# Patient Record
Sex: FEMALE | Race: WHITE | NOT HISPANIC OR LATINO | Employment: FULL TIME | URBAN - METROPOLITAN AREA
[De-identification: names, ages, dates, MRNs, and addresses within clinical notes are randomized per-mention and may not be internally consistent; named-entity substitution may affect disease eponyms.]

---

## 2017-03-17 ENCOUNTER — GENERIC CONVERSION - ENCOUNTER (OUTPATIENT)
Dept: OTHER | Facility: OTHER | Age: 42
End: 2017-03-17

## 2017-03-17 ENCOUNTER — LAB CONVERSION - ENCOUNTER (OUTPATIENT)
Dept: OTHER | Facility: OTHER | Age: 42
End: 2017-03-17

## 2017-03-17 LAB — HBA1C MFR BLD HPLC: 6.8 % OF TOTAL HGB

## 2017-03-21 ENCOUNTER — ALLSCRIPTS OFFICE VISIT (OUTPATIENT)
Dept: OTHER | Facility: OTHER | Age: 42
End: 2017-03-21

## 2017-04-02 ENCOUNTER — OFFICE VISIT (OUTPATIENT)
Dept: URGENT CARE | Facility: MEDICAL CENTER | Age: 42
End: 2017-04-02
Payer: COMMERCIAL

## 2017-04-02 PROCEDURE — 99213 OFFICE O/P EST LOW 20 MIN: CPT

## 2017-04-03 ENCOUNTER — ALLSCRIPTS OFFICE VISIT (OUTPATIENT)
Dept: OTHER | Facility: OTHER | Age: 42
End: 2017-04-03

## 2017-04-04 ENCOUNTER — LAB CONVERSION - ENCOUNTER (OUTPATIENT)
Dept: OTHER | Facility: OTHER | Age: 42
End: 2017-04-04

## 2017-04-04 ENCOUNTER — GENERIC CONVERSION - ENCOUNTER (OUTPATIENT)
Dept: OTHER | Facility: OTHER | Age: 42
End: 2017-04-04

## 2017-04-04 ENCOUNTER — TRANSCRIBE ORDERS (OUTPATIENT)
Dept: ADMINISTRATIVE | Facility: HOSPITAL | Age: 42
End: 2017-04-04

## 2017-04-04 DIAGNOSIS — K62.5 HEMORRHAGE OF RECTUM AND ANUS: ICD-10-CM

## 2017-04-04 DIAGNOSIS — R10.9 ABDOMINAL PAIN, UNSPECIFIED SITE: Primary | ICD-10-CM

## 2017-04-04 LAB
A/G RATIO (HISTORICAL): 1.1 (CALC) (ref 1–2.5)
ALBUMIN SERPL BCP-MCNC: 3.5 G/DL (ref 3.6–5.1)
ALP SERPL-CCNC: 67 U/L (ref 33–115)
ALT SERPL W P-5'-P-CCNC: 16 U/L (ref 6–29)
AMYLASE (HISTORICAL): 16 U/L (ref 21–101)
AST SERPL W P-5'-P-CCNC: 13 U/L (ref 10–30)
BILIRUB SERPL-MCNC: 0.2 MG/DL (ref 0.2–1.2)
BUN SERPL-MCNC: 10 MG/DL (ref 7–25)
BUN/CREA RATIO (HISTORICAL): ABNORMAL (CALC) (ref 6–22)
CALCIUM SERPL-MCNC: 8.8 MG/DL (ref 8.6–10.2)
CHLORIDE SERPL-SCNC: 103 MMOL/L (ref 98–110)
CO2 SERPL-SCNC: 26 MMOL/L (ref 20–31)
CREAT SERPL-MCNC: 0.82 MG/DL (ref 0.5–1.1)
GAMMA GLOBULIN (HISTORICAL): 3.2 G/DL (CALC) (ref 1.9–3.7)
GLUCOSE (HISTORICAL): 91 MG/DL (ref 65–99)
LIPASE SERPL-CCNC: 16 U/L (ref 7–60)
POTASSIUM SERPL-SCNC: 3.8 MMOL/L (ref 3.5–5.3)
SODIUM SERPL-SCNC: 138 MMOL/L (ref 135–146)
TOTAL PROTEIN (HISTORICAL): 6.7 G/DL (ref 6.1–8.1)

## 2017-04-05 ENCOUNTER — GENERIC CONVERSION - ENCOUNTER (OUTPATIENT)
Dept: OTHER | Facility: OTHER | Age: 42
End: 2017-04-05

## 2017-04-05 LAB — CLOSTRIDIUM DIFFICILE TOXIN (HISTORICAL): NOT DETECTED

## 2017-04-07 ENCOUNTER — ALLSCRIPTS OFFICE VISIT (OUTPATIENT)
Dept: OTHER | Facility: OTHER | Age: 42
End: 2017-04-07

## 2017-04-08 LAB
CULTURE RESULT (HISTORICAL): NORMAL
CULTURE RESULT (HISTORICAL): NORMAL
SHIGA TOXIN TEST (HISTORICAL): NORMAL

## 2017-04-10 ENCOUNTER — GENERIC CONVERSION - ENCOUNTER (OUTPATIENT)
Dept: OTHER | Facility: OTHER | Age: 42
End: 2017-04-10

## 2017-04-13 ENCOUNTER — HOSPITAL ENCOUNTER (OUTPATIENT)
Dept: RADIOLOGY | Age: 42
Discharge: HOME | End: 2017-04-13
Payer: COMMERCIAL

## 2017-04-13 DIAGNOSIS — K62.5 HEMORRHAGE OF RECTUM AND ANUS: ICD-10-CM

## 2017-04-13 DIAGNOSIS — R10.9 ABDOMINAL PAIN, UNSPECIFIED SITE: ICD-10-CM

## 2017-04-13 PROCEDURE — 74177 CT ABD & PELVIS W/CONTRAST: CPT

## 2017-04-13 RX ADMIN — IOHEXOL 100 ML: 350 INJECTION, SOLUTION INTRAVENOUS at 09:03

## 2017-04-17 ENCOUNTER — GENERIC CONVERSION - ENCOUNTER (OUTPATIENT)
Dept: OTHER | Facility: OTHER | Age: 42
End: 2017-04-17

## 2017-04-17 DIAGNOSIS — R10.9 ABDOMINAL PAIN: ICD-10-CM

## 2017-04-17 DIAGNOSIS — N83.292 OTHER OVARIAN CYST, LEFT SIDE: ICD-10-CM

## 2017-05-04 ENCOUNTER — HOSPITAL ENCOUNTER (OUTPATIENT)
Dept: ULTRASOUND IMAGING | Facility: HOSPITAL | Age: 42
Discharge: HOME | End: 2017-05-04
Payer: COMMERCIAL

## 2017-05-04 ENCOUNTER — TRANSCRIBE ORDERS (OUTPATIENT)
Dept: ADMINISTRATIVE | Facility: HOSPITAL | Age: 42
End: 2017-05-04

## 2017-05-04 DIAGNOSIS — R10.9 ABDOMINAL PAIN: ICD-10-CM

## 2017-05-04 DIAGNOSIS — N83.292 OTHER OVARIAN CYST, LEFT SIDE: ICD-10-CM

## 2017-05-04 PROCEDURE — 76856 US EXAM PELVIC COMPLETE: CPT

## 2017-05-04 PROCEDURE — 76830 TRANSVAGINAL US NON-OB: CPT

## 2017-05-05 ENCOUNTER — GENERIC CONVERSION - ENCOUNTER (OUTPATIENT)
Dept: OTHER | Facility: OTHER | Age: 42
End: 2017-05-05

## 2017-05-09 ENCOUNTER — ALLSCRIPTS OFFICE VISIT (OUTPATIENT)
Dept: OTHER | Facility: OTHER | Age: 42
End: 2017-05-09

## 2017-05-19 ENCOUNTER — ALLSCRIPTS OFFICE VISIT (OUTPATIENT)
Dept: OTHER | Facility: OTHER | Age: 42
End: 2017-05-19

## 2017-05-19 ENCOUNTER — TRANSCRIBE ORDERS (OUTPATIENT)
Dept: ADMINISTRATIVE | Facility: HOSPITAL | Age: 42
End: 2017-05-19

## 2017-05-19 DIAGNOSIS — N83.292 COMPLEX CYST OF LEFT OVARY: Primary | ICD-10-CM

## 2017-05-25 ENCOUNTER — ALLSCRIPTS OFFICE VISIT (OUTPATIENT)
Dept: OTHER | Facility: OTHER | Age: 42
End: 2017-05-25

## 2017-06-06 ENCOUNTER — HOSPITAL ENCOUNTER (OUTPATIENT)
Dept: ULTRASOUND IMAGING | Facility: HOSPITAL | Age: 42
Discharge: HOME | End: 2017-06-06
Attending: OBSTETRICS & GYNECOLOGY
Payer: COMMERCIAL

## 2017-06-06 DIAGNOSIS — N83.292 COMPLEX CYST OF LEFT OVARY: ICD-10-CM

## 2017-06-06 PROCEDURE — 76830 TRANSVAGINAL US NON-OB: CPT

## 2017-06-06 PROCEDURE — 76856 US EXAM PELVIC COMPLETE: CPT

## 2017-06-09 ENCOUNTER — GENERIC CONVERSION - ENCOUNTER (OUTPATIENT)
Dept: OTHER | Facility: OTHER | Age: 42
End: 2017-06-09

## 2017-06-12 RX ORDER — LEVOTHYROXINE SODIUM 0.05 MG/1
50 TABLET ORAL DAILY
COMMUNITY
End: 2018-02-13 | Stop reason: SDUPTHER

## 2017-06-12 RX ORDER — DOXYCYCLINE HYCLATE 100 MG/1
100 CAPSULE ORAL 2 TIMES DAILY
COMMUNITY
End: 2017-11-10

## 2017-06-12 RX ORDER — GABAPENTIN 300 MG/1
500 CAPSULE ORAL 3 TIMES DAILY
COMMUNITY
End: 2019-03-07 | Stop reason: DRUGHIGH

## 2017-06-12 RX ORDER — ATORVASTATIN CALCIUM 10 MG/1
10 TABLET, FILM COATED ORAL DAILY
COMMUNITY
End: 2018-03-19 | Stop reason: SDUPTHER

## 2017-06-12 RX ORDER — LISINOPRIL 10 MG/1
10 TABLET ORAL DAILY
COMMUNITY
End: 2018-11-30 | Stop reason: SDUPTHER

## 2017-06-12 RX ORDER — PANTOPRAZOLE SODIUM 40 MG/1
40 TABLET, DELAYED RELEASE ORAL DAILY
COMMUNITY
End: 2018-05-03 | Stop reason: ALTCHOICE

## 2017-06-12 RX ORDER — ASPIRIN 81 MG/1
81 TABLET ORAL DAILY
COMMUNITY
End: 2019-03-26 | Stop reason: HOSPADM

## 2017-06-12 RX ORDER — ALPRAZOLAM 0.5 MG/1
0.5 TABLET ORAL
COMMUNITY
End: 2018-02-02

## 2017-06-12 RX ORDER — BIOTIN 1 MG
2000 TABLET ORAL DAILY
COMMUNITY
End: 2018-12-03 | Stop reason: SDUPTHER

## 2017-06-12 RX ORDER — LIRAGLUTIDE 6 MG/ML
0.6 INJECTION SUBCUTANEOUS
COMMUNITY
End: 2017-11-10

## 2017-06-12 RX ORDER — DULOXETIN HYDROCHLORIDE 60 MG/1
60 CAPSULE, DELAYED RELEASE ORAL DAILY
COMMUNITY
End: 2019-03-26 | Stop reason: HOSPADM

## 2017-06-13 ENCOUNTER — GENERIC CONVERSION - ENCOUNTER (OUTPATIENT)
Dept: OTHER | Facility: OTHER | Age: 42
End: 2017-06-13

## 2017-06-13 ENCOUNTER — HOSPITAL ENCOUNTER (OUTPATIENT)
Facility: HOSPITAL | Age: 42
Setting detail: OUTPATIENT SURGERY
Discharge: HOME | End: 2017-06-13
Attending: INTERNAL MEDICINE | Admitting: INTERNAL MEDICINE
Payer: COMMERCIAL

## 2017-06-13 ENCOUNTER — ANESTHESIA (OUTPATIENT)
Dept: GASTROENTEROLOGY | Facility: HOSPITAL | Age: 42
End: 2017-06-13
Payer: COMMERCIAL

## 2017-06-13 ENCOUNTER — ANESTHESIA EVENT (OUTPATIENT)
Dept: GASTROENTEROLOGY | Facility: HOSPITAL | Age: 42
End: 2017-06-13
Payer: COMMERCIAL

## 2017-06-13 VITALS
BODY MASS INDEX: 48.82 KG/M2 | HEIGHT: 65 IN | TEMPERATURE: 98.3 F | HEART RATE: 80 BPM | DIASTOLIC BLOOD PRESSURE: 66 MMHG | RESPIRATION RATE: 16 BRPM | WEIGHT: 293 LBS | SYSTOLIC BLOOD PRESSURE: 110 MMHG | OXYGEN SATURATION: 97 %

## 2017-06-13 DIAGNOSIS — E66.01 MORBID (SEVERE) OBESITY DUE TO EXCESS CALORIES (HCC): ICD-10-CM

## 2017-06-13 PROCEDURE — 88305 TISSUE EXAM BY PATHOLOGIST: CPT | Performed by: INTERNAL MEDICINE

## 2017-06-13 RX ORDER — SODIUM CHLORIDE 9 MG/ML
125 INJECTION, SOLUTION INTRAVENOUS CONTINUOUS
Status: DISCONTINUED | OUTPATIENT
Start: 2017-06-13 | End: 2017-06-13 | Stop reason: HOSPADM

## 2017-06-13 RX ORDER — PROPOFOL 10 MG/ML
INJECTION, EMULSION INTRAVENOUS AS NEEDED
Status: DISCONTINUED | OUTPATIENT
Start: 2017-06-13 | End: 2017-06-13 | Stop reason: SURG

## 2017-06-13 RX ADMIN — PROPOFOL 30 MG: 10 INJECTION, EMULSION INTRAVENOUS at 12:55

## 2017-06-13 RX ADMIN — PROPOFOL 100 MG: 10 INJECTION, EMULSION INTRAVENOUS at 12:39

## 2017-06-13 RX ADMIN — SODIUM CHLORIDE 125 ML/HR: 0.9 INJECTION, SOLUTION INTRAVENOUS at 11:52

## 2017-06-13 RX ADMIN — PROPOFOL 50 MG: 10 INJECTION, EMULSION INTRAVENOUS at 12:43

## 2017-06-13 RX ADMIN — PROPOFOL 30 MG: 10 INJECTION, EMULSION INTRAVENOUS at 12:47

## 2017-06-13 RX ADMIN — PROPOFOL 20 MG: 10 INJECTION, EMULSION INTRAVENOUS at 12:49

## 2017-06-13 RX ADMIN — PROPOFOL 30 MG: 10 INJECTION, EMULSION INTRAVENOUS at 12:54

## 2017-06-13 RX ADMIN — PROPOFOL 30 MG: 10 INJECTION, EMULSION INTRAVENOUS at 12:51

## 2017-06-13 RX ADMIN — PROPOFOL 50 MG: 10 INJECTION, EMULSION INTRAVENOUS at 12:45

## 2017-06-13 RX ADMIN — PROPOFOL 30 MG: 10 INJECTION, EMULSION INTRAVENOUS at 13:00

## 2017-06-13 RX ADMIN — PROPOFOL 30 MG: 10 INJECTION, EMULSION INTRAVENOUS at 12:57

## 2017-06-13 NOTE — ANESTHESIA POSTPROCEDURE EVALUATION
Post-Op Assessment Note      CV Status:  Stable    Mental Status:  Alert and awake    Hydration Status:  Euvolemic    PONV Controlled:  Controlled    Airway Patency:  Patent    Post Op Vitals Reviewed: Yes          Staff: Anesthesiologist, with CRNAs           BP      Temp      Pulse     Resp      SpO2

## 2017-06-13 NOTE — ANESTHESIA PREPROCEDURE EVALUATION
Review of Systems/Medical History  Patient summary reviewed        Cardiovascular  Hypertension ,    Pulmonary  Negative pulmonary ROS        GI/Hepatic    GI bleeding , GERD, Bowel prep       Negative  ROS        Endo/Other  Diabetes type 2 , History of thyroid disease , hypothyroidism,      GYN       Hematology  Negative hematology ROS      Musculoskeletal  Obesity  morbid obesity,        Neurology      Comment: Fibromyalgia Psychology   Anxiety, Depression ,            Physical Exam    Airway    Mallampati score: II  TM Distance: >3 FB  Neck ROM: full     Dental   No notable dental hx     Cardiovascular  Rhythm: regular, Rate: normal, Cardiovascular exam normal    Pulmonary  Pulmonary exam normal Breath sounds clear to auscultation,     Other Findings        Anesthesia Plan  ASA Score- 2       Anesthesia Type- IV sedation with anesthesia        Induction- intravenous      Informed Consent  Anesthetic plan and risks discussed with patient    I, Dejuan Escalante MD, have personally seen and evaluated the patient prior to anesthetic care.  I have reviewed the pre-anesthetic record, and other medical records if appropriate to the anesthetic care.  If a CRNA is involved in the case, I have reviewed the CRNA assessment, if present, and agree. Risks/benefits and alternatives discussed with patient including possible PONV, sore throat, and possibility of rare anesthetic and surgical emergencies.

## 2017-06-14 DIAGNOSIS — E11.9 TYPE 2 DIABETES MELLITUS WITHOUT COMPLICATIONS (HCC): ICD-10-CM

## 2017-06-20 ENCOUNTER — GENERIC CONVERSION - ENCOUNTER (OUTPATIENT)
Dept: OTHER | Facility: OTHER | Age: 42
End: 2017-06-20

## 2017-06-22 ENCOUNTER — ALLSCRIPTS OFFICE VISIT (OUTPATIENT)
Dept: OTHER | Facility: OTHER | Age: 42
End: 2017-06-22

## 2017-07-12 ENCOUNTER — LAB CONVERSION - ENCOUNTER (OUTPATIENT)
Dept: OTHER | Facility: OTHER | Age: 42
End: 2017-07-12

## 2017-07-12 ENCOUNTER — GENERIC CONVERSION - ENCOUNTER (OUTPATIENT)
Dept: OTHER | Facility: OTHER | Age: 42
End: 2017-07-12

## 2017-07-12 LAB — HBA1C MFR BLD HPLC: 6.1 % OF TOTAL HGB

## 2017-07-14 ENCOUNTER — ALLSCRIPTS OFFICE VISIT (OUTPATIENT)
Dept: OTHER | Facility: OTHER | Age: 42
End: 2017-07-14

## 2017-07-19 DIAGNOSIS — N83.292 OTHER OVARIAN CYST, LEFT SIDE: ICD-10-CM

## 2017-07-25 ENCOUNTER — ALLSCRIPTS OFFICE VISIT (OUTPATIENT)
Dept: OTHER | Facility: OTHER | Age: 42
End: 2017-07-25

## 2017-08-09 ENCOUNTER — GENERIC CONVERSION - ENCOUNTER (OUTPATIENT)
Dept: OTHER | Facility: OTHER | Age: 42
End: 2017-08-09

## 2017-08-25 ENCOUNTER — HOSPITAL ENCOUNTER (OUTPATIENT)
Dept: ULTRASOUND IMAGING | Facility: HOSPITAL | Age: 42
Discharge: HOME | End: 2017-08-25
Attending: OBSTETRICS & GYNECOLOGY
Payer: COMMERCIAL

## 2017-08-25 DIAGNOSIS — N83.292 OTHER OVARIAN CYST, LEFT SIDE: ICD-10-CM

## 2017-08-25 PROCEDURE — 76830 TRANSVAGINAL US NON-OB: CPT

## 2017-08-25 PROCEDURE — 76856 US EXAM PELVIC COMPLETE: CPT

## 2017-08-31 LAB
LEFT EYE DIABETIC RETINOPATHY: NORMAL
RIGHT EYE DIABETIC RETINOPATHY: NORMAL

## 2017-09-01 ENCOUNTER — GENERIC CONVERSION - ENCOUNTER (OUTPATIENT)
Dept: OTHER | Facility: OTHER | Age: 42
End: 2017-09-01

## 2017-09-25 ENCOUNTER — GENERIC CONVERSION - ENCOUNTER (OUTPATIENT)
Dept: OTHER | Facility: OTHER | Age: 42
End: 2017-09-25

## 2017-09-25 DIAGNOSIS — Z12.31 ENCOUNTER FOR SCREENING MAMMOGRAM FOR MALIGNANT NEOPLASM OF BREAST: ICD-10-CM

## 2017-10-05 ENCOUNTER — HOSPITAL ENCOUNTER (OUTPATIENT)
Dept: RADIOLOGY | Age: 42
Discharge: HOME | End: 2017-10-05
Payer: COMMERCIAL

## 2017-10-05 DIAGNOSIS — Z12.31 ENCOUNTER FOR SCREENING MAMMOGRAM FOR MALIGNANT NEOPLASM OF BREAST: ICD-10-CM

## 2017-10-05 PROCEDURE — G0202 SCR MAMMO BI INCL CAD: HCPCS

## 2017-10-06 DIAGNOSIS — I10 ESSENTIAL (PRIMARY) HYPERTENSION: ICD-10-CM

## 2017-10-06 DIAGNOSIS — E11.69 TYPE 2 DIABETES MELLITUS WITH OTHER SPECIFIED COMPLICATION (HCC): ICD-10-CM

## 2017-10-06 DIAGNOSIS — D49.59 NEOPLASM OF UNSPECIFIED BEHAVIOR OF OTHER GENITOURINARY ORGAN: ICD-10-CM

## 2017-10-06 DIAGNOSIS — N93.9 ABNORMAL UTERINE AND VAGINAL BLEEDING, UNSPECIFIED (CODE): ICD-10-CM

## 2017-10-06 DIAGNOSIS — E03.9 HYPOTHYROIDISM: ICD-10-CM

## 2017-10-09 ENCOUNTER — ALLSCRIPTS OFFICE VISIT (OUTPATIENT)
Dept: OTHER | Facility: OTHER | Age: 42
End: 2017-10-09

## 2017-10-09 LAB — HCG, QUALITATIVE (HISTORICAL): NEGATIVE

## 2017-10-09 PROCEDURE — 88305 TISSUE EXAM BY PATHOLOGIST: CPT | Performed by: OBSTETRICS & GYNECOLOGY

## 2017-10-11 ENCOUNTER — LAB REQUISITION (OUTPATIENT)
Dept: LAB | Facility: HOSPITAL | Age: 42
End: 2017-10-11
Payer: COMMERCIAL

## 2017-10-11 DIAGNOSIS — N93.9 ABNORMAL UTERINE AND VAGINAL BLEEDING, UNSPECIFIED (CODE): ICD-10-CM

## 2017-10-24 ENCOUNTER — ALLSCRIPTS OFFICE VISIT (OUTPATIENT)
Dept: OTHER | Facility: OTHER | Age: 42
End: 2017-10-24

## 2017-10-25 NOTE — CONSULTS
Assessment  1  Ovarian neoplasm (239 5) ()    27-year-old  0 with an ovarian neoplasm of uncertain behavior and severe dysmenorrhea likely related to endometriosis  Plan  Ovarian neoplasm    · Schedule Surgery Treatment  Procedure  Status: Hold For - Scheduling  Requested for:  [id number]   Ordered; For: Ovarian neoplasm; Ordered By: Tiffanie Chatman Performed:  Due: 77QNQ4163    The patient desires definitive surgical management  Patient understands that the treatment for endometriosis requires surgical removal of both ovaries which will put her into surgical menopause  The patient has been educated that the side effects of menopause may be debilitating to the quality of her life  The patient understands the risks, benefits and alternative treatments and wishes to proceed  The patient will sign informed consent for a robotic assisted total laparoscopic hysterectomy, bilateral salpingo-oophorectomy, possible staging, and possible exploratory laparotomy  Chief Complaint  Chief Complaint Free Text Note Form: I am here to schedule by surgery  History of Present Illness  Problem [facility]:   1) ovarian neoplasm likely endometriomadysmenorrhea which is severely debilitatingsuper morbid obesity with BMI of 53multiple comorbidities including hypertension, type 2 diabetes and obesity  Previous Therapy:   None   Free Text HPI: The patient is a delightful 27-year-old  0 referred for an ovarian neoplasm of uncertain behavior  She presents for evaluation and treatment  patient has been followed for a persistent left ovarian cyst for quite some time  Patient also endorses a history of dysmenorrhea  The patient underwent an endometrial biopsy on 2017 which was suggestive of a polyp but showed no evidence of hyperplasia or carcinoma  Patient's most recent pelvic ultrasound was performed on of 2017  The uterus measured 7 4 x 3 9 x 5 2 cm and was anteverted   The left ovary measured 7 4 x 5 2 x 4 9 cm  Again noted is a was a hypoechoic blocked a bilobed lesion measuring 9 6 x 5 3 x 5 4 cm with low level internal echoes  The right ovary measures 4 5 x 2 3 x 3 2 cm  Review of Systems  Complete-Female Gyn Onc:   Constitutional: feeling poorly-- and-- feeling tired, but-- no fever,-- no recent weight gain,-- no chills-- and-- no recent weight loss  Eyes: no eye pain,-- no eyesight problems,-- no dryness of the eyes,-- eyes not red,-- no purulent discharge from the eyes-- and-- no itching of the eyes  ENT: no earache,-- no nosebleeds,-- no sore throat,-- no hearing loss,-- no nasal discharge-- and-- no hoarseness   Cardiovascular: the heart rate was not slow,-- no chest pain,-- the heart rate was not fast-- and-- no palpitations  Respiratory: no shortness of breath,-- no cough,-- no orthopnea,-- no wheezing,-- no shortness of breath during exertion-- and-- no PND  Gastrointestinal: no abdominal pain,-- no nausea,-- no vomiting,-- no constipation,-- no diarrhea-- and-- no blood in stools  Genitourinary: pelvic pain,-- dysmenorrhea-- and-- unexplained vaginal bleeding, but-- no dysuria,-- no vaginal discharge,-- no incontinence,-- no pelvic pressure,-- no vulvar itching-- and-- no hematuria  Musculoskeletal: joint stiffness-- and-- limb swelling, but-- no arthralgias,-- no joint swelling,-- no limb pain-- and-- no myalgias  Integumentary: no rashes,-- no itching,-- no breast pain,-- no skin lesions,-- no skin wound-- and-- no breast lump  Neurological: tingling, but-- no headache,-- no numbness,-- no confusion,-- no dizziness,-- no limb weakness,-- no convulsions,-- no fainting-- and-- no difficulty walking  Psychiatric: anxiety-- and-- depression, but-- not suicidal,-- no personality change,-- no sleep disturbances-- and-- no emotional problems  Endocrine: hot flashes, but-- no proptosis,-- no muscle weakness-- and-- no deepening of the voice   no feelings of weakness Hematologic/Lymphatic: no swollen glands,-- no tendency for easy bleeding,-- no tendency for easy bruising-- and-- no swollen glands in the neck  ROS Reviewed:   ROS reviewed  Active Problems  1  Abnormal uterine bleeding (AUB) (626 9) (N93 9)   2  Anxiety and depression (300 00,311) (F41 8)   3  Arthritis (716 90) (M19 90)   4  Benign hypertension (401 1) (I10)   5  Colon cancer screening (V76 51) (Z12 11)   6  Diabetes mellitus type 2 in obese (250 00,278 00) (E11 69,E66 9)   7  Encounter for annual routine gynecological examination (V72 31) (Z01 419)   8  Encounter for smoking cessation counseling (V65 42,305 1) (Z71 6,Z72 0)   9  Gastroesophageal reflux disease without esophagitis (530 81) (K21 9)   10  GERD (gastroesophageal reflux disease) (530 81) (K21 9)   11  Heavy menses (626 2) (N92 0)   12  Hypothyroidism (244 9) (E03 9)   13  Insomnia (780 52) (G47 00)   14  Irregular menstrual cycle (626 4) (N92 6)   15  Low back pain (724 2) (M54 5)   16  Morbid obesity due to excess calories (278 01) (E66 01)   17  No history of previous surgery (V49 89) (Z78 9)   18  Obesity (278 00) (E66 9)   19  Other ovarian cyst, left side (620 2) (N83 292)   20  Proctitis (569 49) (K62 89)   21  Rectal bleeding (569 3) (K62 5)   22  Viral gastroenteritis (008 8) (A08 4)   23  Visit for screening mammogram (V76 12) (Z12 31)    Past Medical History  1  History of Acute maxillary sinusitis, recurrence not specified (461 0) (J01 00)   2  History of H/O colonoscopy (V45 89) (Z98 890)   3  History of diabetes mellitus (V12 29) (Z86 39)   4  History of hypertension (V12 59) (Z86 79)   5  History of mammogram (V1 89) (Z92 89)  GYN Onc Past GYN History [facility]:   Patient GYN History: First period was age 6,--  0-- and-- Para 0, but-- no abnormal pap smears in the past-- and-- no history of STD(s)  Active Problems And Past Medical History Reviewed:    The active problems and past medical history were reviewed and updated today  Surgical History  Surgical History Reviewed: The surgical history was reviewed and updated today  Family History  Mother    1  Family history of cardiac disorder (V17 49) (Z82 49)   2  Family history of diabetes mellitus (V18 0) (Z83 3)   3  Family history of hypertension (V17 49) (Z82 49)   4  Family history of hyperthyroidism (V18 19) (Z83 49)  Father    5  Family history of diabetes mellitus (V18 0) (Z83 3)   6  Family history of hypertension (V17 49) (Z82 49)  Maternal Grandfather    7  Family history of lung cancer (V16 1) (Z80 1)  Aunt    8  Family history of colon cancer (V16 0) (Z80 0)  Maternal Aunt    9  Family history of Colon cancer  Uncle    8  Family history of lung cancer (V16 1) (Z80 1)  Cousin    6  Family history of colon cancer (V16 0) (Z80 0)  Maternal Cousin    15  Family history of Colon cancer  Family History Reviewed: The family history was reviewed and updated today  Social History   · Current every day smoker (305 1) (F17 200)   · Occupation   · Social drinker (V49 89) (Z78 9)  Social History Reviewed: The social history was reviewed and is unchanged  Current Meds   1  ALPRAZolam 0 5 MG Oral Tablet; TAKE 2 TABLET Bedtime PRN; Therapy: 47NSW5952 to [phone]); Last Rx:61Svn2428 Ordered   2  Aspirin Adult Low Strength 81 MG Oral Tablet Delayed Release; Therapy: 50UPN9226 to Recorded   3  Atorvastatin Calcium 10 MG Oral Tablet; TAKE ONE (1) TABLET(S) DAILY; Therapy: 07MRQ9438 to (Evaluate:72Ioq8880)  Requested for: 21Mar2017; Last   Rx:13Mar2017 Ordered   4  B-12 1000 MCG Oral Capsule; Therapy: 92NIX1897 to Recorded   5  Budesonide 3 MG Oral Capsule Delayed Release Particles; take 3 capsule daily; Therapy: 24Xma9156 to [phone])  Requested for: 84Bma8851; Last   Rx:64Vww5949 Ordered   6  Cymbalta 60 MG Oral Capsule Delayed Release Particles; take 1 capsule daily; Therapy: 65HYL1241 to Recorded   7   DULoxetine HCl - 60 MG Oral Capsule Delayed Release Particles; Therapy: 32WWL1327 to Recorded   8  Farxiga 5 MG Oral Tablet; Take 1 tablet daily; Therapy: 79GFP8840 to (Ernst Foster)  Requested for: 82KTF8696; Last   Rx:65Qyd9903 Ordered   9  Gabapentin 300 MG Oral Capsule; Therapy: (MPWAKRBT:68IPH7908) to Recorded   10  Levothyroxine Sodium 50 MCG Oral Tablet; TAKE 1 TABLET DAILY AS DIRECTED; Therapy: 50OXG9693 to (Evaluate:26Jan2018)  Requested for: 84DQP4873; Last    Rx:31Jan2017 Ordered   11  Lisinopril 10 MG Oral Tablet; TAKE 1 TABLET BY MOUTH ONCE DAILY; Therapy: 54VVL9765 to (Evaluate:24Ntx2256)  Requested for: 50ZOY5439; Last    Rx:88Glr1591 Ordered   12  Pantoprazole Sodium 40 MG Oral Tablet Delayed Release; TAKE ONE TABLET BY    MOUTH ONCE DAILY; Therapy: 77DBD7339 to (Evaluate:14Apr2018)  Requested for: 93ZTW2365; Last    Rx:56Njd9855 Ordered   13  Victoza 18 MG/3ML Subcutaneous Solution Pen-injector; INJECT 3 MG Daily; Therapy: 35INH9727 to (Last Rx:20Tvb8935)  Requested for: 14IEK1633 Ordered   14  Vitamin D3 2000 UNIT Oral Tablet; Therapy: 42TIF4481 to Recorded  Medication List Reviewed: The medication list was reviewed and updated today  Allergies  1  No Known Drug Allergies  2  No Known Environmental Allergies   3  No Known Food Allergies    Vitals  Vital Signs    Recorded: 41BWH6631 12:50PM   Temperature 99 1 F, Oral   Heart Rate 82, R Radial   Pulse Quality Normal, R Radial   Respiration Quality Normal   Respiration 18   Systolic 703, RUE, Sitting   Diastolic 84, RUE, Sitting   Height 5 ft 5 in   Weight 321 lb 1 oz   BMI Calculated 53 43   BSA Calculated 2 42     Physical Exam    Constitutional   General appearance: No acute distress, well appearing and well nourished  Neck   Neck: Normal, supple, trachea midline, no masses  Thyroid: Normal, no thyromegaly  Pulmonary   Respiratory effort: No increased work of breathing or signs of respiratory distress      Auscultation of lungs: Clear to auscultation  Cardiovascular   Auscultation of heart: Normal rate and rhythm, normal S1 and S2, no murmurs  Peripheral vascular exam: Normal pulses throughout  No edema noted in lower extremities  Genitourinary   External genitalia: Normal and no lesions appreciated  Vagina: Normal, no lesions or dryness appreciated  Urethra: Normal     Urethral meatus: Normal     Bladder: Normal, soft, non-tender and no prolapse or masses appreciated  Cervix: Normal, no palpable masses  Uterus: Normal, non-tender, not enlarged, and no palpaple masses  Adnexa/parametria: Abnormal  -- Left adnexal fullness  Chest   Breasts: Normal and no dimpling or skin changes noted  Abdomen   Abdomen: Normal, soft, non-tender, and no organomegaly noted  Liver and spleen: No hepatomegaly or splenomegaly  Examination for hernias: No hernias appreciated  Lymphatic   Palpation of lymph nodes in neck, axillae, groin and/or other locations: No lymphadenopathy or masses noted  Skin   Skin and subcutaneous tissue: Normal skin turgor and no rashes  Palpation of skin and subcutaneous tissue: Normal     Psychiatric   Orientation to person, place, and time: Normal     Mood and affect: Normal     GOG performance status is: 0      Results/Data  (1) TISSUE EXAM 16Oct2017 02:38PM Taylor Pascal     Test Name Result Flag Reference   LAB AP CASE REPORT (Report)     Surgical Pathology Report             Case: [id number]                   Authorizing Provider: Francisco Marques MD    Collected:      10/09/2017           Pathologist:      Beatrice Ramos MD      Received:      10/11/2017 1210        Specimen:  Endometrium   LAB AP FINAL DIAGNOSIS (Report)     A   Endometrium, Biopsy:  -Blood  -Small fragments of endometrium with focal features suggestive of a polyp   -Few fragments of late secretory pattern endometrium with stromal   breakdown and hemorrhage   -No evidence of hyperplasia or carcinoma seen   Electronically signed by Saul Champion MD on 10/16/2017 at 2:38 PM   LAB AP NOTE      Intradepartmental consultation is in agreement   LAB AP SURGICAL ADDITIONAL INFORMATION (Report)     All controls performed with the immunohistochemical stains reported above   reacted appropriately  These tests were developed and their performance   characteristics determined by [facility] or   [address] [place] [address]  They may not be cleared or approved by the [place]  Food and Drug Administration  The FDA has determined that such clearance   or approval is not necessary  These tests are used for clinical purposes  They should not be regarded as investigational or for research  This   laboratory has been approved by [place] [address], designated as a high-complexity   laboratory and is qualified to perform these tests  LAB AP GROSS DESCRIPTION (Report)     A  The specimen is received in formalin, labeled with the patient's name   and hospital number, and is designated Endometrial biopsy  The specimen   consists of multiple tan to brown to colorless mucinous, hemorrhagic and   possible soft tissue fragments measuring in aggregate 2 x 1 7 x 0 2 cm  Entirely submitted  One cassette  Note: The estimated total formalin fixation time based upon information   provided by the submitting clinician and the standard processing schedule   is less than 72 hours  MAC     * [facility] [id number] 10:08AM Jose Genao Order Number: [id number]    - Patient Instructions: To schedule this appointment, please contact Central Scheduling at [phone]  Test Name Result Flag Reference   US PELVIS COMPLETE (TRANSABDOMINAL AND TRANSVAGINAL) (Report)     PELVIC ULTRASOUND, COMPLETE     INDICATION: Reassess ovarian mass          COMPARISON: Prior ultrasound 6 6/17 and 5/4/2017   CT abdomen pelvis 4/13/2017     TECHNIQUE:  Transabdominal pelvic ultrasound was performed in sagittal and transverse planes with a curvilinear transducer  Additional transvaginal imaging was performed to better evaluate the endometrium and ovaries  Imaging included volumetric    sweeps as well as traditional still imaging technique  FINDINGS:     UTERUS:   The uterus is anteverted in position, measuring 7 4 x 3 9 x 5 2 cm  Contour and echotexture appear normal      The cervix shows no suspicious abnormality  ENDOMETRIUM:    Normal caliber of 11 mm  Homogenous and normal in appearance  OVARIES/ADNEXA:   Right ovary: 4 5 x 2 3 x 3 2 cm  No suspicious right ovarian abnormality  Doppler flow within normal limits  Left ovary: 7 4 x 5 2 x 4 9 cm    Again noted in the left adnexa is a hypoechoic bilobed lesion measuring 9 6 x 5 3 x 5 4 cm with low-level internal echoes  No definite internal flow  Doppler flow within normal limits  No suspicious adnexal mass or loculated collections  There is no free fluid  IMPRESSION:      Again present is a bilobed hypoechoic lesion in the left adnexa with low-level internal echoes likely representing an endometrioma  The lesion does appear slightly larger than on the prior examination  While this may be secondary to differences in    measurement technique, further characterization with pelvic MRI should be considered for confirmation given this finding  ##sigslh##sigslh            Workstation performed: [id number]     Signed by:   Duong Rasmussen MD   8/25/17     * CT ABDOMEN PELVIS W CONTRAST 13Apr2017 08:11AM Dena Toledo     Test Name Result Flag Reference   CT ABDOMEN PELVIS W CONTRAST (Report)     CT ABDOMEN AND PELVIS WITH IV CONTRAST     INDICATION: 55-year-old female with rectal bleeding and abdominal pain  COMPARISON: None  TECHNIQUE: CT examination of the abdomen and pelvis was performed  Reformatted images were created in axial, sagittal, and coronal planes        Radiation dose length product (DLP) for this visit: 1500 mGy-cm   This examination, like all CT scans performed in the [facility], was performed utilizing techniques to minimize radiation dose exposure, including the use of iterative    reconstruction and automated exposure control  IV Contrast: 100 mL of iohexol (OMNIPAQUE)      Enteric Contrast: Enteric contrast was administered  FINDINGS:     ABDOMEN     LOWER CHEST: No significant abnormalities identified in the lower chest      LIVER/BILIARY TREE: Liver is diffusely decreased in density consistent with fatty change  No CT evidence of suspicious hepatic mass  Normal hepatic contours  No biliary dilatation  GALLBLADDER: No calcified gallstones  No pericholecystic inflammatory change  SPLEEN: Unremarkable  PANCREAS: Unremarkable  ADRENAL GLANDS: Unremarkable  KIDNEYS/URETERS: Unremarkable  No hydronephrosis  STOMACH AND BOWEL: Unremarkable  APPENDIX: A normal appendix was visualized  ABDOMINOPELVIC CAVITY: No ascites or free intraperitoneal air  No lymphadenopathy  VESSELS: Unremarkable for patient's age  PELVIS     REPRODUCTIVE ORGANS: Low-density trilobed septated mass in the left adnexa presumably arising from the ovary measuring 7 3 x 5 6 cm  Ultrasound recommended for characterization  Uterus and right adnexa normal        URINARY BLADDER: Unremarkable  ABDOMINAL WALL/INGUINAL REGIONS: Unremarkable  OSSEOUS STRUCTURES: No acute fracture or destructive osseous lesion  IMPRESSION:       1  7 3 x 5 6 cm septated cystic lesion in the left adnexa likely arising from the ovary for which ultrasound is recommended for further characterization  2  Mild hepatic steatosis         Workstation performed: [id number]     Signed by:   Cynthia Simpson MD   4/17/17     Future Appointments    Date/Time Provider Specialty Site   11/22/2017 04:30 PM Sidney Cardona MD [place] [address]   12/04/2017 03:00 PM Paula Connelly, [facility] [address] [place] [address]     Signatures   Electronically signed by : Gurpreet Becker MD; Oct 24 2017  1:24PM EST                       (Author)

## 2017-11-07 ENCOUNTER — TRANSCRIBE ORDERS (OUTPATIENT)
Dept: LAB | Facility: CLINIC | Age: 42
End: 2017-11-07

## 2017-11-07 ENCOUNTER — LAB REQUISITION (OUTPATIENT)
Dept: LAB | Facility: HOSPITAL | Age: 42
End: 2017-11-07
Payer: COMMERCIAL

## 2017-11-07 ENCOUNTER — GENERIC CONVERSION - ENCOUNTER (OUTPATIENT)
Dept: OTHER | Facility: OTHER | Age: 42
End: 2017-11-07

## 2017-11-07 ENCOUNTER — OFFICE VISIT (OUTPATIENT)
Dept: LAB | Facility: CLINIC | Age: 42
End: 2017-11-07
Payer: COMMERCIAL

## 2017-11-07 ENCOUNTER — APPOINTMENT (OUTPATIENT)
Dept: LAB | Facility: CLINIC | Age: 42
End: 2017-11-07
Payer: COMMERCIAL

## 2017-11-07 DIAGNOSIS — O34.599 NEOPLASM OF BODY OF UTERUS AFFECTING PREGNANCY, ANTEPARTUM: ICD-10-CM

## 2017-11-07 DIAGNOSIS — Z01.812 ENCOUNTER FOR PREPROCEDURAL LABORATORY EXAMINATION: ICD-10-CM

## 2017-11-07 DIAGNOSIS — E11.69 TYPE 2 DIABETES MELLITUS WITH OTHER SPECIFIED COMPLICATION (HCC): ICD-10-CM

## 2017-11-07 DIAGNOSIS — I10 ESSENTIAL (PRIMARY) HYPERTENSION: ICD-10-CM

## 2017-11-07 DIAGNOSIS — E11.69 DIABETES MELLITUS ASSOCIATED WITH HORMONAL ETIOLOGY (HCC): ICD-10-CM

## 2017-11-07 DIAGNOSIS — D49.59 NEOPLASM OF BODY OF UTERUS AFFECTING PREGNANCY, ANTEPARTUM: ICD-10-CM

## 2017-11-07 DIAGNOSIS — D49.59 NEOPLASM OF UNSPECIFIED BEHAVIOR OF OTHER GENITOURINARY ORGAN: ICD-10-CM

## 2017-11-07 DIAGNOSIS — Z01.812 PRE-OPERATIVE LABORATORY EXAMINATION: ICD-10-CM

## 2017-11-07 DIAGNOSIS — Z01.812 PRE-OPERATIVE LABORATORY EXAMINATION: Primary | ICD-10-CM

## 2017-11-07 DIAGNOSIS — E03.9 HYPOTHYROIDISM: ICD-10-CM

## 2017-11-07 LAB
ABO GROUP BLD: NORMAL
ALBUMIN SERPL BCP-MCNC: 2.9 G/DL (ref 3.5–5)
ALP SERPL-CCNC: 76 U/L (ref 46–116)
ALT SERPL W P-5'-P-CCNC: 40 U/L (ref 12–78)
ANION GAP SERPL CALCULATED.3IONS-SCNC: 7 MMOL/L (ref 4–13)
APTT PPP: 41 SECONDS (ref 23–35)
AST SERPL W P-5'-P-CCNC: 15 U/L (ref 5–45)
ATRIAL RATE: 77 BPM
BASOPHILS # BLD AUTO: 0.07 THOUSANDS/ΜL (ref 0–0.1)
BASOPHILS NFR BLD AUTO: 1 % (ref 0–1)
BILIRUB SERPL-MCNC: 0.2 MG/DL (ref 0.2–1)
BLD GP AB SCN SERPL QL: NEGATIVE
BUN SERPL-MCNC: 14 MG/DL (ref 5–25)
CALCIUM SERPL-MCNC: 8.7 MG/DL (ref 8.3–10.1)
CANCER AG125 SERPL-ACNC: 58.8 U/ML (ref 0–30)
CHLORIDE SERPL-SCNC: 104 MMOL/L (ref 100–108)
CHOLEST SERPL-MCNC: 145 MG/DL (ref 50–200)
CO2 SERPL-SCNC: 28 MMOL/L (ref 21–32)
CREAT SERPL-MCNC: 0.9 MG/DL (ref 0.6–1.3)
CREAT UR-MCNC: 132 MG/DL
EOSINOPHIL # BLD AUTO: 0.86 THOUSAND/ΜL (ref 0–0.61)
EOSINOPHIL NFR BLD AUTO: 8 % (ref 0–6)
ERYTHROCYTE [DISTWIDTH] IN BLOOD BY AUTOMATED COUNT: 19 % (ref 11.6–15.1)
EST. AVERAGE GLUCOSE BLD GHB EST-MCNC: 151 MG/DL
GFR SERPL CREATININE-BSD FRML MDRD: 79 ML/MIN/1.73SQ M
GLUCOSE P FAST SERPL-MCNC: 130 MG/DL (ref 65–99)
HBA1C MFR BLD: 6.9 % (ref 4.2–6.3)
HCT VFR BLD AUTO: 37.5 % (ref 34.8–46.1)
HDLC SERPL-MCNC: 42 MG/DL (ref 40–60)
HGB BLD-MCNC: 11.4 G/DL (ref 11.5–15.4)
INR PPP: 0.94 (ref 0.86–1.16)
LDLC SERPL CALC-MCNC: 88 MG/DL (ref 0–100)
LYMPHOCYTES # BLD AUTO: 1.8 THOUSANDS/ΜL (ref 0.6–4.47)
LYMPHOCYTES NFR BLD AUTO: 16 % (ref 14–44)
MCH RBC QN AUTO: 22.8 PG (ref 26.8–34.3)
MCHC RBC AUTO-ENTMCNC: 30.4 G/DL (ref 31.4–37.4)
MCV RBC AUTO: 75 FL (ref 82–98)
MICROALBUMIN UR-MCNC: 6.2 MG/L (ref 0–20)
MICROALBUMIN/CREAT 24H UR: 5 MG/G CREATININE (ref 0–30)
MONOCYTES # BLD AUTO: 0.81 THOUSAND/ΜL (ref 0.17–1.22)
MONOCYTES NFR BLD AUTO: 7 % (ref 4–12)
NEUTROPHILS # BLD AUTO: 7.6 THOUSANDS/ΜL (ref 1.85–7.62)
NEUTS SEG NFR BLD AUTO: 68 % (ref 43–75)
P AXIS: 52 DEGREES
PLATELET # BLD AUTO: 311 THOUSANDS/UL (ref 149–390)
PMV BLD AUTO: 11.3 FL (ref 8.9–12.7)
POTASSIUM SERPL-SCNC: 4.1 MMOL/L (ref 3.5–5.3)
PR INTERVAL: 146 MS
PROT SERPL-MCNC: 7.4 G/DL (ref 6.4–8.2)
PROTHROMBIN TIME: 12.9 SECONDS (ref 12.1–14.4)
QRS AXIS: 72 DEGREES
QRSD INTERVAL: 82 MS
QT INTERVAL: 392 MS
QTC INTERVAL: 443 MS
RBC # BLD AUTO: 5 MILLION/UL (ref 3.81–5.12)
RH BLD: POSITIVE
SODIUM SERPL-SCNC: 139 MMOL/L (ref 136–145)
SPECIMEN EXPIRATION DATE: NORMAL
T WAVE AXIS: 38 DEGREES
T4 FREE SERPL-MCNC: 1.17 NG/DL (ref 0.76–1.46)
TRIGL SERPL-MCNC: 75 MG/DL
TSH SERPL DL<=0.05 MIU/L-ACNC: 3.75 UIU/ML (ref 0.36–3.74)
VENTRICULAR RATE: 77 BPM
WBC # BLD AUTO: 11.14 THOUSAND/UL (ref 4.31–10.16)

## 2017-11-07 PROCEDURE — 85025 COMPLETE CBC W/AUTO DIFF WBC: CPT

## 2017-11-07 PROCEDURE — 86850 RBC ANTIBODY SCREEN: CPT | Performed by: OBSTETRICS & GYNECOLOGY

## 2017-11-07 PROCEDURE — 86901 BLOOD TYPING SEROLOGIC RH(D): CPT | Performed by: OBSTETRICS & GYNECOLOGY

## 2017-11-07 PROCEDURE — 84443 ASSAY THYROID STIM HORMONE: CPT

## 2017-11-07 PROCEDURE — 80061 LIPID PANEL: CPT

## 2017-11-07 PROCEDURE — 84439 ASSAY OF FREE THYROXINE: CPT

## 2017-11-07 PROCEDURE — 85730 THROMBOPLASTIN TIME PARTIAL: CPT

## 2017-11-07 PROCEDURE — 80053 COMPREHEN METABOLIC PANEL: CPT

## 2017-11-07 PROCEDURE — 83036 HEMOGLOBIN GLYCOSYLATED A1C: CPT

## 2017-11-07 PROCEDURE — 86304 IMMUNOASSAY TUMOR CA 125: CPT

## 2017-11-07 PROCEDURE — 85610 PROTHROMBIN TIME: CPT

## 2017-11-07 PROCEDURE — 82570 ASSAY OF URINE CREATININE: CPT

## 2017-11-07 PROCEDURE — 82043 UR ALBUMIN QUANTITATIVE: CPT

## 2017-11-07 PROCEDURE — 86900 BLOOD TYPING SEROLOGIC ABO: CPT | Performed by: OBSTETRICS & GYNECOLOGY

## 2017-11-07 PROCEDURE — 36415 COLL VENOUS BLD VENIPUNCTURE: CPT

## 2017-11-07 PROCEDURE — 93005 ELECTROCARDIOGRAM TRACING: CPT

## 2017-11-10 NOTE — PRE-PROCEDURE INSTRUCTIONS
Pre-Surgery Instructions:   Medication Instructions    ALPRAZolam (XANAX) 0 5 mg tablet Patient was instructed per "E-Preop"    aspirin (ECOTRIN LOW STRENGTH) 81 mg EC tablet Patient was instructed per "E-Preop"    atorvastatin (LIPITOR) 10 mg tablet Patient was instructed per "E-Preop"    Cholecalciferol (VITAMIN D3) 1000 units CAPS Instructed patient per Anesthesia Guidelines   cyanocobalamin 100 MCG tablet Instructed patient per Anesthesia Guidelines   Dapagliflozin Propanediol (FARXIGA) 5 MG TABS Instructed patient per Anesthesia Guidelines      DULoxetine (CYMBALTA) 60 mg delayed release capsule Patient was instructed per "E-Preop"    gabapentin (NEURONTIN) 300 mg capsule Patient was instructed per "E-Preop"    levothyroxine 50 mcg tablet Patient was instructed per "E-Preop"    lisinopril (ZESTRIL) 10 mg tablet Patient was instructed per "E-Preop"    pantoprazole (PROTONIX) 40 mg tablet Patient was instructed per "E-Preop"

## 2017-11-10 NOTE — PRE-PROCEDURE INSTRUCTIONS
Pre-Surgery Instructions:   Medication Instructions    ALPRAZolam (XANAX) 0 5 mg tablet Patient was instructed per "E-Preop"    aspirin (ECOTRIN LOW STRENGTH) 81 mg EC tablet Patient was instructed per "E-Preop"    atorvastatin (LIPITOR) 10 mg tablet Patient was instructed per "E-Preop"    Cholecalciferol (VITAMIN D3) 1000 units CAPS Instructed patient per Anesthesia Guidelines   cyanocobalamin 100 MCG tablet Instructed patient per Anesthesia Guidelines   Dapagliflozin Propanediol (FARXIGA) 5 MG TABS Instructed patient per Anesthesia Guidelines   DULoxetine (CYMBALTA) 60 mg delayed release capsule Patient was instructed per "E-Preop"    gabapentin (NEURONTIN) 300 mg capsule Patient was instructed per "E-Preop"    levothyroxine 50 mcg tablet Patient was instructed per "E-Preop"    lisinopril (ZESTRIL) 10 mg tablet Patient was instructed per "E-Preop"    pantoprazole (PROTONIX) 40 mg tablet Patient was instructed per "E-Preop"      Medication Instruction (ACE/ARB - Blood Pressure Medication)    Please do not take this medication on the morning of your day of surgery  Please restart your medications as soon as clinically feasible  Lisinopril 10 MG Oral Tablet          Medication Instruction (Aspirin - Blood Thinners)    Your surgeon may have you stop aspirin up to a week early if you are having intracranial, spine, middle ear, posterior eye or prostate surgery  If not please continue to take this medication on your normal schedule  If you take this in the morning you may do so with a sip of water  aspirin         Medication Instruction (Benzodiazepine)    Please continue the following medications, if needed, up to and including the day of surgery (with a sip of water)          ALPRAZolam 0 5 MG Oral Tablet         Medication Instruction (Diabetic Medication)    If you are taking long-acting insulin (i e  glargine or Lantus) please only take one-half your usual nighttime dose the night before surgery  If you are taking short-acting insulin or oral diabetes medications, then omit the morning dose the day of surgery  If taking metformin (i e  Glucophage), discontinue this medication for 24 hours prior to surgery  If you have an insulin pump, continue pump the morning of surgery at a basal rate only  Diabetes mellitus type 2 in obese       Medication Instruction (Antacids)    Please continue to take this medication on your normal schedule  If this is an oral medication and you take in the morning, you may do so with a sip of water  pantoprazole         Medication Instruction (Neurological Medication)    Please continue to take this medication on your normal schedule  If this is an oral medication and you take in the morning, you may do so with a sip of water  Gabapentin 300 MG Oral Capsule         NPO Instructions    Please do not eat or drink anything prior to your surgery as follows: For AM Surgery times = stop at midnight the night before  For PM Surgery times = Midnight to 8AM clear liquids only (Jello, broth, 7up, Sprite, apple juice, black coffee, black tea, Gatorade)  If you are supposed to take any of your medications, do so with a sip of water  Failure to follow these instructions can lead to an increased risk of lung complications and may result in a delay or cancellation of your procedure  If you have any questions, contact your institution for further instructions  Medical Procedure Risk       Smoking Cessation    Smoking before and after surgery can lead to complications  Patients who quit smoking at least eight weeks before surgery have complication rates almost as low as non-smokers  Smokers who can stop smoking 24 or 48 hours before surgery may also benefit from decreased amounts of nicotine and carbon monoxide in the body  For help quitting smoking, speak with your physician or contact the [employer] or [employer]   Please visit the following web address for assistance with quitting  [employer] be  [email or web address]  aspx  Current every day smoker       Medication Instruction (SSRI - Antidepressants)    Please continue to take this medication on your normal schedule  If this is an oral medication and you take in the morning, you may do so with a sip of water  Cymbalta 60 MG Oral Capsule Delayed Release Particles, DULoxetine         Medication Instruction (Cholesterol Medication)    Please continue to take this medication on your normal schedule  If this is an oral medication and you take in the morning, you may do so with a sip of water  Atorvastatin Calcium 10 MG Oral Tablet         Medication Instruction (Thyroxine - Synthroid)    Please continue to take this medication on your normal schedule  If this is an oral medication and you take in the morning, you may do so with a sip of water           Levothyroxine Sodium 50 MCG Oral Tablet, levothyroxine      Accept All Complete All   Last signed: Never    Request Signature   Add New Task Show Removed Tasks

## 2017-11-15 ENCOUNTER — ANESTHESIA EVENT (OUTPATIENT)
Dept: PERIOP | Facility: HOSPITAL | Age: 42
End: 2017-11-15
Payer: COMMERCIAL

## 2017-11-19 NOTE — ANESTHESIA PREPROCEDURE EVALUATION
Review of Systems/Medical History  Patient summary reviewed  Chart reviewed  No history of anesthetic complications     Cardiovascular  EKG reviewed, Hyperlipidemia, Hypertension ,   Comment: EKG--SR 77,  Pulmonary  Smoker (3/4 ppd) cigarette smoker , ,        GI/Hepatic    GERD ,        Negative  ROS        Endo/Other  Diabetes type 2 , History of thyroid disease , hypothyroidism,      GYN  Negative gynecology ROS          Hematology  Negative hematology ROS      Musculoskeletal  Obesity (BMI 53)  super morbid obesity,   Comment: Fibromyalgia      Neurology    Headaches (occasional;),    Psychology   Anxiety,            Physical Exam    Airway  Comment: Short very thick neck  Mallampati score: III  TM Distance: <3 FB       Dental   Comment: Upper cracked tooth,     Cardiovascular  Rhythm: regular, Rate: normal,     Pulmonary  Breath sounds clear to auscultation,     Other Findings  Morbidly obese pt      Anesthesia Plan  ASA Score- 3       Anesthesia Type- general with ASA Monitors  Additional Monitors:   Airway Plan: ETT  Comment: H/H 11/37, K 4 1, Cr  90, HgbA1C 6 9, T&S,  Possible difficult airway, [place] videolaryngoscope   Preop   Induction- intravenous  Informed Consent- Anesthetic plan and risks discussed with patient  I personally reviewed this patient with the CRNA  Discussed and agreed on the Anesthesia Plan with the CRNA  Evelina Yusuf

## 2017-11-20 ENCOUNTER — ANESTHESIA (OUTPATIENT)
Dept: PERIOP | Facility: HOSPITAL | Age: 42
End: 2017-11-20
Payer: COMMERCIAL

## 2017-11-20 ENCOUNTER — HOSPITAL ENCOUNTER (OUTPATIENT)
Facility: HOSPITAL | Age: 42
Setting detail: OUTPATIENT SURGERY
Discharge: HOME | End: 2017-11-20
Attending: OBSTETRICS & GYNECOLOGY | Admitting: OBSTETRICS & GYNECOLOGY
Payer: COMMERCIAL

## 2017-11-20 VITALS
WEIGHT: 293 LBS | BODY MASS INDEX: 48.82 KG/M2 | HEART RATE: 75 BPM | RESPIRATION RATE: 16 BRPM | SYSTOLIC BLOOD PRESSURE: 155 MMHG | TEMPERATURE: 99.6 F | HEIGHT: 65 IN | DIASTOLIC BLOOD PRESSURE: 68 MMHG | OXYGEN SATURATION: 97 %

## 2017-11-20 DIAGNOSIS — D49.59 NEOPLASM OF UNSPECIFIED BEHAVIOR OF OTHER GENITOURINARY ORGAN: ICD-10-CM

## 2017-11-20 PROBLEM — F32.A ANXIETY AND DEPRESSION: Status: ACTIVE | Noted: 2017-11-20

## 2017-11-20 PROBLEM — M19.90 ARTHRITIS: Status: ACTIVE | Noted: 2017-11-20

## 2017-11-20 PROBLEM — E66.9 DIABETES MELLITUS TYPE 2 IN OBESE (HCC): Status: ACTIVE | Noted: 2017-11-20

## 2017-11-20 PROBLEM — E03.9 HYPOTHYROIDISM: Status: ACTIVE | Noted: 2017-11-20

## 2017-11-20 PROBLEM — N93.9 ABNORMAL UTERINE BLEEDING (AUB): Status: ACTIVE | Noted: 2017-11-20

## 2017-11-20 PROBLEM — K21.9 GASTROESOPHAGEAL REFLUX DISEASE WITHOUT ESOPHAGITIS: Status: ACTIVE | Noted: 2017-11-20

## 2017-11-20 PROBLEM — E66.01 MORBID OBESITY DUE TO EXCESS CALORIES (HCC): Status: ACTIVE | Noted: 2017-11-20

## 2017-11-20 PROBLEM — F41.9 ANXIETY AND DEPRESSION: Status: ACTIVE | Noted: 2017-11-20

## 2017-11-20 PROBLEM — Z90.710 STATUS POST LAPAROSCOPIC HYSTERECTOMY: Status: ACTIVE | Noted: 2017-11-20

## 2017-11-20 PROBLEM — N83.292 OTHER OVARIAN CYST, LEFT SIDE: Status: ACTIVE | Noted: 2017-11-20

## 2017-11-20 PROBLEM — E11.69 DIABETES MELLITUS TYPE 2 IN OBESE (HCC): Status: ACTIVE | Noted: 2017-11-20

## 2017-11-20 PROBLEM — I10 BENIGN HYPERTENSION: Status: ACTIVE | Noted: 2017-11-20

## 2017-11-20 LAB
EXT PREGNANCY TEST URINE: NEGATIVE
GLUCOSE SERPL-MCNC: 149 MG/DL (ref 65–140)
GLUCOSE SERPL-MCNC: 154 MG/DL (ref 65–140)

## 2017-11-20 PROCEDURE — 81025 URINE PREGNANCY TEST: CPT | Performed by: ANESTHESIOLOGY

## 2017-11-20 PROCEDURE — 88342 IMHCHEM/IMCYTCHM 1ST ANTB: CPT | Performed by: OBSTETRICS & GYNECOLOGY

## 2017-11-20 PROCEDURE — 82948 REAGENT STRIP/BLOOD GLUCOSE: CPT

## 2017-11-20 PROCEDURE — 88331 PATH CONSLTJ SURG 1 BLK 1SPC: CPT | Performed by: PATHOLOGY

## 2017-11-20 PROCEDURE — 88341 IMHCHEM/IMCYTCHM EA ADD ANTB: CPT | Performed by: OBSTETRICS & GYNECOLOGY

## 2017-11-20 PROCEDURE — 88307 TISSUE EXAM BY PATHOLOGIST: CPT | Performed by: OBSTETRICS & GYNECOLOGY

## 2017-11-20 RX ORDER — FENTANYL CITRATE 50 UG/ML
INJECTION, SOLUTION INTRAMUSCULAR; INTRAVENOUS AS NEEDED
Status: DISCONTINUED | OUTPATIENT
Start: 2017-11-20 | End: 2017-11-20 | Stop reason: SURG

## 2017-11-20 RX ORDER — FENTANYL CITRATE/PF 50 MCG/ML
50 SYRINGE (ML) INJECTION
Status: DISCONTINUED | OUTPATIENT
Start: 2017-11-20 | End: 2017-11-20 | Stop reason: HOSPADM

## 2017-11-20 RX ORDER — ONDANSETRON 2 MG/ML
4 INJECTION INTRAMUSCULAR; INTRAVENOUS ONCE AS NEEDED
Status: DISCONTINUED | OUTPATIENT
Start: 2017-11-20 | End: 2017-11-20 | Stop reason: HOSPADM

## 2017-11-20 RX ORDER — ALBUTEROL SULFATE 90 UG/1
AEROSOL, METERED RESPIRATORY (INHALATION) AS NEEDED
Status: DISCONTINUED | OUTPATIENT
Start: 2017-11-20 | End: 2017-11-20 | Stop reason: SURG

## 2017-11-20 RX ORDER — ONDANSETRON 2 MG/ML
INJECTION INTRAMUSCULAR; INTRAVENOUS AS NEEDED
Status: DISCONTINUED | OUTPATIENT
Start: 2017-11-20 | End: 2017-11-20 | Stop reason: SURG

## 2017-11-20 RX ORDER — IBUPROFEN 600 MG/1
600 TABLET ORAL EVERY 6 HOURS PRN
Status: DISCONTINUED | OUTPATIENT
Start: 2017-11-20 | End: 2017-11-20 | Stop reason: HOSPADM

## 2017-11-20 RX ORDER — MIDAZOLAM HYDROCHLORIDE 1 MG/ML
INJECTION INTRAMUSCULAR; INTRAVENOUS AS NEEDED
Status: DISCONTINUED | OUTPATIENT
Start: 2017-11-20 | End: 2017-11-20 | Stop reason: SURG

## 2017-11-20 RX ORDER — METOCLOPRAMIDE HYDROCHLORIDE 5 MG/ML
10 INJECTION INTRAMUSCULAR; INTRAVENOUS ONCE AS NEEDED
Status: DISCONTINUED | OUTPATIENT
Start: 2017-11-20 | End: 2017-11-20 | Stop reason: HOSPADM

## 2017-11-20 RX ORDER — MEPERIDINE HYDROCHLORIDE 25 MG/ML
12.5 INJECTION INTRAMUSCULAR; INTRAVENOUS; SUBCUTANEOUS ONCE AS NEEDED
Status: DISCONTINUED | OUTPATIENT
Start: 2017-11-20 | End: 2017-11-20 | Stop reason: HOSPADM

## 2017-11-20 RX ORDER — GLYCOPYRROLATE 0.2 MG/ML
INJECTION INTRAMUSCULAR; INTRAVENOUS AS NEEDED
Status: DISCONTINUED | OUTPATIENT
Start: 2017-11-20 | End: 2017-11-20 | Stop reason: SURG

## 2017-11-20 RX ORDER — BUPIVACAINE HYDROCHLORIDE 2.5 MG/ML
INJECTION, SOLUTION INFILTRATION; PERINEURAL AS NEEDED
Status: DISCONTINUED | OUTPATIENT
Start: 2017-11-20 | End: 2017-11-20 | Stop reason: HOSPADM

## 2017-11-20 RX ORDER — SODIUM CHLORIDE, SODIUM LACTATE, POTASSIUM CHLORIDE, CALCIUM CHLORIDE 600; 310; 30; 20 MG/100ML; MG/100ML; MG/100ML; MG/100ML
125 INJECTION, SOLUTION INTRAVENOUS CONTINUOUS
Status: DISCONTINUED | OUTPATIENT
Start: 2017-11-20 | End: 2017-11-20 | Stop reason: HOSPADM

## 2017-11-20 RX ORDER — KETOROLAC TROMETHAMINE 30 MG/ML
INJECTION, SOLUTION INTRAMUSCULAR; INTRAVENOUS AS NEEDED
Status: DISCONTINUED | OUTPATIENT
Start: 2017-11-20 | End: 2017-11-20 | Stop reason: SURG

## 2017-11-20 RX ORDER — LIDOCAINE HYDROCHLORIDE 10 MG/ML
INJECTION, SOLUTION INFILTRATION; PERINEURAL AS NEEDED
Status: DISCONTINUED | OUTPATIENT
Start: 2017-11-20 | End: 2017-11-20 | Stop reason: SURG

## 2017-11-20 RX ORDER — OXYCODONE HYDROCHLORIDE AND ACETAMINOPHEN 5; 325 MG/1; MG/1
2 TABLET ORAL EVERY 4 HOURS PRN
Status: DISCONTINUED | OUTPATIENT
Start: 2017-11-20 | End: 2017-11-20 | Stop reason: HOSPADM

## 2017-11-20 RX ORDER — SODIUM CHLORIDE 9 MG/ML
INJECTION, SOLUTION INTRAVENOUS CONTINUOUS PRN
Status: DISCONTINUED | OUTPATIENT
Start: 2017-11-20 | End: 2017-11-20 | Stop reason: SURG

## 2017-11-20 RX ORDER — SODIUM CHLORIDE, SODIUM LACTATE, POTASSIUM CHLORIDE, CALCIUM CHLORIDE 600; 310; 30; 20 MG/100ML; MG/100ML; MG/100ML; MG/100ML
50 INJECTION, SOLUTION INTRAVENOUS CONTINUOUS
Status: DISCONTINUED | OUTPATIENT
Start: 2017-11-20 | End: 2017-11-20 | Stop reason: HOSPADM

## 2017-11-20 RX ORDER — MAGNESIUM HYDROXIDE 1200 MG/15ML
LIQUID ORAL AS NEEDED
Status: DISCONTINUED | OUTPATIENT
Start: 2017-11-20 | End: 2017-11-20 | Stop reason: HOSPADM

## 2017-11-20 RX ORDER — PROPOFOL 10 MG/ML
INJECTION, EMULSION INTRAVENOUS AS NEEDED
Status: DISCONTINUED | OUTPATIENT
Start: 2017-11-20 | End: 2017-11-20 | Stop reason: SURG

## 2017-11-20 RX ORDER — ONDANSETRON 2 MG/ML
4 INJECTION INTRAMUSCULAR; INTRAVENOUS EVERY 6 HOURS PRN
Status: DISCONTINUED | OUTPATIENT
Start: 2017-11-20 | End: 2017-11-20 | Stop reason: HOSPADM

## 2017-11-20 RX ORDER — BUDESONIDE 3 MG/1
9 CAPSULE, COATED PELLETS ORAL DAILY
COMMUNITY
End: 2018-08-13 | Stop reason: SDUPTHER

## 2017-11-20 RX ORDER — OXYCODONE HYDROCHLORIDE AND ACETAMINOPHEN 5; 325 MG/1; MG/1
1 TABLET ORAL EVERY 4 HOURS PRN
Status: DISCONTINUED | OUTPATIENT
Start: 2017-11-20 | End: 2017-11-20 | Stop reason: HOSPADM

## 2017-11-20 RX ORDER — SUCCINYLCHOLINE CHLORIDE 20 MG/ML
INJECTION INTRAMUSCULAR; INTRAVENOUS AS NEEDED
Status: DISCONTINUED | OUTPATIENT
Start: 2017-11-20 | End: 2017-11-20 | Stop reason: SURG

## 2017-11-20 RX ORDER — ROCURONIUM BROMIDE 10 MG/ML
INJECTION, SOLUTION INTRAVENOUS AS NEEDED
Status: DISCONTINUED | OUTPATIENT
Start: 2017-11-20 | End: 2017-11-20 | Stop reason: SURG

## 2017-11-20 RX ADMIN — LIDOCAINE HYDROCHLORIDE 100 MG: 10 INJECTION, SOLUTION INFILTRATION; PERINEURAL at 07:33

## 2017-11-20 RX ADMIN — HYDROMORPHONE HYDROCHLORIDE 0.5 MG: 1 INJECTION, SOLUTION INTRAMUSCULAR; INTRAVENOUS; SUBCUTANEOUS at 09:00

## 2017-11-20 RX ADMIN — ROCURONIUM BROMIDE 20 MG: 10 INJECTION INTRAVENOUS at 08:15

## 2017-11-20 RX ADMIN — ROCURONIUM BROMIDE 20 MG: 10 INJECTION INTRAVENOUS at 07:56

## 2017-11-20 RX ADMIN — ROCURONIUM BROMIDE 20 MG: 10 INJECTION INTRAVENOUS at 08:57

## 2017-11-20 RX ADMIN — DEXAMETHASONE SODIUM PHOSPHATE 10 MG: 10 INJECTION INTRAMUSCULAR; INTRAVENOUS at 08:05

## 2017-11-20 RX ADMIN — SUCCINYLCHOLINE CHLORIDE 140 MG: 20 INJECTION, SOLUTION INTRAMUSCULAR; INTRAVENOUS at 07:35

## 2017-11-20 RX ADMIN — ROCURONIUM BROMIDE 10 MG: 10 INJECTION INTRAVENOUS at 08:43

## 2017-11-20 RX ADMIN — ROCURONIUM BROMIDE 50 MG: 10 INJECTION INTRAVENOUS at 07:39

## 2017-11-20 RX ADMIN — SODIUM CHLORIDE: 0.9 INJECTION, SOLUTION INTRAVENOUS at 07:40

## 2017-11-20 RX ADMIN — SODIUM CHLORIDE, SODIUM LACTATE, POTASSIUM CHLORIDE, AND CALCIUM CHLORIDE 125 ML/HR: .6; .31; .03; .02 INJECTION, SOLUTION INTRAVENOUS at 11:20

## 2017-11-20 RX ADMIN — NEOSTIGMINE METHYLSULFATE 4 MG: 1 INJECTION, SOLUTION INTRAMUSCULAR; INTRAVENOUS; SUBCUTANEOUS at 10:09

## 2017-11-20 RX ADMIN — MIDAZOLAM HYDROCHLORIDE 2 MG: 1 INJECTION, SOLUTION INTRAMUSCULAR; INTRAVENOUS at 07:28

## 2017-11-20 RX ADMIN — FENTANYL CITRATE 50 MCG: 50 INJECTION, SOLUTION INTRAMUSCULAR; INTRAVENOUS at 08:15

## 2017-11-20 RX ADMIN — ONDANSETRON 4 MG: 2 INJECTION INTRAMUSCULAR; INTRAVENOUS at 09:42

## 2017-11-20 RX ADMIN — ROCURONIUM BROMIDE 20 MG: 10 INJECTION INTRAVENOUS at 09:32

## 2017-11-20 RX ADMIN — SODIUM CHLORIDE, SODIUM LACTATE, POTASSIUM CHLORIDE, AND CALCIUM CHLORIDE: .6; .31; .03; .02 INJECTION, SOLUTION INTRAVENOUS at 07:28

## 2017-11-20 RX ADMIN — CEFAZOLIN SODIUM 2000 MG: 2 SOLUTION INTRAVENOUS at 08:00

## 2017-11-20 RX ADMIN — GLYCOPYRROLATE 0.6 MG: 0.2 INJECTION, SOLUTION INTRAMUSCULAR; INTRAVENOUS at 10:09

## 2017-11-20 RX ADMIN — HYDROMORPHONE HYDROCHLORIDE 0.5 MG: 1 INJECTION, SOLUTION INTRAMUSCULAR; INTRAVENOUS; SUBCUTANEOUS at 08:46

## 2017-11-20 RX ADMIN — FENTANYL CITRATE 50 MCG: 50 INJECTION, SOLUTION INTRAMUSCULAR; INTRAVENOUS at 07:33

## 2017-11-20 RX ADMIN — PROPOFOL 300 MG: 10 INJECTION, EMULSION INTRAVENOUS at 07:34

## 2017-11-20 RX ADMIN — KETOROLAC TROMETHAMINE 30 MG: 30 INJECTION, SOLUTION INTRAMUSCULAR at 09:53

## 2017-11-20 RX ADMIN — CEFAZOLIN SODIUM 1000 MG: 1 SOLUTION INTRAVENOUS at 08:00

## 2017-11-20 RX ADMIN — ALBUTEROL SULFATE 2 PUFF: 90 AEROSOL, METERED RESPIRATORY (INHALATION) at 07:30

## 2017-11-20 NOTE — PROGRESS NOTES
Assumed care of patient at this time  Report received from Romayne Orem in [address]  Patient in supine position with HOB elevated to 60 degrees  In NAD  Pt's andry Bacon and pt's parents Alma Vargas and Justen Villagomez  at bedside

## 2017-11-20 NOTE — OP NOTE
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 [D49 59]    Procedure(s) (LRB):  ROBOTIC TOTAL LAPAROSCOPIC HYSTERECTOMY/ BSO POSSIBLE STAGING (N/A)    Specimen(s):  ID Type Source Tests Collected by Time Destination   1 : **uterus & left adnexa for hyperplasia, BMI 52 Tissue Uterus w/Bilateral Ovaries and Fallopian Tubes TISSUE EXAM Alma Schneider MD 2017 [id number]        Estimated Blood Loss:   25 mL    Drains: None       Anesthesia Type:   General    Operative Indications:  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 [D49 59]  Patient is a delightful 42-year-old with a symptomatic adnexal mass  She opted for definitive surgical management  She was extensively counseled that removing both of her fallopian tubes and ovaries would put her into surgical menopause  The patient accepted these known risks and wished to proceed  Operative Findings:  1) grossly normal appearing cervix, uterus, bilateral fallopian tubes, and right ovary  2) 10 cm multi cystic left ovarian neoplasm consistent with endometrioma (FROZEN:  Endometriotic cyst)    Complications:   None    Procedure and Technique:  The patient was taken to the operating room where general endotracheal anesthesia was induced without complications  The patient received antibiotic prophylaxis per hospital protocol  Sequential compression devices were applied to the lower extremities and activated prior to induction of anesthesia  A single dose of preoperative Lovenox was administered   The patient was placed in the dorsolithotomy position in [address] [place] [address] and her lower abdomen, perineum and vagina were prepped and draped in the usual sterile fashion  Under direct visualization the uterus was sounded and the endocervix was dilated  A  GREG uterine manipulator was easily placed  Mays catheter was placed and the intravaginal occluder balloon was inflated  Attention was turned to the abdomen  All port sites were infiltrated with bupivacaine at the beginning of completion of the procedure  A 10 mm skin incision was made approximately 4 cm above the umbilicus near the midline  Then, using a 5 mm Endopath Excel trocar and the 5 mm laparoscope, the peritoneal cavity was entered under direct visualization  Good intraperitoneal location was confirmed and pneumoperitoneum was created to maximal pressure 15 mm of mercury  Three 8 mm robotic trocars were placed (2 on the left and 1 on the right) and an 11 mm long trocar was placed in the midline port site for camera use  A short 11 mm trocar was placed in the right flank for assistant's use  The patient was placed in steep Trendelenburg and the [employer] system was docked  The above-mentioned findings were noted  The round ligaments were cauterized and divided bilaterally and the bladder was mobilized anteriorly  The peritoneum lateral to the ovarian vessels was divided to both sides, the paravesical and pararectal spaces were developed  The ureters were clearly identified  on both sides  Bilateral ovarian vessels were skeletonized, cauterized and divided  The uterine vessels were skeletonized cauterized and divided bilaterally  The cardinal ligaments were serially cauterized and divided on both sides  The bladder was further mobilized anteriorly and a circumferential colpotomy was made  The specimen was easily delivered through the vagina  The robot was undocked    Both 11 mm trocar sites were closed using a deep stitch of zero back with a Marlene Sand needle device  Pneumoperitoneum was completely released with the assistance of Valsalva maneuvers  The skin at all port sites was closed using a subcuticular stitch of 4-0 Monocryl  Histoacryl was applied to all incisions  The Mays was removed  The patient tolerated the procedure well  Sponge, lap, needle and instrument counts were reported as correct x2  The patient was successfully extubated in the operating room and transferred to the post anesthetic care unit in stable condition       I was present for the entire procedure    Patient Disposition:  PACU , hemodynamically stable and extubated and stable    SIGNATURE: Marilou Pastrana MD  DATE: November 20, 2017  TIME: 10:13 AM

## 2017-11-20 NOTE — DISCHARGE INSTRUCTIONS
You will have pain post-operatively while you recover  You have been provided Percocet prescription for moderate to severe pain  Please take them as instructed and as needed for pain  You may also take ibuprofen 600mg every 6 hours as needed for mild and cramping pain  Nothing in the vagina for 6-8 weeks until cleared by your physician  This includes no intercourse or sexual activity, no tampons, no tub baths, and no swimming during this period  Do not carry anything that requires any strain, typically nothing heavier than a gallon of a milk for 1-2 weeks  No driving while requiring pain meds  You may have light spotting, but any heavy bleeding requiring 1 pad/hour is not normal   You have multiple small incisions on your abdomen  Daily soap and water will suffice for cleaning  Please monitor signs of infection like redness, pain, white discharge, bleeding from incision sites  Please call your surgeon for any worsening pain, fevers, nausea/vomiting, or signs of infection  Thank you  Robot Assisted Laparoscopic Hysterectomy   WHAT YOU SHOULD KNOW:   Robot-assisted laparoscopic hysterectomy (RH) is surgery to remove your uterus and cervix using a machine controlled by your surgeon  Your ovaries, fallopian tubes, supporting tissues, some lymph nodes, and the top of your vagina may also be removed  After RH, you will not be able to become pregnant  You will go through menopause if your ovaries are removed  AFTER YOU LEAVE:   Medicines:  · Medicines  may be given to decrease pain or prevent a bacterial infection  You may also need to take hormone medicine such as estrogen  Ask your healthcare provider how to take this medicine safely  · Take your medicine as directed  Call your healthcare provider if you think your medicine is not helping or if you have side effects  Tell him if you are allergic to any medicine  Keep a list of the medicines, vitamins, and herbs you take   Include the amounts, and when and why you take them  Bring the list or the pill bottles to follow-up visits  Carry your medicine list with you in case of an emergency  Activity guidelines:   · You may feel like resting more after surgery  Slowly start to do more each day  Rest when you feel it is needed  · Ask when you can start having sexual intercourse again  What to expect after surgery: It is normal to bleed from your vagina after your uterus and cervix are removed  Change the sanitary pad often to prevent infection  Ask your gynecologist or healthcare provider how much bleeding to expect  Contact your healthcare provider if:   · You have a fever  · Your pain is getting worse, even after you take medicine  · Your incisions look red and swollen, or they have bad-smelling drainage coming from them  · You see new or an increased amount of bright red blood coming from your vagina or your incisions  · You have yellow, green, or bad-smelling discharge coming from your vagina  · You feel pain when you urinate or you need to urinate more often than usual     · You have trouble having a bowel movement  · Your skin is itchy, swollen, or has a rash  · You have questions or concerns about your condition or care  Seek care immediately or call 911 if:   · You feel lightheaded, short of breath, and have chest pain  · You cough up blood  · Your arm or leg feels warm, tender, and painful  It may look swollen and red  · You have more bleeding from your vagina than you were told to expect  © 2014 [employer] is for End User's use only and may not be sold, redistributed or otherwise used for commercial purposes  All illustrations and images included in CareNotes® are the copyrighted property of [employer] , [employer]  or [employer]  The above information is an  only  It is not intended as medical advice for individual conditions or treatments   Talk to your doctor, nurse or pharmacist before following any medical regimen to see if it is safe and effective for you

## 2017-11-20 NOTE — ANESTHESIA POSTPROCEDURE EVALUATION
Post-Op Assessment Note      CV Status:  Stable    Mental Status:  Alert and awake    Hydration Status:  Euvolemic    PONV Controlled:  Controlled    Airway Patency:  Patent    Post Op Vitals Reviewed: Yes          Staff: CRNA, Anesthesiologist           BP   135/72   Temp   97 9   Pulse  75   Resp   14   SpO2   100

## 2017-11-20 NOTE — H&P
The history and physical were scanned into [facility] per hospital protocol  There are no changes

## 2017-12-01 ENCOUNTER — TRANSCRIBE ORDERS (OUTPATIENT)
Dept: ADMINISTRATIVE | Facility: HOSPITAL | Age: 42
End: 2017-12-01

## 2017-12-01 ENCOUNTER — APPOINTMENT (OUTPATIENT)
Dept: LAB | Facility: MEDICAL CENTER | Age: 42
End: 2017-12-01
Payer: COMMERCIAL

## 2017-12-01 DIAGNOSIS — D50.9 NORMOCYTIC HYPOCHROMIC ANEMIA: ICD-10-CM

## 2017-12-01 DIAGNOSIS — Z79.899 ENCOUNTER FOR LONG-TERM (CURRENT) USE OF MEDICATIONS: ICD-10-CM

## 2017-12-01 DIAGNOSIS — M79.7 SCAPULOHUMERAL FIBROSITIS: Primary | ICD-10-CM

## 2017-12-01 DIAGNOSIS — M79.7 SCAPULOHUMERAL FIBROSITIS: ICD-10-CM

## 2017-12-01 LAB
ALBUMIN SERPL BCP-MCNC: 2.9 G/DL (ref 3.5–5)
ALP SERPL-CCNC: 79 U/L (ref 46–116)
ALT SERPL W P-5'-P-CCNC: 27 U/L (ref 12–78)
ANION GAP SERPL CALCULATED.3IONS-SCNC: 6 MMOL/L (ref 4–13)
AST SERPL W P-5'-P-CCNC: 12 U/L (ref 5–45)
BASOPHILS # BLD AUTO: 0.03 THOUSANDS/ΜL (ref 0–0.1)
BASOPHILS NFR BLD AUTO: 0 % (ref 0–1)
BILIRUB SERPL-MCNC: 0.23 MG/DL (ref 0.2–1)
BUN SERPL-MCNC: 15 MG/DL (ref 5–25)
CALCIUM SERPL-MCNC: 9.1 MG/DL (ref 8.3–10.1)
CHLORIDE SERPL-SCNC: 102 MMOL/L (ref 100–108)
CO2 SERPL-SCNC: 29 MMOL/L (ref 21–32)
CREAT SERPL-MCNC: 0.7 MG/DL (ref 0.6–1.3)
EOSINOPHIL # BLD AUTO: 0.55 THOUSAND/ΜL (ref 0–0.61)
EOSINOPHIL NFR BLD AUTO: 5 % (ref 0–6)
ERYTHROCYTE [DISTWIDTH] IN BLOOD BY AUTOMATED COUNT: 18.5 % (ref 11.6–15.1)
ERYTHROCYTE [SEDIMENTATION RATE] IN BLOOD: 53 MM/HOUR (ref 0–20)
FERRITIN SERPL-MCNC: 20 NG/ML (ref 8–388)
GFR SERPL CREATININE-BSD FRML MDRD: 107 ML/MIN/1.73SQ M
GLUCOSE P FAST SERPL-MCNC: 142 MG/DL (ref 65–99)
HCT VFR BLD AUTO: 38 % (ref 34.8–46.1)
HGB BLD-MCNC: 11.6 G/DL (ref 11.5–15.4)
IRON SERPL-MCNC: 34 UG/DL (ref 50–170)
LYMPHOCYTES # BLD AUTO: 1.75 THOUSANDS/ΜL (ref 0.6–4.47)
LYMPHOCYTES NFR BLD AUTO: 16 % (ref 14–44)
MCH RBC QN AUTO: 23 PG (ref 26.8–34.3)
MCHC RBC AUTO-ENTMCNC: 30.5 G/DL (ref 31.4–37.4)
MCV RBC AUTO: 75 FL (ref 82–98)
MONOCYTES # BLD AUTO: 0.9 THOUSAND/ΜL (ref 0.17–1.22)
MONOCYTES NFR BLD AUTO: 8 % (ref 4–12)
NEUTROPHILS # BLD AUTO: 7.51 THOUSANDS/ΜL (ref 1.85–7.62)
NEUTS SEG NFR BLD AUTO: 71 % (ref 43–75)
NRBC BLD AUTO-RTO: 0 /100 WBCS
PLATELET # BLD AUTO: 301 THOUSANDS/UL (ref 149–390)
PMV BLD AUTO: 10.6 FL (ref 8.9–12.7)
POTASSIUM SERPL-SCNC: 5 MMOL/L (ref 3.5–5.3)
PROT SERPL-MCNC: 7.7 G/DL (ref 6.4–8.2)
RBC # BLD AUTO: 5.05 MILLION/UL (ref 3.81–5.12)
SODIUM SERPL-SCNC: 137 MMOL/L (ref 136–145)
WBC # BLD AUTO: 10.77 THOUSAND/UL (ref 4.31–10.16)

## 2017-12-01 PROCEDURE — 83540 ASSAY OF IRON: CPT

## 2017-12-01 PROCEDURE — 82728 ASSAY OF FERRITIN: CPT

## 2017-12-01 PROCEDURE — 36415 COLL VENOUS BLD VENIPUNCTURE: CPT

## 2017-12-01 PROCEDURE — 80053 COMPREHEN METABOLIC PANEL: CPT

## 2017-12-01 PROCEDURE — 85025 COMPLETE CBC W/AUTO DIFF WBC: CPT

## 2017-12-01 PROCEDURE — 85652 RBC SED RATE AUTOMATED: CPT

## 2017-12-04 ENCOUNTER — ALLSCRIPTS OFFICE VISIT (OUTPATIENT)
Dept: OTHER | Facility: OTHER | Age: 42
End: 2017-12-04

## 2017-12-05 ENCOUNTER — ALLSCRIPTS OFFICE VISIT (OUTPATIENT)
Dept: OTHER | Facility: OTHER | Age: 42
End: 2017-12-05

## 2017-12-06 NOTE — PROGRESS NOTES
Assessment    1  GERD (gastroesophageal reflux disease) (530 81) (K21 9)   2  Rectal bleeding (569 3) (K62 5)   3  Proctitis (569 49) (K62 89)    Plan  Proctitis    · Follow-up visit in 3 months Evaluation and Treatment  Follow-up with Dr Dalia Brito for: [id number]   Ordered;Proctitis; Ordered By: Kina London Performed:  Due: [id number]    Discussion/Summary  Discussion Summary:   1  Proctitis/rectal bleeding - she reports significant reduction in her symptoms on budesonide 9 mg daily  She reports that she is taking this for at least 2 months no  She states she has had 2 mild episodes of rectal bleeding in the past month when previously she was having 3-5 days of significant rectal bleeding with passage of large blood clots  Recommend tapering off budesonide and I gave her instructions for this  she will call the office if she has a recurrence her symptoms during the taper  Recommend follow-up in 3 months to see how she is doing  GERD - she reports that her symptoms are well controlled on pantoprazole and she wishes to continue this medication  Colon cancer surveillance - she had a recent colonoscopy this year which showed only proctitis and a single hyperplastic polyp removed her sigmoid colon, follow-up for age-appropriate colon cancer surveillance recommended at age 48  Counseling Documentation With Imm: The patient was counseled regarding diagnostic results,-- instructions for management,-- risk factor reductions,-- prognosis,-- patient and family education,-- impressions,-- risks and benefits of treatment options,-- importance of compliance with treatment  Goals and Barriers: The patient has the current Goals: To reduce bleeding  The patent has the current Barriers: None  Patient's Capacity to Self-Care: Patient is able to Self-Care  Patient Education: Educational resources provided: Colon cancer surveillance     Medication SE Review and Pt Understands Tx: Possible side effects of new medications were reviewed with the patient/guardian today  The treatment plan was reviewed with the patient/guardian  The patient/guardian understands and agrees with the treatment plan   Self Referrals:   Self Referrals: No      Chief Complaint  Chief Complaint Free Text Note Form: Patient 3 month follow up  Two episodes of rectal bleeding  GERD is controlled with PPI  History of Present Illness  HPI: 70-year-old female with past medical history of lupus, hypertension, obesity presents to the office for follow-up after colonoscopy  Colonoscopy showed proctitis and biopsies were negative for chronic inflammation  She continued to have rectal bleeding and she was started on trial of budesonide  She reports that since beginning the budesonide she has had significant improvement in her symptoms of rectal bleeding  She reports that she was previously having episodes of heavy bleeding with blood clots about 3-5 times per week and now she has had only 2 very mild episodes of rectal bleeding in the past 2 months  she denies any other GI complaints today  She notes that she had a total abdominal hysterectomy couple of weeks ago and is recovering from this well  She denies any first-degree relatives with colon cancer, states her maternal aunt had colon cancer T2-C7 in her cousin had colon cancer at age 52  History Reviewed: The history was obtained today from the patient and I agree with the documented history  Review of Systems  Complete-Female GI Adult:  Constitutional: No fever, no chills, feels well, no tiredness, no recent weight gain or weight loss  Eyes: No complaints of eye pain, no red eyes, no eyesight problems, no discharge, no dry eyes, no itching of eyes  ENT: no complaints of earache, no loss of hearing, no nose bleeds, no nasal discharge, no sore throat, no hoarseness    Cardiovascular: No complaints of slow heart rate, no fast heart rate, no chest pain, no palpitations, no leg claudication, no lower extremity edema  Respiratory: No complaints of shortness of breath, no wheezing, no cough, no SOB on exertion, no orthopnea, no PND  Gastrointestinal: GERD, but-- No complaints of abdominal pain, no constipation, no nausea or vomiting, no diarrhea, no bloody stools  Genitourinary: No complaints of dysuria, no incontinence, no pelvic pain, no dysmenorrhea, no vaginal discharge or bleeding  Musculoskeletal: No complaints of arthralgias, no myalgias, no joint swelling or stiffness, no limb pain or swelling  Integumentary: No complaints of skin rash or lesions, no itching, no skin wounds, no breast pain or lump  Neurological: No complaints of headache, no confusion, no convulsions, no numbness, no dizziness or fainting, no tingling, no limb weakness, no difficulty walking  Psychiatric: Not suicidal, no sleep disturbance, no anxiety or depression, no change in personality, no emotional problems  Endocrine: No complaints of proptosis, no hot flashes, no muscle weakness, no deepening of the voice, no feelings of weakness  Hematologic/Lymphatic: No complaints of swollen glands, no swollen glands in the neck, does not bleed easily, does not bruise easily  ROS Reviewed:   ROS reviewed  Active Problems  1  Abnormal uterine bleeding (AUB) (626 9) (N93 9)   2  Anxiety and depression (300 00,311) (F41 8)   3  Arthritis (716 90) (M19 90)   4  Benign hypertension (401 1) (I10)   5  Candidal vaginitis (112 1) (B37 3)   6  Colon cancer screening (V76 51) (Z12 11)   7  Diabetes mellitus type 2 in obese (250 00,278 00) (E11 69,E66 9)   8  Encounter for annual routine gynecological examination (V72 31) (Z01 419)   9  Encounter for smoking cessation counseling (V65 42,305 1) (Z71 6,Z72 0)   10  Gastroesophageal reflux disease without esophagitis (530 81) (K21 9)   11  GERD (gastroesophageal reflux disease) (530 81) (K21 9)   12  Heavy menses (626 2) (N92 0)   13   Hypothyroidism (244  9) (E03 9)   14  Insomnia (780 52) (G47 00)   15  Irregular menstrual cycle (626 4) (N92 6)   16  Low back pain (724 2) (M54 5)   17  Morbid obesity due to excess calories (278 01) (E66 01)   18  No history of previous surgery (V49 89) (Z78 9)   19  Obesity (278 00) (E66 9)   20  Other ovarian cyst, left side (620 2) (N83 292)   21  Ovarian neoplasm (239 5) (D49 59)   22  Proctitis (569 49) (K62 89)   23  Rectal bleeding (569 3) (K62 5)   24  Viral gastroenteritis (008 8) (A08 4)   25  Visit for screening mammogram (V76 12) (Z12 31)    Past Medical History  1  History of Acute maxillary sinusitis, recurrence not specified (461 0) (J01 00)   2  History of H/O colonoscopy (V45 89) (Z98 890)   3  History of diabetes mellitus (V12 29) (Z86 39)   4  History of hypertension (V12 59) (Z86 79)   5  History of mammogram (V15 89) (Z92 89)  Active Problems And Past Medical History Reviewed: The active problems and past medical history were reviewed and updated today  Surgical History  1  History of Hysterectomy Robotic-Assisted   2  History of Salpingo-oophorectomy Bilateral  Surgical History Reviewed: The surgical history was reviewed and updated today  Family History  Mother    1  Family history of cardiac disorder (V17 49) (Z82 49)   2  Family history of diabetes mellitus (V18 0) (Z83 3)   3  Family history of hypertension (V17 49) (Z82 49)   4  Family history of hyperthyroidism (V18 19) (Z83 49)  Father    5  Family history of diabetes mellitus (V18 0) (Z83 3)   6  Family history of hypertension (V17 49) (Z82 49)  Maternal Grandfather    7  Family history of lung cancer (V16 1) (Z80 1)  Aunt    8  Family history of colon cancer (V16 0) (Z80 0)  Maternal Aunt    9  Family history of Colon cancer  Uncle    8  Family history of lung cancer (V16 1) (Z80 1)  Cousin    6  Family history of colon cancer (V16 0) (Z80 0)  Maternal Cousin    15  Family history of Colon cancer  Family History Reviewed:    The family history was reviewed and updated today  Social History     · Current every day smoker (305 1) (F17 200)   · Occupation   · Social drinker (V49 89) (Z78 9)  Social History Reviewed: The social history was reviewed and updated today  Current Meds   1  ALPRAZolam 0 5 MG Oral Tablet; TAKE 2 TABLET Bedtime PRN; Therapy: 06XLM3658 to (96 360331); Last Rx:03Tsy0831 Ordered   2  Aspirin Adult Low Strength 81 MG Oral Tablet Delayed Release; Therapy: 43KZA9062 to Recorded   3  Atorvastatin Calcium 10 MG Oral Tablet; TAKE ONE (1) TABLET(S) DAILY; Therapy: 35VGL2397 to (Evaluate:08Dec2017)  Requested for: 21Mar2017; Last Rx:13Mar2017 Ordered   4  B-12 1000 MCG Oral Capsule; Therapy: 92LVX0304 to Recorded   5  Budesonide 3 MG Oral Capsule Delayed Release Particles; take 3 capsule daily; Therapy: 73Mph4897 to (Evaluate:27Jan2018)  Requested for: 54IJR9465; Last Rx:28Nov2017 Ordered   6  Cymbalta 60 MG Oral Capsule Delayed Release Particles; take 1 capsule daily; Therapy: 33YLS7320 to Recorded   7  DULoxetine HCl - 60 MG Oral Capsule Delayed Release Particles; Therapy: 17HRA6362 to Recorded   8  Farxiga 5 MG Oral Tablet; Take 1 tablet daily; Therapy: 67ZRL1310 to (Ramiro Massey)  Requested for: 92YJZ8337; Last Rx:30Nov2017 Ordered   9  Fluconazole 200 MG Oral Tablet; TAKE 1 TABLET 1 TIME ONLY; Therapy: 65MHN2897 to (Evaluate:28Nov2017); Last Rx:27Nov2017 Ordered   10  Gabapentin 300 MG Oral Capsule; Therapy: (WIGLNL:41OJB9945) to Recorded   11  Levothyroxine Sodium 50 MCG Oral Tablet; TAKE 1 TABLET DAILY AS DIRECTED; Therapy: 68RWB7457 to (Evaluate:26Jan2018)  Requested for: 13SDY5446; Last  Rx:31Jan2017 Ordered   12  Lisinopril 10 MG Oral Tablet; TAKE 1 TABLET BY MOUTH ONCE DAILY; Therapy: 17NTK7153 to (Evaluate:42Jsj7599)  Requested for: 97YZM8540; Last  Rx:15Dec2016 Ordered   13  Pantoprazole Sodium 40 MG Oral Tablet Delayed Release; TAKE ONE TABLET BY  MOUTH ONCE DAILY;   Therapy: 26EGV9201 to (Evaluate:23Uwr6411)  Requested for: 81GWM9274; Last  Rx:48Duk6013 Ordered   14  Vitamin D3 2000 UNIT Oral Tablet; Therapy: 88LOE8525 to Recorded  Medication List Reviewed: The medication list was reviewed and updated today  Allergies  1  No Known Drug Allergies  2  No Known Environmental Allergies   3  No Known Food Allergies    Vitals  Vital Signs    Recorded: 28GJV3189 03:20PM   Heart Rate 86   Systolic 164, LUE, Sitting   Diastolic 82, LUE, Sitting   BP CUFF SIZE Large   Height 5 ft 5 in   Weight 323 lb 6 0 oz   BMI Calculated 53 81   BSA Calculated 2 43   O2 Saturation 98       Physical Exam   Constitutional  General appearance: No acute distress, well appearing and well nourished  obese  Eyes  Pupils and irises: Equal, round and reactive to light  Ears, Nose, Mouth, and Throat  Oropharynx: Normal with no erythema, edema, exudate or lesions  Pulmonary  Respiratory effort: No increased work of breathing or signs of respiratory distress  Auscultation of lungs: Clear to auscultation  Cardiovascular  Auscultation of heart: Normal rate and rhythm, normal S1 and S2, without murmurs  Abdomen  Abdomen: Non-tender, no masses  Liver and spleen: No hepatomegaly or splenomegaly  Lymphatic  Palpation of lymph nodes in neck: No lymphadenopathy  Musculoskeletal  Gait and station: Normal    Skin  Skin and subcutaneous tissue: Normal without rashes or lesions  Neurologic  Cranial nerves: Cranial nerves 2-12 intact  -- (grossly intact)  Psychiatric  Orientation to person, place, and time: Normal    Mood and affect: Normal          Health Management  Colon cancer screening   COLONOSCOPY (GI, SURG); every 8 years; Last 62TWU0724; Next Due: 60His4109;  Active    Future Appointments    Date/Time Provider Specialty Site   12/19/2017 03:30 PM Jazmine Toledo MD [facility]   12/05/2017 04:15 PM Johanny Romero MD Gynecological Oncology [facility] Signatures   Electronically signed by : Anne-Marie Wasserman, [facility]; Dec  4 2017  3:52PM EST                       (Author)    Electronically signed by : Stefano Daniels MD; Dec  5 2017 11:27AM EST                       (Author)

## 2017-12-06 NOTE — PROGRESS NOTES
Assessment    1  Other ovarian cyst, left side (620 2) (T04 090)  43year-old status post definitive surgical management for endometriosis  Plan  Other ovarian cyst, left side    · Follow-up Visit in 4 Weeks Evaluation and Treatment  Follow-up  Status: Hold For -Scheduling  Requested for: 55JCO5683   Ordered; Other ovarian cyst, left side; Ordered By: Karin Pina Performed:  Due: 93JEU8650  The pathology results were explained to the patient in detail  The patient will return in 4 weeks for her 2nd postop visit  Chief Complaint  I am here for my postop visit  Post-Op  Problem [facility]:   1) ovarian neoplasm (endometrioma)dysmenorrhea which is severely debilitatingsuper morbid obesity with BMI of 53multiple comorbidities including hypertension, type 2 diabetes and obesity  Previous Therapy:   11/20/2017: Robotic assisted total laparoscopic hysterectomy with bilateral salpingo-oophorectomy    Free Text HPI: The patient is delightful 70-year-old status post definitive surgical management for atypical endometriosis  On November 20, 2017 the patient underwent robotic assisted total laparoscopic hysterectomy with bilateral salpingo-oophorectomy  Final pathology was completely benign  It did show atypical endometriosis  The patient comes in today for her 1st postoperative visit with minimal complaints  Active Problems  1  Abnormal uterine bleeding (AUB) (626 9) (N93 9)   2  Anxiety and depression (300 00,311) (F41 8)   3  Arthritis (716 90) (M19 90)   4  Benign hypertension (401 1) (I10)   5  Candidal vaginitis (112 1) (B37 3)   6  Colon cancer screening (V76 51) (Z12 11)   7  Diabetes mellitus type 2 in obese (250 00,278 00) (E11 69,E66 9)   8  Encounter for annual routine gynecological examination (V72 31) (Z01 419)   9  Encounter for smoking cessation counseling (V65 42,305 1) (Z71 6,Z72 0)   10  Gastroesophageal reflux disease without esophagitis (530 81) (K21 9)   11   GERD (gastroesophageal reflux disease) (530 81) (K21 9)   12  Heavy menses (626 2) (N92 0)   13  Hypothyroidism (244 9) (E03 9)   14  Insomnia (780 52) (G47 00)   15  Irregular menstrual cycle (626 4) (N92 6)   16  Low back pain (724 2) (M54 5)   17  Morbid obesity due to excess calories (278 01) (E66 01)   18  No history of previous surgery (V49 89) (Z78 9)   19  Obesity (278 00) (E66 9)   20  Other ovarian cyst, left side (620 2) (N83 292)   21  Ovarian neoplasm (239 5) (D49 59)   22  Proctitis (569 49) (K62 89)   23  Rectal bleeding (569 3) (K62 5)   24  Viral gastroenteritis (008 8) (A08 4)   25  Visit for screening mammogram (V76 12) (Z12 31)    Social History     · Current every day smoker (305 1) (F17 200)   · Occupation   · Social drinker (V49 89) (Z78 9)    Current Meds   1  ALPRAZolam 0 5 MG Oral Tablet; TAKE 2 TABLET Bedtime PRN; Therapy: 01OXR2628 to (Evaluate:04Jan2018); Last Rx:99Yxa2385 Ordered   2  Aspirin Adult Low Strength 81 MG Oral Tablet Delayed Release; Therapy: 93URT1174 to Recorded   3  Atorvastatin Calcium 10 MG Oral Tablet; TAKE ONE (1) TABLET(S) DAILY; Therapy: 47BTE6239 to (Evaluate:66Vay8152)  Requested for: 21Mar2017; Last Rx:13Mar2017 Ordered   4  B-12 1000 MCG Oral Capsule; Therapy: 51WWC7617 to Recorded   5  Budesonide 3 MG Oral Capsule Delayed Release Particles; take 3 capsule daily; Therapy: 13Vgi6385 to (Evaluate:27Jan2018)  Requested for: 55YTU0861; Last Rx:28Nov2017 Ordered   6  Cymbalta 60 MG Oral Capsule Delayed Release Particles; take 1 capsule daily; Therapy: 27TMY3342 to Recorded   7  DULoxetine HCl - 60 MG Oral Capsule Delayed Release Particles; Therapy: 05UWJ9816 to Recorded   8  Farxiga 5 MG Oral Tablet; Take 1 tablet daily; Therapy: 88IRO9749 to (Floyce Curlin)  Requested for: 81WLH3932; Last Rx:30Nov2017 Ordered   9  Fluconazole 200 MG Oral Tablet; TAKE 1 TABLET 1 TIME ONLY; Therapy: 79TZM5960 to (Evaluate:28Nov2017); Last Rx:27Nov2017 Ordered   10  Gabapentin 300 MG Oral Capsule;   Therapy: (HDCKFPPZ:44UBD9332) to Recorded   11  Levothyroxine Sodium 50 MCG Oral Tablet; TAKE 1 TABLET DAILY AS DIRECTED; Therapy: 88ZYX0542 to (Evaluate:26Jan2018)  Requested for: 08TVN3338; Last  Rx:31Jan2017 Ordered   12  Lisinopril 10 MG Oral Tablet; TAKE 1 TABLET BY MOUTH ONCE DAILY; Therapy: 97WXH3562 to (Evaluate:41Isl8517)  Requested for: 03QSV3270; Last  Rx:75Cjm4767 Ordered   13  Pantoprazole Sodium 40 MG Oral Tablet Delayed Release; TAKE ONE TABLET BY  MOUTH ONCE DAILY; Therapy: 01PRC1022 to (Evaluate:14Apr2018)  Requested for: 54WJJ1946; Last  Rx:16Oct2017 Ordered   14  Vitamin D3 2000 UNIT Oral Tablet; Therapy: 38ZWU9114 to Recorded    Allergies  1  No Known Drug Allergies  2  No Known Environmental Allergies   3  No Known Food Allergies    Vitals   Recorded: 55ZEU0261 03:51PM   Temperature 98 4 F, Oral   Heart Rate 80, L PT   Pulse Quality Normal, L PT   Respiration Quality Difficult   Respiration 16   Systolic 684   Diastolic 78   Height 5 ft 5 in   Weight 324 lb 3 oz   BMI Calculated 53 95   BSA Calculated 2 43       Physical Exam   Constitutional  General appearance: No acute distress, well appearing and well nourished  Chest  Breasts: Normal and no dimpling or skin changes noted  Abdomen  Abdomen: Normal, non-tender, and no organomegaly noted  -- Well healing laparoscopic port sites  Liver and spleen: No hepatomegaly or splenomegaly  Examination for hernias: No hernias appreciated     Psychiatric  Orientation to person, place, and time: Normal    Mood and affect: Normal        Results/Data  (1) TISSUE EXAM 12UNT9937 09:31AM Gladys Corporal     Test Name Result Flag Reference   LAB AP CASE REPORT (Report)       Surgical Pathology Report             Case: [id number]                  Authorizing Provider: Antoni Stein MD    Collected:      11/20/2017 0931       Ordering Location:   [address] [place] [address]   Received:      11/20/2017 [address] [facility] Pathologist:      Yoon Chacon MD                            Intraop:        Yoon Chacon MD                            Specimen:  Uterus w/Bilateral Ovaries and Fallopian Tubes, **uterus & left adnexa for              hyperplasia, BMI 46   LAB AP FINAL DIAGNOSIS (Report)       A  Uterus, cervix, bilateral ovaries and fallopian tubes; hysterectomy and  salpingo-oophorectomies (158g): - Left ovary: Atypical endometriosis (see note)  - Right ovary: Benign right ovary with hemorrhagic luteinized follicle,  negative for features of malignancy  - Cervix: Benign cervix with features of nabothian gland cysts, negative  for atypia or malignancy  - Endometrium: Benign weakly proliferative to early secretory phase  endometrium with focal benign endometrial polyp, negative for hyperplasia  or carcinoma  - Myometrium: Benign myometrium without significant pathologic  alteration  - Bilateral fallopian tubes: Benign bilateral fallopian tubes and  fimbriae, negative for atypia or malignancy  Electronically signed by Yoon Chacon MD on 12/4/2017 at 4:37 PM Preliminary result electronically signed by Yoon Chacon MD on 11/27/2017 at 10:48 AM   This is an appended report  These results have been appended to a previously preliminary verified report  LAB AP INTRAOPERATIVE CONSULTATION        AF1  Uterus w/Bilateral Ovaries and Fallopian Tubes: Endometriotic cyst of ovary  Small polyp of endometrium, no grossly  invasive lesion  [facility]   LAB AP NOTE (Report)       The left ovary shows a large hemorrhagic cyst with foci of surface atypia  and associated hemorrhagic follicular cyst and corpus luteum, negative for  features of malignancy  Immunohistochemical stains performed with  appropriate controls on selected blocks of the hemorrhagic cyst show  patchy staining for p53 with weak staining for Napsin     Due to the atypia found in the surface epithelium, representative sections  were submitted for outside expert consultation the results of which are  included below  The preliminary findings were communicated to Dr Hetal Cardenas by telephone  conversation on 11/27/17 at [phone]  The final findings were communicated to Dr Hetal Cardenas by telephone  conversation on 12/4/17 at [phone]  Uterus with bilateral ovaries and fallopian tubes, [id number] (8 slides): - Left ovary with atypical endometriosis (see note)  Note: Sections of the left ovary show an endometriotic cyst, the majority  of which is denuded  Focally, the cyst is lined by a single layer of  epithelium with atypical, hyperchromatic nuclei which is most likely  degenerative in nature  There is no evidence of malignancy  The findings  are most consistent with atypical endometriosis  Francisco Villegas MD/MERARY   This is an appended report  These results have been appended to a previously preliminary verified report  LAB AP SURGICAL ADDITIONAL INFORMATION (Report)       All controls performed with the immunohistochemical stains reported above  reacted appropriately  These tests were developed and their performance  characteristics determined by [facility] or  [facility]  They may not be cleared or approved by the [place]  Food and Drug Administration  The FDA has determined that such clearance  or approval is not necessary  These tests are used for clinical purposes  They should not be regarded as investigational or for research  This  laboratory has been approved by IA 88, designated as a high-complexity  laboratory and is qualified to perform these tests  Interpretation performed at [facility], [place] [address] [address]   This is an appended report  These results have been appended to a previously preliminary verified report  LAB AP GROSS DESCRIPTION (Report)       A   The specimen is received fresh for frozen section, labeled with the  patient's name and hospital number, and is designated uterus with  bilateral ovaries and fallopian tubes for hyperplasia  The specimen  consists of a 158 g uterus with attached bilateral adnexa  The serosa is  tan-pink and smooth  The ectocervix is white tan pink and smooth  The  uterus measures 7 9 cm superior to inferior by 3 4 cm cornu to cornu by  4 1 cm anterior to posterior  A patent slit-shaped cervical os is  identified measuring 0 7 cm in greatest dimension  The specimen is  bivalved, revealing an unremarkable cervix  The lower uterine segment  exhibits a smooth lined clear mucus filled cystic structure measuring 0 9  cm in greatest dimension  The endometrium exhibits a hemorrhagic cavity,  which ranges from 0 2 cm to 0 3 cm in thickness, measures 1 5 cm in width  at the fundus, and is without identifiable lesions  The myometrium appears  unremarkable; no nodule/lesions are identified  The left ovary exhibits a  white tan pink partially lobular but smooth outer surface, and appears  cystically dilated, and is received focally ruptured/disrupted, and upon  reconstruction measures 8 1 x 4 7 x 3 9 cm  The outer surface is inked  blue  The specimen is opened, and the ovary exhibits 4 cystic structures  which range from 0 5 cm to 6 8 cm in greatest dimension, and are  hemorrhagic fluid-filled, and smooth lined; no excrescences are  identified  A portion of the cystically dilated left ovary is submitted  for frozen section analysis  The remaining cut surface of the left ovary  is white tan pink and dense, and also identified are a few small orange  tan cystic structures measuring up to 0 5 cm in greatest dimension  the  left fallopian tube with fimbriated end measures 6 2 cm in length and  ranges from 0 4 cm to 0 8 cm in diameter and exhibits 2 adherent smooth  lined cystic structures each measuring 0 2 cm in greatest dimension   The  right ovary exhibits a white tan pink to focally hemorrhagic smooth outer  surface, and measures 4 0 x 2 6 x 2 4 cm, and upon cut section exhibits 2  hemorrhagic cystic structures measuring 0 7 cm and 1 6 cm in greatest  dimension  Also identified is a few orange tan cystic structures measuring  up to 0 7 cm in greatest dimension  The right fallopian tube with  fimbriated end measures 8 2 cm in length and ranges from 0 4 cm to 0 8 cm  in diameter and is unremarkable  Representative sections  Twenty-nine  cassettes, including entire endometrium  1: Frozen section cystically dilated left ovary 2, 3: Anterior cervix/lower uterine segment, complete section 4, 5: Posterior cervix/lower uterine segment, complete section 6-7: Anterior endomyometrium with serosa, complete section in each  cassette 8, 9: Remaining anterior endometrium 10, 11: Posterior endomyometrium with serosa, complete section in each  cassette 12, 13: Remaining posterior endomyometrium 14-20: Left ovary, with a complete section, split, in cassette 14, and  largest cystic cavity in cassettes 18-20 21, 22: Left fallopian tube 23-27: Right ovary 28, 29: Right fallopian tube 30-32: Additional section of left ovary/RRavotti  Note: The estimated total formalin fixation time based upon information  provided by the submitting clinician and the standard processing schedule  is under 72 hours  MSequino   This is an appended report  These results have been appended to a previously preliminary verified report       Future Appointments    Date/Time Provider Specialty Site   12/19/2017 03:30 PM Callum Toledo MD [address] [place], [place] [address]   Electronically signed by : Linda Cherry MD; Dec  5 2017  4:01PM EST                       (Author)

## 2017-12-19 ENCOUNTER — ALLSCRIPTS OFFICE VISIT (OUTPATIENT)
Dept: OTHER | Facility: OTHER | Age: 42
End: 2017-12-19

## 2018-01-09 ENCOUNTER — GENERIC CONVERSION - ENCOUNTER (OUTPATIENT)
Dept: OTHER | Facility: OTHER | Age: 43
End: 2018-01-09

## 2018-01-10 NOTE — RESULT NOTES
Verified Results  (Q) TSH, 3RD GENERATION W/REFLEX TO FT4 11HAM4855 06:50AM Dena Toledo     Test Name Result Flag Reference   TSH W/REFLEX TO FT4 1 99 mIU/L     Reference Range                         > or = 20 Years  0 40-4 50                              Pregnancy Ranges            First trimester    0 26-2 66            Second trimester   0 55-2 73            Third trimester    0 43-2 91     (Q) COMPREHENSIVE METABOLIC PNL W/ADJUSTED CALCIUM 03WTQ3925 06:50AM Dena Toledo     Test Name Result Flag Reference   GLUCOSE 133 mg/dL H 65-99   Fasting reference interval   UREA NITROGEN (BUN) 13 mg/dL  7-25   CREATININE 0 59 mg/dL  0 50-1 10   eGFR NON-AFR  AMERICAN 114 mL/min/1 73m2  > OR = 60   eGFR AFRICAN AMERICAN 132 mL/min/1 73m2  > OR = 60   BUN/CREATININE RATIO   1-40   NOT APPLICABLE (calc)   SODIUM 135 mmol/L  135-146   POTASSIUM 4 4 mmol/L  3 5-5 3   CHLORIDE 103 mmol/L     CARBON DIOXIDE 25 mmol/L  20-31   CALCIUM 9 1 mg/dL  8 6-10 2   CALCIUM (ADJUSTED FOR$ALBUMIN) 9 5 mg/dL (calc)  8 6-10 2   PROTEIN, TOTAL 7 2 g/dL  6 1-8 1   ALBUMIN 3 8 g/dL  3 6-5 1   GLOBULIN 3 4 g/dL (calc)  1 9-3 7   ALBUMIN/GLOBULIN RATIO 1 1 (calc)  1 0-2 5   BILIRUBIN, TOTAL 0 2 mg/dL  0 2-1 2   ALKALINE PHOSPHATASE 75 U/L     AST 15 U/L  10-30   ALT 16 U/L  6-29     (Q) LIPID PANEL WITH DIRECT LDL 93ZPA0547 06:50AM Dena Toledo     Test Name Result Flag Reference   CHOLESTEROL, TOTAL 147 mg/dL  125-200   HDL CHOLESTEROL 32 mg/dL L > OR = 46   TRIGLICERIDES 876 mg/dL  <150   DIRECT LDL 93 mg/dL  <130   Desirable range <100 mg/dL for patients with CHD or  diabetes and <70 mg/dL for diabetic patients with  known heart disease  CHOL/HDLC RATIO 4 6 (calc)  < OR = 5 0   NON HDL CHOLESTEROL 115 mg/dL (calc)     Target for non-HDL cholesterol is 30 mg/dL higher than   LDL cholesterol target       (Q) MICROALBUMIN, RANDOM URINE (W/CREATININE) 83ZYX9965 06:50AM Dena Toledo     Test Name Result Flag Reference CREATININE, RANDOM URINE 137 mg/dL     MICROALBUMIN 0 7 mg/dL     Reference Range  Not established   MICROALBUMIN/CREATININE$RATIO, RANDOM URINE 5 mcg/mg creat  <30   The ADA defines abnormalities in albumin  excretion as follows:     Category         Result (mcg/mg creatinine)     Normal                    <30  Microalbuminuria            Clinical albuminuria   > OR = 300     The ADA recommends that at least two of three  specimens collected within a 3-6 month period be  abnormal before considering a patient to be  within a diagnostic category  (Q) HEMOGLOBIN A1c 95TCF7107 06:50AM Dena Toledo   REPORT COMMENT:  INCLUDES LAB REF [id number]  FASTING:YES     Test Name Result Flag Reference   HEMOGLOBIN A1c 7 0 % of total Hgb H <5 7   According to ADA guidelines, hemoglobin A1c <7 0%  represents optimal control in non-pregnant diabetic  patients  Different metrics may apply to specific  patient populations  Standards of Medical Care in    Diabetes Care  2013;36:s11-s66     For the purpose of screening for the presence of  diabetes  <5 7%       Consistent with the absence of diabetes  5 7-6 4%    Consistent with increased risk for diabetes              (prediabetes)  >or=6 5%    Consistent with diabetes     This assay result is consistent with diabetes  mellitus  Currently, no consensus exists for use of hemoglobin  A1c for diagnosis of diabetes for children  Discussion/Summary   blood sugar level was slightly high    overall looks good!    will discuss in details next visit   - Dr Esperanza Del Cid   Electronically signed by : Abdiel Woodson MD; Nov 15 2016  1:13PM EST                       (Author)

## 2018-01-10 NOTE — RESULT NOTES
Discussion/Summary   stool culture for ecoli, salmonella bacteria was negative     - Dr Obregon Enter      Verified Results  (Q) CULTURE, STOOL, SAL/SHIG/CAMPY AND SHIGA TOXINS EIA W/RFL E COLI O157 CULT [id number] 11:12AM Dena Toledo   REPORT COMMENT:  SPLIT 04/03/2017 FROM [id number]     Test Name Result Flag Reference   SHIGA TOXINS, EIA W/RFL$TO E COLI O157 CULTURE      SHIGA TOXINS, EIA W/RFL TO E COLI O157 CULTURE         MICRO NUMBER:      [id number]    TEST STATUS:       FINAL    SPECIMEN SOURCE:   STOOL    SPECIMEN QUALITY:  ADEQUATE    RESULT:            Not Detected   CULTURE, STOOL, BEA/SHIG/CAMPY AND SHIGA TOXINS EIA W/RFL E COLI O157 CULT      CAMPYLOBACTER, CULTURE         MICRO NUMBER:      [id number]    TEST STATUS:       FINAL    SPECIMEN SOURCE:   STOOL    SPECIMEN QUALITY:  ADEQUATE    RESULT:            No enteric Campylobacter isolated   CULTURE, STOOL, BEA/SHIG/CAMPY AND SHIGA TOXINS EIA W/RFL E COLI O157 CULT      SALMONELLA AND SHIGELLA, CULTURE         MICRO NUMBER:      [id number]    TEST STATUS:       FINAL    SPECIMEN SOURCE:   STOOL    SPECIMEN QUALITY:  ADEQUATE    RESULT:            No Salmonella or Shigella isolated       Signatures   Electronically signed by : Ortega Zaragoza MD; Apr 10 2017  7:59AM EST                       (Author)

## 2018-01-11 NOTE — RESULT NOTES
Verified Results  (Q) COMPREHENSIVE METABOLIC PANEL W/O eGFR [id number] 04:05PM Dena Toledo     Test Name Result Flag Reference   GLUCOSE 91 mg/dL  65-99   Fasting reference interval   UREA NITROGEN (BUN) 10 mg/dL  7-25   CREATININE 0 82 mg/dL  0 50-1 10   BUN/CREATININE RATIO   7-74   NOT APPLICABLE (calc)   SODIUM 138 mmol/L  135-146   POTASSIUM 3 8 mmol/L  3 5-5 3   CHLORIDE 103 mmol/L     CARBON DIOXIDE 26 mmol/L  20-31   CALCIUM 8 8 mg/dL  8 6-10 2   PROTEIN, TOTAL 6 7 g/dL  6 1-8 1   ALBUMIN 3 5 g/dL L 3 6-5 1   GLOBULIN 3 2 g/dL (calc)  1 9-3 7   ALBUMIN/GLOBULIN RATIO 1 1 (calc)  1 0-2 5   BILIRUBIN, TOTAL 0 2 mg/dL  0 2-1 2   ALKALINE PHOSPHATASE 67 U/L     AST 13 U/L  10-30   ALT 16 U/L  6-29     (Q) AMYLASE [id number] 04:05PM TreDena     Test Name Result Flag Reference   AMYLASE 16 U/L L      (1) LIPASE [id number] 04:05PM Tre Dena   REPORT COMMENT:  ON HOLD-2 REQS  FASTING:NO AN UPDATE OR CORRECTION HAS BEEN MADE TO NAME COL     Test Name Result Flag Reference   LIPASE 16 U/L  7-60       Discussion/Summary   normal blood sugar, liver and kidney function   pancreas enzymes were normal as well     - Dr Bartolome Mckinney   Electronically signed by : Eliana Hicks MD; Apr 4 2017  7:50AM EST                       (Author)

## 2018-01-11 NOTE — RESULT NOTES
Discussion/Summary   diabetes test is in a controlled range    cholesterol looks good   thyroid level is overall normal   will discuss in details next visit   - Dr Elias García      Verified Results  (1) TSH WITH FT4 REFLEX [id number] 06:09AM Dena Toledo    Order Number: [id number]     Test Name Result Flag Reference   TSH 3 749 uIU/mL H 0 358-3 740   A FT4 has been ordered      Patients undergoing fluorescein dye angiography may retain small amounts of fluorescein in the body for 48-72 hours post procedure  Samples containing fluorescein can produce falsely depressed TSH values  If the patient had this procedure,a specimen should be resubmitted post fluorescein clearance  The recommended reference ranges for TSH during pregnancy are as follows:  First trimester 0 1 to 2 5 uIU/mL  Second trimester  0 2 to 3 0 uIU/mL  Third trimester 0 3 to 3 0 uIU/m   T4,FREE 1 17 ng/dL  0 76-1 46   Specimen collection should occur prior to Sulfasalazine administration due to the potential for falsely elevated results  (1) COMPREHENSIVE METABOLIC PANEL [id number] 78:90PD Quinton Toledo [facility] Order Number: [id number]     Test Name Result Flag Reference   SODIUM 139 mmol/L  136-145   POTASSIUM 4 1 mmol/L  3 5-5 3   CHLORIDE 104 mmol/L  100-108   CARBON DIOXIDE 28 mmol/L  21-32   ANION GAP (CALC) 7 mmol/L  4-13   BLOOD UREA NITROGEN 14 mg/dL  5-25   CREATININE 0 90 mg/dL  0 60-1 30   Standardized to IDMS reference method   CALCIUM 8 7 mg/dL  8 3-10 1   BILI, TOTAL 0 20 mg/dL  0 20-1 00   ALK PHOSPHATAS 76 U/L     ALT (SGPT) 40 U/L  12-78   Specimen collection should occur prior to Sulfasalazine administration due to the potential for falsely depressed results  AST(SGOT) 15 U/L  5-45   Specimen collection should occur prior to Sulfasalazine administration due to the potential for falsely depressed results     ALBUMIN 2 9 g/dL L 3 5-5 0   TOTAL PROTEIN 7 4 g/dL  6 4-8 2   eGFR 79 ml/min/1 73sq m National Kidney Disease Education Program recommendations are as follows:  GFR calculation is accurate only with a steady state creatinine  Chronic Kidney disease less than 60 ml/min/1 73 sq  meters  Kidney failure less than 15 ml/min/1 73 sq  meters  GLUCOSE FASTING 130 mg/dL H 65-99   Specimen collection should occur prior to Sulfasalazine administration due to the potential for falsely depressed results  Specimen collection should occur prior to Sulfapyridine administration due to the potential for falsely elevated results  (1) HEMOGLOBIN A1C 84NKK2708 06:09AM Tre Verteego ([facility]) Order Number: [id number]     Test Name Result Flag Reference   HEMOGLOBIN A1C 6 9 % H 4 2-6 3   EST  AVG  GLUCOSE 151 mg/dl       (1) MICROALBUMIN CREATININE RATIO, RANDOM URINE [id number] 06:09AM Tre Verteego ([facility]) Order Number: [id number]     Test Name Result Flag Reference   MICROALBUMIN/ CREAT R 5 mg/g creatinine  0-30   MICROALBUMIN,URINE 6 2 mg/L  0 0-20 0   CREATININE URINE 132 0 mg/dL       (1) LIPID PANEL, FASTING 23STF3547 06:09AM [facility] Order Number: [id number]     Test Name Result Flag Reference   CHOLESTEROL 145 mg/dL     HDL,DIRECT 42 mg/dL  40-60   Specimen collection should occur prior to Metamizole administration due to the potential for falsley depressed results  LDL CHOLESTEROL CALCULATED 88 mg/dL  0-100   Triglyceride:        Normal <150 mg/dl   Borderline High 150-199 mg/dl   High 200-499 mg/dl   Very High >499 mg/dl      Cholesterol:       Desirable <200 mg/dl    Borderline High 200-239 mg/dl    High >239 mg/dl      HDL Cholesterol:       High>59 mg/dL    Low <41 mg/dL      This screening LDL is a calculated result  It does not have the accuracy of the Direct Measured LDL in the monitoring of patients with hyperlipidemia and/or statin therapy  Direct Measure LDL (YZU328) must be ordered separately in these patients     TRIGLYCERIDES 75 mg/dL  <=150 Specimen collection should occur prior to N-Acetylcysteine or Metamizole administration due to the potential for falsely depressed results         Signatures   Electronically signed by : Elfego Blanco MD; Nov 7 2017 11:33AM EST                       (Author)

## 2018-01-11 NOTE — RESULT NOTES
Verified Results  (Q) HELICOBACTER PYLORI AG, EIA, STOOL [id number] 11:12AM Dena Toledo   REPORT COMMENT:  SPLIT 04/03/2017 FROM [id number]     Test Name Result Flag Reference   HELICOBACTER PYLORI AG,$EIA, STOOL      HELICOBACTER PYLORI AG, EIA, STOOL         MICRO NUMBER:      [id number]    TEST STATUS:       FINAL    SPECIMEN SOURCE:   STOOL    SPECIMEN QUALITY:  ADEQUATE    RESULT:            Not Detected                                               Antimicrobials, proton pump inhibitors, and                       bismuth preparations inhibit H  pylori and                       ingestion up to two weeks prior to testing may                       cause false negative results  If clinically                       indicated the test should be repeated on a new                       specimen obtained two weeks after discontinuing                       treatment         Discussion/Summary   stool test showed negative for H  pylori infection    - Dr Martin Lizarraga   Electronically signed by : Stephen Price MD; Apr 5 2017 12:06PM EST                       (Author)

## 2018-01-12 VITALS
DIASTOLIC BLOOD PRESSURE: 68 MMHG | RESPIRATION RATE: 18 BRPM | TEMPERATURE: 98 F | OXYGEN SATURATION: 93 % | SYSTOLIC BLOOD PRESSURE: 98 MMHG | WEIGHT: 293 LBS | HEART RATE: 103 BPM

## 2018-01-12 NOTE — RESULT NOTES
Discussion/Summary   pelvic ultrasound showed cyst on left ovary about 9x 5cm  I want her to follow up with her GYN if she has one     - Dr Agnieszka Lea ([address] [place] [address]) [id number] 06:22PM Jose Toledo Order Number: [id number]    - Patient Instructions: To schedule this appointment, please contact Central Scheduling at [phone]  Test Name Result Flag Reference   US PELVIS COMPLETE (TRANSABDOMINAL AND TRANSVAGINAL) (Report)     PELVIC ULTRASOUND, COMPLETE     INDICATION: Follow-up left adnexal cyst  Patient is asymptomatic  LMP was April 6, 2017  COMPARISON: None  TECHNIQUE:  Transabdominal pelvic ultrasound was performed in sagittal and transverse planes with a curvilinear transducer  Additional transvaginal imaging was performed to better evaluate the endometrium and ovaries  Imaging included volumetric    sweeps as well as traditional still imaging technique  FINDINGS:     UTERUS:   The uterus is anteverted in position, measuring 8 8 x 4 4 x 4 7 cm  Volume = 96 mL  Contour and echotexture appear normal      Nabothian cysts in the lower uterine segment  The cervix shows no suspicious abnormality  ENDOMETRIUM:    Normal caliber of 8 mm  Homogenous and normal in appearance  OVARIES/ADNEXA:   Right ovary: 3 0 x 2 6 x 2 6 cm  No suspicious right ovarian abnormality  Doppler flow within normal limits  Left ovary: Multilocular mass in the left adnexa measuring up to 9 1 x 6 1 x 5 4 cm when measured as a single structure  There is a cystic component measuring 3 8 cm  There is an avascular heterogeneously hypoechoic component measuring 5 2 cm  There    is a smaller similarly a vascular heterogeneously hypoechoic component measuring 3 5 cm  No suspicious adnexal mass or loculated collections  There is no free fluid  IMPRESSION:   Uterus is unremarkable  Normal thickness endometrium        Normal right ovary  Multilobular structure in the left adnexa which appears to be comprised of both a simple cystic component as well as two complex cystic lesions, the larger of these measures 5 2 cm and corresponds to the CT abnormality (both appear to have diminished in    size compared to the prior CT)  According to the consensus conference statement from the Society of Radiologists in Ultrasound (Radiology:Volume 256: Number 3-September 2010  pp [phone] ) These lesions have imaging features of either hemorrhagic cysts    or endometriomata  In this premenopausal woman, these should be reevaluated by ultrasound in 6-12 weeks to document continued improvement/resolution     ##fuslh2##fuslh2        Workstation performed: [id number]     Signed by:    Smita Barcenas DO   5/4/17       Signatures   Electronically signed by : Alexander King MD; May  5 2017  7:55AM EST                       (Author)

## 2018-01-13 VITALS
HEART RATE: 94 BPM | WEIGHT: 293 LBS | DIASTOLIC BLOOD PRESSURE: 82 MMHG | BODY MASS INDEX: 47.09 KG/M2 | TEMPERATURE: 98.1 F | HEIGHT: 66 IN | SYSTOLIC BLOOD PRESSURE: 134 MMHG

## 2018-01-13 VITALS
OXYGEN SATURATION: 98 % | WEIGHT: 293 LBS | TEMPERATURE: 97.3 F | SYSTOLIC BLOOD PRESSURE: 118 MMHG | DIASTOLIC BLOOD PRESSURE: 84 MMHG | HEIGHT: 65 IN | HEART RATE: 94 BPM | BODY MASS INDEX: 48.82 KG/M2

## 2018-01-13 VITALS
DIASTOLIC BLOOD PRESSURE: 64 MMHG | HEIGHT: 65 IN | SYSTOLIC BLOOD PRESSURE: 128 MMHG | BODY MASS INDEX: 48.82 KG/M2 | HEART RATE: 80 BPM | RESPIRATION RATE: 20 BRPM | WEIGHT: 293 LBS

## 2018-01-13 VITALS
DIASTOLIC BLOOD PRESSURE: 80 MMHG | HEART RATE: 76 BPM | RESPIRATION RATE: 16 BRPM | WEIGHT: 293 LBS | SYSTOLIC BLOOD PRESSURE: 108 MMHG | BODY MASS INDEX: 52.98 KG/M2

## 2018-01-13 NOTE — RESULT NOTES
Discussion/Summary   Polyp was not a precancerous, colon biopsies were normal, I recommend a repeat colonoscopy at age 48 for screening purposes     Verified Results  (1) TISSUE EXAM [id number] 12:58PM Brittney Saleh     Test Name Result Flag Reference   LAB AP CASE REPORT (Report)     Surgical Pathology Report             Case: [id number]                   Authorizing Provider: Gianfranco Brito DO    Collected:      06/13/2017 1258        Ordering Location:   [address] [place] [address]   Received:      06/13/2017 [facility]                               Pathologist:      Jeanna Baires MD                                Specimens:  A) - Large Intestine, Sigmoid Colon, Sigmoid colon                           B) - Rectum, Distal proctitis   LAB AP FINAL DIAGNOSIS (Report)     A  Large Intestine, Sigmoid Colon, Sigmoid colon :  -Hyperplastic polyp  B  Rectum, Distal proctitis:  Benign colonic mucosa with intramucosal lymphoid aggregate   -No evidence of lymphocytic or collagenous colitis   -No evidence of active colitis   -No evidence of dysplasia or malignancy seen  Electronically signed by Jeanna Baires MD on 6/16/2017 at 5:06 PM   LAB AP SURGICAL ADDITIONAL INFORMATION (Report)     These tests were developed and their performance characteristics   determined by [facility] or [facility]  They may not be cleared or approved by the [place]  Food and   Drug Administration  The FDA has determined that such clearance or   approval is not necessary  These tests are used for clinical purposes  They should not be regarded as investigational or for research  This   laboratory has been approved by CLIA 88, designated as a high-complexity   laboratory and is qualified to perform these tests  LAB AP GROSS DESCRIPTION (Report)     A  The specimen is received in formalin, labeled with the patient's name   and hospital number, and is designated sigmoid colon  The specimen   consists of a single, rubbery, tan-brown tissue fragment measuring up to   0 3 cm in greatest dimension  Entirely submitted  One cassette  B  The specimen is received in formalin, labeled with the patient's name   and hospital number, and is designated distal proctitis, rectum  The   specimen consists of several, rubbery, tan-brown tissue fragments   measuring 0 5 x 0 5 x 0 1 cm in aggregate dimension  Entirely submitted  One cassette  Note: The estimated total formalin fixation time based upon information   provided by the submitting clinician and the standard processing schedule   is 25 0 hours      Erick Gallegos

## 2018-01-13 NOTE — PROGRESS NOTES
Assessment    1  Encounter for preventive health examination (V70 0) (Z00 00)   2  Diabetes mellitus type 2 in obese (250 00,278 00) (E11 9,E66 9)   3  Anxiety and depression (300 00,311) (F41 9,F32 9)   4  Hypothyroidism (244 9) (E03 9)   5  Benign hypertension (401 1) (I10)   6  Low back pain (724 2) (M54 5)    Plan  Anxiety and depression    · ALPRAZolam 0 5 MG Oral Tablet; TAKE 2 TABLET Bedtime PRN  Benign hypertension, Diabetes mellitus type 2 in obese, Hypothyroidism    · (1) HEMOGLOBIN A1C; Status:Active; Requested for:11Nov2016;    · (1) MICROALBUMIN CREATININE RATIO, RANDOM URINE; Status:Active; Requested  for:11Nov2016;    · (Q) LIPID PANEL WITH DIRECT LDL; Status:Active; Requested for:11Nov2016;   Diabetes mellitus type 2 in obese    · Farxiga 5 MG Oral Tablet; Take 1 tablet daily  Diabetes mellitus type 2 in obese, Hypothyroidism    · (Q) COMPREHENSIVE METABOLIC PNL W/ADJUSTED CALCIUM; Status:Active; Requested for:11Nov2016;    · (Q) TSH, 3RD GENERATION W/REFLEX TO FT4; Status:Active; Requested  for:11Nov2016;     Discussion/Summary  health maintenance visit Currently, she eats an adequate diet and eats a poor diet  the risks and benefits of cervical cancer screening were discussed      Chief Complaint  Pt here for new patient physical      History of Present Illness  HM, Adult Female: The patient is being seen for a health maintenance evaluation  The last health maintenance visit was 1 year(s) ago  General Health: The patient's health since the last visit is described as good  She has regular dental visits  She denies vision problems  She denies hearing loss  Lifestyle:  She does not have a healthy diet  She has weight concerns  She does not exercise regularly  She uses tobacco  The patient is a current cigarette smoker  She consumes alcohol  She uses illicit drugs  Reproductive health:  she reports normal menses  she uses no contraception  she is sexually active  she denies prior pregnancies  Screening: cancer screening reviewed and updated  metabolic screening reviewed and updated  risk screening reviewed and updated  HPI: was seeing [facility] doctor       Review of Systems    Constitutional: No fever, no chills, feels well, no tiredness, no recent weight gain or weight loss  Eyes: No complaints of eye pain, no red eyes, no eyesight problems, no discharge, no dry eyes, no itching of eyes  ENT: no complaints of earache, no loss of hearing, no nose bleeds, no nasal discharge, no sore throat, no hoarseness  Cardiovascular: No complaints of slow heart rate, no fast heart rate, no chest pain, no palpitations, no leg claudication, no lower extremity edema  Respiratory: No complaints of shortness of breath, no wheezing, no cough, no SOB on exertion, no orthopnea, no PND  Gastrointestinal: No complaints of abdominal pain, no constipation, no nausea or vomiting, no diarrhea, no bloody stools  Genitourinary: No complaints of dysuria, no incontinence, no pelvic pain, no dysmenorrhea, no vaginal discharge or bleeding  Musculoskeletal: No complaints of arthralgias, no myalgias, no joint swelling or stiffness, no limb pain or swelling  Integumentary: No complaints of skin rash or lesions, no itching, no skin wounds, no breast pain or lump  Past Medical History    · History of diabetes mellitus (V12 29) (Z86 39)   · History of hypertension (V12 59) (Z86 79)    Family History  Mother    · Family history of cardiac disorder (V17 49) (Z82 49)   · Family history of diabetes mellitus (V18 0) (Z83 3)   · Family history of hypertension (V17 49) (Z82 49)   · Family history of hyperthyroidism (V18 19) (Z83 49)  Father    · Family history of diabetes mellitus (V18 0) (Z83 3)   · Family history of hypertension (V17 49) (Z82 49)    Social History    · Current every day smoker (305 1) (F17 200)   · Social drinker (V49 89) (Z78 9)    Current Meds   1   ALPRAZolam 0 5 MG Oral Tablet; TAKE 2 TABLET Bedtime PRN; Therapy: 80NJK0394 to (Evaluate:95Qfj6081) Recorded   2  Atorvastatin Calcium 10 MG Oral Tablet; TAKE 1 TABLET AT BEDTIME; Therapy: 63EFX9935 to Recorded   3  Cymbalta 60 MG Oral Capsule Delayed Release Particles; take 1 capsule daily; Therapy: 84DHA5407 to Recorded   4  Levothyroxine Sodium 50 MCG Oral Tablet; TAKE 1 TABLET DAILY AS DIRECTED; Therapy: 43NAL5384 to (Evaluate:10Ptq6316) Recorded   5  Lisinopril 20 MG Oral Tablet; TAKE 1 TABLET DAILY AS DIRECTED; Therapy: 92FPD6479 to Recorded   6  Vitamin D3 2000 UNIT Oral Tablet; Therapy: 34ZCU4544 to Recorded    Vitals   Recorded: 10SFN4084 41:84OR   Systolic 708   Diastolic 62   Heart Rate 80   Respiration 16   Temperature 97 5 F   Height 5 ft 5 35 in   Weight 326 lb 2 oz   BMI Calculated 53 69   BSA Calculated 2 44     Physical Exam    Constitutional   General appearance: Abnormal   obese  Head and Face   Head and face: Normal     Palpation of the face and sinuses: No sinus tenderness  Eyes   Conjunctiva and lids: No swelling, erythema or discharge  Pupils and irises: Equal, round, reactive to light  Ophthalmoscopic examination: Normal fundi and optic discs  Ears, Nose, Mouth, and Throat   External inspection of ears and nose: Normal     Otoscopic examination: Tympanic membranes translucent with normal light reflex  Canals patent without erythema  Hearing: Normal     Nasal mucosa, septum, and turbinates: Normal without edema or erythema  Lips, teeth, and gums: Normal, good dentition  Oropharynx: Normal with no erythema, edema, exudate or lesions  Neck   Neck: Supple, symmetric, trachea midline, no masses  Thyroid: Normal, no thyromegaly  Pulmonary   Respiratory effort: No increased work of breathing or signs of respiratory distress  Percussion of chest: Normal     Auscultation of lungs: Clear to auscultation  Cardiovascular   Palpation of heart: Normal PMI, no thrills      Auscultation of heart: Normal rate and rhythm, normal S1 and S2, no murmurs  Examination of extremities for edema and/or varicosities: Normal     Chest   Chest: Normal     Abdomen   Abdomen: Non-tender, no masses  Liver and spleen: No hepatomegaly or splenomegaly  Examination for hernias: No hernia appreciated  Lymphatic   Palpation of lymph nodes in neck: No lymphadenopathy  Palpation of lymph nodes in axillae: No lymphadenopathy  Palpation of lymph nodes in groin: No lymphadenopathy  Musculoskeletal   Gait and station: Normal     Digits and nails: Normal without clubbing or cyanosis  Joints, bones, and muscles: Normal     Range of motion: Normal     Stability: Normal     Muscle strength/tone: Normal     Skin   Skin and subcutaneous tissue: Normal without rashes or lesions  Palpation of skin and subcutaneous tissue: Normal turgor  Neurologic   Cranial nerves: Cranial nerves II-XII intact  Cortical function: Normal mental status  Reflexes: 2+ and symmetric  Sensation: No sensory loss  Coordination: Normal finger to nose and heel to shin  Psychiatric   Judgment and insight: Normal     Orientation to person, place, and time: Normal     Recent and remote memory: Intact  Mood and affect: Normal        Results/Data  PHQ-9 Adult Depression Screening [id number] 02:38PM User, [facility]     Test Name Result Flag Reference   PHQ-9 Adult Depression Score 11     Over the last two weeks, how often have you been bothered by any of the following problems?   Little interest or pleasure in doing things: Several days - 1  Feeling down, depressed, or hopeless: Several days - 1  Trouble falling or staying asleep, or sleeping too much: Nearly every day - 3  Feeling tired or having little energy: Nearly every day - 3  Poor appetite or over eating: More than half the days - 2  Feeling bad about yourself - or that you are a failure or have let yourself or your family down: Several days - 1  Trouble concentrating on things, such as reading the newspaper or watching television: Not at all - 0  Moving or speaking so slowly that other people could have noticed   Or the opposite -  being so fidgety or restless that you have been moving around a lot more than usual: Not at all - 0  Thoughts that you would be better off dead, or of hurting yourself in some way: Not at all - 0   PHQ-9 Adult Depression Screening Positive     PHQ-9 Difficulty Level Somewhat difficult     PHQ-9 Severity Moderate Depression         Signatures   Electronically signed by : Boris Samuels MD; Nov 11 2016  3:31PM EST                       (Author)

## 2018-01-14 VITALS
HEIGHT: 65 IN | TEMPERATURE: 99.1 F | RESPIRATION RATE: 18 BRPM | BODY MASS INDEX: 48.82 KG/M2 | HEART RATE: 82 BPM | SYSTOLIC BLOOD PRESSURE: 142 MMHG | WEIGHT: 293 LBS | DIASTOLIC BLOOD PRESSURE: 84 MMHG

## 2018-01-14 VITALS
WEIGHT: 293 LBS | TEMPERATURE: 97.8 F | RESPIRATION RATE: 18 BRPM | OXYGEN SATURATION: 93 % | DIASTOLIC BLOOD PRESSURE: 64 MMHG | HEART RATE: 93 BPM | SYSTOLIC BLOOD PRESSURE: 110 MMHG | BODY MASS INDEX: 51.23 KG/M2

## 2018-01-14 VITALS
RESPIRATION RATE: 20 BRPM | WEIGHT: 293 LBS | BODY MASS INDEX: 53.71 KG/M2 | SYSTOLIC BLOOD PRESSURE: 128 MMHG | DIASTOLIC BLOOD PRESSURE: 74 MMHG | HEART RATE: 90 BPM

## 2018-01-14 VITALS
WEIGHT: 293 LBS | HEART RATE: 84 BPM | DIASTOLIC BLOOD PRESSURE: 68 MMHG | TEMPERATURE: 97.8 F | BODY MASS INDEX: 52.58 KG/M2 | SYSTOLIC BLOOD PRESSURE: 118 MMHG | RESPIRATION RATE: 18 BRPM

## 2018-01-14 VITALS
BODY MASS INDEX: 48.82 KG/M2 | DIASTOLIC BLOOD PRESSURE: 70 MMHG | WEIGHT: 293 LBS | HEART RATE: 89 BPM | HEIGHT: 65 IN | SYSTOLIC BLOOD PRESSURE: 110 MMHG

## 2018-01-15 NOTE — MISCELLANEOUS
Dear Dr Alex Nolan,    The polyp I removed from Ms Bacon was hyperplastic, not precancerous  Based on this, I recommend repeat colonoscopy at age 48 for screening purposes        Sincerely,        Evaristo Diehl DO       Electronically signed by:Cherelle Polanco DO  Jun 20 2017  7:43AM EST

## 2018-01-15 NOTE — RESULT NOTES
Discussion/Summary   diabetes test is in a controlled range   continue current dose as directed    - Dr Cesar Hager      Verified Results  (Q) HEMOGLOBIN A1c [id number] 07:31AM Dena Toledo     Test Name Result Flag Reference   HEMOGLOBIN A1c 6 1 % of total Hgb H <5 7   For someone without known diabetes, a hemoglobin   A1c value between 5 7% and 6 4% is consistent with  prediabetes and should be confirmed with a   follow-up test      For someone with known diabetes, a value <7%  indicates that their diabetes is well controlled  A1c  targets should be individualized based on duration of  diabetes, age, comorbid conditions, and other  considerations  This assay result is consistent with an increased risk  of diabetes  Currently, no consensus exists regarding use of  hemoglobin A1c for diagnosis of diabetes for children         Signatures   Electronically signed by : Samuel Hudson MD; Jul 12 2017  8:06AM EST                       (Author)

## 2018-01-16 ENCOUNTER — GENERIC CONVERSION - ENCOUNTER (OUTPATIENT)
Dept: OTHER | Facility: OTHER | Age: 43
End: 2018-01-16

## 2018-01-16 NOTE — RESULT NOTES
Verified Results  (Q) TSH, 3RD GENERATION W/REFLEX TO FT4 [id number] 07:12AM Dena Toledo     Test Name Result Flag Reference   TSH W/REFLEX TO FT4 2 06 mIU/L     Reference Range                         > or = 20 Years  0 40-4 50                              Pregnancy Ranges            First trimester    0 26-2 66            Second trimester   0 55-2 73            Third trimester    0 43-2 91     (Q) MICROALBUMIN, RANDOM URINE (W/CREATININE) [id number] 07:12AM Dena Toledo     Test Name Result Flag Reference   CREATININE, RANDOM URINE 190 mg/dL     MICROALBUMIN 0 8 mg/dL     Reference Range  Not established   MICROALBUMIN/CREATININE$RATIO, RANDOM URINE 4 mcg/mg creat  <30   The ADA defines abnormalities in albumin  excretion as follows:     Category         Result (mcg/mg creatinine)     Normal                    <30  Microalbuminuria            Clinical albuminuria   > OR = 300     The ADA recommends that at least two of three  specimens collected within a 3-6 month period be  abnormal before considering a patient to be  within a diagnostic category  (Q) HEMOGLOBIN A1c [id number] 07:12AM Dena Toledo   REPORT COMMENT:  INCLUDES LAB [id number]  FASTING:YES     Test Name Result Flag Reference   HEMOGLOBIN A1c 6 8 % of total Hgb H <5 7   According to ADA guidelines, hemoglobin A1c <7 0%  represents optimal control in non-pregnant diabetic  patients  Different metrics may apply to specific  patient populations  Standards of Medical Care in    Diabetes Care  2013;36:s11-s66     For the purpose of screening for the presence of  diabetes  <5 7%       Consistent with the absence of diabetes  5 7-6 4%    Consistent with increased risk for diabetes              (prediabetes)  >or=6 5%    Consistent with diabetes     This assay result is consistent with diabetes  mellitus  Currently, no consensus exists for use of hemoglobin  A1c for diagnosis of diabetes for children  Discussion/Summary   thyroid level is within the range    normal urine test   diabetes test has improved   - Dr Beto Lam

## 2018-01-17 NOTE — RESULT NOTES
this premenopausal woman, this is benign but should receive yearly followup ultrasound because endometriomas have a low risk of malignant degeneration       Another 3 6 x 3 8 x 3 7 simple cyst in the left ovary has slightly decreased in size compared to 5/4/2017 and is likely a functional cyst                  Workstation performed: [id number]     Signed by:   Patricia Eli MD   6/8/17       Signatures   Electronically signed by : WHITLEY Zuniga ; Jun 9 2017 10:04AM EST                       (Author)

## 2018-01-17 NOTE — RESULT NOTES
Discussion/Summary   CT scan of abdomen showed 7cm x 5 6 cm possible cyst on left ovary  mild fatty liver due to her weight  needs a Pelvic Ultrasound to further evaluate her ovaries  see chart for the order  to see GYN as well   - Dr Sadia Christiansen [id number] 08:11AM Dena Toledo     Test Name Result Flag Reference   CT ABDOMEN PELVIS W CONTRAST (Report)     CT ABDOMEN AND PELVIS WITH IV CONTRAST     INDICATION: 51-year-old female with rectal bleeding and abdominal pain  COMPARISON: None  TECHNIQUE: CT examination of the abdomen and pelvis was performed  Reformatted images were created in axial, sagittal, and coronal planes  Radiation dose length product (DLP) for this visit: 1500 mGy-cm   This examination, like all CT scans performed in the [facility], was performed utilizing techniques to minimize radiation dose exposure, including the use of iterative    reconstruction and automated exposure control  IV Contrast: 100 mL of iohexol (OMNIPAQUE)      Enteric Contrast: Enteric contrast was administered  FINDINGS:     ABDOMEN     LOWER CHEST: No significant abnormalities identified in the lower chest      LIVER/BILIARY TREE: Liver is diffusely decreased in density consistent with fatty change  No CT evidence of suspicious hepatic mass  Normal hepatic contours  No biliary dilatation  GALLBLADDER: No calcified gallstones  No pericholecystic inflammatory change  SPLEEN: Unremarkable  PANCREAS: Unremarkable  ADRENAL GLANDS: Unremarkable  KIDNEYS/URETERS: Unremarkable  No hydronephrosis  STOMACH AND BOWEL: Unremarkable  APPENDIX: A normal appendix was visualized  ABDOMINOPELVIC CAVITY: No ascites or free intraperitoneal air  No lymphadenopathy  VESSELS: Unremarkable for patient's age       PELVIS     REPRODUCTIVE ORGANS: Low-density trilobed septated mass in the left adnexa presumably arising from the ovary measuring 7 3 x 5 6 cm  Ultrasound recommended for characterization  Uterus and right adnexa normal        URINARY BLADDER: Unremarkable  ABDOMINAL WALL/INGUINAL REGIONS: Unremarkable  OSSEOUS STRUCTURES: No acute fracture or destructive osseous lesion  IMPRESSION:       1  7 3 x 5 6 cm septated cystic lesion in the left adnexa likely arising from the ovary for which ultrasound is recommended for further characterization  2  Mild hepatic steatosis         Workstation performed: [id number]     Signed by:   Aurora Thompson MD   4/17/17       Plan  Abdominal pain, Other ovarian cyst, left side    · US PELVIS COMPLETE NON OB; Status:Hold For - Scheduling; Requested  for:[id number];

## 2018-01-18 NOTE — MISCELLANEOUS
Message  GI Reminder Recall [employer]:   Date: 08/09/2017   Dear Ayesha Zamudio:     Review of our records shows you are due for the following:       Please call the following office to schedule your appointment:   [address], [address], [place], [address] [facility] [phone]  We look forward to hearing from you!      Sincerely,     [facility]      Signatures   Electronically signed by : Dhara Rosenberg, ; Aug  9 2017 11:37AM EST                       (Author)

## 2018-01-22 VITALS
DIASTOLIC BLOOD PRESSURE: 66 MMHG | WEIGHT: 293 LBS | BODY MASS INDEX: 48.82 KG/M2 | OXYGEN SATURATION: 98 % | HEART RATE: 88 BPM | SYSTOLIC BLOOD PRESSURE: 134 MMHG | HEIGHT: 65 IN

## 2018-01-22 VITALS
HEART RATE: 78 BPM | HEIGHT: 65 IN | SYSTOLIC BLOOD PRESSURE: 132 MMHG | RESPIRATION RATE: 16 BRPM | DIASTOLIC BLOOD PRESSURE: 70 MMHG | BODY MASS INDEX: 48.82 KG/M2 | WEIGHT: 293 LBS

## 2018-01-23 VITALS
BODY MASS INDEX: 48.82 KG/M2 | RESPIRATION RATE: 16 BRPM | HEIGHT: 65 IN | HEART RATE: 80 BPM | SYSTOLIC BLOOD PRESSURE: 134 MMHG | TEMPERATURE: 98.4 F | DIASTOLIC BLOOD PRESSURE: 78 MMHG | WEIGHT: 293 LBS

## 2018-01-23 VITALS
HEART RATE: 86 BPM | WEIGHT: 293 LBS | SYSTOLIC BLOOD PRESSURE: 140 MMHG | DIASTOLIC BLOOD PRESSURE: 82 MMHG | OXYGEN SATURATION: 98 % | BODY MASS INDEX: 48.82 KG/M2 | HEIGHT: 65 IN

## 2018-01-23 NOTE — MISCELLANEOUS
Message  GI Reminder Recall [employer] Can:   Date: 01/16/2018   Dear Francisco Sevilla:     Review of our records shows you are due for the following: Follow Up Visit  Please call the following office to schedule your appointment:   [address] [place] [address], [address] [place] [address] [phone]  We look forward to hearing from you! Sincerely,     [facility]      Signatures   Electronically signed by :  Lucila Owen, ; Jan 16 2018 10:01AM EST                       (Author)

## 2018-01-23 NOTE — PROGRESS NOTES
Assessment   1  Encounter for preventive health examination (V70 0) (Z00 00)  2  Diabetes mellitus type 2 in obese (250 00,278 00) (E11 69,E66 9)  3  Gastroesophageal reflux disease without esophagitis (530 81) (K21 9)  4  GERD (gastroesophageal reflux disease) (530 81) (K21 9)  5  Fibromyalgia (729 1) (M79 7)  6  Encounter for smoking cessation counseling (V65 42,305 1) (Z71 6,Z72 0)  7  Anxiety and depression (300 00,311) (F41 8)  8  Hypothyroidism (244 9) (E03 9)    Plan  Anxiety and depression    · Start: BuPROPion HCl ER (XL) 150 MG Oral Tablet Extended Release 24 Hour; take one  tablet by mouth every day  Diabetes mellitus type 2 in obese    · Start: Victoza 18 MG/3ML Subcutaneous Solution Pen-injector; 0 6 mg once a day for 1  week, then 1 2mg once a day for 1 week then 1 8 mg daily   · *VB - Foot Exam; Status:Complete;   Done: 47CIK8168 03:49PM  Diabetes mellitus type 2 in obese, Hypothyroidism    · (1) COMPREHENSIVE METABOLIC PANEL; Status:Active; Requested for:12Mar2018;    · (1) HEMOGLOBIN A1C; Status:Active; Requested for:12Mar2018;    · (1) LIPID PANEL, FASTING; Status:Active; Requested for:12Mar2018;    · (1) MICROALBUMIN CREATININE RATIO, RANDOM URINE; Status:Active; Requested  for:12Mar2018;    · (1) TSH WITH FT4 REFLEX; Status:Active; Requested for:12Mar2018;   Encounter for smoking cessation counseling    · We recommend you quit smoking  Time spent counseling today was greater than 3  minutes ; Status:Complete;   Done: 24GLT5113  Health Maintenance    · Follow-up visit in 1 year Evaluation and Treatment  Follow-up  Status: Hold For -  Scheduling  Requested for: 69FWN0487   · Always use a seat belt and shoulder strap when riding or driving a motor vehicle ;  Status:Complete;   Done: 24OVQ7371   · Begin a limited exercise program ; Status:Complete;   Done: 13DWR0783   · Begin or continue regular aerobic exercise  Gradually work up to at least 3 sessions of 30  minutes of exercise a week  ; Status:Complete;   Done: 11RMS0715   · Drink plenty of fluids ; Status:Complete;   Done: 76GVN9358   · Eat a low fat and low cholesterol diet ; Status:Complete;   Done: 23DOY3332   · Eat foods that are high in calcium ; Status:Complete;   Done: 83QWR4450   · Use a sun block product with an SPF of 15 or more ; Status:Complete;   Done:  81OUA2384   · We recommend that you bring your body mass index down to 26 ; Status:Complete;    Done: 25MOC4336   · We recommend that you follow these steps to lower your risk of osteoporosis  ;  Status:Complete;   Done: 77OKS1681    Discussion/Summary  health maintenance visit Currently, she eats an adequate diet and has an adequate exercise regimen  the risks and benefits of cervical cancer screening were discussed cervical cancer screening is current Breast cancer screening: the risks and benefits of breast cancer screening were discussed  Chief Complaint  Wellness visit  History of Present Illness  HM, Adult Female: The patient is being seen for a health maintenance evaluation  The last health maintenance visit was 1 year(s) ago  General Health: The patient's health since the last visit is described as good  She has regular dental visits  She denies vision problems  She denies hearing loss  Immunizations status: up to date  Lifestyle:  She does not have a healthy diet  She does not have any weight concerns  She does not exercise regularly  She uses tobacco  She denies alcohol use  She denies drug use  Reproductive health:  she reports normal menses  she uses no contraception  she is sexually active  she denies prior pregnancies  Screening: cancer screening reviewed and updated  metabolic screening reviewed and updated  risk screening reviewed and updated     HPI: sees Rheumatologist for her Fibromyalgia   worsening anxiety and depression due to her recent family feud with sister and her mother       Review of Systems    Constitutional: No fever, no chills, feels well, no tiredness, no recent weight gain or weight loss  Eyes: No complaints of eye pain, no red eyes, no eyesight problems, no discharge, no dry eyes, no itching of eyes  ENT: no complaints of earache, no loss of hearing, no nose bleeds, no nasal discharge, no sore throat, no hoarseness  Cardiovascular: No complaints of slow heart rate, no fast heart rate, no chest pain, no palpitations, no leg claudication, no lower extremity edema  Respiratory: No complaints of shortness of breath, no wheezing, no cough, no SOB on exertion, no orthopnea, no PND  Gastrointestinal: No complaints of abdominal pain, no constipation, no nausea or vomiting, no diarrhea, no bloody stools  Genitourinary: No complaints of dysuria, no incontinence, no pelvic pain, no dysmenorrhea, no vaginal discharge or bleeding  Musculoskeletal: No complaints of arthralgias, no myalgias, no joint swelling or stiffness, no limb pain or swelling  Integumentary: No complaints of skin rash or lesions, no itching, no skin wounds, no breast pain or lump  Neurological: No complaints of headache, no confusion, no convulsions, no numbness, no dizziness or fainting, no tingling, no limb weakness, no difficulty walking  Psychiatric: anxiety and depression  Endocrine: No complaints of proptosis, no hot flashes, no muscle weakness, no deepening of the voice, no feelings of weakness  Hematologic/Lymphatic: No complaints of swollen glands, no swollen glands in the neck, does not bleed easily, does not bruise easily  Active Problems   1  Anxiety and depression (300 00,311) (F41 8)  2  Arthritis (716 90) (M19 90)  3  Benign hypertension (401 1) (I10)  4  Diabetes mellitus type 2 in obese (250 00,278 00) (E11 69,E66 9)  5  Encounter for smoking cessation counseling (V65 42,305 1) (Z71 6,Z72 0)  6  Gastroesophageal reflux disease without esophagitis (530 81) (K21 9)  7  GERD (gastroesophageal reflux disease) (530 81) (K21 9)  8   Hypothyroidism (244  9) (E03 9)  9  Insomnia (780 52) (G47 00)  10  Irregular menstrual cycle (626 4) (N92 6)  11  Morbid obesity due to excess calories (278 01) (E66 01)  12  Visit for screening mammogram (V76 12) (Z12 31)    Past Medical History    · History of Acute maxillary sinusitis, recurrence not specified (461 0) (J01 00)   · History of H/O colonoscopy (V45 89) (X46 170)   · History of diabetes mellitus (V12 29) (Z86 39)   · History of hypertension (V12 59) (Z86 79)   · History of mammogram (V15 89) (Z92 89)   · History of Other ovarian cyst, left side (620 2) (N83 292)   · History of Ovarian neoplasm (239 5) (D49 59)    Surgical History    · History of Hysterectomy Robotic-Assisted   · History of Salpingo-oophorectomy Bilateral    Family History  Mother    · Family history of cardiac disorder (V17 49) (Z82 49)   · Family history of diabetes mellitus (V18 0) (Z83 3)   · Family history of hypertension (V17 49) (Z82 49)   · Family history of hyperthyroidism (V18 19) (Z83 49)  Father    · Family history of diabetes mellitus (V18 0) (Z83 3)   · Family history of hypertension (V17 49) (Z82 49)  Maternal Grandfather    · Family history of lung cancer (V16 1) (Z80 1)  Aunt    · Family history of colon cancer (V16 0) (Z80 0)  Maternal Aunt    · Family history of Colon cancer  Uncle    · Family history of lung cancer (V16 1) (Z80 1)  Cousin    · Family history of colon cancer (V16 0) (Z80 0)  Maternal Cousin    · Family history of Colon cancer    Social History    · Current every day smoker (305 1) (F17 200)   · Occupation   · Social drinker (V49 89) (Z78 9)    Current Meds  1  ALPRAZolam 0 5 MG Oral Tablet; TAKE 2 TABLET Bedtime PRN; Therapy: 95YUQ8139 to (Evaluate:04Jan2018); Last Rx:55Qvr3620 Ordered  2  Aspirin Adult Low Strength 81 MG Oral Tablet Delayed Release; Therapy: 15DOY6929 to Recorded  3  Atorvastatin Calcium 10 MG Oral Tablet; TAKE ONE (1) TABLET(S) DAILY;    Therapy: 08KSL4817 to (Evaluate:06Hlu8244)  Requested for: 50TQW5876; Last   Rx:13Mar2017 Ordered  4  B-12 1000 MCG Oral Capsule; Therapy: 12KWS6093 to Recorded  5  Budesonide 3 MG Oral Capsule Delayed Release Particles; take 3 capsule daily; Therapy: 91Uuf3123 to (Evaluate:27Jan2018)  Requested for: 93UKZ8344; Last   Rx:28Nov2017 Ordered  6  DULoxetine HCl - 60 MG Oral Capsule Delayed Release Particles; Therapy: 26CLV7535 to Recorded  7  Farxiga 5 MG Oral Tablet; Take 1 tablet daily; Therapy: 71MSM9204 to (Yuliya [place])  Requested for: 50YDT1335; Last   Rx:30Nov2017 Ordered  8  Gabapentin 300 MG Oral Capsule; TAKE 1 CAPSULE Every 8 hours; Therapy: (Recorded:71Bwi1480) to Recorded  9  Levothyroxine Sodium 50 MCG Oral Tablet; TAKE 1 TABLET DAILY AS DIRECTED; Therapy: 60BIQ9288 to (Evaluate:26Jan2018)  Requested for: 16HXJ8848; Last   Rx:31Jan2017 Ordered  10  Lisinopril 10 MG Oral Tablet; TAKE 1 TABLET BY MOUTH ONCE DAILY; Therapy: 06CCR4044 to (Evaluate:16Rbi3636)  Requested for: 46Wuz2241; Last    Rx:39Amj3882 Ordered  11  Pantoprazole Sodium 40 MG Oral Tablet Delayed Release; TAKE ONE TABLET BY    MOUTH ONCE DAILY; Therapy: 36AUU7437 to (Evaluate:14Apr2018)  Requested for: 69CWF7872; Last    Rx:06Yxr3638 Ordered  12  Vitamin D3 2000 UNIT Oral Tablet; Therapy: 71ZVJ3982 to Recorded    Allergies   1  No Known Drug Allergies   2  No Known Environmental Allergies  3  No Known Food Allergies    Vitals   Recorded: 75LYN6291 03:17PM   Heart Rate 88   Systolic 904   Diastolic 66   Height 5 ft 5 in   Weight 328 lb    BMI Calculated 54 58   BSA Calculated 2 44   O2 Saturation 98     Physical Exam    Constitutional   General appearance: Abnormal   morbidly obese  Head and Face   Head and face: Normal     Palpation of the face and sinuses: No sinus tenderness  Eyes   Conjunctiva and lids: No swelling, erythema or discharge  Pupils and irises: Equal, round, reactive to light  Ophthalmoscopic examination: Normal fundi and optic discs      Ears, Nose, Mouth, and Throat   External inspection of ears and nose: Normal     Otoscopic examination: Tympanic membranes translucent with normal light reflex  Canals patent without erythema  Hearing: Normal     Nasal mucosa, septum, and turbinates: Normal without edema or erythema  Lips, teeth, and gums: Normal, good dentition  Oropharynx: Normal with no erythema, edema, exudate or lesions  Neck   Neck: Supple, symmetric, trachea midline, no masses  Thyroid: Normal, no thyromegaly  Pulmonary   Respiratory effort: No increased work of breathing or signs of respiratory distress  Percussion of chest: Normal     Cardiovascular   Palpation of heart: Normal PMI, no thrills  Auscultation of heart: Normal rate and rhythm, normal S1 and S2, no murmurs  Examination of extremities for edema and/or varicosities: Normal     Chest   Chest: Normal     Abdomen   Abdomen: Non-tender, no masses  Liver and spleen: No hepatomegaly or splenomegaly  Examination for hernias: No hernia appreciated  Lymphatic   Palpation of lymph nodes in neck: No lymphadenopathy  Palpation of lymph nodes in axillae: No lymphadenopathy  Palpation of lymph nodes in groin: No lymphadenopathy  Musculoskeletal   Gait and station: Normal     Digits and nails: Normal without clubbing or cyanosis  Joints, bones, and muscles: Normal     Range of motion: Normal     Stability: Normal     Muscle strength/tone: Normal     Skin   Skin and subcutaneous tissue: Normal without rashes or lesions  Palpation of skin and subcutaneous tissue: Normal turgor  Neurologic   Cranial nerves: Cranial nerves II-XII intact  Cortical function: Normal mental status  Reflexes: 2+ and symmetric  Sensation: No sensory loss  Coordination: Normal finger to nose and heel to shin  Psychiatric   Judgment and insight: Normal     Orientation to person, place, and time: Normal     Recent and remote memory: Intact      Mood and affect: Normal     Socks and shoes removed, Right Foot Findings: normal foot  The toes were normal  The sensory exam showed normal vibratory sensation at the level of the toes  Socks and Shoes removed, Left Foot Findings: normal foot  The toes were normal, were not swollen and were not erythematous  The sensory exam showed normal vibratory sensation at the level of the toes  Monofilament Testing: diminished tactile sensation with monofilament testing throughout both feet  Vascular: Capillary refills findings on the right were normal in the toes  Pulses: 2+ in the posterior tibialis and 2+ in the dorsalis pedis  Capillary refills findings on the left were normal in the toes  Pulses: 2+ in the posterior tibialis and 2+ in the dorsalis pedis  Assign Risk Category: 0: No loss of protective sensation, no deformity  No present risk  Results/Data  PHQ-9 Adult Depression Screening1 60Van3775 03:53PM1 Arden [place]     Test Name Result Flag Reference   PHQ-9 Adult Depression Score1 131     Over the last two weeks, how often have you been bothered by any of the following problems? Little interest or pleasure in doing things: More than half the days - 2  Feeling down, depressed, or hopeless: More than half the days - 2  Trouble falling or staying asleep, or sleeping too much: Nearly every day - 3  Feeling tired or having little energy: More than half the days - 2  Poor appetite or over eating: More than half the days - 2  Feeling bad about yourself - or that you are a failure or have let yourself or your family down: More than half the days - 2  Trouble concentrating on things, such as reading the newspaper or watching television: Not at all - 0  Moving or speaking so slowly that other people could have noticed   Or the opposite -  being so fidgety or restless that you have been moving around a lot more than usual: Not at all - 0  Thoughts that you would be better off dead, or of hurting yourself in some way: Not at all - 0   PHQ-9 Adult Depression Screening1 Positive1     PHQ-9 Difficulty Level1 Somewhat difficult1     PHQ-9 Severity1 Moderate Depression1       *VB - Foot Exam 81IKR8824 03:49PM Dena [place]     Test Name Result Flag Reference   FOOT EXAM [id number]           1  Amended ByLouisa Arreola; Dec 19 2017 3:54 PM EST   Health Management  History of Colon cancer screening   COLONOSCOPY (GI, SURG); every 8 years; Last 75VTV5719; Next Due: 32Kgz6175;   Active    Future Appointments    Date/Time Provider Specialty Site   01/04/2018 04:00 PM Letty De La Rosa MD Gynecological Oncology [facility]     Signatures   Electronically signed by : Quinton Salinas MD; Dec 19 2017  3:54PM EST                       (Author)

## 2018-01-23 NOTE — MISCELLANEOUS
Message  Return to work or school:   Pawan Hope is under my professional care  She was seen in my office on 12/05/2017   She is able to return to work on  12/14/2017      Weight Bearing Status: Full Weight-Bearing  Should you have any question, please feel free to call our office [phone]  Dr Natalia Torres        Signatures   Electronically signed by : Robby Leon, ; Dec  5 2017  4:12PM EST                       (Author)    Electronically signed by : Robby Leon, ; Dec  5 2017  4:14PM EST                       (Author)

## 2018-01-24 VITALS
DIASTOLIC BLOOD PRESSURE: 68 MMHG | HEIGHT: 65 IN | BODY MASS INDEX: 48.82 KG/M2 | RESPIRATION RATE: 14 BRPM | WEIGHT: 293 LBS | TEMPERATURE: 98.6 F | SYSTOLIC BLOOD PRESSURE: 132 MMHG | HEART RATE: 94 BPM

## 2018-02-02 DIAGNOSIS — F41.9 ANXIETY: Primary | ICD-10-CM

## 2018-02-02 RX ORDER — ALPRAZOLAM 0.5 MG/1
1 TABLET ORAL 2 TIMES DAILY PRN
Qty: 30 TABLET | Refills: 0 | OUTPATIENT
Start: 2018-02-02 | End: 2018-03-30 | Stop reason: SDUPTHER

## 2018-02-02 RX ORDER — ALPRAZOLAM 0.5 MG/1
2 TABLET ORAL
COMMUNITY
Start: 2016-11-11 | End: 2018-02-02 | Stop reason: SDUPTHER

## 2018-02-02 RX ORDER — ALPRAZOLAM 0.5 MG/1
1 TABLET ORAL 2 TIMES DAILY PRN
Qty: 60 TABLET | Refills: 0 | Status: CANCELLED | OUTPATIENT
Start: 2018-02-02 | End: 2018-03-04

## 2018-02-13 DIAGNOSIS — E03.9 HYPOTHYROIDISM, UNSPECIFIED TYPE: ICD-10-CM

## 2018-02-13 DIAGNOSIS — E03.9 HYPOTHYROIDISM, UNSPECIFIED TYPE: Primary | ICD-10-CM

## 2018-02-13 RX ORDER — LEVOTHYROXINE SODIUM 0.05 MG/1
50 TABLET ORAL DAILY
Qty: 90 TABLET | Refills: 0 | OUTPATIENT
Start: 2018-02-13 | End: 2018-02-13 | Stop reason: SDUPTHER

## 2018-02-13 RX ORDER — LEVOTHYROXINE SODIUM 0.05 MG/1
TABLET ORAL
Qty: 90 TABLET | Refills: 11 | Status: SHIPPED | OUTPATIENT
Start: 2018-02-13 | End: 2019-03-09 | Stop reason: SDUPTHER

## 2018-03-12 DIAGNOSIS — E11.69 TYPE 2 DIABETES MELLITUS WITH OTHER SPECIFIED COMPLICATION (HCC): ICD-10-CM

## 2018-03-12 DIAGNOSIS — E03.9 HYPOTHYROIDISM: ICD-10-CM

## 2018-03-19 DIAGNOSIS — E78.5 HYPERLIPIDEMIA, UNSPECIFIED HYPERLIPIDEMIA TYPE: Primary | ICD-10-CM

## 2018-03-20 RX ORDER — ATORVASTATIN CALCIUM 10 MG/1
TABLET, FILM COATED ORAL
Qty: 30 TABLET | Refills: 8 | Status: SHIPPED | OUTPATIENT
Start: 2018-03-20 | End: 2019-01-03 | Stop reason: SDUPTHER

## 2018-03-25 DIAGNOSIS — F32.A DEPRESSION, UNSPECIFIED DEPRESSION TYPE: Primary | ICD-10-CM

## 2018-03-26 RX ORDER — BUPROPION HYDROCHLORIDE 150 MG/1
TABLET ORAL
Qty: 90 TABLET | Refills: 3 | Status: SHIPPED | OUTPATIENT
Start: 2018-03-26 | End: 2018-03-30 | Stop reason: ALTCHOICE

## 2018-03-27 LAB
ALBUMIN SERPL-MCNC: 3.6 G/DL (ref 3.6–5.1)
ALBUMIN/CREAT UR: 3 MCG/MG CREAT
ALBUMIN/GLOB SERPL: 1.1 (CALC) (ref 1–2.5)
ALP SERPL-CCNC: 72 U/L (ref 33–115)
ALT SERPL-CCNC: 30 U/L (ref 6–29)
AST SERPL-CCNC: 22 U/L (ref 10–30)
BILIRUB SERPL-MCNC: 0.2 MG/DL (ref 0.2–1.2)
BUN SERPL-MCNC: 11 MG/DL (ref 7–25)
BUN/CREAT SERPL: ABNORMAL (CALC) (ref 6–22)
CALCIUM SERPL-MCNC: 9.1 MG/DL (ref 8.6–10.2)
CHLORIDE SERPL-SCNC: 103 MMOL/L (ref 98–110)
CHOLEST SERPL-MCNC: 110 MG/DL
CHOLEST/HDLC SERPL: 3.9 (CALC)
CO2 SERPL-SCNC: 26 MMOL/L (ref 20–31)
CREAT SERPL-MCNC: 0.71 MG/DL (ref 0.5–1.1)
CREAT UR-MCNC: 107 MG/DL (ref 20–320)
GLOBULIN SER CALC-MCNC: 3.4 G/DL (CALC) (ref 1.9–3.7)
GLUCOSE SERPL-MCNC: 116 MG/DL (ref 65–99)
HBA1C MFR BLD: 6.3 % OF TOTAL HGB
HDLC SERPL-MCNC: 28 MG/DL
LDLC SERPL CALC-MCNC: 59 MG/DL (CALC)
MICROALBUMIN UR-MCNC: 0.3 MG/DL
NONHDLC SERPL-MCNC: 82 MG/DL (CALC)
POTASSIUM SERPL-SCNC: 4.3 MMOL/L (ref 3.5–5.3)
PROT SERPL-MCNC: 7 G/DL (ref 6.1–8.1)
SL AMB EGFR AFRICAN AMERICAN: 121 ML/MIN/1.73M2
SL AMB EGFR NON AFRICAN AMERICAN: 104 ML/MIN/1.73M2
SODIUM SERPL-SCNC: 137 MMOL/L (ref 135–146)
TRIGL SERPL-MCNC: 152 MG/DL
TSH SERPL-ACNC: 2.19 MIU/L

## 2018-03-30 ENCOUNTER — OFFICE VISIT (OUTPATIENT)
Dept: FAMILY MEDICINE CLINIC | Facility: CLINIC | Age: 43
End: 2018-03-30
Payer: COMMERCIAL

## 2018-03-30 VITALS
HEIGHT: 65 IN | WEIGHT: 293 LBS | DIASTOLIC BLOOD PRESSURE: 82 MMHG | RESPIRATION RATE: 18 BRPM | HEART RATE: 82 BPM | SYSTOLIC BLOOD PRESSURE: 132 MMHG | BODY MASS INDEX: 48.82 KG/M2

## 2018-03-30 DIAGNOSIS — F41.9 ANXIETY AND DEPRESSION: ICD-10-CM

## 2018-03-30 DIAGNOSIS — E11.69 DIABETES MELLITUS TYPE 2 IN OBESE (HCC): Primary | ICD-10-CM

## 2018-03-30 DIAGNOSIS — Z71.6 ENCOUNTER FOR SMOKING CESSATION COUNSELING: ICD-10-CM

## 2018-03-30 DIAGNOSIS — E66.9 DIABETES MELLITUS TYPE 2 IN OBESE (HCC): Primary | ICD-10-CM

## 2018-03-30 DIAGNOSIS — F41.9 ANXIETY: ICD-10-CM

## 2018-03-30 DIAGNOSIS — E66.01 MORBID OBESITY DUE TO EXCESS CALORIES (HCC): ICD-10-CM

## 2018-03-30 DIAGNOSIS — K21.9 GASTROESOPHAGEAL REFLUX DISEASE WITHOUT ESOPHAGITIS: ICD-10-CM

## 2018-03-30 DIAGNOSIS — F32.A ANXIETY AND DEPRESSION: ICD-10-CM

## 2018-03-30 DIAGNOSIS — M79.7 FIBROMYALGIA: ICD-10-CM

## 2018-03-30 DIAGNOSIS — I10 BENIGN HYPERTENSION: ICD-10-CM

## 2018-03-30 PROBLEM — G47.00 INSOMNIA: Status: ACTIVE | Noted: 2017-03-21

## 2018-03-30 PROCEDURE — 99215 OFFICE O/P EST HI 40 MIN: CPT | Performed by: FAMILY MEDICINE

## 2018-03-30 RX ORDER — BUPROPION HYDROCHLORIDE 300 MG/1
300 TABLET ORAL EVERY MORNING
Qty: 90 TABLET | Refills: 0 | Status: SHIPPED | OUTPATIENT
Start: 2018-03-30 | End: 2018-07-26 | Stop reason: SDUPTHER

## 2018-03-30 RX ORDER — FLUCONAZOLE 200 MG/1
1 TABLET ORAL
COMMUNITY
Start: 2017-11-27 | End: 2018-03-30 | Stop reason: ALTCHOICE

## 2018-03-30 RX ORDER — ALPRAZOLAM 0.5 MG/1
0.5 TABLET ORAL 2 TIMES DAILY PRN
Qty: 60 TABLET | Refills: 0 | Status: SHIPPED | OUTPATIENT
Start: 2018-03-30 | End: 2018-06-06 | Stop reason: SDUPTHER

## 2018-03-30 RX ORDER — ACETAMINOPHEN 160 MG
2000 TABLET,DISINTEGRATING ORAL DAILY
COMMUNITY
Start: 2016-11-11

## 2018-03-30 RX ORDER — CHOLECALCIFEROL (VITAMIN D3) 25 MCG
1 TABLET,CHEWABLE ORAL DAILY
COMMUNITY
Start: 2017-05-19

## 2018-03-30 RX ORDER — PHENTERMINE HYDROCHLORIDE 15 MG/1
15 CAPSULE ORAL EVERY MORNING
Qty: 30 CAPSULE | Refills: 0 | Status: SHIPPED | OUTPATIENT
Start: 2018-03-30 | End: 2018-05-03 | Stop reason: SDUPTHER

## 2018-03-30 NOTE — PROGRESS NOTES
Assessment/Plan:     Diagnoses and all orders for this visit:    Diabetes mellitus type 2 in obese ([address] )    Benign hypertension    Gastroesophageal reflux disease without esophagitis    Morbid obesity due to excess calories ([facility])  -     phentermine 15 MG capsule; Take 1 capsule (15 mg total) by mouth every morning    Anxiety and depression  -     buPROPion (WELLBUTRIN XL) 300 mg 24 hr tablet; Take 1 tablet (300 mg total) by mouth every morning    Fibromyalgia    Encounter for smoking cessation counseling    Anxiety  -     ALPRAZolam (XANAX) 0 5 mg tablet; Take 1 tablet (0 5 mg total) by mouth 2 (two) times a day as needed for anxiety    Other orders  -     Cyanocobalamin (B-12) 1000 MCG CAPS; Take by mouth  -     Liraglutide (VICTOZA) 18 MG/3ML SOPN; Inject 3 mg under the skin daily  -     Discontinue: fluconazole (DIFLUCAN) 200 mg tablet; Take 1 tablet by mouth  -     Cholecalciferol (VITAMIN D3) 2000 units capsule; Take by mouth      continue current meds for diabetes and anxiety  To f/u in 1 month for weight check     Subjective:      Patient ID: Shari Matute is a 37 y o  female  Stress eating due to work  Seeing Dr [facility] for her Fibromyalgia and she will be starting lyrica soon  She wants to try to lose weight and will be seeing a  through work for meal planning  She wants to quit smoking as well  April 1 st is the date she set up for herself  Diabetes   She presents for her follow-up diabetic visit  She has type 2 diabetes mellitus  Her disease course has been stable  Hypoglycemia symptoms include nervousness/anxiousness  Pertinent negatives for hypoglycemia include no confusion, dizziness or sweats  There are no diabetic associated symptoms  Pertinent negatives for diabetes include no blurred vision and no chest pain  There are no hypoglycemic complications  Symptoms are stable  There are no diabetic complications  Pertinent negatives for diabetic complications include no impotence   Risk factors for coronary artery disease include diabetes mellitus and obesity  Current diabetic treatment includes oral agent (dual therapy)  She is compliant with treatment all of the time  Her weight is stable  She is following a diabetic diet  When asked about meal planning, she reported none  She has not had a previous visit with a dietitian  She participates in exercise intermittently  There is no change in her home blood glucose trend  Her overall blood glucose range is 130-140 mg/dl  An ACE inhibitor/angiotensin II receptor blocker is being taken  Hypertension   This is a chronic problem  The current episode started more than 1 year ago  The problem is unchanged  The problem is controlled  Pertinent negatives include no anxiety, blurred vision, chest pain, malaise/fatigue, neck pain, palpitations, peripheral edema, PND, shortness of breath or sweats  There are no associated agents to hypertension  Risk factors for coronary artery disease include diabetes mellitus, dyslipidemia and obesity  Past treatments include ACE inhibitors  The current treatment provides moderate improvement  There are no compliance problems  Anxiety   Presents for follow-up visit  Symptoms include excessive worry and nervous/anxious behavior  Patient reports no chest pain, compulsions, confusion, decreased concentration, depressed mood, dizziness, dry mouth, feeling of choking, hyperventilation, impotence, palpitations or shortness of breath  Symptoms occur constantly  The severity of symptoms is moderate  The quality of sleep is good  Compliance with medications is %  The following portions of the patient's history were reviewed and updated as appropriate: allergies, current medications, past family history, past medical history, past social history, past surgical history and problem list     Review of Systems   Constitutional: Negative  Negative for malaise/fatigue  HENT: Negative  Eyes: Negative    Negative for blurred vision  Respiratory: Negative  Negative for shortness of breath  Cardiovascular: Negative  Negative for chest pain, palpitations and PND  Gastrointestinal: Negative  Endocrine: Negative  Genitourinary: Negative  Negative for impotence  Musculoskeletal: Negative  Negative for neck pain  Skin: Negative  Allergic/Immunologic: Negative  Neurological: Negative  Negative for dizziness  Hematological: Negative  Psychiatric/Behavioral: Negative for confusion and decreased concentration  The patient is nervous/anxious  Past Medical History:   Diagnosis Date    Diabetes mellitus ([address] )     Disease of thyroid gland     GERD (gastroesophageal reflux disease)     Hx of colonoscopy 2017    Hypertension     Obesity     Other ovarian cyst, left side     Last assessed 12/05/17    Ovarian neoplasm     Last assessed 10/24/17    Rectal bleeding      Past Surgical History:   Procedure Laterality Date    IN COLONOSCOPY FLX DX W/COLLJ SPEC WHEN PFRMD N/A 6/13/2017    Procedure: COLONOSCOPY;  Surgeon: Shahzad Copeland DO;  Location: [facility];   Service: Gastroenterology    IN LAPAROSCOPY W TOT HYSTERECTUTERUS <=250 [address] TUBE/OVARY N/A 11/20/2017    Procedure: ROBOTIC TOTAL LAPAROSCOPIC HYSTERECTOMY/ BSO;  Surgeon: Adarsh Castrejon MD;  Location: [facility];  Service: Gynecology Oncology     Social History     Social History    Marital status: /Civil Union     Spouse name: N/A    Number of children: N/A    Years of education: N/A     Occupational History    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      Social History Main Topics    Smoking status: Current Every Day Smoker     Packs/day: 0 75    Smokeless tobacco: Never Used      Comment: 15 smokes a day    Alcohol use No      Comment: [employer]: Social    Drug use: No    Sexual activity: Yes     Partners: Male     Other Topics Concern    Not on file     Social History Narrative    No narrative on file     No Known Allergies      Family History   Problem Relation Age of Onset    Heart disease Mother      Cardiac disorder    Diabetes Mother     Hypertension Mother     Hyperthyroidism Mother     Diabetes Father     Hypertension Father     Lung cancer Maternal Grandfather     Colon cancer Cousin     Colon cancer Maternal Aunt     Lung cancer Family            Current Outpatient Prescriptions:     Cholecalciferol (VITAMIN D3) 2000 units capsule, Take by mouth, Disp: , Rfl:     Cyanocobalamin (B-12) 1000 MCG CAPS, Take by mouth, Disp: , Rfl:     Liraglutide (VICTOZA) 18 MG/3ML SOPN, Inject 3 mg under the skin daily, Disp: , Rfl:     ALPRAZolam (XANAX) 0 5 mg tablet, Take 1 tablet (0 5 mg total) by mouth 2 (two) times a day as needed for anxiety, Disp: 60 tablet, Rfl: 0    aspirin (ECOTRIN LOW STRENGTH) 81 mg EC tablet, Take 81 mg by mouth daily, Disp: , Rfl:     atorvastatin (LIPITOR) 10 mg tablet, TAKE ONE TABLET BY MOUTH ONCE DAILY, Disp: 30 tablet, Rfl: 8    budesonide (ENTOCORT EC) 3 MG capsule, Take 9 mg by mouth daily, Disp: , Rfl:     buPROPion (WELLBUTRIN XL) 300 mg 24 hr tablet, Take 1 tablet (300 mg total) by mouth every morning, Disp: 90 tablet, Rfl: 0    Cholecalciferol (VITAMIN D3) 1000 units CAPS, Take 2,000 Units by mouth daily, Disp: , Rfl:     cyanocobalamin 100 MCG tablet, Take 100 mcg by mouth daily, Disp: , Rfl:     Dapagliflozin Propanediol (FARXIGA) 5 MG TABS, Take 5 mg by mouth daily, Disp: , Rfl:     DULoxetine (CYMBALTA) 60 mg delayed release capsule, Take 60 mg by mouth daily, Disp: , Rfl:     gabapentin (NEURONTIN) 300 mg capsule, Take 300 mg by mouth 3 (three) times a day, Disp: , Rfl:     levothyroxine 50 mcg tablet, TAKE ONE TABLET BY MOUTH ONCE DAILY AS DIRECTED, Disp: 90 tablet, Rfl: 11    lisinopril (ZESTRIL) 10 mg tablet, Take 10 mg by mouth daily, Disp: , Rfl:     pantoprazole (PROTONIX) 40 mg tablet, Take 40 mg by mouth daily, Disp: , Rfl:     phentermine 15 MG capsule, Take 1 capsule (15 mg total) by mouth every morning, Disp: 30 capsule, Rfl: 0      Objective:      /82   Pulse 82   Resp 18   Ht 5' 5" (1 651 m)   Wt (!) 151 kg (332 lb 12 8 oz)   LMP 06/13/2017   BMI 55 38 kg/m²        Physical Exam   Constitutional: She is oriented to person, place, and time  Vital signs are normal  She appears well-developed and well-nourished  HENT:   Head: Normocephalic and atraumatic  Nose: Nose normal    Mouth/Throat: Oropharynx is clear and moist    Eyes: Pupils are equal, round, and reactive to light  Neck: Normal range of motion  Neck supple  No thyromegaly present  Cardiovascular: Normal rate and regular rhythm  No murmur heard  Pulmonary/Chest: Effort normal and breath sounds normal    Abdominal: Soft  Bowel sounds are normal    Musculoskeletal: Normal range of motion  She exhibits no edema or deformity  Neurological: She is alert and oriented to person, place, and time  She has normal reflexes  Skin: Skin is warm  No rash noted  No erythema  Psychiatric: She has a normal mood and affect   Her behavior is normal

## 2018-04-30 ENCOUNTER — TELEPHONE (OUTPATIENT)
Dept: FAMILY MEDICINE CLINIC | Facility: CLINIC | Age: 43
End: 2018-04-30

## 2018-05-01 NOTE — TELEPHONE ENCOUNTER
Spoke to patient she said she lost from 3-5 pounds on the 15mg  She would take the medication in the morning and it would help with her eating but by the evening it wore off

## 2018-05-02 NOTE — TELEPHONE ENCOUNTER
I would like her to continue 15 mg dose for now and try to develop healthier eating habits   I will discuss with her during her next appointment

## 2018-05-03 ENCOUNTER — OFFICE VISIT (OUTPATIENT)
Dept: FAMILY MEDICINE CLINIC | Facility: CLINIC | Age: 43
End: 2018-05-03
Payer: COMMERCIAL

## 2018-05-03 VITALS
BODY MASS INDEX: 48.82 KG/M2 | RESPIRATION RATE: 16 BRPM | SYSTOLIC BLOOD PRESSURE: 119 MMHG | HEIGHT: 65 IN | DIASTOLIC BLOOD PRESSURE: 62 MMHG | WEIGHT: 293 LBS | HEART RATE: 72 BPM

## 2018-05-03 DIAGNOSIS — E66.9 DIABETES MELLITUS TYPE 2 IN OBESE (HCC): ICD-10-CM

## 2018-05-03 DIAGNOSIS — E03.9 ACQUIRED HYPOTHYROIDISM: ICD-10-CM

## 2018-05-03 DIAGNOSIS — I10 BENIGN HYPERTENSION: ICD-10-CM

## 2018-05-03 DIAGNOSIS — K21.9 GASTROESOPHAGEAL REFLUX DISEASE WITHOUT ESOPHAGITIS: Primary | ICD-10-CM

## 2018-05-03 DIAGNOSIS — E11.69 DIABETES MELLITUS TYPE 2 IN OBESE (HCC): ICD-10-CM

## 2018-05-03 DIAGNOSIS — E66.01 MORBID OBESITY DUE TO EXCESS CALORIES (HCC): ICD-10-CM

## 2018-05-03 PROCEDURE — 3074F SYST BP LT 130 MM HG: CPT | Performed by: FAMILY MEDICINE

## 2018-05-03 PROCEDURE — 3078F DIAST BP <80 MM HG: CPT | Performed by: FAMILY MEDICINE

## 2018-05-03 PROCEDURE — 99214 OFFICE O/P EST MOD 30 MIN: CPT | Performed by: FAMILY MEDICINE

## 2018-05-03 RX ORDER — PHENTERMINE HYDROCHLORIDE 15 MG/1
15 CAPSULE ORAL EVERY MORNING
Qty: 30 CAPSULE | Refills: 2 | Status: SHIPPED | OUTPATIENT
Start: 2018-05-03 | End: 2018-06-06

## 2018-06-06 ENCOUNTER — OFFICE VISIT (OUTPATIENT)
Dept: FAMILY MEDICINE CLINIC | Facility: CLINIC | Age: 43
End: 2018-06-06
Payer: COMMERCIAL

## 2018-06-06 VITALS
WEIGHT: 293 LBS | BODY MASS INDEX: 48.82 KG/M2 | HEART RATE: 80 BPM | SYSTOLIC BLOOD PRESSURE: 110 MMHG | RESPIRATION RATE: 14 BRPM | HEIGHT: 65 IN | DIASTOLIC BLOOD PRESSURE: 70 MMHG

## 2018-06-06 DIAGNOSIS — E66.01 CLASS 3 SEVERE OBESITY DUE TO EXCESS CALORIES WITH SERIOUS COMORBIDITY AND BODY MASS INDEX (BMI) OF 50.0 TO 59.9 IN ADULT (HCC): Primary | ICD-10-CM

## 2018-06-06 DIAGNOSIS — E11.69 DIABETES MELLITUS TYPE 2 IN OBESE (HCC): ICD-10-CM

## 2018-06-06 DIAGNOSIS — F41.9 ANXIETY AND DEPRESSION: ICD-10-CM

## 2018-06-06 DIAGNOSIS — F41.9 ANXIETY: ICD-10-CM

## 2018-06-06 DIAGNOSIS — E66.9 DIABETES MELLITUS TYPE 2 IN OBESE (HCC): ICD-10-CM

## 2018-06-06 DIAGNOSIS — F32.A ANXIETY AND DEPRESSION: ICD-10-CM

## 2018-06-06 PROCEDURE — 99214 OFFICE O/P EST MOD 30 MIN: CPT | Performed by: FAMILY MEDICINE

## 2018-06-06 RX ORDER — PHENTERMINE HYDROCHLORIDE 37.5 MG/1
37.5 TABLET ORAL DAILY
Qty: 30 TABLET | Refills: 0 | Status: SHIPPED | OUTPATIENT
Start: 2018-06-06 | End: 2018-07-17 | Stop reason: SDUPTHER

## 2018-06-06 RX ORDER — IRON/C/B12/FOLATE/ZINC/SUCCIN 75MG-175MG
TABLET ORAL
Status: ON HOLD | COMMUNITY
Start: 2018-04-13 | End: 2019-03-25 | Stop reason: ALTCHOICE

## 2018-06-06 RX ORDER — ALPRAZOLAM 0.5 MG/1
0.5 TABLET ORAL 2 TIMES DAILY PRN
Qty: 60 TABLET | Refills: 0 | Status: SHIPPED | OUTPATIENT
Start: 2018-06-06 | End: 2018-07-26 | Stop reason: SDUPTHER

## 2018-06-06 NOTE — PROGRESS NOTES
Assessment/Plan:     Diagnoses and all orders for this visit:    Class 3 severe obesity due to excess calories with serious comorbidity and body mass index (BMI) of 50 0 to 59 9 in adult (HCC)  -     phentermine (ADIPEX-P) 37 5 MG tablet; Take 1 tablet (37 5 mg total) by mouth daily    Anxiety and depression    Diabetes mellitus type 2 in obese (HCC)    Anxiety  -     ALPRAZolam (XANAX) 0 5 mg tablet; Take 1 tablet (0 5 mg total) by mouth 2 (two) times a day as needed for anxiety    Other orders  -     [id number]-[id number]-SuccAc-Zn (FERIVA 21/7) 75-1 MG TABS;       continue current meds for diabetes and anxiety      Subjective:      Patient ID: Ana Lilia De Oliveira is a 37 y o  female  Still smoking 1 cigarette a day   Weight loss of 10 pounds in 2 months   Not exercising as much as she should   [employer] work 2 days a week     Diabetes   She presents for her follow-up diabetic visit  She has type 2 diabetes mellitus  Her disease course has been stable  Hypoglycemia symptoms include nervousness/anxiousness  Pertinent negatives for hypoglycemia include no confusion, dizziness or sweats  There are no diabetic associated symptoms  Pertinent negatives for diabetes include no blurred vision and no chest pain  There are no hypoglycemic complications  Symptoms are stable  There are no diabetic complications  Pertinent negatives for diabetic complications include no impotence  Risk factors for coronary artery disease include diabetes mellitus and obesity  Current diabetic treatment includes oral agent (dual therapy)  She is compliant with treatment all of the time  Her weight is stable  She is following a diabetic diet  When asked about meal planning, she reported none  She has not had a previous visit with a dietitian  She participates in exercise intermittently  There is no change in her home blood glucose trend  Her overall blood glucose range is 110-130 mg/dl  An ACE inhibitor/angiotensin II receptor blocker is being taken  Hypertension   This is a chronic problem  The current episode started more than 1 year ago  The problem is unchanged  The problem is controlled  Pertinent negatives include no anxiety, blurred vision, chest pain, malaise/fatigue, neck pain, palpitations, peripheral edema, PND, shortness of breath or sweats  There are no associated agents to hypertension  Risk factors for coronary artery disease include diabetes mellitus, dyslipidemia and obesity  Past treatments include ACE inhibitors  The current treatment provides moderate improvement  There are no compliance problems  Anxiety   Presents for follow-up visit  Symptoms include excessive worry and nervous/anxious behavior  Patient reports no chest pain, compulsions, confusion, decreased concentration, depressed mood, dizziness, dry mouth, feeling of choking, hyperventilation, impotence, palpitations or shortness of breath  Symptoms occur constantly  The severity of symptoms is moderate  The quality of sleep is good  Compliance with medications is %  The following portions of the patient's history were reviewed and updated as appropriate: allergies, current medications, past family history, past medical history, past social history, past surgical history and problem list     Review of Systems   Constitutional: Negative  Negative for malaise/fatigue  HENT: Negative  Eyes: Negative  Negative for blurred vision  Respiratory: Negative  Negative for shortness of breath  Cardiovascular: Negative  Negative for chest pain, palpitations and PND  Gastrointestinal: Negative  Endocrine: Negative  Genitourinary: Negative  Negative for impotence  Musculoskeletal: Negative  Negative for neck pain  Skin: Negative  Allergic/Immunologic: Negative  Neurological: Negative  Negative for dizziness  Hematological: Negative  Psychiatric/Behavioral: Negative for confusion and decreased concentration  The patient is nervous/anxious  Past Medical History:   Diagnosis Date    Diabetes mellitus ([facility] [address] )     Disease of thyroid gland     GERD (gastroesophageal reflux disease)     Hx of colonoscopy 2017    Hypertension     Obesity     Other ovarian cyst, left side     Last assessed 12/05/17    Ovarian neoplasm     Last assessed 10/24/17    Rectal bleeding      Past Surgical History:   Procedure Laterality Date    [place] COLONOSCOPY FLX DX W/COLLJ SPEC WHEN PFRMD N/A 6/13/2017    Procedure: COLONOSCOPY;  Surgeon: Dyllan Ng DO;  [facility];   Service: Gastroenterology    [place] LAPAROSCOPY W TOT HYSTERECTUTERUS <=250 [address] TUBE/OVARY N/A 11/20/2017    Procedure: ROBOTIC TOTAL LAPAROSCOPIC HYSTERECTOMY/ BSO;  Surgeon: Dolly Ahumada MD;  [facility];  Service: Gynecology Oncology     Social History     Social History    Marital status: /Civil Union     Spouse name: N/A    Number of children: N/A    Years of education: N/A     Occupational History    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      Social History Main Topics    Smoking status: Current Every Day Smoker     Packs/day: 0 75    Smokeless tobacco: Never Used      Comment: 15 smokes a day    Alcohol use No      Comment: Allscripts: Social    Drug use: No    Sexual activity: Yes     Partners: Male     Other Topics Concern    Not on file     Social History Narrative    No narrative on file     No Known Allergies      Family History   Problem Relation Age of Onset    Heart disease Mother      Cardiac disorder    Diabetes Mother     Hypertension Mother     Hyperthyroidism Mother     Diabetes Father     Hypertension Father     Lung cancer Maternal Grandfather     Colon cancer Cousin     Colon cancer Maternal Aunt     Lung cancer Family            Current Outpatient Prescriptions:     ALPRAZolam (XANAX) 0 5 mg tablet, Take 1 tablet (0 5 mg total) by mouth 2 (two) times a day as needed for anxiety, Disp: 60 tablet, Rfl: 0    aspirin (ECOTRIN LOW STRENGTH) 81 mg EC tablet, Take 81 mg by mouth daily, Disp: , Rfl:     atorvastatin (LIPITOR) 10 mg tablet, TAKE ONE TABLET BY MOUTH ONCE DAILY, Disp: 30 tablet, Rfl: 8    budesonide (ENTOCORT EC) 3 MG capsule, Take 9 mg by mouth daily, Disp: , Rfl:     buPROPion (WELLBUTRIN XL) 300 mg 24 hr tablet, Take 1 tablet (300 mg total) by mouth every morning, Disp: 90 tablet, Rfl: 0    Cholecalciferol (VITAMIN D3) 1000 units CAPS, Take 2,000 Units by mouth daily, Disp: , Rfl:     Cholecalciferol (VITAMIN D3) 2000 units capsule, Take by mouth, Disp: , Rfl:     Cyanocobalamin (B-12) 1000 MCG CAPS, Take by mouth, Disp: , Rfl:     cyanocobalamin 100 MCG tablet, Take 100 mcg by mouth daily, Disp: , Rfl:     Dapagliflozin Propanediol (FARXIGA) 5 MG TABS, Take 5 mg by mouth daily, Disp: , Rfl:     DULoxetine (CYMBALTA) 60 mg delayed release capsule, Take 60 mg by mouth daily, Disp: , Rfl:     MvDug-G27-PY-C-DSS-SuccAc-Zn (FERIVA 21/7) 75-1 MG TABS, , Disp: , Rfl:     gabapentin (NEURONTIN) 300 mg capsule, Take 300 mg by mouth 3 (three) times a day, Disp: , Rfl:     levothyroxine 50 mcg tablet, TAKE ONE TABLET BY MOUTH ONCE DAILY AS DIRECTED, Disp: 90 tablet, Rfl: 11    Liraglutide (VICTOZA) 18 MG/3ML SOPN, Inject 1 8 mg under the skin daily , Disp: , Rfl:     lisinopril (ZESTRIL) 10 mg tablet, Take 10 mg by mouth daily, Disp: , Rfl:     phentermine (ADIPEX-P) 37 5 MG tablet, Take 1 tablet (37 5 mg total) by mouth daily, Disp: 30 tablet, Rfl: 0      Objective:      /70   Pulse 80   Resp 14   Ht 5' 5" (1 651 m)   Wt (!) 147 kg (323 lb)   LMP 06/13/2017   BMI 53 75 kg/m²        Physical Exam   Constitutional: She is oriented to person, place, and time  Vital signs are normal  She appears well-developed and well-nourished  HENT:   Head: Normocephalic and atraumatic  Nose: Nose normal    Mouth/Throat: Oropharynx is clear and moist    Eyes: Pupils are equal, round, and reactive to light     Neck: Normal range of motion  Neck supple  No thyromegaly present  Cardiovascular: Normal rate and regular rhythm  No murmur heard  Pulmonary/Chest: Effort normal and breath sounds normal    Abdominal: Soft  Bowel sounds are normal    Musculoskeletal: Normal range of motion  She exhibits no edema or deformity  Neurological: She is alert and oriented to person, place, and time  She has normal reflexes  Skin: Skin is warm  No rash noted  No erythema  Psychiatric: She has a normal mood and affect   Her behavior is normal

## 2018-07-17 DIAGNOSIS — E66.01 CLASS 3 SEVERE OBESITY DUE TO EXCESS CALORIES WITH SERIOUS COMORBIDITY AND BODY MASS INDEX (BMI) OF 50.0 TO 59.9 IN ADULT (HCC): Primary | ICD-10-CM

## 2018-07-17 RX ORDER — PHENTERMINE HYDROCHLORIDE 37.5 MG/1
37.5 TABLET ORAL DAILY
Qty: 30 TABLET | Refills: 0 | Status: SHIPPED | OUTPATIENT
Start: 2018-07-17 | End: 2018-07-26 | Stop reason: ALTCHOICE

## 2018-07-23 ENCOUNTER — APPOINTMENT (OUTPATIENT)
Dept: LAB | Facility: MEDICAL CENTER | Age: 43
End: 2018-07-23
Payer: COMMERCIAL

## 2018-07-23 DIAGNOSIS — E11.69 DIABETES MELLITUS TYPE 2 IN OBESE (HCC): ICD-10-CM

## 2018-07-23 DIAGNOSIS — E66.9 DIABETES MELLITUS TYPE 2 IN OBESE (HCC): ICD-10-CM

## 2018-07-26 ENCOUNTER — OFFICE VISIT (OUTPATIENT)
Dept: FAMILY MEDICINE CLINIC | Facility: CLINIC | Age: 43
End: 2018-07-26
Payer: COMMERCIAL

## 2018-07-26 VITALS
RESPIRATION RATE: 12 BRPM | WEIGHT: 293 LBS | HEART RATE: 89 BPM | SYSTOLIC BLOOD PRESSURE: 110 MMHG | BODY MASS INDEX: 48.82 KG/M2 | DIASTOLIC BLOOD PRESSURE: 68 MMHG | HEIGHT: 65 IN

## 2018-07-26 DIAGNOSIS — E11.69 DIABETES MELLITUS TYPE 2 IN OBESE (HCC): ICD-10-CM

## 2018-07-26 DIAGNOSIS — F32.A ANXIETY AND DEPRESSION: ICD-10-CM

## 2018-07-26 DIAGNOSIS — F41.9 ANXIETY AND DEPRESSION: ICD-10-CM

## 2018-07-26 DIAGNOSIS — E66.01 MORBID OBESITY DUE TO EXCESS CALORIES (HCC): ICD-10-CM

## 2018-07-26 DIAGNOSIS — E03.9 ACQUIRED HYPOTHYROIDISM: ICD-10-CM

## 2018-07-26 DIAGNOSIS — I10 BENIGN HYPERTENSION: Primary | ICD-10-CM

## 2018-07-26 DIAGNOSIS — E66.9 DIABETES MELLITUS TYPE 2 IN OBESE (HCC): ICD-10-CM

## 2018-07-26 DIAGNOSIS — K21.9 GASTROESOPHAGEAL REFLUX DISEASE WITHOUT ESOPHAGITIS: ICD-10-CM

## 2018-07-26 DIAGNOSIS — F51.01 PRIMARY INSOMNIA: ICD-10-CM

## 2018-07-26 PROCEDURE — 99215 OFFICE O/P EST HI 40 MIN: CPT | Performed by: FAMILY MEDICINE

## 2018-07-26 RX ORDER — BUPROPION HYDROCHLORIDE 300 MG/1
300 TABLET ORAL EVERY MORNING
Qty: 90 TABLET | Refills: 0 | Status: SHIPPED | OUTPATIENT
Start: 2018-07-26 | End: 2018-10-31 | Stop reason: SDUPTHER

## 2018-07-26 RX ORDER — ALPRAZOLAM 0.5 MG/1
0.5 TABLET ORAL 2 TIMES DAILY PRN
Qty: 60 TABLET | Refills: 0 | Status: SHIPPED | OUTPATIENT
Start: 2018-07-26 | End: 2018-10-25 | Stop reason: SDUPTHER

## 2018-07-26 NOTE — PROGRESS NOTES
Assessment/Plan:         Diagnoses and all orders for this visit:    Benign hypertension    Morbid obesity due to excess calories ([facility] [address] )  -     Ambulatory referral to Bariatric Surgery    Primary insomnia  -     ALPRAZolam (XANAX) 0 5 mg tablet; Take 1 tablet (0 5 mg total) by mouth 2 (two) times a day as needed for anxiety    Gastroesophageal reflux disease without esophagitis    Diabetes mellitus type 2 in obese (HCC)  -     Hemoglobin A1C; Future    Acquired hypothyroidism    Anxiety and depression  -     ALPRAZolam (XANAX) 0 5 mg tablet; Take 1 tablet (0 5 mg total) by mouth 2 (two) times a day as needed for anxiety  -     buPROPion (WELLBUTRIN XL) 300 mg 24 hr tablet; Take 1 tablet (300 mg total) by mouth every morning      1) diabetes: stable  Recent HbA1c 7 4%  Continue current medications for now- weight loss and cut back on sweets   2) Hypertension: stable  Continue lisinopril  3) Hypothyroidism: stable  Continue Levothyroxine 50 mcg daily  4) GERD: Pantoprazole PRN - not taking at all right now   5) Fibromyalgia: stable on gabapentin-sees Dr Matt Lou   6) Insomnia: negative for sleep apnea in the past  Xanax PRN   7) morbid obesity: not doing well   Will stop the Phentermine at this point- referral to bypass surgery   8) anxiety: due to eating disorder- to se Josep Jacques for counseling     Subjective:      Patient ID: Norval Soulier is a 37 y o  female  Not eating healthy  Gaining weight   Worsening anxiety   Phentermine is not working   Obsessed with her weight     Subjective    Norval Soulier is a 37 y o  female who complains of insomnia  Onset was several months ago  Patient describes symptoms as frequent night time awakening  Patient has found minimal relief with avoiding heavy meal before bedtime and avoiding long naps during the day  Associated symptoms include: none  Patient denies anxiety, daytime somnolence and fatigue  Symptoms have stabilized            Diabetes   She presents for her follow-up diabetic visit  She has type 2 diabetes mellitus  Her disease course has been stable  Hypoglycemia symptoms include nervousness/anxiousness  Pertinent negatives for hypoglycemia include no dizziness, headaches or sweats  There are no diabetic associated symptoms  Pertinent negatives for diabetes include no blurred vision and no chest pain  There are no hypoglycemic complications  Symptoms are stable  There are no diabetic complications  Pertinent negatives for diabetic complications include no CVA, PVD or retinopathy  Risk factors for coronary artery disease include diabetes mellitus and obesity  Current diabetic treatment includes oral agent (dual therapy)  She is compliant with treatment all of the time  Her weight is stable  She is following a diabetic diet  She has not had a previous visit with a dietitian  Her overall blood glucose range is  mg/dl  An ACE inhibitor/angiotensin II receptor blocker is being taken  She does not see a podiatrist Eye exam is not current  Hypertension   This is a chronic problem  The current episode started more than 1 year ago  The problem has been gradually improving since onset  The problem is controlled  Pertinent negatives include no anxiety, blurred vision, chest pain, headaches, malaise/fatigue, neck pain, orthopnea, palpitations, peripheral edema, PND, shortness of breath or sweats  There are no associated agents to hypertension  Risk factors for coronary artery disease include diabetes mellitus and obesity  Past treatments include ACE inhibitors  The current treatment provides mild improvement  There are no compliance problems  There is no history of angina, CAD/MI, CVA, heart failure, left ventricular hypertrophy, PVD or retinopathy  There is no history of chronic renal disease, coarctation of the aorta, hyperaldosteronism, hypercortisolism, hyperparathyroidism, a hypertension causing med, pheochromocytoma, renovascular disease, sleep apnea or a thyroid problem     Anxiety Presents for follow-up visit  Symptoms include excessive worry, insomnia and nervous/anxious behavior  Patient reports no chest pain, compulsions, decreased concentration, depressed mood, dizziness, dry mouth, feeling of choking, irritability, malaise, muscle tension, nausea, obsessions, palpitations or shortness of breath  Symptoms occur most days  The severity of symptoms is mild  The quality of sleep is good  Compliance with medications is %  The following portions of the patient's history were reviewed and updated as appropriate: allergies, current medications, past family history, past medical history, past social history, past surgical history and problem list     Review of Systems   Constitutional: Negative  Negative for irritability and malaise/fatigue  HENT: Negative  Eyes: Negative  Negative for blurred vision  Respiratory: Negative  Negative for shortness of breath  Cardiovascular: Negative  Negative for chest pain, palpitations, orthopnea and PND  Gastrointestinal: Negative  Negative for nausea  Endocrine: Negative  Genitourinary: Negative  Musculoskeletal: Negative  Negative for neck pain  Skin: Negative  Allergic/Immunologic: Negative  Neurological: Negative  Negative for dizziness and headaches  Hematological: Negative  Psychiatric/Behavioral: Negative for decreased concentration  The patient is nervous/anxious and has insomnia                Past Medical History:   Diagnosis Date    Diabetes mellitus ([address] )     Disease of thyroid gland     GERD (gastroesophageal reflux disease)     Hx of colonoscopy 2017    Hypertension     Obesity     Other ovarian cyst, left side     Last assessed 12/05/17    Ovarian neoplasm     Last assessed 10/24/17    Rectal bleeding      Past Surgical History:   Procedure Laterality Date    [place] COLONOSCOPY FLX DX W/COLLJ SPEC WHEN PFRMD N/A 6/13/2017    Procedure: COLONOSCOPY;  Surgeon: Joellen Guardado DO; [facility];   Service: Gastroenterology    OR LAPAROSCOPY W TOT HYSTERECTUTERUS <=250 [address] TUBE/OVARY N/A 11/20/2017    Procedure: ROBOTIC TOTAL LAPAROSCOPIC HYSTERECTOMY/ BSO;  Surgeon: Dolly Ahumada MD;  [facility];  Service: Gynecology Oncology     Social History     Social History    Marital status: /Civil Union     Spouse name: N/A    Number of children: N/A    Years of education: N/A     Occupational History    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      Social History Main Topics    Smoking status: Current Every Day Smoker     Packs/day: 0 75    Smokeless tobacco: Never Used      Comment: 15 smokes a day    Alcohol use No      Comment: Allscripts: Social    Drug use: No    Sexual activity: Yes     Partners: Male     Other Topics Concern    Not on file     Social History Narrative    No narrative on file     Allergies   Allergen Reactions    Metformin Diarrhea         Family History   Problem Relation Age of Onset    Heart disease Mother         Cardiac disorder    Diabetes Mother     Hypertension Mother     Hyperthyroidism Mother     Diabetes Father     Hypertension Father     Lung cancer Maternal Grandfather     Colon cancer Cousin     Colon cancer Maternal Aunt     Lung cancer Family            Current Outpatient Prescriptions:     ALPRAZolam (XANAX) 0 5 mg tablet, Take 1 tablet (0 5 mg total) by mouth 2 (two) times a day as needed for anxiety, Disp: 60 tablet, Rfl: 0    aspirin (ECOTRIN LOW STRENGTH) 81 mg EC tablet, Take 81 mg by mouth daily, Disp: , Rfl:     atorvastatin (LIPITOR) 10 mg tablet, TAKE ONE TABLET BY MOUTH ONCE DAILY, Disp: 30 tablet, Rfl: 8    budesonide (ENTOCORT EC) 3 MG capsule, Take 9 mg by mouth daily, Disp: , Rfl:     buPROPion (WELLBUTRIN XL) 300 mg 24 hr tablet, Take 1 tablet (300 mg total) by mouth every morning, Disp: 90 tablet, Rfl: 0    Cholecalciferol (VITAMIN D3) 1000 units CAPS, Take 2,000 Units by mouth daily, Disp: , Rfl:    Cholecalciferol (VITAMIN D3) 2000 units capsule, Take by mouth, Disp: , Rfl:     Cyanocobalamin (B-12) 1000 MCG CAPS, Take by mouth, Disp: , Rfl:     cyanocobalamin 100 MCG tablet, Take 100 mcg by mouth daily, Disp: , Rfl:     Dapagliflozin Propanediol (FARXIGA) 5 MG TABS, Take 5 mg by mouth daily, Disp: , Rfl:     DULoxetine (CYMBALTA) 60 mg delayed release capsule, Take 60 mg by mouth daily, Disp: , Rfl:     VrVhu-X48-CO-C-DSS-SuccAc-Zn (FERIVA 21/7) 75-1 MG TABS, , Disp: , Rfl:     gabapentin (NEURONTIN) 300 mg capsule, Take 300 mg by mouth 3 (three) times a day, Disp: , Rfl:     levothyroxine 50 mcg tablet, TAKE ONE TABLET BY MOUTH ONCE DAILY AS DIRECTED, Disp: 90 tablet, Rfl: 11    Liraglutide (VICTOZA) 18 MG/3ML SOPN, Inject 1 8 mg under the skin daily , Disp: , Rfl:     lisinopril (ZESTRIL) 10 mg tablet, Take 10 mg by mouth daily, Disp: , Rfl:     Objective:      /68   Pulse 89   Resp 12   Ht 5' 5" (1 651 m)   Wt (!) 149 kg (328 lb 6 4 oz)   LMP 06/13/2017   BMI 54 65 kg/m²          Physical Exam   Constitutional: She is oriented to person, place, and time  Vital signs are normal  She appears well-developed and well-nourished  HENT:   Head: Normocephalic and atraumatic  Nose: Nose normal    Mouth/Throat: Oropharynx is clear and moist    Eyes: Pupils are equal, round, and reactive to light  Neck: Normal range of motion  Neck supple  No thyromegaly present  Cardiovascular: Normal rate and regular rhythm  No murmur heard  Pulmonary/Chest: Effort normal and breath sounds normal    Abdominal: Soft  Bowel sounds are normal    Musculoskeletal: Normal range of motion  She exhibits no edema or deformity  Neurological: She is alert and oriented to person, place, and time  She has normal reflexes  Skin: Skin is warm  No rash noted  No erythema  Psychiatric: She has a normal mood and affect   Her behavior is normal

## 2018-07-31 DIAGNOSIS — E11.9 TYPE 2 DIABETES MELLITUS WITHOUT COMPLICATION, WITHOUT LONG-TERM CURRENT USE OF INSULIN (HCC): Primary | ICD-10-CM

## 2018-08-06 ENCOUNTER — TELEPHONE (OUTPATIENT)
Dept: GASTROENTEROLOGY | Facility: CLINIC | Age: 43
End: 2018-08-06

## 2018-08-06 NOTE — TELEPHONE ENCOUNTER
Dr Edda Loving patient with hx of gerd, proctitis w/occasional BRB  She is reporting since this morning she is having   BRB +quarter size clots  She had normal BM this am with some blood but since this morning she is now having diarrhea with active BRB and clots  She also reports intermittent middle abd pains that give her the urge for bm  When she goes she says its blood like a "nose bleed"  Patient denies fever, n/v/c, SOB, dizzyness or lightheadedness  She also denies any knowledge or prior dx of hemmorhoids  In the past she was on Budesonide, which she says helped her symptoms  She has ov on Monday 8/13/18  I told patient that if her bleeding or pain gets worse to go to the ED otherwise we would call her back asap  Please advise

## 2018-08-06 NOTE — TELEPHONE ENCOUNTER
I called pt back  She reports rectal bleeding which is a small amount  Advised if continued bleeding or gets lightheaded or dizzy she should proceed to ER  All questions answered

## 2018-08-13 ENCOUNTER — OFFICE VISIT (OUTPATIENT)
Dept: GASTROENTEROLOGY | Facility: CLINIC | Age: 43
End: 2018-08-13
Payer: COMMERCIAL

## 2018-08-13 VITALS
WEIGHT: 293 LBS | HEART RATE: 86 BPM | TEMPERATURE: 97.3 F | HEIGHT: 65 IN | BODY MASS INDEX: 48.82 KG/M2 | SYSTOLIC BLOOD PRESSURE: 129 MMHG | DIASTOLIC BLOOD PRESSURE: 79 MMHG

## 2018-08-13 DIAGNOSIS — K21.9 GASTROESOPHAGEAL REFLUX DISEASE WITHOUT ESOPHAGITIS: Primary | ICD-10-CM

## 2018-08-13 DIAGNOSIS — K62.5 BRIGHT RED RECTAL BLEEDING: ICD-10-CM

## 2018-08-13 DIAGNOSIS — K62.89 PROCTITIS: ICD-10-CM

## 2018-08-13 PROCEDURE — 99213 OFFICE O/P EST LOW 20 MIN: CPT | Performed by: PHYSICIAN ASSISTANT

## 2018-08-13 RX ORDER — BUDESONIDE 3 MG/1
6 CAPSULE, COATED PELLETS ORAL DAILY
Qty: 60 CAPSULE | Refills: 1 | Status: SHIPPED | OUTPATIENT
Start: 2018-08-13 | End: 2018-09-12

## 2018-08-14 ENCOUNTER — OFFICE VISIT (OUTPATIENT)
Dept: BEHAVIORAL/MENTAL HEALTH CLINIC | Facility: CLINIC | Age: 43
End: 2018-08-14
Payer: COMMERCIAL

## 2018-08-14 DIAGNOSIS — F41.9 ANXIETY AND DEPRESSION: Primary | ICD-10-CM

## 2018-08-14 DIAGNOSIS — F32.A ANXIETY AND DEPRESSION: Primary | ICD-10-CM

## 2018-08-14 PROCEDURE — 90834 PSYTX W PT 45 MINUTES: CPT | Performed by: SOCIAL WORKER

## 2018-08-14 NOTE — PSYCH
Assessment/Plan: Manage anxiety and depression     There are no diagnoses linked to this encounter  Subjective: Feels anxious and overwhelmed at times  Multiple stressors  Long-standing self esteem issues due to her weight and eating issues  Patient ID: Raghu Waite is a 37 y o  female  Alexi Anjelica has history of some emotional trauma due to family dysfunction  Has had struggle with weight and self-esteem issues since childhood  Appears to be primary stressor at present  Review of Systems   Psychiatric/Behavioral: Positive for sleep disturbance  The patient is nervous/anxious  Objective: Presents as anxious and overwhelmed  Tearful at times due to her long-standing self esteem issues around her weight as well as her struggles with eating  She is verbal, cooperative and well oriented during session  Physical Exam   Psychiatric: Her speech is normal and behavior is normal  Judgment and thought content normal  Her mood appears anxious  Cognition and memory are normal    No SI  Very invested in her family

## 2018-08-15 ENCOUNTER — TELEPHONE (OUTPATIENT)
Dept: PSYCHIATRY | Facility: CLINIC | Age: 43
End: 2018-08-15

## 2018-08-15 NOTE — TELEPHONE ENCOUNTER
Behavorial Health Outpatient Intake Questions    Referred by: Tommy Drummond with provider before scheduling    Are there any developmental disabilities? No    Does the patient have hearing impairment? No    Does the patient have ICM or CTT? No    Taking injectable psychiatric medications? NoIf yes, patient can not be seen here  Has the patient ever seen or currently see a psychiatrist? No If yes who/when? Has the patient ever seen or currently see a therapist? No If yes who/when? How many visits did the pt have for previous psychiatric treatment?  History    Has the patient served in the [place] [address]? No    If yes, have you had combat services? No    Was the patient activated into federal active duty as a member of the national guard or reserve? No    Minor Child    Who has custody of the child? Is there a custody agreement? If there is a custody agreement remind parent that they must bring a copy to the first appt or they will not be seen  Behavorial Health Outpatient Intake History     Presenting Problem (in patient's words) EATING DX ISSUES    Substance Abuse:No concerns of substance abuse are reported  Has the patient been seen here previously, either inpatient or outpatient? No outpatient    If seen as outpatient, what provider(s) did the patient see? A member of the patient's family has been in therapy here with NONE  ACCEPTED as a patient Yes Appointment Date:     Referred Elsewhere? No    Primary Care Physician: Nikki Melara MD    PCP telephone number: [phone] [facility]  ----------------------------------------------------------------------------------------------------------------------    Insurance subscriber:     BEBETO ABDALLA;     Address:                                                        Phone: SSN:    Employer:   [employer]                                                   Address:  ----------------------------------------------------------------------------------------------------------------------    Primary Insurance:    CBC                                                               Phone:    ID number:    [id number]                                               Group number: [id number]----------------------------------------------------------------------------------------------------------------------    Secondary Insurance:                                                               Phone:    ID number:                                                   Group number:  ----------------------------------------------------------------------------------------------------------------------    Other insurance information:             _______________________________________

## 2018-08-15 NOTE — TELEPHONE ENCOUNTER
----- Message from eKlby Jaramillo sent at 8/15/2018  9:13 AM EDT -----   Would like to refer her to [facility] for therapy for her eating disorder issues  Berkley Garcia  ----- Message -----  From: Arlene Claros  Sent: 8/15/2018   9:03 AM  To: Kelby Jaramillo    Of course  ----- Message -----  From: Kelby Jaramillo  Sent: 8/14/2018   6:50 PM  To: Arlene Claros    Could you possible see her for her eating/self-esteem issues?  Berkley Garcia

## 2018-10-18 ENCOUNTER — OFFICE VISIT (OUTPATIENT)
Dept: BEHAVIORAL/MENTAL HEALTH CLINIC | Facility: CLINIC | Age: 43
End: 2018-10-18
Payer: COMMERCIAL

## 2018-10-18 ENCOUNTER — APPOINTMENT (OUTPATIENT)
Dept: LAB | Facility: MEDICAL CENTER | Age: 43
End: 2018-10-18
Payer: COMMERCIAL

## 2018-10-18 DIAGNOSIS — E66.9 DIABETES MELLITUS TYPE 2 IN OBESE (HCC): ICD-10-CM

## 2018-10-18 DIAGNOSIS — F32.A ANXIETY AND DEPRESSION: Primary | ICD-10-CM

## 2018-10-18 DIAGNOSIS — F41.9 ANXIETY AND DEPRESSION: Primary | ICD-10-CM

## 2018-10-18 DIAGNOSIS — E11.69 DIABETES MELLITUS TYPE 2 IN OBESE (HCC): ICD-10-CM

## 2018-10-18 LAB
EST. AVERAGE GLUCOSE BLD GHB EST-MCNC: 143 MG/DL
HBA1C MFR BLD: 6.6 % (ref 4.2–6.3)

## 2018-10-18 PROCEDURE — 90791 PSYCH DIAGNOSTIC EVALUATION: CPT | Performed by: SOCIAL WORKER

## 2018-10-18 PROCEDURE — 36415 COLL VENOUS BLD VENIPUNCTURE: CPT

## 2018-10-18 PROCEDURE — 83036 HEMOGLOBIN GLYCOSYLATED A1C: CPT

## 2018-10-18 NOTE — PSYCH
Robi Mobley  1975       Date of Initial Treatment Plan: 10/18/18   Date of Current Treatment Plan: 10/18/18    Treatment Plan Number 1     Strengths/Personal Resources for Self Care: Good mother and wife, organized, responsible, caring    Diagnosis:   No diagnosis found  Area of Needs:   Stress  Eating      Long Term Goal 1: Learn how to let go    Target Date: 1/18/19  Completion Date: TBD         Short Term Objectives for Goal 1:       Learn what is in my control and what is not      Stop taking everyone else's issues on       Decide what I should help with and what I shouldn't      Take time for me and my  by ourselves    Long Term Goal 2: try not to link it to stress    Target Date: 1/18/19  Completion Date: TBD    Short Term Objectives for Goal 2:    water or take a walk instead  Be able to verbalize rather than cure it with food  Follow through with doctor appointment  Recognize my triggers    GOAL 1: Modality: Individual 1-2x per month   Completion Date TBD and The person(s) responsible for carrying out the plan is  [facility]    GOAL 2: Modality: Individual 1-2x per month   Completion Date TBD and The person(s) responsible for carrying out the plan is  [facility],[address] [facility]: Diagnosis and Treatment Plan explained to Colonel Fraser relates understanding diagnosis and is agreeable to Treatment Plan         Client Comments : Please share your thoughts, feelings, need and/or experiences regarding your treatment plan:       __________________________________________________________________    __________________________________________________________________    __________________________________________________________________    __________________________________________________________________    _______________________________________                Patient signature, Date Time: __________________________________________             Physician cosigner signature, Date, Time: ________________________________

## 2018-10-24 PROBLEM — E66.01 MORBID OBESITY WITH BMI OF 50.0-59.9, ADULT (HCC): Status: ACTIVE | Noted: 2018-10-24

## 2018-10-25 ENCOUNTER — OFFICE VISIT (OUTPATIENT)
Dept: FAMILY MEDICINE CLINIC | Facility: CLINIC | Age: 43
End: 2018-10-25
Payer: COMMERCIAL

## 2018-10-25 VITALS
HEIGHT: 65 IN | BODY MASS INDEX: 48.82 KG/M2 | SYSTOLIC BLOOD PRESSURE: 116 MMHG | WEIGHT: 293 LBS | RESPIRATION RATE: 16 BRPM | HEART RATE: 86 BPM | OXYGEN SATURATION: 99 % | DIASTOLIC BLOOD PRESSURE: 68 MMHG

## 2018-10-25 DIAGNOSIS — E03.9 ACQUIRED HYPOTHYROIDISM: ICD-10-CM

## 2018-10-25 DIAGNOSIS — F32.A ANXIETY AND DEPRESSION: ICD-10-CM

## 2018-10-25 DIAGNOSIS — E66.9 DIABETES MELLITUS TYPE 2 IN OBESE (HCC): Primary | ICD-10-CM

## 2018-10-25 DIAGNOSIS — E66.01 MORBID OBESITY WITH BMI OF 50.0-59.9, ADULT (HCC): ICD-10-CM

## 2018-10-25 DIAGNOSIS — Z12.31 VISIT FOR SCREENING MAMMOGRAM: ICD-10-CM

## 2018-10-25 DIAGNOSIS — F51.01 PRIMARY INSOMNIA: ICD-10-CM

## 2018-10-25 DIAGNOSIS — F41.9 ANXIETY AND DEPRESSION: ICD-10-CM

## 2018-10-25 DIAGNOSIS — K21.9 GASTROESOPHAGEAL REFLUX DISEASE WITHOUT ESOPHAGITIS: ICD-10-CM

## 2018-10-25 DIAGNOSIS — E11.69 DIABETES MELLITUS TYPE 2 IN OBESE (HCC): Primary | ICD-10-CM

## 2018-10-25 DIAGNOSIS — I10 BENIGN HYPERTENSION: ICD-10-CM

## 2018-10-25 PROCEDURE — 3078F DIAST BP <80 MM HG: CPT | Performed by: FAMILY MEDICINE

## 2018-10-25 PROCEDURE — 3074F SYST BP LT 130 MM HG: CPT | Performed by: FAMILY MEDICINE

## 2018-10-25 PROCEDURE — 99215 OFFICE O/P EST HI 40 MIN: CPT | Performed by: FAMILY MEDICINE

## 2018-10-25 RX ORDER — ALPRAZOLAM 0.5 MG/1
0.5 TABLET ORAL 2 TIMES DAILY PRN
Qty: 60 TABLET | Refills: 0 | Status: SHIPPED | OUTPATIENT
Start: 2018-10-25 | End: 2019-01-31 | Stop reason: SDUPTHER

## 2018-10-25 NOTE — PROGRESS NOTES
Assessment/Plan:         Diagnoses and all orders for this visit:    Diabetes mellitus type 2 in obese ([address] )  -     Hemoglobin A1C; Future    Benign hypertension  -     Comprehensive metabolic panel; Future    Acquired hypothyroidism    Gastroesophageal reflux disease without esophagitis    Anxiety and depression  -     ALPRAZolam (XANAX) 0 5 mg tablet; Take 1 tablet (0 5 mg total) by mouth 2 (two) times a day as needed for anxiety    Morbid obesity with BMI of 50 0-59 9, adult (HCC)    Primary insomnia  -     ALPRAZolam (XANAX) 0 5 mg tablet; Take 1 tablet (0 5 mg total) by mouth 2 (two) times a day as needed for anxiety    Visit for screening mammogram  -     Mammo screening bilateral w cad; Future            1) diabetes: stable  Recent HbA1c 6 6%  Continue current medications   2) Hypertension: stable  Continue lisinopril  3) Hypothyroidism: stable  Continue Levothyroxine 50 mcg daily  4) GERD: Pantoprazole PRN - not taking at all right now   5) Fibromyalgia: was increased on dose recently on gabapentin-sees Dr Riley Gray   6) Insomnia: negative for sleep apnea in the past  Xanax PRN   Belsomra didn't help   7) morbid obesity: not doing well   Seeing gastric bypass surgeon next month   8) anxiety: due to eating disorder- seeing counselor     Subjective:      Patient ID: Trinity Noriega is a 37 y o  female  Not eating healthy  Gaining weight 20 pounds in 3 months   Stress eating   Worsening anxiety   Not sleeping well- Belsomra didn't work   Roof leaking at home       [address] is a 37 y o  female who complains of insomnia  Onset was several months ago  Patient describes symptoms as frequent night time awakening  Patient has found minimal relief with avoiding heavy meal before bedtime and avoiding long naps during the day  Associated symptoms include: none  Patient denies anxiety, daytime somnolence and fatigue  Symptoms have stabilized            Diabetes   She presents for her follow-up diabetic visit  She has type 2 diabetes mellitus  Her disease course has been stable  Hypoglycemia symptoms include nervousness/anxiousness  Pertinent negatives for hypoglycemia include no dizziness, headaches or sweats  There are no diabetic associated symptoms  Pertinent negatives for diabetes include no blurred vision and no chest pain  There are no hypoglycemic complications  Symptoms are stable  There are no diabetic complications  Pertinent negatives for diabetic complications include no CVA, PVD or retinopathy  Risk factors for coronary artery disease include diabetes mellitus and obesity  Current diabetic treatment includes oral agent (dual therapy)  She is compliant with treatment all of the time  Her weight is stable  She is following a diabetic diet  She has not had a previous visit with a dietitian  Her overall blood glucose range is  mg/dl  An ACE inhibitor/angiotensin II receptor blocker is being taken  She does not see a podiatrist Eye exam is not current  Hypertension   This is a chronic problem  The current episode started more than 1 year ago  The problem has been gradually improving since onset  The problem is controlled  Pertinent negatives include no anxiety, blurred vision, chest pain, headaches, malaise/fatigue, neck pain, orthopnea, palpitations, peripheral edema, PND, shortness of breath or sweats  There are no associated agents to hypertension  Risk factors for coronary artery disease include diabetes mellitus and obesity  Past treatments include ACE inhibitors  The current treatment provides mild improvement  There are no compliance problems  There is no history of angina, CAD/MI, CVA, heart failure, left ventricular hypertrophy, PVD or retinopathy  There is no history of chronic renal disease, coarctation of the aorta, hyperaldosteronism, hypercortisolism, hyperparathyroidism, a hypertension causing med, pheochromocytoma, renovascular disease, sleep apnea or a thyroid problem     Anxiety Presents for follow-up visit  Symptoms include excessive worry, insomnia and nervous/anxious behavior  Patient reports no chest pain, compulsions, decreased concentration, depressed mood, dizziness, dry mouth, feeling of choking, irritability, malaise, muscle tension, nausea, obsessions, palpitations or shortness of breath  Symptoms occur most days  The severity of symptoms is mild  The quality of sleep is good  Compliance with medications is %  The following portions of the patient's history were reviewed and updated as appropriate: allergies, current medications, past family history, past medical history, past social history, past surgical history and problem list     Review of Systems   Constitutional: Negative  Negative for irritability and malaise/fatigue  HENT: Negative  Eyes: Negative  Negative for blurred vision  Respiratory: Negative  Negative for shortness of breath  Cardiovascular: Negative  Negative for chest pain, palpitations, orthopnea and PND  Gastrointestinal: Negative  Negative for nausea  Endocrine: Negative  Genitourinary: Negative  Musculoskeletal: Negative  Negative for neck pain  Skin: Negative  Allergic/Immunologic: Negative  Neurological: Negative  Negative for dizziness and headaches  Hematological: Negative  Psychiatric/Behavioral: Negative for decreased concentration  The patient is nervous/anxious and has insomnia                Past Medical History:   Diagnosis Date    Diabetes mellitus ([address] )     Disease of thyroid gland     GERD (gastroesophageal reflux disease)     Hx of colonoscopy 2017    Hypertension     Obesity     Other ovarian cyst, left side     Last assessed 12/05/17    Ovarian neoplasm     Last assessed 10/24/17    Rectal bleeding      Past Surgical History:   Procedure Laterality Date    SC COLONOSCOPY FLX DX W/COLLJ SPEC WHEN PFRMD N/A 6/13/2017    Procedure: COLONOSCOPY;  Surgeon: Michaela Monzon DO; [facility];   Service: Gastroenterology    IN LAPAROSCOPY W TOT HYSTERECTUTERUS <=250 [address] Emer[address] TUBE/OVARY N/A 11/20/2017    Procedure: ROBOTIC TOTAL LAPAROSCOPIC HYSTERECTOMY/ BSO;  Surgeon: Lew Rivera MD;  [facility];  Service: Gynecology Oncology     Social History     Social History    Marital status: /Civil Union     Spouse name: N/A    Number of children: N/A    Years of education: N/A     Occupational History    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      Social History Main Topics    Smoking status: Current Every Day Smoker     Packs/day: 0 75    Smokeless tobacco: Never Used      Comment: 15 smokes a day    Alcohol use No      Comment: [employer]: Social    Drug use: No    Sexual activity: Yes     Partners: Male     Other Topics Concern    Not on file     Social History Narrative    No narrative on file     Allergies   Allergen Reactions    Metformin Diarrhea         Family History   Problem Relation Age of Onset    Heart disease Mother         Cardiac disorder    Diabetes Mother     Hypertension Mother     Hyperthyroidism Mother     Diabetes Father     Hypertension Father     Lung cancer Maternal Grandfather     Colon cancer Cousin     Colon cancer Maternal Aunt     Lung cancer Family            Current Outpatient Prescriptions:     ALPRAZolam (XANAX) 0 5 mg tablet, Take 1 tablet (0 5 mg total) by mouth 2 (two) times a day as needed for anxiety, Disp: 60 tablet, Rfl: 0    aspirin (ECOTRIN LOW STRENGTH) 81 mg EC tablet, Take 81 mg by mouth daily, Disp: , Rfl:     atorvastatin (LIPITOR) 10 mg tablet, TAKE ONE TABLET BY MOUTH ONCE DAILY, Disp: 30 tablet, Rfl: 8    buPROPion (WELLBUTRIN XL) 300 mg 24 hr tablet, Take 1 tablet (300 mg total) by mouth every morning, Disp: 90 tablet, Rfl: 0    Cholecalciferol (VITAMIN D3) 1000 units CAPS, Take 2,000 Units by mouth daily, Disp: , Rfl:     Cholecalciferol (VITAMIN D3) 2000 units capsule, Take by mouth, Disp: , Rfl:    Cyanocobalamin (B-12) 1000 MCG CAPS, Take by mouth, Disp: , Rfl:     cyanocobalamin 100 MCG tablet, Take 100 mcg by mouth daily, Disp: , Rfl:     Dapagliflozin Propanediol (FARXIGA) 5 MG TABS, Take 5 mg by mouth daily, Disp: , Rfl:     DULoxetine (CYMBALTA) 60 mg delayed release capsule, Take 60 mg by mouth daily, Disp: , Rfl:     [id number]-DSS-SuccAc-Zn (FERIVA 21/7) 75-1 MG TABS, , Disp: , Rfl:     gabapentin (NEURONTIN) 300 mg capsule, Take 500 mg by mouth 3 (three) times a day , Disp: , Rfl:     levothyroxine 50 mcg tablet, TAKE ONE TABLET BY MOUTH ONCE DAILY AS DIRECTED, Disp: 90 tablet, Rfl: 11    liraglutide (VICTOZA) injection, Inject 0 3 mL (1 8 mg total) under the skin daily, Disp: 5 pen, Rfl: 3    lisinopril (ZESTRIL) 10 mg tablet, Take 10 mg by mouth daily, Disp: , Rfl:     Objective:      /68 (BP Location: Left arm, Patient Position: Sitting, Cuff Size: Standard)   Pulse 86   Resp 16   Ht 5' 5" (1 651 m)   Wt (!) 156 kg (343 lb)   LMP 06/13/2017   SpO2 99%   BMI 57 08 kg/m²          Physical Exam   Constitutional: She is oriented to person, place, and time  Vital signs are normal  She appears well-developed and well-nourished  HENT:   Head: Normocephalic and atraumatic  Nose: Nose normal    Mouth/Throat: Oropharynx is clear and moist    Eyes: Pupils are equal, round, and reactive to light  Neck: Normal range of motion  Neck supple  No thyromegaly present  Cardiovascular: Normal rate and regular rhythm  No murmur heard  Pulmonary/Chest: Effort normal and breath sounds normal    Abdominal: Soft  Bowel sounds are normal    Musculoskeletal: Normal range of motion  She exhibits no edema or deformity  Neurological: She is alert and oriented to person, place, and time  She has normal reflexes  Skin: Skin is warm  No rash noted  No erythema  Psychiatric: She has a normal mood and affect   Her behavior is normal

## 2018-10-31 DIAGNOSIS — F32.A ANXIETY AND DEPRESSION: Primary | ICD-10-CM

## 2018-10-31 DIAGNOSIS — F41.9 ANXIETY AND DEPRESSION: Primary | ICD-10-CM

## 2018-10-31 RX ORDER — BUPROPION HYDROCHLORIDE 300 MG/1
300 TABLET ORAL EVERY MORNING
Qty: 90 TABLET | Refills: 3 | Status: SHIPPED | OUTPATIENT
Start: 2018-10-31 | End: 2019-03-11 | Stop reason: DRUGHIGH

## 2018-11-13 ENCOUNTER — CLINICAL SUPPORT (OUTPATIENT)
Dept: BARIATRICS | Facility: CLINIC | Age: 43
End: 2018-11-13

## 2018-11-13 VITALS
HEIGHT: 65 IN | TEMPERATURE: 97.5 F | DIASTOLIC BLOOD PRESSURE: 82 MMHG | WEIGHT: 293 LBS | HEART RATE: 82 BPM | SYSTOLIC BLOOD PRESSURE: 124 MMHG | BODY MASS INDEX: 48.82 KG/M2

## 2018-11-13 DIAGNOSIS — E66.01 MORBID OBESITY (HCC): Primary | ICD-10-CM

## 2018-11-13 DIAGNOSIS — E66.01 MORBID OBESITY WITH BMI OF 50.0-59.9, ADULT (HCC): Primary | ICD-10-CM

## 2018-11-13 PROCEDURE — RECHECK: Performed by: DIETITIAN, REGISTERED

## 2018-11-13 NOTE — PROGRESS NOTES
Bariatric Behavioral Health Evaluation    Presenting Problem: Patient reports she wants to alleviate pain in knees and feet  She wants to improve diabetes  Patient wants to improve quality of life  Is the patient seeking Bariatric Surgery Eval? Yes  If yes how long have you researched this surgery option  Patient has considered bariatric surgery for 4 years  Realizes Post- Op Requirements? Yes     Psychiatric/Psychological Treatment Diagnosis: In 2008 patient diagnosed with depression and anxiety  Patient is monitored by physicians and they prescribe medication  Outpatient Counselor Yes, Jennifer Donald  Counselor Phone [phone]  Psychiatrist No     Have you had Inpatient Treatment? No    Family Constellation (include relationship with each and Psych/Med HX) None reported     Mother is 71years old, father is 79years old, 2 sisters, 1 brother   15 years; spouse supports surgery  1 step child who lives with patient( partially)  Domestic Violence No    Abuse History:  None reported     Additional comments/stressors related to family/relationships/peer support: patient has struggled with weight her entire life  Physical/Psychological Assessment:     Appearance: appropriate  Sociability: average  Affect: appropriate  Mood: calm  Thought Process: coherent  Speech: normal  Content: no impairment  Orientation: person  Yes   Insight: emotional  good    Risk Assessment:     none    Recommendations: Recommended for surgery  yes    Risk of Harm to Self or Others: None reported     Access to weapons yes     Weapons secured by; weapons belong to spouse; locked in a gun safe  Based on the previous information, the client presents the following risk of harm to self or others: low     Note   [facility] Marc[facility] Behavioral Health Assessment  Provided patient education as needed  Patient admits  to  Axis 1 diagnosis and is currently taking medication prescribed by PCP   Patient also meets with a therapist    Patient  meets criteria for [facility] bariatric  surgery program and is therefore referred to surgeon   BARIATRIC SURGERY EDUCATION CHECKLIST    I have received education related to my bariatric surgery process and understand:    Patients may be required to complete a psychiatric evaluation and receive clearance for surgery from their psychiatrist     Patients who undergo weight loss surgery are at higher risk of increased mental health concerns and suicide attempts  Patients may be required to complete a full substance abuse evaluation and then complete all treatment recommendations prior to surgery  If diagnosis of abuse/dependence results, patient may be required to remain sober for one (1) year before having bariatric surgery  Patients on psychiatric medications should check with their provider to discuss psychiatric medications and the changes in absorption  Patient should discuss all time release medications with provider and take all medications as prescribed  The recommendation is that there is no use of  any tobacco products, Hookah or  vapes for the bariatric post-operation patient  Bariatric surgery patients should not consume alcohol as a post-operative patient as it may increase risk of numerous health conditions including but not limited to alcohol abuse and ulcers  There is a possibility of weight regain if patient does not follow all program guidelines and recommendations  Bariatric surgery patients should exercise thirty (30) to sixty (60) minutes per day to maintain post-surgical weight loss  Research indicates that bariatric patients are more successful when they see a therapist for up to two (2) years post-op  Patients will follow all medical and dietary recommendations provided  Patient will keep all scheduled appointments and follow up with their physician for a minimum of five (5) years  Patient will take all vitamins as recommended  Post-operative vitamins are life-long  Patient reviewed Bariatric Surgery Education Checklist and agrees they have received education on these issues

## 2018-11-13 NOTE — PROGRESS NOTES
Bariatric Nutrition Assessment Note    Type of surgery    Preop  Surgery Date: TBD  Surgeon: Dr Suzette Duarte  37 y o   female     Wt with BMI of 25: 150lbs  Pre-Op Excess Wt: 195 5lbs  /82 (BP Location: Left arm, Patient Position: Sitting, Cuff Size: Adult)   Pulse 82   Temp 97 5 °F (36 4 °C) (Tympanic)   Ht 5' 5" (1 651 m)   Wt (!) 157 kg (345 lb 8 oz)   LMP 06/13/2017   BMI 57 49 kg/m²     [place] Equation:     You need 2,668 Calories/day to maintain your weight  2,668   You need 2,168 Calories/day to lose 1 lb per week  2,168   You need 1,668 Calories/day to lose 2 lb per week  1,668     Weight History   Onset of Obesity: Childhood  Family history of obesity: No  Wt Loss Attempts: Commercial Programs (Simple Admit/Smartesting, Marney Grow, etc )  Counseling with  MD  Exercise  FAD Diets (Cabbage soup, Grapefruit, Cleanse, etc )  OTC meds/supplements  Self Created Diets (Portion Control, Healthy Food Choices, etc )  Maximum Wt Lost: 60lbs    Review of History and Medications   Past Medical History:   Diagnosis Date    Ankle swelling     Anxiety     Arthritis     Depression     Diabetes mellitus ([address] )     Disease of thyroid gland     Fibromyalgia     GERD (gastroesophageal reflux disease)     Hx of colonoscopy 2017    Hypertension     Morbid obesity ([address] )     Night sweats     Obesity     Other ovarian cyst, left side     Last assessed 12/05/17    Ovarian neoplasm     Last assessed 10/24/17    Rectal bleeding      Past Surgical History:   Procedure Laterality Date    MD COLONOSCOPY FLX DX W/COLLJ SPEC WHEN PFRMD N/A 6/13/2017    Procedure: COLONOSCOPY;  Surgeon: Lieutenant Talita DO;  [facility];   Service: Gastroenterology    MD LAPAROSCOPY W TOT HYSTERECTUTERUS <=250 [address]man TUBE/OVARY N/A 11/20/2017    Procedure: ROBOTIC TOTAL LAPAROSCOPIC HYSTERECTOMY/ BSO;  Surgeon: Ximena Montemayor MD;  [facility];  Service: Gynecology Oncology     Social History     Social History    Marital status: /Civil Union     Spouse name: N/A    Number of children: N/A    Years of education: N/A     Occupational History    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      Social History Main Topics    Smoking status: Current Every Day Smoker     Packs/day: 0 75    Smokeless tobacco: Never Used    Alcohol use Yes      Comment: social    Drug use: No    Sexual activity: Yes     Partners: Male     Other Topics Concern    None     Social History Narrative    None       Current Outpatient Prescriptions:     ALPRAZolam (XANAX) 0 5 mg tablet, Take 1 tablet (0 5 mg total) by mouth 2 (two) times a day as needed for anxiety, Disp: 60 tablet, Rfl: 0    aspirin (ECOTRIN LOW STRENGTH) 81 mg EC tablet, Take 81 mg by mouth daily, Disp: , Rfl:     atorvastatin (LIPITOR) 10 mg tablet, TAKE ONE TABLET BY MOUTH ONCE DAILY, Disp: 30 tablet, Rfl: 8    buPROPion (WELLBUTRIN XL) 300 mg 24 hr tablet, Take 1 tablet (300 mg total) by mouth every morning, Disp: 90 tablet, Rfl: 3    Cholecalciferol (VITAMIN D3) 1000 units CAPS, Take 2,000 Units by mouth daily, Disp: , Rfl:     Cholecalciferol (VITAMIN D3) 2000 units capsule, Take by mouth, Disp: , Rfl:     Cyanocobalamin (B-12) 1000 MCG CAPS, Take by mouth, Disp: , Rfl:     cyanocobalamin 100 MCG tablet, Take 100 mcg by mouth daily, Disp: , Rfl:     Dapagliflozin Propanediol (FARXIGA) 5 MG TABS, Take 5 mg by mouth daily, Disp: , Rfl:     DULoxetine (CYMBALTA) 60 mg delayed release capsule, Take 60 mg by mouth daily, Disp: , Rfl:     [id number]-DSS-SuccAc-Zn (FERIVA 21/7) 75-1 MG TABS, , Disp: , Rfl:     gabapentin (NEURONTIN) 300 mg capsule, Take 500 mg by mouth 3 (three) times a day , Disp: , Rfl:     levothyroxine 50 mcg tablet, TAKE ONE TABLET BY MOUTH ONCE DAILY AS DIRECTED, Disp: 90 tablet, Rfl: 11    liraglutide (VICTOZA) injection, Inject 0 3 mL (1 8 mg total) under the skin daily, Disp: 5 pen, Rfl: 3   lisinopril (ZESTRIL) 10 mg tablet, Take 10 mg by mouth daily, Disp: , Rfl:   Food Intake and Lifestyle Assessment   Food Intake Assessment completed via 24 hour recall  Breakfast: three cups coffee with creamer  Tamera Hughs egg and cheese wake up wrap from [employer]: none   Lunch: 16 oz whole milk, 2 cups Honey Bunches of Oats Cereal  Snack: 16 oz iced tea with Sweet N Low, 2 bottles diet Ginger Ale  Dinner: shake n bake boneless porkchop, 1/2 cup corn, 1 tsp water, 2 tbsp au gratin potatoes  Snack: 2 glasses wine  Beverage intake: water, whole milk, sugar free beverages, juice, diet soda and coffee/tea  Protein supplement: none  Estimated protein intake per day: 30-60g  Estimated fluid intake per day: 3 cup coffee, 32 oz iced tea, 40 oz diet soda, 8 oz OJ, 8-16 oz water  Meals eaten away from home: once weekly: Restaurant or Diner  Typical meal pattern: 3 meals per day and 0-1 snacks per day  Eating Behaviors: Emotional eating and stress eating  Food allergies or intolerances: Allergies   Allergen Reactions    Metformin Diarrhea     Cultural or Mormonism considerations: none  Dislikes fish  Will eat shellfish  Physical Assessment  Physical Activity  Types of exercise: None  Current physical limitations: none  Some SOB  Psychosocial Assessment   Support systems: significant other friend(s)  Socioeconomic factors: none noted    Nutrition Diagnosis  Diagnosis: Overweight / Obesity (NC-3 3) and Excessive energy intake (NI-1 5)  Related to: Food and nutrition-related knowledge deficit, Physical inactivity and Excessive energy intake  As Evidenced by: BMI >25, Excessive energy intake and Unintentional weight gain     Nutrition Prescription: Recommend the following diet  Low fat, Low sugar, High protein and Regular    Interventions and Teaching   Discussed pre-op and post-op nutrition guidelines  Patient educated and handouts provided    Surgical changes to stomach / GI  Capacity of post-surgery stomach  Diet progression  Adequate hydration  Sugar and fat restriction to decrease "dumping syndrome"  Fat restriction to decrease steatorrhea  Expected weight loss  Weight loss plateaus/ possibility of weight regain  Exercise  Suggestions for pre-op diet  Nutrition considerations after surgery  Protein supplements  Meal planning and preparation  Appropriate carbohydrate, protein, and fat intake, and food/fluid choices to maximize safe weight loss, nutrient intake, and tolerance   Dietary and lifestyle changes  Possible problems with poor eating habits  Intuitive eating  Techniques for self monitoring and keeping daily food journal  Potential for food intolerance after surgery, and ways to deal with them including: lactose intolerance, nausea, reflux, vomiting, diarrhea, food intolerance, appetite changes, gas  Vitamin / Mineral supplementation of Multivitamin with minerals and Vitamin D    Education provided to: patient and friend    Barriers to learning: No barriers identified  Readiness to change: preparation    Prior research on procedure: discussed with provider and pre-op class    Comprehension: needs reinforcement and verbalizes understanding     Expected Compliance: good  Recommendations  Pt is an appropriate candidate for surgery  Yes    Evaluation / Monitoring  Dietitian to Monitor: Eating pattern as discussed Tolerance of nutrition prescription Body weight Lab values Physical activity    Goals  Food journal, Exercise 30 minutes 5 times per week, Complete lession plans 1-6, Eat 3 meals per day and measure her meal portions  Pt needs 10% we loss (34 5#) for a DAY OF SURGERY goal weight of 311#  custodial TO SCHEDULE SURGERY=17 25#, goal weight of 328 25#    Will meet with pt monthly    Time Spent:   1 Hour

## 2018-11-16 ENCOUNTER — TELEPHONE (OUTPATIENT)
Dept: BARIATRICS | Facility: CLINIC | Age: 43
End: 2018-11-16

## 2018-11-16 NOTE — TELEPHONE ENCOUNTER
I called left a message for patient to call and be scheduled with Dr Dee Duncan in Jan for consult she is seeing SW in Dec

## 2018-11-29 ENCOUNTER — OFFICE VISIT (OUTPATIENT)
Dept: FAMILY MEDICINE CLINIC | Facility: CLINIC | Age: 43
End: 2018-11-29
Payer: COMMERCIAL

## 2018-11-29 ENCOUNTER — HOSPITAL ENCOUNTER (OUTPATIENT)
Dept: RADIOLOGY | Facility: HOSPITAL | Age: 43
Discharge: HOME | End: 2018-11-29
Payer: COMMERCIAL

## 2018-11-29 VITALS
HEART RATE: 91 BPM | OXYGEN SATURATION: 97 % | DIASTOLIC BLOOD PRESSURE: 76 MMHG | SYSTOLIC BLOOD PRESSURE: 116 MMHG | WEIGHT: 293 LBS | BODY MASS INDEX: 48.82 KG/M2 | HEIGHT: 65 IN | TEMPERATURE: 98.6 F | RESPIRATION RATE: 16 BRPM

## 2018-11-29 DIAGNOSIS — R04.2 COUGH WITH HEMOPTYSIS: ICD-10-CM

## 2018-11-29 DIAGNOSIS — J20.9 ACUTE BRONCHITIS, UNSPECIFIED ORGANISM: Primary | ICD-10-CM

## 2018-11-29 PROCEDURE — 71046 X-RAY EXAM CHEST 2 VIEWS: CPT

## 2018-11-29 PROCEDURE — 3008F BODY MASS INDEX DOCD: CPT | Performed by: FAMILY MEDICINE

## 2018-11-29 PROCEDURE — 99213 OFFICE O/P EST LOW 20 MIN: CPT | Performed by: FAMILY MEDICINE

## 2018-11-29 RX ORDER — PROMETHAZINE HYDROCHLORIDE AND CODEINE PHOSPHATE 6.25; 1 MG/5ML; MG/5ML
5 SYRUP ORAL EVERY 4 HOURS PRN
Qty: 120 ML | Refills: 0 | Status: SHIPPED | OUTPATIENT
Start: 2018-11-29 | End: 2019-01-31 | Stop reason: ALTCHOICE

## 2018-11-29 RX ORDER — LEVOFLOXACIN 500 MG/1
500 TABLET, FILM COATED ORAL EVERY 24 HOURS
Qty: 7 TABLET | Refills: 0 | Status: SHIPPED | OUTPATIENT
Start: 2018-11-29 | End: 2018-12-06

## 2018-11-29 NOTE — PROGRESS NOTES
Assessment/Plan:         Diagnoses and all orders for this visit:    Acute bronchitis, unspecified organism  -     levofloxacin (LEVAQUIN) 500 mg tablet; Take 1 tablet (500 mg total) by mouth every 24 hours for 7 days  -     promethazine-codeine (PHENERGAN WITH CODEINE) 6 25-10 mg/5 mL syrup; Take 5 mL by mouth every 4 (four) hours as needed for cough    Cough with hemoptysis  -     XR chest pa & lateral; Future  -     promethazine-codeine (PHENERGAN WITH CODEINE) 6 25-10 mg/5 mL syrup; Take 5 mL by mouth every 4 (four) hours as needed for cough          Subjective:      Patient ID: Kvng Mtz is a 37 y o  female  URI    This is a new problem  The current episode started in the past 7 days  There has been no fever  Associated symptoms include chest pain, congestion, coughing, headaches, rhinorrhea, sinus pain and swollen glands  Pertinent negatives include no abdominal pain, diarrhea, dysuria, ear pain, joint pain, joint swelling, nausea, neck pain, plugged ear sensation, rash, sneezing, sore throat, vomiting or wheezing  The treatment provided mild relief  The following portions of the patient's history were reviewed and updated as appropriate: allergies, current medications, past family history, past medical history, past social history, past surgical history and problem list     Review of Systems   HENT: Positive for congestion, rhinorrhea and sinus pain  Negative for ear pain, sneezing and sore throat  Respiratory: Positive for cough  Negative for wheezing  Cardiovascular: Positive for chest pain  Gastrointestinal: Negative for abdominal pain, diarrhea, nausea and vomiting  Genitourinary: Negative for dysuria  Musculoskeletal: Negative for joint pain and neck pain  Skin: Negative for rash  Neurological: Positive for headaches           Objective:      /76 (BP Location: Left arm, Patient Position: Sitting, Cuff Size: Standard)   Pulse 91   Temp 98 6 °F (37 °C)   Resp 16   Ht 5' 5" (1 651 m)   Wt (!) 156 kg (343 lb)   LMP 06/13/2017   SpO2 97%   BMI 57 08 kg/m²          Physical Exam   Constitutional: She is oriented to person, place, and time  She appears well-developed and well-nourished  HENT:   Head: Normocephalic  Mouth/Throat: No oropharyngeal exudate  Eyes: Right eye exhibits no discharge  Left eye exhibits no discharge  Cardiovascular: Normal rate and regular rhythm  Pulmonary/Chest: Effort normal and breath sounds normal    Musculoskeletal: She exhibits no edema or tenderness  Neurological: She is alert and oriented to person, place, and time  Skin: No rash noted  No erythema  Psychiatric: She has a normal mood and affect

## 2018-11-30 DIAGNOSIS — I10 ESSENTIAL HYPERTENSION: Primary | ICD-10-CM

## 2018-11-30 DIAGNOSIS — E11.65 TYPE 2 DIABETES MELLITUS WITH HYPERGLYCEMIA, WITHOUT LONG-TERM CURRENT USE OF INSULIN (HCC): ICD-10-CM

## 2018-11-30 PROCEDURE — 4010F ACE/ARB THERAPY RXD/TAKEN: CPT | Performed by: FAMILY MEDICINE

## 2018-11-30 RX ORDER — LISINOPRIL 10 MG/1
10 TABLET ORAL DAILY
Qty: 30 TABLET | Refills: 5 | Status: SHIPPED | OUTPATIENT
Start: 2018-11-30 | End: 2019-04-09

## 2018-12-03 ENCOUNTER — OFFICE VISIT (OUTPATIENT)
Dept: CARDIOLOGY CLINIC | Facility: MEDICAL CENTER | Age: 43
End: 2018-12-03
Payer: COMMERCIAL

## 2018-12-03 ENCOUNTER — TRANSCRIBE ORDERS (OUTPATIENT)
Dept: ADMINISTRATIVE | Facility: HOSPITAL | Age: 43
End: 2018-12-03

## 2018-12-03 ENCOUNTER — APPOINTMENT (OUTPATIENT)
Dept: LAB | Facility: MEDICAL CENTER | Age: 43
End: 2018-12-03
Payer: COMMERCIAL

## 2018-12-03 VITALS
SYSTOLIC BLOOD PRESSURE: 124 MMHG | BODY MASS INDEX: 48.82 KG/M2 | WEIGHT: 293 LBS | HEART RATE: 81 BPM | OXYGEN SATURATION: 93 % | HEIGHT: 65 IN | DIASTOLIC BLOOD PRESSURE: 70 MMHG

## 2018-12-03 DIAGNOSIS — M79.7 SCAPULOHUMERAL FIBROSITIS: Primary | ICD-10-CM

## 2018-12-03 DIAGNOSIS — Z79.899 ENCOUNTER FOR LONG-TERM (CURRENT) USE OF MEDICATIONS: ICD-10-CM

## 2018-12-03 DIAGNOSIS — Z01.818 PRE-OPERATIVE CLEARANCE: Primary | ICD-10-CM

## 2018-12-03 DIAGNOSIS — E78.5 DYSLIPIDEMIA: ICD-10-CM

## 2018-12-03 DIAGNOSIS — E66.01 MORBID OBESITY DUE TO EXCESS CALORIES (HCC): ICD-10-CM

## 2018-12-03 DIAGNOSIS — E66.01 MORBID OBESITY (HCC): ICD-10-CM

## 2018-12-03 DIAGNOSIS — I10 BENIGN HYPERTENSION: ICD-10-CM

## 2018-12-03 LAB
ALBUMIN SERPL BCP-MCNC: 3.3 G/DL (ref 3.5–5)
ALP SERPL-CCNC: 89 U/L (ref 46–116)
ALT SERPL W P-5'-P-CCNC: 46 U/L (ref 12–78)
ANION GAP SERPL CALCULATED.3IONS-SCNC: 6 MMOL/L (ref 4–13)
AST SERPL W P-5'-P-CCNC: 23 U/L (ref 5–45)
BASOPHILS # BLD AUTO: 0.05 THOUSANDS/ΜL (ref 0–0.1)
BASOPHILS NFR BLD AUTO: 1 % (ref 0–1)
BILIRUB SERPL-MCNC: 0.18 MG/DL (ref 0.2–1)
BUN SERPL-MCNC: 10 MG/DL (ref 5–25)
CALCIUM SERPL-MCNC: 9.1 MG/DL (ref 8.3–10.1)
CHLORIDE SERPL-SCNC: 107 MMOL/L (ref 100–108)
CO2 SERPL-SCNC: 26 MMOL/L (ref 21–32)
CREAT SERPL-MCNC: 0.76 MG/DL (ref 0.6–1.3)
EOSINOPHIL # BLD AUTO: 0.65 THOUSAND/ΜL (ref 0–0.61)
EOSINOPHIL NFR BLD AUTO: 8 % (ref 0–6)
ERYTHROCYTE [DISTWIDTH] IN BLOOD BY AUTOMATED COUNT: 18 % (ref 11.6–15.1)
ERYTHROCYTE [SEDIMENTATION RATE] IN BLOOD: 51 MM/HOUR (ref 0–20)
GFR SERPL CREATININE-BSD FRML MDRD: 96 ML/MIN/1.73SQ M
GLUCOSE SERPL-MCNC: 110 MG/DL (ref 65–140)
HCT VFR BLD AUTO: 41.6 % (ref 34.8–46.1)
HGB BLD-MCNC: 12.7 G/DL (ref 11.5–15.4)
IMM GRANULOCYTES # BLD AUTO: 0.05 THOUSAND/UL (ref 0–0.2)
IMM GRANULOCYTES NFR BLD AUTO: 1 % (ref 0–2)
LYMPHOCYTES # BLD AUTO: 2.21 THOUSANDS/ΜL (ref 0.6–4.47)
LYMPHOCYTES NFR BLD AUTO: 26 % (ref 14–44)
MCH RBC QN AUTO: 25.4 PG (ref 26.8–34.3)
MCHC RBC AUTO-ENTMCNC: 30.5 G/DL (ref 31.4–37.4)
MCV RBC AUTO: 83 FL (ref 82–98)
MONOCYTES # BLD AUTO: 0.77 THOUSAND/ΜL (ref 0.17–1.22)
MONOCYTES NFR BLD AUTO: 9 % (ref 4–12)
NEUTROPHILS # BLD AUTO: 4.94 THOUSANDS/ΜL (ref 1.85–7.62)
NEUTS SEG NFR BLD AUTO: 55 % (ref 43–75)
NRBC BLD AUTO-RTO: 0 /100 WBCS
PLATELET # BLD AUTO: 242 THOUSANDS/UL (ref 149–390)
PMV BLD AUTO: 11.8 FL (ref 8.9–12.7)
POTASSIUM SERPL-SCNC: 4.4 MMOL/L (ref 3.5–5.3)
PROT SERPL-MCNC: 8 G/DL (ref 6.4–8.2)
RBC # BLD AUTO: 5 MILLION/UL (ref 3.81–5.12)
SODIUM SERPL-SCNC: 139 MMOL/L (ref 136–145)
WBC # BLD AUTO: 8.67 THOUSAND/UL (ref 4.31–10.16)

## 2018-12-03 PROCEDURE — 93000 ELECTROCARDIOGRAM COMPLETE: CPT | Performed by: INTERNAL MEDICINE

## 2018-12-03 PROCEDURE — 85652 RBC SED RATE AUTOMATED: CPT | Performed by: PHYSICIAN ASSISTANT

## 2018-12-03 PROCEDURE — 80053 COMPREHEN METABOLIC PANEL: CPT | Performed by: PHYSICIAN ASSISTANT

## 2018-12-03 PROCEDURE — 99243 OFF/OP CNSLTJ NEW/EST LOW 30: CPT | Performed by: INTERNAL MEDICINE

## 2018-12-03 PROCEDURE — 36415 COLL VENOUS BLD VENIPUNCTURE: CPT | Performed by: PHYSICIAN ASSISTANT

## 2018-12-03 PROCEDURE — 85025 COMPLETE CBC W/AUTO DIFF WBC: CPT | Performed by: PHYSICIAN ASSISTANT

## 2018-12-03 NOTE — PROGRESS NOTES
Cardiology Consultation     Mohan Bustillo  [id number]  1975  [facility] [place]  [address] [place] [address]    I had the pleasure of seeing Mohan Bustillo for a consultation regarding preoperative clearance for Gastric bypass surgery requested by Sawyer Fortune    History of the Presenting Illness, Discussion/Summary and My Plan are as follows: She is a very pleasant 24-year-old lady without a prior cardiac history but with a history of hypertension-2 years, dyslipidemia and diabetes-5 years and chronic tobacco use-quit 3 days ago    She is asymptomatic from a cardiac standpoint although minimally physically active-walking a mi twice a week at a slow pace  Cardiac physical exam is unremarkable  Limited by body habitus  ECG demonstrates sinus rhythm  Plan:    Preoperative evaluation prior to gastric bypass surgery: Will check a regular treadmill stress test   She feels that she will be able to exercise  If this is negative, can proceed with surgery  Hypertension:  Controlled    Dyslipidemia:  Continue statin    Diabetes:  Last A1c around 6    With weight loss that would ensue after bariatric surgery, her hypertension, diabetes and dyslipidemia might resolve entirely if not, control would improve  Can follow up as needed, presuming normalcy of the upcoming stress test      Results for Kym Barahona (MRN [id number]) as of 12/3/2018 10:07   Ref  Range 3/26/2018 07:15   Cholesterol Latest Ref Range: <200 mg/dL 110   Triglycerides Latest Ref Range: <150 mg/dL 152 (H)   HDL Latest Ref Range: >50 mg/dL 28 (L)   Non-HDL Cholesterol Latest Ref Range: <130 mg/dL (calc) 82   LDL Direct Latest Units: mg/dL (calc) 59   Results for Kym Barahona (MRN [id number]) as of 12/3/2018 10:07   Ref   Range 10/18/2018 07:41   Hemoglobin A1C Latest Ref Range: 4 2 - 6 3 % 6 6 (H)       1  Pre-operative clearance  POCT ECG 2  Morbid obesity ([facility] )  Ambulatory referral to Cardiology   3  Benign hypertension     4   Morbid obesity due to excess calories [facility])       Patient Active Problem List   Diagnosis    Abnormal uterine bleeding (AUB)    Morbid obesity due to excess calories (HCC)    Other ovarian cyst, left side    Hypothyroidism    Gastroesophageal reflux disease without esophagitis    Diabetes mellitus type 2 in obese (HCC)    Benign hypertension    Arthritis    Anxiety and depression    Ovarian neoplasm    Status post laparoscopic hysterectomy    Fibromyalgia    Insomnia    Morbid obesity with BMI of 50 0-59 9, adult ([facility] )     Past Medical History:   Diagnosis Date    Ankle swelling     Anxiety     Arthritis     Depression     Diabetes mellitus ([facility] )     Disease of thyroid gland     Fibromyalgia     GERD (gastroesophageal reflux disease)     Hx of colonoscopy 2017    Hypertension     Morbid obesity ([facility] )     Night sweats     Obesity     Other ovarian cyst, left side     Last assessed 12/05/17    Ovarian neoplasm     Last assessed 10/24/17    Rectal bleeding      Social History     Social History    Marital status: /Civil Union     Spouse name: N/A    Number of children: N/A    Years of education: N/A     Occupational History    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      Social History Main Topics    Smoking status: Former Smoker     Packs/day: 0 75     Quit date: 11/30/2018    Smokeless tobacco: Never Used      Comment: just quit     Alcohol use Yes      Comment: social    Drug use: No    Sexual activity: Yes     Partners: Male     Other Topics Concern    Not on file     Social History Narrative    No narrative on file      Family History   Problem Relation Age of Onset    Heart disease Mother         Cardiac disorder    Diabetes Mother     Hypertension Mother     Hyperthyroidism Mother     Diabetes Father     Hypertension Father     Lung cancer Maternal Grandfather     Colon cancer Cousin  Colon cancer Maternal Aunt     Lung cancer Family     Diabetes Sister     No Known Problems Brother      Past Surgical History:   Procedure Laterality Date    AL COLONOSCOPY FLX DX W/COLLJ [facility] WHEN PFRMD N/A 6/13/2017    Procedure: COLONOSCOPY;  Surgeon: Michaela Monzon DO;  [facility];   Service: Gastroenterology    AL LAPAROSCOPY W TOT HYSTERECTUTERUS <=250 Tracie Roulette TUBE/OVARY N/A 11/20/2017    Procedure: ROBOTIC TOTAL LAPAROSCOPIC HYSTERECTOMY/ BSO;  Surgeon: Samm Mandel MD;  [facility];  Service: Gynecology Oncology       Current Outpatient Prescriptions:     ALPRAZolam (XANAX) 0 5 mg tablet, Take 1 tablet (0 5 mg total) by mouth 2 (two) times a day as needed for anxiety, Disp: 60 tablet, Rfl: 0    aspirin (ECOTRIN LOW STRENGTH) 81 mg EC tablet, Take 81 mg by mouth daily, Disp: , Rfl:     atorvastatin (LIPITOR) 10 mg tablet, TAKE ONE TABLET BY MOUTH ONCE DAILY, Disp: 30 tablet, Rfl: 8    buPROPion (WELLBUTRIN XL) 300 mg 24 hr tablet, Take 1 tablet (300 mg total) by mouth every morning, Disp: 90 tablet, Rfl: 3    Cholecalciferol (VITAMIN D3) 2000 units capsule, Take by mouth, Disp: , Rfl:     Cyanocobalamin (B-12) 1000 MCG CAPS, Take by mouth, Disp: , Rfl:     Dapagliflozin Propanediol (FARXIGA) 5 MG TABS, Take 1 tablet (5 mg total) by mouth daily, Disp: 30 tablet, Rfl: 5    DULoxetine (CYMBALTA) 60 mg delayed release capsule, Take 60 mg by mouth daily, Disp: , Rfl:     [id number]-SuccAc-Zn (FERIVA 21/7) 75-1 MG TABS, , Disp: , Rfl:     gabapentin (NEURONTIN) 300 mg capsule, Take 500 mg by mouth 3 (three) times a day , Disp: , Rfl:     levofloxacin (LEVAQUIN) 500 mg tablet, Take 1 tablet (500 mg total) by mouth every 24 hours for 7 days, Disp: 7 tablet, Rfl: 0    levothyroxine 50 mcg tablet, TAKE ONE TABLET BY MOUTH ONCE DAILY AS DIRECTED, Disp: 90 tablet, Rfl: 11    liraglutide (VICTOZA) injection, Inject 0 3 mL (1 8 mg total) under the skin daily, Disp: 5 pen, Rfl: 3    lisinopril (ZESTRIL) 10 mg tablet, Take 1 tablet (10 mg total) by mouth daily, Disp: 30 tablet, Rfl: 5    promethazine-codeine (PHENERGAN WITH CODEINE) 6 25-10 mg/5 mL syrup, Take 5 mL by mouth every 4 (four) hours as needed for cough, Disp: 120 mL, Rfl: 0  Allergies   Allergen Reactions    Metformin Diarrhea     Vitals:    12/03/18 0954   BP: 124/70   BP Location: Left arm   Patient Position: Sitting   Cuff Size: Large   Pulse: 81   SpO2: 93%   Weight: (!) 158 kg (348 lb 9 6 oz)   Height: 5' 5" (1 651 m)       Labs:not applicable    Imaging: Xr Chest Pa & Lateral    Result Date: 12/2/2018  Narrative: CHEST INDICATION:   R04 2: Hemoptysis  Smoker  COMPARISON:  None EXAM PERFORMED/VIEWS:  XR CHEST PA & LATERAL  The frontal view was performed utilizing dual energy radiographic technique  FINDINGS: Cardiomediastinal silhouette appears unremarkable  The lungs are clear  No pneumothorax or pleural effusion  Osseous structures appear within normal limits for patient age  Impression: No acute cardiopulmonary disease  Workstation performed: [id number]       Review of Systems:  Review of Systems   Constitutional: Negative  HENT: Negative  Eyes: Negative  Respiratory: Negative  Cardiovascular: Negative  Gastrointestinal: Negative  Endocrine: Negative  Genitourinary: Negative  Musculoskeletal: Negative  Skin: Negative  Allergic/Immunologic: Negative  Neurological: Negative  Hematological: Negative  Psychiatric/Behavioral: Negative  Physical Exam:  Physical Exam   Constitutional: She appears well-developed and well-nourished  No distress  HENT:   Head: Normocephalic and atraumatic  Eyes: Pupils are equal, round, and reactive to light  Right eye exhibits no discharge  Left eye exhibits no discharge  Neck: Normal range of motion  No JVD present  No thyromegaly present  Cardiovascular: Normal rate, normal heart sounds and intact distal pulses    Exam reveals no friction rub  No murmur heard  Pulmonary/Chest: Effort normal  No stridor  No respiratory distress  She has no wheezes  She has no rales  Abdominal: Soft  She exhibits no distension  There is no tenderness  There is no rebound  Skin: Skin is warm  No rash noted  She is not diaphoretic  No erythema  No pallor

## 2018-12-03 NOTE — LETTER
Cardiology Pre Operative Clearance      PRE OPERATIVE CARDIAC RISK ASSESSMENT    12/03/18    Eva Self  1975  [id number]    Date of Surgery: TBA    Type of Surgery:Gastric Bypass    Surgeon: Dr Puma Winston     No Cardiac Contraindication for Planned Surgical Procedures    Anticoagulation: Yes, Aspirin 81 mg     Physician Comment: Can proceed at low cardiac risk without further cardiac testing       Electronically Signed: Lord Gilberto MD

## 2018-12-11 ENCOUNTER — TELEPHONE (OUTPATIENT)
Dept: FAMILY MEDICINE CLINIC | Facility: CLINIC | Age: 43
End: 2018-12-11

## 2018-12-11 NOTE — TELEPHONE ENCOUNTER
PATIENT WAS INTO SEE YOU 11/29/2018  Acute bronchitis  PATIENT HAS COMPLETED THE ROUND OF levofloxacin (LEVAQUIN) 500 mg tablet AS WELL AS THE promethazine-codeine (PHENERGAN WITH CODEINE) 6 25-10 mg/5 mL syrup   SYMPTOMS : SORE THROAT, CHEST CONGESTION, YELLOW AND GREEN MUCUS FOR 4 DAYS NOW          [address] [facility]

## 2018-12-11 NOTE — TELEPHONE ENCOUNTER
I want her to take mucinex DM and see how she does  I want to see her on Friday if she is not better   She needs to stop smoking as well

## 2018-12-14 ENCOUNTER — TRANSCRIBE ORDERS (OUTPATIENT)
Dept: ADMINISTRATIVE | Facility: HOSPITAL | Age: 43
End: 2018-12-14

## 2018-12-14 DIAGNOSIS — G60.8 HEREDITARY SENSORY NEUROPATHY: Primary | ICD-10-CM

## 2018-12-21 ENCOUNTER — APPOINTMENT (OUTPATIENT)
Dept: LAB | Facility: CLINIC | Age: 43
End: 2018-12-21
Payer: COMMERCIAL

## 2018-12-21 ENCOUNTER — HOSPITAL ENCOUNTER (OUTPATIENT)
Dept: NON INVASIVE DIAGNOSTICS | Facility: CLINIC | Age: 43
Discharge: HOME | End: 2018-12-21
Payer: COMMERCIAL

## 2018-12-21 ENCOUNTER — OFFICE VISIT (OUTPATIENT)
Dept: BARIATRICS | Facility: CLINIC | Age: 43
End: 2018-12-21

## 2018-12-21 ENCOUNTER — TRANSCRIBE ORDERS (OUTPATIENT)
Dept: LAB | Facility: CLINIC | Age: 43
End: 2018-12-21

## 2018-12-21 VITALS — HEIGHT: 65 IN | WEIGHT: 293 LBS | BODY MASS INDEX: 48.82 KG/M2

## 2018-12-21 DIAGNOSIS — G60.8 HEREDITARY SENSORY NEUROPATHY: ICD-10-CM

## 2018-12-21 DIAGNOSIS — E66.01 MORBID (SEVERE) OBESITY DUE TO EXCESS CALORIES (HCC): Primary | ICD-10-CM

## 2018-12-21 DIAGNOSIS — I10 BENIGN HYPERTENSION: ICD-10-CM

## 2018-12-21 DIAGNOSIS — M25.50 PAIN IN JOINT, MULTIPLE SITES: ICD-10-CM

## 2018-12-21 DIAGNOSIS — Z79.899 NEED FOR PROPHYLACTIC CHEMOTHERAPY: ICD-10-CM

## 2018-12-21 DIAGNOSIS — Z01.818 PRE-OPERATIVE CLEARANCE: ICD-10-CM

## 2018-12-21 DIAGNOSIS — R79.89 HYPOURICEMIA: ICD-10-CM

## 2018-12-21 DIAGNOSIS — E78.5 DYSLIPIDEMIA: ICD-10-CM

## 2018-12-21 DIAGNOSIS — G60.8 HEREDITARY SENSORY NEUROPATHY: Primary | ICD-10-CM

## 2018-12-21 LAB
ALBUMIN SERPL BCP-MCNC: 3.1 G/DL (ref 3.5–5)
ALP SERPL-CCNC: 90 U/L (ref 46–116)
ALT SERPL W P-5'-P-CCNC: 27 U/L (ref 12–78)
ANION GAP SERPL CALCULATED.3IONS-SCNC: 13 MMOL/L (ref 4–13)
AST SERPL W P-5'-P-CCNC: 19 U/L (ref 5–45)
BASOPHILS # BLD AUTO: 0.06 THOUSANDS/ΜL (ref 0–0.1)
BASOPHILS NFR BLD AUTO: 1 % (ref 0–1)
BILIRUB SERPL-MCNC: 0.2 MG/DL (ref 0.2–1)
BUN SERPL-MCNC: 16 MG/DL (ref 5–25)
C3 SERPL-MCNC: 148 MG/DL (ref 90–180)
CALCIUM SERPL-MCNC: 9.2 MG/DL (ref 8.3–10.1)
CHEST PAIN STATEMENT: NORMAL
CHLORIDE SERPL-SCNC: 101 MMOL/L (ref 100–108)
CO2 SERPL-SCNC: 24 MMOL/L (ref 21–32)
CREAT SERPL-MCNC: 0.84 MG/DL (ref 0.6–1.3)
CRP SERPL QL: 28.5 MG/L
EOSINOPHIL # BLD AUTO: 0.67 THOUSAND/ΜL (ref 0–0.61)
EOSINOPHIL NFR BLD AUTO: 6 % (ref 0–6)
ERYTHROCYTE [DISTWIDTH] IN BLOOD BY AUTOMATED COUNT: 18 % (ref 11.6–15.1)
ERYTHROCYTE [SEDIMENTATION RATE] IN BLOOD: 48 MM/HOUR (ref 0–20)
EST. AVERAGE GLUCOSE BLD GHB EST-MCNC: 157 MG/DL
FOLATE SERPL-MCNC: 18.6 NG/ML (ref 3.1–17.5)
GFR SERPL CREATININE-BSD FRML MDRD: 85 ML/MIN/1.73SQ M
GLUCOSE SERPL-MCNC: 101 MG/DL (ref 65–140)
HBA1C MFR BLD: 7.1 % (ref 4.2–6.3)
HCT VFR BLD AUTO: 43.7 % (ref 34.8–46.1)
HGB BLD-MCNC: 13.4 G/DL (ref 11.5–15.4)
IMM GRANULOCYTES # BLD AUTO: 0.05 THOUSAND/UL (ref 0–0.2)
IMM GRANULOCYTES NFR BLD AUTO: 1 % (ref 0–2)
LYMPHOCYTES # BLD AUTO: 1.88 THOUSANDS/ΜL (ref 0.6–4.47)
LYMPHOCYTES NFR BLD AUTO: 17 % (ref 14–44)
MAX DIASTOLIC BP: 90 MMHG
MAX HEART RATE: 151 BPM
MAX PREDICTED HEART RATE: 177 BPM
MAX. SYSTOLIC BP: 168 MMHG
MCH RBC QN AUTO: 25.4 PG (ref 26.8–34.3)
MCHC RBC AUTO-ENTMCNC: 30.7 G/DL (ref 31.4–37.4)
MCV RBC AUTO: 83 FL (ref 82–98)
MISCELLANEOUS LAB TEST RESULT: NORMAL
MONOCYTES # BLD AUTO: 0.91 THOUSAND/ΜL (ref 0.17–1.22)
MONOCYTES NFR BLD AUTO: 8 % (ref 4–12)
NEUTROPHILS # BLD AUTO: 7.44 THOUSANDS/ΜL (ref 1.85–7.62)
NEUTS SEG NFR BLD AUTO: 67 % (ref 43–75)
NRBC BLD AUTO-RTO: 0 /100 WBCS
PLATELET # BLD AUTO: 260 THOUSANDS/UL (ref 149–390)
PMV BLD AUTO: 11.2 FL (ref 8.9–12.7)
POTASSIUM SERPL-SCNC: 4.3 MMOL/L (ref 3.5–5.3)
PROT SERPL-MCNC: 8 G/DL (ref 6.4–8.2)
PROTOCOL NAME: NORMAL
RBC # BLD AUTO: 5.27 MILLION/UL (ref 3.81–5.12)
REASON FOR TERMINATION: NORMAL
SODIUM SERPL-SCNC: 138 MMOL/L (ref 136–145)
T4 FREE SERPL-MCNC: 0.99 NG/DL (ref 0.76–1.46)
TARGET HR FORMULA: NORMAL
TEST INDICATION: NORMAL
TIME IN EXERCISE PHASE: NORMAL
TSH SERPL DL<=0.05 MIU/L-ACNC: 2.29 UIU/ML (ref 0.36–3.74)
VIT B12 SERPL-MCNC: 874 PG/ML (ref 100–900)
WBC # BLD AUTO: 11.01 THOUSAND/UL (ref 4.31–10.16)

## 2018-12-21 PROCEDURE — 82607 VITAMIN B-12: CPT

## 2018-12-21 PROCEDURE — 84165 PROTEIN E-PHORESIS SERUM: CPT

## 2018-12-21 PROCEDURE — RECHECK

## 2018-12-21 PROCEDURE — 93017 CV STRESS TEST TRACING ONLY: CPT

## 2018-12-21 PROCEDURE — 86800 THYROGLOBULIN ANTIBODY: CPT

## 2018-12-21 PROCEDURE — 86430 RHEUMATOID FACTOR TEST QUAL: CPT

## 2018-12-21 PROCEDURE — 83516 IMMUNOASSAY NONANTIBODY: CPT

## 2018-12-21 PROCEDURE — 84165 PROTEIN E-PHORESIS SERUM: CPT | Performed by: PATHOLOGY

## 2018-12-21 PROCEDURE — 36415 COLL VENOUS BLD VENIPUNCTURE: CPT

## 2018-12-21 PROCEDURE — 86618 LYME DISEASE ANTIBODY: CPT

## 2018-12-21 PROCEDURE — 93018 CV STRESS TEST I&R ONLY: CPT | Performed by: INTERNAL MEDICINE

## 2018-12-21 PROCEDURE — 86225 DNA ANTIBODY NATIVE: CPT

## 2018-12-21 PROCEDURE — 80053 COMPREHEN METABOLIC PANEL: CPT

## 2018-12-21 PROCEDURE — 85652 RBC SED RATE AUTOMATED: CPT

## 2018-12-21 PROCEDURE — 82746 ASSAY OF FOLIC ACID SERUM: CPT

## 2018-12-21 PROCEDURE — 84439 ASSAY OF FREE THYROXINE: CPT

## 2018-12-21 PROCEDURE — 86140 C-REACTIVE PROTEIN: CPT

## 2018-12-21 PROCEDURE — 86160 COMPLEMENT ANTIGEN: CPT

## 2018-12-21 PROCEDURE — 83036 HEMOGLOBIN GLYCOSYLATED A1C: CPT

## 2018-12-21 PROCEDURE — 84432 ASSAY OF THYROGLOBULIN: CPT

## 2018-12-21 PROCEDURE — 86038 ANTINUCLEAR ANTIBODIES: CPT

## 2018-12-21 PROCEDURE — 84443 ASSAY THYROID STIM HORMONE: CPT

## 2018-12-21 PROCEDURE — 85025 COMPLETE CBC W/AUTO DIFF WBC: CPT

## 2018-12-21 PROCEDURE — 86200 CCP ANTIBODY: CPT

## 2018-12-21 PROCEDURE — 86376 MICROSOMAL ANTIBODY EACH: CPT

## 2018-12-21 PROCEDURE — 93016 CV STRESS TEST SUPVJ ONLY: CPT | Performed by: INTERNAL MEDICINE

## 2018-12-21 NOTE — PROGRESS NOTES
Met with patient and discussed the following; workflow and pre-op weight loss  Patient scheduled to complete stress test ( 12/21/18), scheduled for sleep consult and with surgeon  Patient quit smoking on 12/4/18  Patient food journals using phone wilver  Patient wants to address weight loss   Next appointment with AMARILIS+LAMAR scheduled for 2/15/18 @8:00am  [place]

## 2018-12-21 NOTE — PROGRESS NOTES
Bariatric Follow Up Nutrition Note    Preop  Preop Program    Type of surgery  Preop  Surgery Date: TBD- pt aiming for March 2019  Surgeon: Dr Idalia Mcclain  37 y o   female  Ht 5' 5" (1 651 m)   Wt (!) 157 kg (346 lb)   LMP 06/13/2017   BMI 57 58 kg/m²     No calculations performed for this visit    Weight on Day of Weight Loss Surgery: Pt needs 10% we loss (34 5#) for a DAY OF SURGERY goal weight of 311#  [place] TO SCHEDULE SURGERY=17 25#, goal weight of 328 25#  Weight in (lb) to have BMI = 25: 150lbs  Pre-Op Excess Wt: 195 5lbs    Review of History and Medications   Past Medical History:   Diagnosis Date    Ankle swelling     Anxiety     Arthritis     Depression     Diabetes mellitus ([facility] )     Disease of thyroid gland     Fibromyalgia     GERD (gastroesophageal reflux disease)     Hx of colonoscopy 2017    Hypertension     Morbid obesity ([address] )     Night sweats     Obesity     Other ovarian cyst, left side     Last assessed 12/05/17    Ovarian neoplasm     Last assessed 10/24/17    Rectal bleeding      Past Surgical History:   Procedure Laterality Date    AK COLONOSCOPY FLX DX W/COLLJ SPEC WHEN PFRMD N/A 6/13/2017    Procedure: COLONOSCOPY;  Surgeon: Tiffani Drummond DO;  [facility];   Service: Gastroenterology    AK LAPAROSCOPY W TOT HYSTERECTUTERUS <=250 Fredi Heater TUBE/OVARY N/A 11/20/2017    Procedure: ROBOTIC TOTAL LAPAROSCOPIC HYSTERECTOMY/ BSO;  Surgeon: Jody Ronquillo MD;  [facility];  Service: Gynecology Oncology     Social History     Social History    Marital status: /Civil Union     Spouse name: N/A    Number of children: N/A    Years of education: N/A     Occupational History    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      Social History Main Topics    Smoking status: Former Smoker     Packs/day: 0 75     Quit date: 11/30/2018    Smokeless tobacco: Never Used      Comment: just quit     Alcohol use Yes      Comment: social  Drug use: No    Sexual activity: Yes     Partners: Male     Other Topics Concern    Not on file     Social History Narrative    No narrative on file       Current Outpatient Prescriptions:     ALPRAZolam (XANAX) 0 5 mg tablet, Take 1 tablet (0 5 mg total) by mouth 2 (two) times a day as needed for anxiety, Disp: 60 tablet, Rfl: 0    aspirin (ECOTRIN LOW STRENGTH) 81 mg EC tablet, Take 81 mg by mouth daily, Disp: , Rfl:     atorvastatin (LIPITOR) 10 mg tablet, TAKE ONE TABLET BY MOUTH ONCE DAILY, Disp: 30 tablet, Rfl: 8    buPROPion (WELLBUTRIN XL) 300 mg 24 hr tablet, Take 1 tablet (300 mg total) by mouth every morning, Disp: 90 tablet, Rfl: 3    Cholecalciferol (VITAMIN D3) 2000 units capsule, Take by mouth, Disp: , Rfl:     Cyanocobalamin (B-12) 1000 MCG CAPS, Take by mouth, Disp: , Rfl:     Dapagliflozin Propanediol (FARXIGA) 5 MG TABS, Take 1 tablet (5 mg total) by mouth daily, Disp: 30 tablet, Rfl: 5    DULoxetine (CYMBALTA) 60 mg delayed release capsule, Take 60 mg by mouth daily, Disp: , Rfl:     [id number]-DSS-SuccAc-Zn (FERIVA 21/7) 75-1 MG TABS, , Disp: , Rfl:     gabapentin (NEURONTIN) 300 mg capsule, Take 500 mg by mouth 3 (three) times a day , Disp: , Rfl:     levothyroxine 50 mcg tablet, TAKE ONE TABLET BY MOUTH ONCE DAILY AS DIRECTED, Disp: 90 tablet, Rfl: 11    liraglutide (VICTOZA) injection, Inject 0 3 mL (1 8 mg total) under the skin daily, Disp: 5 pen, Rfl: 3    lisinopril (ZESTRIL) 10 mg tablet, Take 1 tablet (10 mg total) by mouth daily, Disp: 30 tablet, Rfl: 5    promethazine-codeine (PHENERGAN WITH CODEINE) 6 25-10 mg/5 mL syrup, Take 5 mL by mouth every 4 (four) hours as needed for cough, Disp: 120 mL, Rfl: 0    Food Intake and Lifestyle Assessment   Food Intake Assessment completed via 24 hour recall  Beverage intake: water and whole milk  Diet texture/stage: regular  Protein supplement: none  Estimated protein intake per day: 30-60g  Estimated fluid intake per day: 64oz  Meals eaten away from home: 0-1  Typical meal pattern: 3 meals per day and 0 snacks per day  Eating Behaviors: Does not drink with meals and waits 30-minutes after meal before resuming drinking    Food allergies or intolerances: none noted  Cultural or Temple considerations: pt dislikes seaafood    Physical Assessment  Nutrition Related Findings  obesity    Physical Activity  Types of exercise: Walking  Current physical limitations: none    Psychosocial Assessment   Support systems: spouse friend(s)  Socioeconomic factors: none noted    Nutrition Diagnosis  Diagnosis: Overweight / Obesity (NC-3 3)  Related to: Physical inactivity and Excessive energy intake  As Evidenced by: BMI >25 and Excessive energy intake     Interventions and Teaching   Patient educated on post-op nutrition guidelines  Patient educated and handouts provided  Surgical changes to stomach / GI  Capacity of post-surgery stomach  Diet progression  Adequate hydration  Expected weight loss  Exercise  Suggestions for pre-op diet  Nutrition considerations after surgery  Meal planning and preparation  Appropriate carbohydrate, protein, and fat intake, and food/fluid choices to maximize safe weight loss, nutrient intake, and tolerance   Dietary and lifestyle changes  Techniques for self monitoring and keeping daily food journal    Education provided to: patient    Barriers to learning: No barriers identified    Readiness to change: action    Comprehension: needs reinforcement and verbalizes understanding     Expected Compliance: good    Evaluation/Monitoring   Eating pattern as discussed Tolerance of nutrition prescription Body weight Lab values Physical activity    Goals  Food journal, Exercise 30 minutes 5 times per week, Complete lession plans 1-6 and Eat 3 meals per day     Provided pt with [id number] calorie, 90 g CHO, 90 g PRO 7-day sample menu which pt will strictly floow    Pt is also food journaling on [employer] wilver     Workflow:  1) bloodwork  2) nicotine test- pt quit smoking 12/4/18  3) EGD- will be scheduled after surgeon consult  4) Cardiac clearance- appt was 12/3/18, pt has stress test today  5) Sleep studies- appt on 1/7/19   6) 10% wt loss    Time Spent:   30 Minutes

## 2018-12-22 LAB
DSDNA AB SER-ACNC: 1 IU/ML (ref 0–9)
THYROGLOB AB SERPL-ACNC: <1 IU/ML (ref 0–0.9)
THYROGLOB SERPL-MCNC: 7.4 NG/ML (ref 1.5–38.5)
THYROPEROXIDASE AB SERPL-ACNC: 27 IU/ML (ref 0–34)
TTG IGA SER-ACNC: <2 U/ML (ref 0–3)
TTG IGG SER-ACNC: <2 U/ML (ref 0–5)

## 2018-12-23 LAB
B BURGDOR IGG SER IA-ACNC: 0.33
B BURGDOR IGM SER IA-ACNC: 0.26
CCP IGA+IGG SERPL IA-ACNC: 9 UNITS (ref 0–19)
RHEUMATOID FACT SER QL LA: NEGATIVE

## 2018-12-24 LAB — RYE IGE QN: NEGATIVE

## 2018-12-25 LAB
ALBUMIN SERPL ELPH-MCNC: 3.53 G/DL (ref 3.5–5)
ALBUMIN SERPL ELPH-MCNC: 48.3 % (ref 52–65)
ALPHA1 GLOB SERPL ELPH-MCNC: 0.38 G/DL (ref 0.1–0.4)
ALPHA1 GLOB SERPL ELPH-MCNC: 5.2 % (ref 2.5–5)
ALPHA2 GLOB SERPL ELPH-MCNC: 0.99 G/DL (ref 0.4–1.2)
ALPHA2 GLOB SERPL ELPH-MCNC: 13.5 % (ref 7–13)
BETA GLOB ABNORMAL SERPL ELPH-MCNC: 0.53 G/DL (ref 0.4–0.8)
BETA1 GLOB SERPL ELPH-MCNC: 7.2 % (ref 5–13)
BETA2 GLOB SERPL ELPH-MCNC: 6.4 % (ref 2–8)
BETA2+GAMMA GLOB SERPL ELPH-MCNC: 0.47 G/DL (ref 0.2–0.5)
GAMMA GLOB ABNORMAL SERPL ELPH-MCNC: 1.42 G/DL (ref 0.5–1.6)
GAMMA GLOB SERPL ELPH-MCNC: 19.4 % (ref 12–22)
IGG/ALB SER: 0.93 {RATIO} (ref 1.1–1.8)
PROT PATTERN SERPL ELPH-IMP: ABNORMAL
PROT SERPL-MCNC: 7.3 G/DL (ref 6.4–8.2)

## 2018-12-31 ENCOUNTER — TRANSCRIBE ORDERS (OUTPATIENT)
Dept: ADMINISTRATIVE | Facility: HOSPITAL | Age: 43
End: 2018-12-31

## 2018-12-31 ENCOUNTER — APPOINTMENT (OUTPATIENT)
Dept: LAB | Facility: MEDICAL CENTER | Age: 43
End: 2018-12-31
Payer: COMMERCIAL

## 2018-12-31 ENCOUNTER — HOSPITAL ENCOUNTER (OUTPATIENT)
Dept: RADIOLOGY | Facility: MEDICAL CENTER | Age: 43
Discharge: HOME | End: 2018-12-31
Payer: COMMERCIAL

## 2018-12-31 ENCOUNTER — APPOINTMENT (OUTPATIENT)
Dept: RADIOLOGY | Facility: MEDICAL CENTER | Age: 43
End: 2018-12-31
Payer: COMMERCIAL

## 2018-12-31 VITALS — WEIGHT: 293 LBS | BODY MASS INDEX: 48.82 KG/M2 | HEIGHT: 65 IN

## 2018-12-31 DIAGNOSIS — M25.50 PAIN IN JOINT, MULTIPLE SITES: ICD-10-CM

## 2018-12-31 DIAGNOSIS — L40.0 PSORIASIS VULGARIS: ICD-10-CM

## 2018-12-31 DIAGNOSIS — M15.0 PRIMARY GENERALIZED HYPERTROPHIC OSTEOARTHROSIS: ICD-10-CM

## 2018-12-31 DIAGNOSIS — Z79.899 ENCOUNTER FOR LONG-TERM (CURRENT) USE OF MEDICATIONS: ICD-10-CM

## 2018-12-31 DIAGNOSIS — R79.89 HYPOURICEMIA: ICD-10-CM

## 2018-12-31 DIAGNOSIS — G60.8 HEREDITARY SENSORY NEUROPATHY: Primary | ICD-10-CM

## 2018-12-31 DIAGNOSIS — G60.8 HEREDITARY SENSORY NEUROPATHY: ICD-10-CM

## 2018-12-31 DIAGNOSIS — Z12.31 VISIT FOR SCREENING MAMMOGRAM: ICD-10-CM

## 2018-12-31 PROCEDURE — 84166 PROTEIN E-PHORESIS/URINE/CSF: CPT | Performed by: PATHOLOGY

## 2018-12-31 PROCEDURE — 84166 PROTEIN E-PHORESIS/URINE/CSF: CPT | Performed by: PHYSICIAN ASSISTANT

## 2018-12-31 PROCEDURE — 77067 SCR MAMMO BI INCL CAD: CPT

## 2018-12-31 PROCEDURE — 73130 X-RAY EXAM OF HAND: CPT

## 2018-12-31 PROCEDURE — 73110 X-RAY EXAM OF WRIST: CPT

## 2018-12-31 PROCEDURE — 73630 X-RAY EXAM OF FOOT: CPT

## 2019-01-03 DIAGNOSIS — E78.5 HYPERLIPIDEMIA, UNSPECIFIED HYPERLIPIDEMIA TYPE: Primary | ICD-10-CM

## 2019-01-03 DIAGNOSIS — D72.829 LEUKOCYTOSIS, UNSPECIFIED TYPE: Primary | ICD-10-CM

## 2019-01-03 LAB
ALBUMIN UR ELPH-MCNC: 100 %
ALPHA1 GLOB MFR UR ELPH: 0 %
ALPHA2 GLOB MFR UR ELPH: 0 %
B-GLOBULIN MFR UR ELPH: 0 %
GAMMA GLOB MFR UR ELPH: 0 %
PROT PATTERN UR ELPH-IMP: NORMAL
PROT UR-MCNC: 8 MG/DL

## 2019-01-03 RX ORDER — ATORVASTATIN CALCIUM 10 MG/1
10 TABLET, FILM COATED ORAL DAILY
Qty: 90 TABLET | Refills: 3 | Status: SHIPPED | OUTPATIENT
Start: 2019-01-03 | End: 2019-07-29 | Stop reason: SDUPTHER

## 2019-01-07 ENCOUNTER — OFFICE VISIT (OUTPATIENT)
Dept: SLEEP CENTER | Facility: CLINIC | Age: 44
End: 2019-01-07
Payer: COMMERCIAL

## 2019-01-07 VITALS
BODY MASS INDEX: 48.82 KG/M2 | WEIGHT: 293 LBS | SYSTOLIC BLOOD PRESSURE: 114 MMHG | DIASTOLIC BLOOD PRESSURE: 68 MMHG | OXYGEN SATURATION: 94 % | HEIGHT: 65 IN | HEART RATE: 79 BPM

## 2019-01-07 DIAGNOSIS — F51.01 PRIMARY INSOMNIA: ICD-10-CM

## 2019-01-07 DIAGNOSIS — R29.818 SUSPECTED SLEEP APNEA: Primary | ICD-10-CM

## 2019-01-07 DIAGNOSIS — E66.01 MORBID OBESITY (HCC): ICD-10-CM

## 2019-01-07 PROCEDURE — 99244 OFF/OP CNSLTJ NEW/EST MOD 40: CPT | Performed by: NURSE PRACTITIONER

## 2019-01-07 NOTE — PATIENT INSTRUCTIONS
1  Schedule home sleep study with CPAP titration study to follow, if needed  2  Schedule follow up visit to review study results  3  Schedule DME appointment for CPAP equipment, if needed  4   Schedule follow up visit for CPAP compliance and recheck

## 2019-01-07 NOTE — PROGRESS NOTES
Consultation - [address] [place], 1975, MRN: [id number]    1/7/2019        Reason for Consult / Principal Problem:    Evaluation of possible sleep apnea     Subjective:     HPI: Soham Campbell is a 37y o  year old female  She presents for evaluation of possible sleep apnea  She is currently working with the bariatric specialists and was found to be at risk for sleep apnea with a stop bang >4  She rates herself a 3/24 on the Engelhard sleepiness scale and denies significant daytime sleepiness  She occasionally awakens feeling unrefreshed, however, these are typically days when she has not gotten her typical 8 hours of sleep  She admits to becoming sleepy in the late afternoon while at work, between 3-3:30pm   She snores moderately and reports that snoring is intermittent  She has not had witnessed apnea, choking or gasping  Comorbid conditions include morbid obesity, HTN, type 2 diabetes, hyperlipidemia, depression with anxiety and fibromyalgia  She uses alprazolam to help with insomnia on some nights "when she can't shut her mind off", but doesn't use it nightly  Review of Systems      Genitourinary hot flashes at night   Cardiology none   Gastrointestinal none   Neurology awaken with headache   Constitutional fatigue   Integumentary none   Psychiatry anxiety   Musculoskeletal back pain   Pulmonary snoring   ENT none   Endocrine none   Hematological none     Employment:  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 between the hours of 8:00 a m  And 4:30 p m  Sleep Schedule:       Bedtime:  She maintains a fairly consistent bedtime, going to bed at 10:00 p m  On work days and 11:00 p m  On weekends      Latency:  Approximately 1 hr      Wakeup time:  6:00 a m  On work days and between 7-8:00 a m  On weekends    Awakenings:       Frequency:  1-2 times per night      Causes:   For urination      Duration:  Returns to sleep easily after returning to bed    Daytime Sleepiness / Inappropriate Sleep:       Most severe:  3:00 p m  To 3:30 p m  Naps :  She is not intentionally take naps       Inappropriate drowsiness / sleep:  She denies any inappropriate drowsiness with sedentary activities, however, she will fall asleep on long car rides as a passenger    Snoring:  Her family reports that she snores moderately and intermittently    Apnea: No witnessed apnea    Change in Weight:  Weight fluctuates by approximately 20 lb up and down    Restless Leg Syndrome:  She denies any clinical symptoms consistent with this diagnosis     Other Complaints:  She denies any sleep walking, sleep talking, sleep paralysis or hallucinations surrounding sleep  She denies bruxism  She occasionally has headaches however these are not associated with sleep or a m  Awakenings  Headaches are typically related to sinus congestion when she has that  Social History:      Caffeine:  She has recently stopped the use of all caffeine products       Tobacco:   reports that she quit smoking about 5 weeks ago  She smoked 0 75 packs per day  She has never used smokeless tobacco        Alcohol:   reports that she drinks alcohol  Occasional, such as with special occasions     Drugs:   reports that she does not use drugs  The review of systems and following portions of the patient's history were reviewed and updated as appropriate: allergies, current medications, past family history, past medical history, past social history, past surgical history and problem list         Objective:       Vitals:    01/07/19 0800   BP: 114/68   Pulse: 79   SpO2: 94%   Weight: (!) 156 kg (344 lb 6 4 oz)   Height: 5' 5" (1 651 m)     Body mass index is 57 31 kg/m²  Neck Circumference: 44 (cm)  Langtry Sleepiness Scale:  Total score: 3      Current Outpatient Prescriptions:     ALPRAZolam (XANAX) 0 5 mg tablet, Take 1 tablet (0 5 mg total) by mouth 2 (two) times a day as needed for anxiety, Disp: 60 tablet, Rfl: 0    aspirin (ECOTRIN LOW STRENGTH) 81 mg EC tablet, Take 81 mg by mouth daily, Disp: , Rfl:     atorvastatin (LIPITOR) 10 mg tablet, Take 1 tablet (10 mg total) by mouth daily for 90 days, Disp: 90 tablet, Rfl: 3    buPROPion (WELLBUTRIN XL) 300 mg 24 hr tablet, Take 1 tablet (300 mg total) by mouth every morning, Disp: 90 tablet, Rfl: 3    Cholecalciferol (VITAMIN D3) 2000 units capsule, Take by mouth, Disp: , Rfl:     Cyanocobalamin (B-12) 1000 MCG CAPS, Take by mouth, Disp: , Rfl:     Dapagliflozin Propanediol (FARXIGA) 5 MG TABS, Take 1 tablet (5 mg total) by mouth daily, Disp: 30 tablet, Rfl: 5    DULoxetine (CYMBALTA) 60 mg delayed release capsule, Take 60 mg by mouth daily, Disp: , Rfl:     gabapentin (NEURONTIN) 300 mg capsule, Take 500 mg by mouth 3 (three) times a day , Disp: , Rfl:     levothyroxine 50 mcg tablet, TAKE ONE TABLET BY MOUTH ONCE DAILY AS DIRECTED, Disp: 90 tablet, Rfl: 11    liraglutide (VICTOZA) injection, Inject 0 3 mL (1 8 mg total) under the skin daily, Disp: 5 pen, Rfl: 3    lisinopril (ZESTRIL) 10 mg tablet, Take 1 tablet (10 mg total) by mouth daily, Disp: 30 tablet, Rfl: 5    UcVne-U97-XB-C-DSS-SuccAc-Zn (FERIVA 21/7) 75-1 MG TABS, , Disp: , Rfl:     promethazine-codeine (PHENERGAN WITH CODEINE) 6 25-10 mg/5 mL syrup, Take 5 mL by mouth every 4 (four) hours as needed for cough (Patient not taking: Reported on 1/7/2019 ), Disp: 120 mL, Rfl: 0    Physical Exam  General Appearance:   Alert, cooperative, no distress, appears stated age, morbidly obese     Head:   Normocephalic, without obvious abnormality, atraumatic     Eyes:   PERRL, conjunctiva/corneas clear, EOM's intact          Nose:  Nares normal, septum midline, mucosa normal, no drainage or sinus tenderness           Throat:  Lips, teeth and gums normal; tongue normal size and  shape and midline in position; mucosa moist and redundant bilaterally, uvula thick at the base and dipping below the base of the tongue, tonsils +3/4 bilaterally, Mallampati class 3-4       Neck:  Supple, symmetrical, trachea midline, no adenopathy; Thyroid: No enlargement, tenderness or nodules; no carotid bruit or JVD     Lungs:      Clear to auscultation bilaterally, respirations unlabored     Heart:   Regular rate and rhythm, S1 and S2 normal, no murmur, rub or gallop       Extremities:  Extremities normal, atraumatic, no cyanosis or edema     Pulses:  2+ and symmetric all extremities     Skin:  Skin color, texture, turgor normal, no rashes or lesions       Neurologic:  CNII-XII intact  Normal strength, sensation throughout     Sleep Study Results:  She completed a sleep study in 2002 that was reportedly negative for sleep apnea  Study results are unavailable  ASSESSMENT / PLAN     1  Suspected sleep apnea  Home Study    CPAP Study   2  Primary insomnia     3  Morbid obesity ([facility] [address] )  Ambulatory referral to Sleep Medicine         Counseling / Coordination of Care  Total clinic time spent today 60 minutes  Greater than 50% of total time was spent with the patient and / or family counseling and / or coordination of care  A description of the counseling / coordination of care:     instructions for management, risk factor reductions, prognosis, patient and family education, impressions, risks and benefits of treatment options and importance of compliance with treatment    Today we discussed the anatomy and physiology of the upper airway  I pointed out how changes in this region can result in both snoring and abnormal breathing events including apneas and hypopneas  I explained the most common co-morbidities of untreated sleep apnea  After this we talked about some forms of treatment including application of positive airway pressure, mandibular advancement devices and surgery       In order to evaluate the possibility of Obstructive Sleep Apnea as a cause of the patient's symptoms, a test will be completed to identify the presence or absence of abnormal nocturnal breathing  This will consist of a home sleep test   If significant abnormal nocturnal breathing is detected, a CPAP titration study will be completed and nasal CPAP will be titrated to find the optimum pressure needed to maintain upper airway patency during sleep  Following testing, the patient will return to the [address] for a review of the test results and to discuss possible treatment options  She was encouraged to continue with lifestyle changes regarding diet and exercise, in conjunction with the bariatric specialists  The following instructions have been given to the patient today:    Patient Instructions   1  Schedule home sleep study with CPAP titration study to follow, if needed  2  Schedule follow up visit to review study results  3  Schedule DME appointment for CPAP equipment, if needed  4   Schedule follow up visit for CPAP compliance and recheck            Jose David Cardoso, [address] [facility]

## 2019-01-17 ENCOUNTER — OFFICE VISIT (OUTPATIENT)
Dept: BARIATRICS | Facility: CLINIC | Age: 44
End: 2019-01-17
Payer: COMMERCIAL

## 2019-01-17 ENCOUNTER — OFFICE VISIT (OUTPATIENT)
Dept: BEHAVIORAL/MENTAL HEALTH CLINIC | Facility: CLINIC | Age: 44
End: 2019-01-17
Payer: COMMERCIAL

## 2019-01-17 ENCOUNTER — OFFICE VISIT (OUTPATIENT)
Dept: BARIATRICS | Facility: CLINIC | Age: 44
End: 2019-01-17

## 2019-01-17 VITALS
BODY MASS INDEX: 48.82 KG/M2 | DIASTOLIC BLOOD PRESSURE: 70 MMHG | TEMPERATURE: 97.7 F | HEART RATE: 82 BPM | SYSTOLIC BLOOD PRESSURE: 120 MMHG | HEIGHT: 65 IN | WEIGHT: 293 LBS

## 2019-01-17 DIAGNOSIS — E66.01 MORBID OBESITY (HCC): ICD-10-CM

## 2019-01-17 DIAGNOSIS — E78.5 DYSLIPIDEMIA: ICD-10-CM

## 2019-01-17 DIAGNOSIS — E66.9 DIABETES MELLITUS TYPE 2 IN OBESE (HCC): Primary | ICD-10-CM

## 2019-01-17 DIAGNOSIS — F41.9 ANXIETY AND DEPRESSION: ICD-10-CM

## 2019-01-17 DIAGNOSIS — M19.90 ARTHRITIS: ICD-10-CM

## 2019-01-17 DIAGNOSIS — F32.A ANXIETY AND DEPRESSION: ICD-10-CM

## 2019-01-17 DIAGNOSIS — R29.818 SUSPECTED SLEEP APNEA: ICD-10-CM

## 2019-01-17 DIAGNOSIS — E66.01 MORBID OBESITY WITH BMI OF 50.0-59.9, ADULT (HCC): ICD-10-CM

## 2019-01-17 DIAGNOSIS — F17.211 CIGARETTE NICOTINE DEPENDENCE IN REMISSION: Primary | ICD-10-CM

## 2019-01-17 DIAGNOSIS — F32.A ANXIETY AND DEPRESSION: Primary | ICD-10-CM

## 2019-01-17 DIAGNOSIS — F41.9 ANXIETY AND DEPRESSION: Primary | ICD-10-CM

## 2019-01-17 DIAGNOSIS — E11.69 DIABETES MELLITUS TYPE 2 IN OBESE (HCC): Primary | ICD-10-CM

## 2019-01-17 DIAGNOSIS — K21.9 GASTROESOPHAGEAL REFLUX DISEASE WITHOUT ESOPHAGITIS: ICD-10-CM

## 2019-01-17 DIAGNOSIS — I10 BENIGN HYPERTENSION: ICD-10-CM

## 2019-01-17 PROCEDURE — 99204 OFFICE O/P NEW MOD 45 MIN: CPT | Performed by: SURGERY

## 2019-01-17 PROCEDURE — 90834 PSYTX W PT 45 MINUTES: CPT | Performed by: SOCIAL WORKER

## 2019-01-17 PROCEDURE — RECHECK: Performed by: DIETITIAN, REGISTERED

## 2019-01-17 NOTE — PSYCH
Treatment Plan Tracking    # 2Treatment Plan not completed within required time limits due to: Treatment plan created verbally  PT will sign at her next session

## 2019-01-17 NOTE — PSYCH
Rosa Isela Octtom  1975       Date of Initial Treatment Plan: 10/18/18   Date of Current Treatment Plan: 01/17/19    Treatment Plan Number 2     Strengths/Personal Resources for Self Care: Good mother and wife, organized, responsible, caring    Diagnosis:   No diagnosis found  Area of Needs:   Stress  Eating    Long Term Goal 1: Learn how to let go    Target Date: 4/17/19  Completion Date: TBD         Short Term Objectives for Goal 1:    Learn what is in my control and what is not      Stop taking everyone else's issues on       Decide what I should help with and what I shouldn't      Take time for me and my  by ourselves    Long Term Goal 2:  try not to link it to stress       Target Date: 4/17/19  Completion Date: TBD    Short Term Objectives for Goal 2:    water or take a walk instead  Be able to verbalize rather than cure it with food  Follow through with doctor appointment  Recognize my triggers           GOAL 1: Modality: Individual 1-2x per month   Completion Date TBD and The person(s) responsible for carrying out the plan is  Marti    GOAL 2: Modality: Individual 1-2x per month   Completion Date TBD and The person(s) responsible for carrying out the plan is  SUNITHAE: Diagnosis and Treatment Plan explained to Luz Penaloza relates understanding diagnosis and is agreeable to Treatment Plan         Client Comments : Please share your thoughts, feelings, need and/or experiences regarding your treatment plan:       __________________________________________________________________    __________________________________________________________________    __________________________________________________________________    __________________________________________________________________    _______________________________________                Patient signature, Date Time: __________________________________________             Physician Brenda Lang signature, Date, Time: ________________________________

## 2019-01-17 NOTE — LETTER
January 17, 2019     Sy Reyes MD  [address] [facility] [place]  [address] [address]    Patient: Arlene Mascorro   YOB: 1975   Date of Visit: 1/17/2019       Dear Dr Natali Knapp:    Thank you for referring Arlene Mascorro to me for evaluation  Below are my notes for this consultation  If you have questions, please do not hesitate to call me  I look forward to following your patient along with you  Sincerely,        Jovita Bazan MD        CC: No Recipients  Jovita Bazan MD  1/17/2019 10:33 AM  Sign at close encounter      BARIATRIC INITIAL CONSULT - [address] [facility] 37 y o  female MRN: [id number]  Unit/Bed#:  Encounter: [id number]      HPI:  Arlene Mascorro is a 37 y o  female who presents with a longstanding history of morbid obesity and inability to sustain a meaningful weight loss  Here today to discuss bariatric options  Visit type: initial visit    Symptoms: inability to loss weight    Associated Symptoms: depressed mood and anxiety    Associated Conditions: hyperlipidemia and abdominal obesity  Disease Complications: hypertension and osteoarthritis Diabetes  Weight Loss Interest: high  Previous Diet Trials: low carb    Exercise Frequency:infrequency  Types of Exercise: walking        Review of Systems   All other systems reviewed and are negative        Historical Information   Past Medical History:   Diagnosis Date    Ankle swelling     Anxiety     Arthritis     Depression     Diabetes mellitus ([address] )     Disease of thyroid gland     Fibromyalgia     GERD (gastroesophageal reflux disease)     Hx of colonoscopy 2017    Hypertension     Morbid obesity ([address] )     Night sweats     Obesity     Other ovarian cyst, left side     Last assessed 12/05/17    Ovarian neoplasm     Last assessed 10/24/17    Rectal bleeding      Past Surgical History:   Procedure Laterality Date    HYSTERECTOMY Bilateral 11/17/2018    OOPHORECTOMY Bilateral 11/17/2018    MS COLONOSCOPY FLX DX W/COLLJ SPEC WHEN PFRMD N/A 6/13/2017    Procedure: COLONOSCOPY;  Surgeon: Demetrius Peralta DO;  [facility]: [facility]; Service: Gastroenterology    ME LAPAROSCOPY W TOT HYSTERECTUTERUS <=250 Charlsie Abelson TUBE/OVARY N/A 11/20/2017    Procedure: ROBOTIC TOTAL LAPAROSCOPIC HYSTERECTOMY/ BSO;  Surgeon: Denise Segovia MD;  [facility];  Service: Gynecology Oncology     Social History   History   Alcohol Use    Yes     Comment: social     History   Drug Use No     History   Smoking Status    Former Smoker    Packs/day: 0 75    Quit date: 12/4/2018   Smokeless Tobacco    Never Used     Family History: non-contributory    Meds/Allergies   all medications and allergies reviewed  Allergies   Allergen Reactions    Metformin Diarrhea       Objective       Current Vitals:   Blood Pressure: 120/70 (01/17/19 1010)  Pulse: 82 (01/17/19 1010)  Temperature: 97 7 °F (36 5 °C) (01/17/19 1010)  Temp Source: Tympanic (01/17/19 1010)  Height: 5' 5" (165 1 cm) (01/17/19 1010)  Weight - Scale: (!) 152 kg (336 lb) (01/17/19 1010)        Invasive Devices          No matching active lines, drains, or airways          Physical Exam   Constitutional: She is oriented to person, place, and time  She appears well-developed and well-nourished  No distress  Neurological: She is alert and oriented to person, place, and time  Psychiatric: She has a normal mood and affect  Her behavior is normal  Judgment and thought content normal    Vitals reviewed  Lab Results: I have personally reviewed pertinent lab results  Assessment/PLAN:    37 y o  yo female with a long standing h/o of obesity and inability to sustain any meaningful weight loss on her own despite several attempts  She is interested in the Laparoscopic Gastric Bypass  As a part of her pre op evaluation, she will be referred to a cardiologist and for a sleep evaluation and consult      She needs an EGD to evaluate the anatomy of her GI tract prior to the operation  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 by Dr Patti Mccracken from   I have encouraged her to make an appointment with her in follow-up also  I have spent over 45 minutes with her face to face in the office today discussing her options and details of the surgery  We have seen an animation of the surgery on the computer that illustrates how the operation is done and how the anatomy will be altered with the procedure  Over 50% of this was coordinating care  She was given the opportunity to ask questions and I have answered all of them  I have discussed and educated the patient with regards to the components of our multidisciplinary program and the importance of compliance and follow up in the post operative period  The patient was also instructed with regards to the importance of behavior modification, nutritional counseling, support meeting attendance and lifestyle changes that are important to ensure success  Although there is a great statistical chance of improvement or even resolution of most of her associated comorbidities, the results vary from patient to patient and they largely depend on her commitment and compliance  She needs to lose 34 lbs prior to the operation  She quit smoking on December 4th         Wil Schultz MD  1/17/2019  10:18 AM

## 2019-01-17 NOTE — PROGRESS NOTES
BARIATRIC INITIAL CONSULT - BARIATRIC SURGERY    Shari Matute 37 y o  female MRN: [id number]  Unit/Bed#:  Encounter: [id number]      HPI:  Shari Matute is a 37 y o  female who presents with a longstanding history of morbid obesity and inability to sustain a meaningful weight loss  Here today to discuss bariatric options  Visit type: initial visit    Symptoms: inability to loss weight    Associated Symptoms: depressed mood and anxiety    Associated Conditions: hyperlipidemia and abdominal obesity  Disease Complications: hypertension and osteoarthritis Diabetes  Weight Loss Interest: high  Previous Diet Trials: low carb    Exercise Frequency:infrequency  Types of Exercise: walking        Review of Systems   All other systems reviewed and are negative  Historical Information   Past Medical History:   Diagnosis Date    Ankle swelling     Anxiety     Arthritis     Depression     Diabetes mellitus ([facility] [address] )     Disease of thyroid gland     Fibromyalgia     GERD (gastroesophageal reflux disease)     Hx of colonoscopy 2017    Hypertension     Morbid obesity ([facility] [address] )     Night sweats     Obesity     Other ovarian cyst, left side     Last assessed 12/05/17    Ovarian neoplasm     Last assessed 10/24/17    Rectal bleeding      Past Surgical History:   Procedure Laterality Date    HYSTERECTOMY Bilateral 11/17/2018    OOPHORECTOMY Bilateral 11/17/2018    ME COLONOSCOPY FLX DX W/COLLJ SPEC WHEN PFRMD N/A 6/13/2017    Procedure: COLONOSCOPY;  Surgeon: Candelario Lawrence DO;  [facility];   Service: Gastroenterology    ME LAPAROSCOPY W TOT HYSTERECTUTERUS <=250 Dex Flake TUBE/OVARY N/A 11/20/2017    Procedure: ROBOTIC TOTAL LAPAROSCOPIC HYSTERECTOMY/ BSO;  Surgeon: Alyson Presley MD;  [facility];  Service: Gynecology Oncology     Social History   History   Alcohol Use    Yes     Comment: social     History   Drug Use No     History   Smoking Status    Former Smoker    Packs/day: 0 75  Quit date: 12/4/2018   Smokeless Tobacco    Never Used     Family History: non-contributory    Meds/Allergies   all medications and allergies reviewed  Allergies   Allergen Reactions    Metformin Diarrhea       Objective       Current Vitals:   Blood Pressure: 120/70 (01/17/19 1010)  Pulse: 82 (01/17/19 1010)  Temperature: 97 7 °F (36 5 °C) (01/17/19 1010)  Temp Source: Tympanic (01/17/19 1010)  Height: 5' 5" (165 1 cm) (01/17/19 1010)  Weight - Scale: (!) 152 kg (336 lb) (01/17/19 1010)        Invasive Devices          No matching active lines, drains, or airways          Physical Exam   Constitutional: She is oriented to person, place, and time  She appears well-developed and well-nourished  No distress  Neurological: She is alert and oriented to person, place, and time  Psychiatric: She has a normal mood and affect  Her behavior is normal  Judgment and thought content normal    Vitals reviewed  Lab Results: I have personally reviewed pertinent lab results  Assessment/PLAN:    37 y o  yo female with a long standing h/o of obesity and inability to sustain any meaningful weight loss on her own despite several attempts  She is interested in the Laparoscopic Gastric Bypass  As a part of her pre op evaluation, she will be referred to a cardiologist and for a sleep evaluation and consult  She needs an EGD to evaluate the anatomy of her GI tract prior to the operation  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 by Dr Thania Kohli from GI  I have encouraged her to make an appointment with her in follow-up also  I have spent over 45 minutes with her face to face in the office today discussing her options and details of the surgery  We have seen an animation of the surgery on the computer that illustrates how the operation is done and how the anatomy will be altered with the procedure  Over 50% of this was coordinating care      She was given the opportunity to ask questions and I have answered all of them  I have discussed and educated the patient with regards to the components of our multidisciplinary program and the importance of compliance and follow up in the post operative period  The patient was also instructed with regards to the importance of behavior modification, nutritional counseling, support meeting attendance and lifestyle changes that are important to ensure success  Although there is a great statistical chance of improvement or even resolution of most of her associated comorbidities, the results vary from patient to patient and they largely depend on her commitment and compliance  She needs to lose 34 lbs prior to the operation  She quit smoking on December 4th         Shaneka Lawson MD  1/17/2019  10:18 AM

## 2019-01-17 NOTE — PSYCH
Psychotherapy Provided: Individual Psychotherapy 45 minutes     Length of time in session: 45 minutes, follow up in 2 month    Goals addressed in session: Goal 1 and Goal 2     Pain:      moderate to severe    5    Current suicide risk : Low     D- Marti presented for treatment as upbeat and happy  She stated that she feels that things have been much better  She stated that she is now able to not allow her job to ruin her personal time  She also stated that she is moving forward with the bariatric surgery and has been meeting her goals  She also shared that she has been able to set and maintain healthy boundaries in her relationships  Processing her emotions and continuing to work on use of positive thought and healthy coping mechanisms for stress  Reviewing and renewing her treatment plan  Giving supportive therapy  A- Progress - Continuing to process her emotions  P-Continue treatment    [address]: Diagnosis and Treatment Plan explained to Ashutosh Bonilla relates understanding diagnosis and is agreeable to Treatment Plan   Yes

## 2019-01-17 NOTE — PROGRESS NOTES
Bariatric Follow Up Nutrition Note    Preop  Preop Program: 10% pre-op wt loss support    Type of surgery  Preop  Surgery Date: Tentative for March 2019  Surgeon: Dr Vipul Burr  37 y o   female  LMP 11/18/2018     No calculations performed for this visit    Weight on Day of Weight Loss Surgery: day of surgery goal weight of 311lbs  Weight in (lb) to have BMI = 25: 150lbs  Pre-Op Excess Wt: 185 4lbs    Review of History and Medications   Past Medical History:   Diagnosis Date    Ankle swelling     Anxiety     Arthritis     Depression     Diabetes mellitus ([address] )     Disease of thyroid gland     Fibromyalgia     GERD (gastroesophageal reflux disease)     Hx of colonoscopy 2017    Hypertension     Morbid obesity ([address] )     Night sweats     Obesity     Other ovarian cyst, left side     Last assessed 12/05/17    Ovarian neoplasm     Last assessed 10/24/17    Rectal bleeding      Past Surgical History:   Procedure Laterality Date    HYSTERECTOMY Bilateral 11/17/2018    OOPHORECTOMY Bilateral 11/17/2018    NM COLONOSCOPY FLX DX W/COLLJ SPEC WHEN PFRMD N/A 6/13/2017    Procedure: COLONOSCOPY;  Surgeon: Fara Jennings DO;  [facility]: [facility];   Service: Gastroenterology    NM LAPAROSCOPY W TOT HYSTERECTUTERUS <=[address] [place] TUBE/OVARY N/A 11/20/2017    Procedure: ROBOTIC TOTAL LAPAROSCOPIC HYSTERECTOMY/ BSO;  Surgeon: Saulo Monge MD;  [facility];  Service: Gynecology Oncology     Social History     Social History    Marital status: /Civil Union     Spouse name: N/A    Number of children: N/A    Years of education: N/A     Occupational History    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      Social History Main Topics    Smoking status: Former Smoker     Packs/day: 0 75     Quit date: 12/4/2018    Smokeless tobacco: Never Used    Alcohol use Yes      Comment: social    Drug use: No    Sexual activity: Yes     Partners: Male     Other Topics Concern  Not on file     Social History Narrative    No narrative on file       Current Outpatient Prescriptions:     ALPRAZolam (XANAX) 0 5 mg tablet, Take 1 tablet (0 5 mg total) by mouth 2 (two) times a day as needed for anxiety, Disp: 60 tablet, Rfl: 0    aspirin (ECOTRIN LOW STRENGTH) 81 mg EC tablet, Take 81 mg by mouth daily, Disp: , Rfl:     atorvastatin (LIPITOR) 10 mg tablet, Take 1 tablet (10 mg total) by mouth daily for 90 days, Disp: 90 tablet, Rfl: 3    buPROPion (WELLBUTRIN XL) 300 mg 24 hr tablet, Take 1 tablet (300 mg total) by mouth every morning, Disp: 90 tablet, Rfl: 3    Cholecalciferol (VITAMIN D3) 2000 units capsule, Take by mouth, Disp: , Rfl:     Cyanocobalamin (B-12) 1000 MCG CAPS, Take by mouth, Disp: , Rfl:     Dapagliflozin Propanediol (FARXIGA) 5 MG TABS, Take 1 tablet (5 mg total) by mouth daily, Disp: 30 tablet, Rfl: 5    DULoxetine (CYMBALTA) 60 mg delayed release capsule, Take 60 mg by mouth daily, Disp: , Rfl:     XvTyr-V98-SR-C-DSS-SuccAc-Zn (FERIVA 21/7) 75-1 MG TABS, , Disp: , Rfl:     gabapentin (NEURONTIN) 300 mg capsule, Take 500 mg by mouth 3 (three) times a day , Disp: , Rfl:     levothyroxine 50 mcg tablet, TAKE ONE TABLET BY MOUTH ONCE DAILY AS DIRECTED, Disp: 90 tablet, Rfl: 11    liraglutide (VICTOZA) injection, Inject 0 3 mL (1 8 mg total) under the skin daily, Disp: 5 pen, Rfl: 3    lisinopril (ZESTRIL) 10 mg tablet, Take 1 tablet (10 mg total) by mouth daily, Disp: 30 tablet, Rfl: 5    promethazine-codeine (PHENERGAN WITH CODEINE) 6 25-10 mg/5 mL syrup, Take 5 mL by mouth every 4 (four) hours as needed for cough, Disp: 120 mL, Rfl: 0    Food Intake and Lifestyle Assessment   Food Intake Assessment completed via food log brought by patient  Breakfast: dannon light and fit greek yogurt, 24 oz decaf coffee with 3 tbsp sugar free creamer  Snack: none   Lunch:  salad, half a slice of pizza, 1 can [place]  Snack: none  Dinner: 1 slice whole grain bread, 1 cup egg salad, 1 can fresca  Snack: none  Beverage intake: water  Diet texture/stage: regular  Protein supplement: none  Estimated protein intake per day: 70-90g  Estimated fluid intake per day: 70oz  Meals eaten away from home: 0  Typical meal pattern: 3 meals per day and 0-3 snacks per day  Eating Behaviors: Appropriate diet advancement, Appropriate portion sizes and Does not drink with meals and waits 30-minutes after meal before resuming drinking    Food allergies or intolerances: none noted  Cultural or Moravian considerations: dislikes seafood    Physical Assessment  Nutrition Related Findings  obesity    Physical Activity  Types of exercise: None  Current physical limitations: pt reports her back starts to hurt is she walks for longer periods of time, but this is improving with her weight loss  Psychosocial Assessment   Support systems: spouse friend(s) coworkers  Socioeconomic factors: none noted    Nutrition Diagnosis  Diagnosis: Overweight / Obesity (NC-3 3)  Related to: Physical inactivity and Excessive energy intake  As Evidenced by: BMI >25 and Excessive energy intake     Interventions and Teaching   Patient educated on post-op nutrition guidelines  Patient educated and handouts provided    Adequate hydration  Expected weight loss  Exercise  Suggestions for pre-op diet  Appropriate carbohydrate, protein, and fat intake, and food/fluid choices to maximize safe weight loss, nutrient intake, and tolerance   Techniques for self monitoring and keeping daily food journal    Education provided to: patient    Barriers to learning: No barriers identified    Readiness to change: action and monitoring    Comprehension: demonstrated understanding and verbalizes understanding     Expected Compliance: excellent    Evaluation/Monitoring   Eating pattern as discussed Tolerance of nutrition prescription Body weight Lab values Physical activity    Goals  Food journal, Exercise 30 minutes 5 times per week, Complete lession plans 1-6 and Eat 3 meals per day     Met with pt for pre-op weight loss support  Pt has stuck to the meal plans previously provided and has [facility]  Average [id number] calories per day, 60-90 gram carb, 90 gram protein  Reviewed food journals with pt and reinforced calorie and macronutrient goals, discussed some higher fat foods to substitute  Pt has been walking for about 35 minutes twice weekly  Pt is practicing 30/60 rule and drinking mainly water, more than 64 oz daily  Discussed pt's bloodwork results from late December  Pt reports her WBC have been high for several years and she has seen a Hematologist in the past for this, whom said she was ok  Pt will contact this hematologists' office to request clearance letter for surgery  Pt will also have CBC re-drawn in early February  Workflow:  1) 10% weight loss:  7 15# wt loss needed yet to schedule surgery  24 4# wt loss needed yet prior to surgery  2) Labs: Will re-check CBC in early February  Pt will also get letter from hematologist   3) Nicotine test:  Ordered today  Pt will have drawn after next appointment  Pt has not smoked since 12/4/18  4) EGD: scheduled for 1/30/19  5) Sleep studies: Home sleep study scheduled for 2/5/19  6)  Pt attended support group last night      Next follow-up scheduled with LAMAR+AMARILIS for Friday, 2/15/19 at 8:00am   Time Spent:   30 Minutes

## 2019-01-18 ENCOUNTER — TELEPHONE (OUTPATIENT)
Dept: GASTROENTEROLOGY | Facility: CLINIC | Age: 44
End: 2019-01-18

## 2019-01-18 NOTE — TELEPHONE ENCOUNTER
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

## 2019-01-18 NOTE — TELEPHONE ENCOUNTER
The patient is scheduled at [facility] on 1/29/19 with Brianne Villela   Pt aware of date and time

## 2019-01-18 NOTE — TELEPHONE ENCOUNTER
Can someone please call the patient to schedule a follow up appointment?  Please explain that we must see her in order to write the letter

## 2019-01-26 ENCOUNTER — APPOINTMENT (OUTPATIENT)
Dept: LAB | Facility: MEDICAL CENTER | Age: 44
End: 2019-01-26
Payer: COMMERCIAL

## 2019-01-26 DIAGNOSIS — I10 BENIGN HYPERTENSION: ICD-10-CM

## 2019-01-26 DIAGNOSIS — E66.9 DIABETES MELLITUS TYPE 2 IN OBESE (HCC): ICD-10-CM

## 2019-01-26 DIAGNOSIS — D72.829 LEUKOCYTOSIS, UNSPECIFIED TYPE: ICD-10-CM

## 2019-01-26 DIAGNOSIS — E11.69 DIABETES MELLITUS TYPE 2 IN OBESE (HCC): ICD-10-CM

## 2019-01-26 LAB
ALBUMIN SERPL BCP-MCNC: 3.4 G/DL (ref 3.5–5)
ALP SERPL-CCNC: 77 U/L (ref 46–116)
ALT SERPL W P-5'-P-CCNC: 43 U/L (ref 12–78)
ANION GAP SERPL CALCULATED.3IONS-SCNC: 6 MMOL/L (ref 4–13)
AST SERPL W P-5'-P-CCNC: 23 U/L (ref 5–45)
BILIRUB SERPL-MCNC: 0.24 MG/DL (ref 0.2–1)
BUN SERPL-MCNC: 16 MG/DL (ref 5–25)
CALCIUM SERPL-MCNC: 9.1 MG/DL (ref 8.3–10.1)
CHLORIDE SERPL-SCNC: 102 MMOL/L (ref 100–108)
CO2 SERPL-SCNC: 28 MMOL/L (ref 21–32)
CREAT SERPL-MCNC: 0.78 MG/DL (ref 0.6–1.3)
ERYTHROCYTE [DISTWIDTH] IN BLOOD BY AUTOMATED COUNT: 16.8 % (ref 11.6–15.1)
EST. AVERAGE GLUCOSE BLD GHB EST-MCNC: 137 MG/DL
GFR SERPL CREATININE-BSD FRML MDRD: 93 ML/MIN/1.73SQ M
GLUCOSE P FAST SERPL-MCNC: 121 MG/DL (ref 65–99)
HBA1C MFR BLD: 6.4 % (ref 4.2–6.3)
HCT VFR BLD AUTO: 44.1 % (ref 34.8–46.1)
HGB BLD-MCNC: 13.2 G/DL (ref 11.5–15.4)
MCH RBC QN AUTO: 25.7 PG (ref 26.8–34.3)
MCHC RBC AUTO-ENTMCNC: 29.9 G/DL (ref 31.4–37.4)
MCV RBC AUTO: 86 FL (ref 82–98)
PLATELET # BLD AUTO: 260 THOUSANDS/UL (ref 149–390)
PMV BLD AUTO: 11.5 FL (ref 8.9–12.7)
POTASSIUM SERPL-SCNC: 4.9 MMOL/L (ref 3.5–5.3)
PROT SERPL-MCNC: 8 G/DL (ref 6.4–8.2)
RBC # BLD AUTO: 5.14 MILLION/UL (ref 3.81–5.12)
SODIUM SERPL-SCNC: 136 MMOL/L (ref 136–145)
WBC # BLD AUTO: 10.8 THOUSAND/UL (ref 4.31–10.16)

## 2019-01-26 PROCEDURE — 85027 COMPLETE CBC AUTOMATED: CPT

## 2019-01-26 PROCEDURE — 80053 COMPREHEN METABOLIC PANEL: CPT

## 2019-01-26 PROCEDURE — 83036 HEMOGLOBIN GLYCOSYLATED A1C: CPT

## 2019-01-26 PROCEDURE — 36415 COLL VENOUS BLD VENIPUNCTURE: CPT

## 2019-01-29 ENCOUNTER — OFFICE VISIT (OUTPATIENT)
Dept: GASTROENTEROLOGY | Facility: CLINIC | Age: 44
End: 2019-01-29
Payer: COMMERCIAL

## 2019-01-29 ENCOUNTER — ANESTHESIA EVENT (OUTPATIENT)
Dept: GASTROENTEROLOGY | Facility: HOSPITAL | Age: 44
End: 2019-01-29
Payer: COMMERCIAL

## 2019-01-29 VITALS
DIASTOLIC BLOOD PRESSURE: 62 MMHG | BODY MASS INDEX: 48.82 KG/M2 | SYSTOLIC BLOOD PRESSURE: 105 MMHG | HEIGHT: 65 IN | WEIGHT: 293 LBS | HEART RATE: 74 BPM | TEMPERATURE: 97.6 F

## 2019-01-29 DIAGNOSIS — Z12.11 SCREENING FOR COLON CANCER: ICD-10-CM

## 2019-01-29 DIAGNOSIS — K62.89 PROCTITIS: ICD-10-CM

## 2019-01-29 DIAGNOSIS — K21.9 GASTROESOPHAGEAL REFLUX DISEASE WITHOUT ESOPHAGITIS: ICD-10-CM

## 2019-01-29 DIAGNOSIS — K62.5 RECTAL BLEEDING: Primary | ICD-10-CM

## 2019-01-29 PROCEDURE — 99214 OFFICE O/P EST MOD 30 MIN: CPT | Performed by: PHYSICIAN ASSISTANT

## 2019-01-29 NOTE — PROGRESS NOTES
[facility] - Outpatient Follow-up Note  Shari Matute 40 y o  female MRN: [id number]  Encounter: [id number]          ASSESSMENT AND PLAN:      1  Rectal Bleeding  2  Proctitis   -she reports that she has not had any recurrence of her rectal bleeding since she began a high-fiber diet and her constipation improved  She is not using budesonide, when she did have a rectal bleeding her symptoms responded very well to this  -mild proctitis in the distal rectum was noted on her colonoscopy in 2017, biopsies were unremarkable    -she can follow up as needed for her proctitis/rectal bleeding  3  GERD    -she reports that she has a history of reflux but notes this is under much better control since she has changed her diet prior to gastric bypass surgery  She is not consuming caffeine any more    -she was previously taking a PPI but has not required this, currently her reflux is well controlled on diet alone    -commended her weight loss efforts, this will likely improve her GERD as well  4  Screening for colon cancer   -recommend follow-up in the office to discuss screening colonoscopy prior to her next birthday, the most recent colon cancer screening guidelines recommend starting screening colonoscopies at age 39     -she notes that her cousin had colon cancer at age 52, she denies any colon cancer in a first-degree relative   ____________________________________________________________    SUBJECTIVE:    42-year-old female past medical history of GERD, type 2 diabetes, hypothyroidism, hypertension, arthritis, fibromyalgia and morbid obesity presents to the office for follow-up  She notes that she has been feeling very well lately and denies any further episodes of rectal bleeding since her last office visit  She did completed budesonide taper after her visit    She notes that she has changed her diet and increased her fiber intake and as a result her constipation has drastically improved, she states she is now having regular bowel movements the past much more easily  She believes this has resolved the rectal bleeding  She notes that her reflux is under good control with her dietary changes, she has not required acid blocking medications  She has discontinued all caffeine intake  She notes that her cousin had colon cancer at age 52, she denies any colon cancer in a first-degree relative  She had a hyperplastic polyp on colonoscopy in 2017  REVIEW OF SYSTEMS IS OTHERWISE NEGATIVE  Historical Information   Past Medical History:   Diagnosis Date    Ankle swelling     Anxiety     Arthritis     Depression     Diabetes mellitus ([address]     Disease of thyroid gland     Fibromyalgia     GERD (gastroesophageal reflux disease)     Hx of colonoscopy 2017    Hypertension     Morbid obesity ([address] )     Night sweats     Obesity     Other ovarian cyst, left side     Last assessed 12/05/17    Ovarian neoplasm     Last assessed 10/24/17    Rectal bleeding      Past Surgical History:   Procedure Laterality Date    COLONOSCOPY      HYSTERECTOMY Bilateral 11/17/2018    OOPHORECTOMY Bilateral 11/17/2018    NV COLONOSCOPY FLX DX W/COLLJ SPEC WHEN PFRMD N/A 6/13/2017    Procedure: COLONOSCOPY;  Surgeon: Bakari Fernanod DO;  [facility];   Service: Gastroenterology    NV LAPAROSCOPY W TOT HYSTERECTUTERUS <=[address] [place] TUBE/OVARY N/A 11/20/2017    Procedure: ROBOTIC TOTAL LAPAROSCOPIC HYSTERECTOMY/ BSO;  Surgeon: Ana Bravo MD;  [facility];  Service: Gynecology Oncology     Social History   History   Alcohol Use    Yes     Comment: social     History   Drug Use No     History   Smoking Status    Former Smoker    Packs/day: 0 75    Quit date: 12/4/2018   Smokeless Tobacco    Never Used     Family History   Problem Relation Age of Onset    Heart disease Mother         Cardiac disorder    Diabetes Mother     Hypertension Mother     Hyperthyroidism Mother     Diabetes Father     Hypertension Father     Lung cancer Maternal Grandfather     Colon cancer Cousin 79    Colon cancer Maternal Aunt 52    Lung cancer Family     Diabetes Sister     No Known Problems Brother        Meds/Allergies       Current Outpatient Prescriptions:     ALPRAZolam (XANAX) 0 5 mg tablet    aspirin (ECOTRIN LOW STRENGTH) 81 mg EC tablet    atorvastatin (LIPITOR) 10 mg tablet    buPROPion (WELLBUTRIN XL) 300 mg 24 hr tablet    Cholecalciferol (VITAMIN D3) 2000 units capsule    Cyanocobalamin (B-12) 1000 MCG CAPS    Dapagliflozin Propanediol (FARXIGA) 5 MG TABS    DULoxetine (CYMBALTA) 60 mg delayed release capsule    IwDrc-N60-UI-C-DSS-SuccAc-Zn (FERIVA 21/7) 75-1 MG TABS    gabapentin (NEURONTIN) 300 mg capsule    levothyroxine 50 mcg tablet    liraglutide (VICTOZA) injection    lisinopril (ZESTRIL) 10 mg tablet    promethazine-codeine (PHENERGAN WITH CODEINE) 6 25-10 mg/5 mL syrup    Allergies   Allergen Reactions    Metformin Diarrhea           Objective     Last menstrual period 11/18/2018  PHYSICAL EXAM:      Physical Exam   Constitutional: She is oriented to person, place, and time  No distress  Morbidly obese   HENT:   Head: Normocephalic and atraumatic  Eyes: Right eye exhibits no discharge  Left eye exhibits no discharge  No scleral icterus  Neck: Neck supple  No tracheal deviation present  Cardiovascular: Normal rate, regular rhythm and normal heart sounds  Exam reveals no gallop and no friction rub  No murmur heard  Pulmonary/Chest: Effort normal  No respiratory distress  She has no wheezes  She has no rales  Abdominal: Soft  Bowel sounds are normal  She exhibits no distension  There is no tenderness  There is no rebound and no guarding  Neurological: She is alert and oriented to person, place, and time  Skin: Skin is warm and dry  Psychiatric: She has a normal mood and affect               Lab Results:   No visits with results within 1 Day(s) from this visit  Latest known visit with results is:   Appointment on 01/26/2019   Component Date Value    Hemoglobin A1C 01/26/2019 6 4*    EAG 01/26/2019 137     Sodium 01/26/2019 136     Potassium 01/26/2019 4 9     Chloride 01/26/2019 102     CO2 01/26/2019 28     ANION GAP 01/26/2019 6     BUN 01/26/2019 16     Creatinine 01/26/2019 0 78     Glucose, Fasting 01/26/2019 121*    Calcium 01/26/2019 9 1     AST 01/26/2019 23     ALT 01/26/2019 43     Alkaline Phosphatase 01/26/2019 77     Total Protein 01/26/2019 8 0     Albumin 01/26/2019 3 4*    Total Bilirubin 01/26/2019 0 24     eGFR 01/26/2019 93     WBC 01/26/2019 10 80*    RBC 01/26/2019 5 14*    Hemoglobin 01/26/2019 13 2     Hematocrit 01/26/2019 44 1     MCV 01/26/2019 86     MCH 01/26/2019 25 7*    MCHC 01/26/2019 29 9*    RDW 01/26/2019 16 8*    Platelets 25/61/7393 260     MPV 01/26/2019 11 5          Radiology Results:   Xr Wrist 3+ Vw Left    Result Date: 1/4/2019  Narrative: LEFT WRIST INDICATION:   Fibromyalgia, evaluate for psoriatic arthritis  Chronic diffuse joint pain  COMPARISON:  None VIEWS:  XR WRIST 3+ VW LEFT FINDINGS: There is no acute fracture or dislocation  No bony erosions or juxta-articular abnormality to suggest inflammatory arthritis  There is mild 1st carpometacarpal and scaphotrapezial joint osteoarthritis  No lytic or blastic lesions are seen  Soft tissues are unremarkable  Impression: No acute osseous abnormality  No bony erosions or juxta-articular abnormality to suggest inflammatory arthritis  There is mild 1st carpometacarpal and scaphotrapezial joint osteoarthritis  Workstation performed: [id number]     Xr Wrist 3+ Vw Right    Result Date: 1/4/2019  Narrative: RIGHT WRIST INDICATION:   Fibromyalgia, evaluate for psoriatic arthritis  Chronic diffuse joint pain  COMPARISON:  None VIEWS:  XR WRIST 3+ VW RIGHT FINDINGS: There is no acute fracture or dislocation   No bony erosions or juxta-articular abnormality to suggest inflammatory arthritis  Mild 1st carpometacarpal and scaphotrapezial joint osteoarthritis  No lytic or blastic lesions are seen  Soft tissues are unremarkable  Impression: No acute osseous abnormality  No evidence of inflammatory arthritis  Mild 1st carpometacarpal and scaphotrapezial joint osteoarthritis  Workstation performed: [id number]     Xr Hand 3+ Vw Left    Result Date: 1/4/2019  Narrative: LEFT HAND INDICATION:   Fibromyalgia, evaluate for psoriatic arthritis  Chronic diffuse joint pain  COMPARISON:  None VIEWS:  XR HAND 3+ VW LEFT For the purposes of institution wide universal language the following terms will apply: (thumb=1st digit/finger, index finger=2nd digit/finger, long finger=3rd digit/finger, ring=4th digit/finger and small finger=5th digit/finger) FINDINGS: There is no acute fracture or dislocation  No bony erosions or juxta-articular abnormality to suggest inflammatory arthritis  No lytic or blastic lesions are seen  Soft tissues are unremarkable  Impression: No acute osseous abnormality  No bony erosions or juxta-articular abnormality to suggest inflammatory arthritis  Workstation performed: [id number]     Xr Hand 3+ Vw Right    Result Date: 1/4/2019  Narrative: RIGHT HAND INDICATION:   Fibromyalgia, evaluate for psoriatic arthritis  Chronic diffuse joint pain  COMPARISON:  None VIEWS:  XR HAND 3+ VW RIGHT For the purposes of institution wide universal language the following terms will apply: (thumb=1st digit/finger, index finger=2nd digit/finger, long finger=3rd digit/finger, ring=4th digit/finger and small finger=5th digit/finger) FINDINGS: There is no acute fracture or dislocation  No bony erosions or juxta-articular abnormality to suggest inflammatory arthritis  There is mild osteoarthritis in the 2nd through 5th DIP joints  No lytic or blastic lesions are seen  Soft tissues are unremarkable       Impression: No acute osseous abnormality  There is no evidence of inflammatory arthritis  There is mild osteoarthritis in the 2nd through 5th DIP joints  Workstation performed: [id number]     Xr Foot 3+ Vw Left    Result Date: 1/4/2019  Narrative: LEFT FOOT INDICATION:   Fibromyalgia, evaluate for psoriatic arthritis  Chronic diffuse joint pain  COMPARISON:  None VIEWS:  XR FOOT 3+ VW LEFT FINDINGS: There is no acute fracture or dislocation  No bony erosions or juxta-articular abnormality to suggest inflammatory arthritis  There are small plantar and retrocalcaneal spurs  No lytic or blastic lesions seen  Soft tissues are unremarkable  Impression: No acute osseous abnormality  There is no evidence of inflammatory arthritis  Workstation performed: [id number]     Xr Foot 3+ Vw Right    Result Date: 1/4/2019  Narrative: RIGHT FOOT INDICATION:   Fibromyalgia, evaluate for psoriatic arthritis  Chronic diffuse joint pain  COMPARISON:  None VIEWS:  XR FOOT 3+ VW RIGHT FINDINGS: There is no acute fracture or dislocation  No bony erosions or juxta-articular abnormality to suggest inflammatory arthritis  Mild 1st metatarsophalangeal and 1st interphalangeal joint osteoarthritis  Moderate plantar and retrocalcaneal spurs  No lytic or blastic lesions seen  Soft tissues are unremarkable  Impression: No acute osseous abnormality  There is no evidence of inflammatory arthritis  Mild 1st metatarsophalangeal and 1st interphalangeal joint osteoarthritis  Workstation performed: [id number]     Mammo Screening Bilateral W Cad    Result Date: 12/31/2018  Narrative: DIAGNOSIS: Visit for screening mammogram RELEVANT HISTORY: Family Breast Cancer History: No known family history of breast cancer  Family Medical history: Family medical history includes colon cancer in cousin and colon cancer in maternal aunt  Personal History: No relevant hormone history has been documented for this patient  Surgical history includes hysterectomy and oophorectomy   No relevant medical history has been documented for this patient  COMPARISONS: Compared to: 10/05/2017 Mammo screening bilateral w cad INDICATION: Sujata Claros is a 37 y o  female presenting for screening  TECHNOLOGIST: Juana Resendez VIEWS: 2D views: CC, MLO views of left, right breast(s) were taken  2D synth views: No views of the breast(s) were taken  3D views: No views of the breast(s) were taken  TECHNIQUE: The current study was evaluated with Computer Aided Detection  TISSUE DENSITY: The breasts are almost entirely fatty  The patient is scheduled in a reminder system for screening mammography  8-10% of cancers will be missed on mammography  Management of a palpable abnormality must be based on clinical grounds  Patients will be notified of their results via letter from our facility  Accredited by [employer] of Radiology and FDA  RISK ASSESSMENT: 5 Year Tyrer-Cuzick: 0 55 % 10 Year Tyrer-Cuzick: 1 32 % Lifetime Tyrer-Cuzick: 8 38 % FINDINGS: Bilateral There are no suspicious masses, grouped microcalcifications or areas of architectural distortion  The skin and nipple areolar complex are unremarkable  Impression: No mammographic evidence of malignancy  ASSESSMENT/BI-RADS CATEGORY: Left: 2 - Benign Right: 2 - Benign Overall: 2 - Benign RECOMMENDATION:      - Routine Screening Mammogram in 1 year for both breasts   Workstation ID: [id number]

## 2019-01-30 ENCOUNTER — ANESTHESIA (OUTPATIENT)
Dept: GASTROENTEROLOGY | Facility: HOSPITAL | Age: 44
End: 2019-01-30
Payer: COMMERCIAL

## 2019-01-30 ENCOUNTER — HOSPITAL ENCOUNTER (OUTPATIENT)
Facility: HOSPITAL | Age: 44
Setting detail: OUTPATIENT SURGERY
Discharge: HOME | End: 2019-01-30
Attending: SURGERY | Admitting: SURGERY
Payer: COMMERCIAL

## 2019-01-30 VITALS
TEMPERATURE: 98.5 F | HEIGHT: 65 IN | SYSTOLIC BLOOD PRESSURE: 116 MMHG | WEIGHT: 293 LBS | RESPIRATION RATE: 16 BRPM | OXYGEN SATURATION: 96 % | HEART RATE: 85 BPM | DIASTOLIC BLOOD PRESSURE: 75 MMHG | BODY MASS INDEX: 48.82 KG/M2

## 2019-01-30 DIAGNOSIS — E66.01 MORBID OBESITY (HCC): ICD-10-CM

## 2019-01-30 LAB — GLUCOSE SERPL-MCNC: 99 MG/DL (ref 65–140)

## 2019-01-30 PROCEDURE — 88305 TISSUE EXAM BY PATHOLOGIST: CPT | Performed by: PATHOLOGY

## 2019-01-30 PROCEDURE — 43239 EGD BIOPSY SINGLE/MULTIPLE: CPT | Performed by: SURGERY

## 2019-01-30 PROCEDURE — 82948 REAGENT STRIP/BLOOD GLUCOSE: CPT

## 2019-01-30 RX ORDER — SODIUM CHLORIDE 9 MG/ML
125 INJECTION, SOLUTION INTRAVENOUS CONTINUOUS
Status: DISCONTINUED | OUTPATIENT
Start: 2019-01-30 | End: 2019-01-30 | Stop reason: HOSPADM

## 2019-01-30 RX ORDER — PROPOFOL 10 MG/ML
INJECTION, EMULSION INTRAVENOUS AS NEEDED
Status: DISCONTINUED | OUTPATIENT
Start: 2019-01-30 | End: 2019-01-30 | Stop reason: SURG

## 2019-01-30 RX ADMIN — PROPOFOL 100 MG: 10 INJECTION, EMULSION INTRAVENOUS at 10:52

## 2019-01-30 RX ADMIN — LIDOCAINE HYDROCHLORIDE 100 MG: 20 INJECTION, SOLUTION INTRAVENOUS at 10:50

## 2019-01-30 RX ADMIN — PROPOFOL 200 MG: 10 INJECTION, EMULSION INTRAVENOUS at 10:50

## 2019-01-30 RX ADMIN — SODIUM CHLORIDE 125 ML/HR: 0.9 INJECTION, SOLUTION INTRAVENOUS at 10:07

## 2019-01-30 RX ADMIN — PROPOFOL 100 MG: 10 INJECTION, EMULSION INTRAVENOUS at 10:51

## 2019-01-30 NOTE — DISCHARGE INSTRUCTIONS
Gastritis   WHAT YOU NEED TO KNOW:   Gastritis is inflammation or irritation of the lining of your stomach  DISCHARGE INSTRUCTIONS:   Call 911 for any of the following:   · You develop chest pain or shortness of breath  Seek care immediately if:   · You vomit blood  · You have black or bloody bowel movements  · You have severe stomach or back pain  Contact your healthcare provider if:   · You have a fever  · You have new or worsening symptoms, even after treatment  · You have questions or concerns about your condition or care  Medicines:   · Medicines  may be given to help treat a bacterial infection or decrease stomach acid  · Take your medicine as directed  Contact your healthcare provider if you think your medicine is not helping or if you have side effects  Tell him or her if you are allergic to any medicine  Keep a list of the medicines, vitamins, and herbs you take  Include the amounts, and when and why you take them  Bring the list or the pill bottles to follow-up visits  Carry your medicine list with you in case of an emergency  Manage or prevent gastritis:   · Do not smoke  Nicotine and other chemicals in cigarettes and cigars can make your symptoms worse and cause lung damage  Ask your healthcare provider for information if you currently smoke and need help to quit  E-cigarettes or smokeless tobacco still contain nicotine  Talk to your healthcare provider before you use these products  · Do not drink alcohol  Alcohol can prevent healing and make your gastritis worse  Talk to your healthcare provider if you need help to stop drinking  · Do not take NSAIDs or aspirin unless directed  These and similar medicines can cause irritation  If your healthcare provider says it is okay to take NSAIDs, take them with food  · Do not eat foods that cause irritation  Foods such as oranges and salsa can cause burning or pain  Eat a variety of healthy foods   Examples include fruits (not citrus), vegetables, low-fat dairy products, beans, whole-grain breads, and lean meats and fish  Try to eat small meals, and drink water with your meals  Do not eat for at least 3 hours before you go to bed  · Find ways to relax and decrease stress  Stress can increase stomach acid and make gastritis worse  Activities such as yoga, meditation, or listening to music can help you relax  Spend time with friends, or do things you enjoy  Follow up with your healthcare provider as directed: You may need ongoing tests or treatment, or referral to a gastroenterologist  Write down your questions so you remember to ask them during your visits  © 2017 [employer] Information is for End User's use only and may not be sold, redistributed or otherwise used for commercial purposes  All illustrations and images included in CareNotes® are the copyrighted property of [employer]  or [employer]  The above information is an  only  It is not intended as medical advice for individual conditions or treatments  Talk to your doctor, nurse or pharmacist before following any medical regimen to see if it is safe and effective for you

## 2019-01-30 NOTE — H&P (VIEW-ONLY)
BARIATRIC INITIAL CONSULT - BARIATRIC SURGERY    Myranda Mike 37 y o  female MRN: [id number]  Unit/Bed#:  Encounter: [id number]      HPI:  Myranda Mike is a 37 y o  female who presents with a longstanding history of morbid obesity and inability to sustain a meaningful weight loss  Here today to discuss bariatric options  Visit type: initial visit    Symptoms: inability to loss weight    Associated Symptoms: depressed mood and anxiety    Associated Conditions: hyperlipidemia and abdominal obesity  Disease Complications: hypertension and osteoarthritis Diabetes  Weight Loss Interest: high  Previous Diet Trials: low carb    Exercise Frequency:infrequency  Types of Exercise: walking        Review of Systems   All other systems reviewed and are negative  Historical Information   Past Medical History:   Diagnosis Date    Ankle swelling     Anxiety     Arthritis     Depression     [facility] [address] [facility]     Disease of thyroid gland     Fibromyalgia     GERD (gastroesophageal reflux disease)     Hx of colonoscopy 2017    Hypertension     Morbid obesity ([facility] [address] [facility]     Night sweats     Obesity     Other ovarian cyst, left side     Last assessed 12/05/17    Ovarian neoplasm     Last assessed 10/24/17    Rectal bleeding      Past Surgical History:   Procedure Laterality Date    HYSTERECTOMY Bilateral 11/17/2018    OOPHORECTOMY Bilateral 11/17/2018    MT COLONOSCOPY FLX DX W/COLLJ SPEC WHEN PFRMD N/A 6/13/2017    Procedure: COLONOSCOPY;  Surgeon: Dk Gordon DO;  [facility];   Service: Gastroenterology    MT LAPAROSCOPY W TOT HYSTERECTUTERUS <=[address] TUBE/OVARY N/A 11/20/2017    Procedure: ROBOTIC TOTAL LAPAROSCOPIC HYSTERECTOMY/ BSO;  Surgeon: Caitlin Terrell MD;  [facility];  Service: Gynecology Oncology     Social History   History   Alcohol Use    Yes     Comment: social     History   Drug Use No     History   Smoking Status    Former Smoker    Packs/day: 0 75  Quit date: 12/4/2018   Smokeless Tobacco    Never Used     Family History: non-contributory    Meds/Allergies   all medications and allergies reviewed  Allergies   Allergen Reactions    Metformin Diarrhea       Objective       Current Vitals:   Blood Pressure: 120/70 (01/17/19 1010)  Pulse: 82 (01/17/19 1010)  Temperature: 97 7 °F (36 5 °C) (01/17/19 1010)  Temp Source: Tympanic (01/17/19 1010)  Height: 5' 5" (165 1 cm) (01/17/19 1010)  Weight - Scale: (!) 152 kg (336 lb) (01/17/19 1010)        Invasive Devices          No matching active lines, drains, or airways          Physical Exam   Constitutional: She is oriented to person, place, and time  She appears well-developed and well-nourished  No distress  Neurological: She is alert and oriented to person, place, and time  Psychiatric: She has a normal mood and affect  Her behavior is normal  Judgment and thought content normal    Vitals reviewed  Lab Results: I have personally reviewed pertinent lab results  Assessment/PLAN:    37 y o  yo female with a long standing h/o of obesity and inability to sustain any meaningful weight loss on her own despite several attempts  She is interested in the Laparoscopic Gastric Bypass  As a part of her pre op evaluation, she will be referred to a cardiologist and for a sleep evaluation and consult  She needs an EGD to evaluate the anatomy of her GI tract prior to the operation  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 by Dr Lucrecia Hussein from GI  I have encouraged her to make an appointment with her in follow-up also  I have spent over 45 minutes with her face to face in the office today discussing her options and details of the surgery  We have seen an animation of the surgery on the computer that illustrates how the operation is done and how the anatomy will be altered with the procedure  Over 50% of this was coordinating care      She was given the opportunity to ask questions and I have answered all of them  I have discussed and educated the patient with regards to the components of our multidisciplinary program and the importance of compliance and follow up in the post operative period  The patient was also instructed with regards to the importance of behavior modification, nutritional counseling, support meeting attendance and lifestyle changes that are important to ensure success  Although there is a great statistical chance of improvement or even resolution of most of her associated comorbidities, the results vary from patient to patient and they largely depend on her commitment and compliance  She needs to lose 34 lbs prior to the operation  She quit smoking on December 4th         Gabi Moseley MD  1/17/2019  10:18 AM

## 2019-01-30 NOTE — OP NOTE
[facility]   [address] [facility], [address]  [phone] (Fax)      Operative Report  ESOPHAGOGASTRODUODENOSCOPY (EGD) with bx     Patient Name: Patricia Sutton    :  1975  MRN: [id number]  [facility]: [facility]  Surgery Date : 2019  Surgeons:  Surgeon(s) and Role:     * Griffin Lane MD - Primary    Diagnosis:    Pre-Op Diagnosis Codes: Morbid obesity ([address] ) [E66 01]  Body mass index is 55 75 kg/m²  Post-Op Diagnosis Codes:     * Morbid obesity ([address] ) [E66 01]     * Body mass index is 55 75 kg/m²  * Gastritis and bile reflux    Procedure  1  Endoscopy with Biopsy    Specimen(s):  ID Type Source Tests Collected by Time Destination   1 : gastric bx r/o h  pilori Tissue Stomach TISSUE EXAM Griffin Lane MD 2019 1051        Estimated Blood Loss:    Minimal      Anesthesia Type:     IV Sedation with Anesthesia    Operative Indications: Morbid obesity ([address] ) [E66 01]  Body mass index is 55 75 kg/m²  Operative Findings:    Gastritis  Bile reflux    Complications:     None    Procedure and Technique:       Indication for the procedure     The patient is a 40 y o  female with a long standing history of morbid obesity who presented to the office for a bariatric operation  The risks, benefits and alternatives to the procedure were discussed with the patient and informed consent was obtained for an upper endoscopy and biopsy to asses the anatomy of the GI tract prior to the surgical procedure      Operative Technique     The patient was brought to the GI suite and was placed in a supine position  EKG trace, pulse, blood pressure and oxygen saturation were monitored throughout the procedure  A time out was called and the patient was identified by name and arm band  The patient was placed in a left lateral decubitus position and received IV sedation with propofol by the anesthesia staff    The endoscope was introduced through the mouth and advanced under under direct visualization  The esophagus was normal in appearance  The stomach showed evidence of  inflammation  There were no mucosal lesions, polyps nor ulcerations   There was a moderate amount of bile refluxing into the antrum from the pylorus  The first and second portions of the duodenum were inspected and were normal in appearance  A biopsy was taken times two from the antrum, fundus and incisura with a forceps grasper to rule out the presence of H  Pylori  A retroflex view of the GE junction was obtained and was normal in appearance     The GE junction was again inspected upon withdrawing the scope and was normal in appearance      Impression     Gastritis  Bile reflux        Patient Disposition:    PACU     Signature: Gabi Moseley MD  Date: January 30, 2019  Time: 10:56 AM

## 2019-01-31 ENCOUNTER — OFFICE VISIT (OUTPATIENT)
Dept: FAMILY MEDICINE CLINIC | Facility: CLINIC | Age: 44
End: 2019-01-31
Payer: COMMERCIAL

## 2019-01-31 VITALS
WEIGHT: 293 LBS | RESPIRATION RATE: 16 BRPM | SYSTOLIC BLOOD PRESSURE: 130 MMHG | TEMPERATURE: 98 F | DIASTOLIC BLOOD PRESSURE: 86 MMHG | HEART RATE: 88 BPM | OXYGEN SATURATION: 97 % | BODY MASS INDEX: 48.82 KG/M2 | HEIGHT: 65 IN

## 2019-01-31 DIAGNOSIS — F32.A ANXIETY AND DEPRESSION: Primary | ICD-10-CM

## 2019-01-31 DIAGNOSIS — F51.01 PRIMARY INSOMNIA: ICD-10-CM

## 2019-01-31 DIAGNOSIS — E11.69 DIABETES MELLITUS TYPE 2 IN OBESE (HCC): ICD-10-CM

## 2019-01-31 DIAGNOSIS — F41.9 ANXIETY AND DEPRESSION: Primary | ICD-10-CM

## 2019-01-31 DIAGNOSIS — E66.9 DIABETES MELLITUS TYPE 2 IN OBESE (HCC): ICD-10-CM

## 2019-01-31 DIAGNOSIS — Z00.00 ANNUAL PHYSICAL EXAM: Primary | ICD-10-CM

## 2019-01-31 PROCEDURE — 99396 PREV VISIT EST AGE 40-64: CPT | Performed by: FAMILY MEDICINE

## 2019-01-31 RX ORDER — ALPRAZOLAM 0.5 MG/1
0.5 TABLET ORAL 2 TIMES DAILY PRN
Qty: 60 TABLET | Refills: 0 | Status: SHIPPED | OUTPATIENT
Start: 2019-01-31 | End: 2019-04-09 | Stop reason: SDUPTHER

## 2019-01-31 NOTE — PATIENT INSTRUCTIONS

## 2019-02-05 ENCOUNTER — HOSPITAL ENCOUNTER (OUTPATIENT)
Dept: SLEEP CENTER | Facility: CLINIC | Age: 44
Discharge: HOME | End: 2019-02-05
Payer: COMMERCIAL

## 2019-02-05 DIAGNOSIS — R29.818 SUSPECTED SLEEP APNEA: ICD-10-CM

## 2019-02-05 PROCEDURE — G0399 HOME SLEEP TEST/TYPE 3 PORTA: HCPCS

## 2019-02-07 ENCOUNTER — TELEPHONE (OUTPATIENT)
Dept: SLEEP CENTER | Facility: CLINIC | Age: 44
End: 2019-02-07

## 2019-02-07 NOTE — TELEPHONE ENCOUNTER
I spoke with pt, advised sleep study reveals severe KEAGAN, recommend CPAP study already scheduled for 2/28/19 in [facility]  Instructions reviewed  Pt verbalizes understanding

## 2019-02-15 ENCOUNTER — OFFICE VISIT (OUTPATIENT)
Dept: BARIATRICS | Facility: CLINIC | Age: 44
End: 2019-02-15

## 2019-02-15 VITALS — BODY MASS INDEX: 48.82 KG/M2 | WEIGHT: 293 LBS | HEIGHT: 65 IN

## 2019-02-15 DIAGNOSIS — E66.01 MORBID (SEVERE) OBESITY DUE TO EXCESS CALORIES (HCC): Primary | ICD-10-CM

## 2019-02-15 DIAGNOSIS — E66.01 MORBID OBESITY (HCC): ICD-10-CM

## 2019-02-15 PROCEDURE — RECHECK

## 2019-02-15 NOTE — PROGRESS NOTES
Met with patient and discussed the following; workflow and pre-op weight loss  Patient  scheduled to complete sleep recommendations  on 3/18/19  Patient is making positive dietary and behavioral changes  Patient continues to meet with therapist Coral Aguila and is implementing skills and strategies she is learning  She reduced mindless eating, reads food labels, tracks meals on phone wilver and cooked healthier meals which her husbands approves of  She also reduced reliance on Xanax medication because she is practicing tools learned in therapy  We discussed how to increase physical activity during the winter months    Next appointment with AMARILIS+LAMAR scheduled for [email or web address]  [place]

## 2019-02-15 NOTE — PROGRESS NOTES
Bariatric Follow Up Nutrition Note    Preop  Preop Program: 10% pre-op wt loss support    Type of surgery  Preop  Surgery Date: Tentative for March 2019  Surgeon: Dr Davion Awan  40 y o   female  Ht 5' 5" (1 651 m)   Wt (!) 150 kg (331 lb 1 6 oz)   LMP 11/18/2018   BMI 55 10 kg/m²     No calculations performed for this visit    Weight on Day of Weight Loss Surgery: day of surgery goal weight of 311lbs  Weight in (lb) to have BMI = 25: 150lbs  Pre-Op Excess Wt: 185 4lbs    Review of History and Medications   Past Medical History:   Diagnosis Date    Ankle swelling     Anxiety     Arthritis     Depression     Diabetes mellitus ([facility] [address] [facility]     Disease of thyroid gland     Fibromyalgia     GERD (gastroesophageal reflux disease)     Hx of colonoscopy 2017    Hypertension     Morbid obesity ([facility] [address] [facility]     Night sweats     Obesity     Other ovarian cyst, left side     Last assessed 12/05/17    Ovarian neoplasm     Last assessed 10/24/17    Rectal bleeding      Past Surgical History:   Procedure Laterality Date    COLONOSCOPY      HYSTERECTOMY Bilateral 11/17/2018    OOPHORECTOMY Bilateral 11/17/2018    [place] COLONOSCOPY FLX DX W/COLLJ SPEC WHEN PFRMD N/A 6/13/2017    Procedure: COLONOSCOPY;  Surgeon: Michaela Monzon DO;  [facility]; Service: Gastroenterology    IL EGD TRANSORAL BIOPSY SINGLE/MULTIPLE N/A 1/30/2019    Procedure: ESOPHAGOGASTRODUODENOSCOPY (EGD) with bx;  Surgeon: Sarah Campos MD;  [facility];   Service: Bariatrics    IL LAPAROSCOPY W TOT HYSTERECTUTERUS <=250 GRAM  W TUBE/OVARY N/A 11/20/2017    Procedure: ROBOTIC TOTAL LAPAROSCOPIC HYSTERECTOMY/ BSO;  Surgeon: Samm Mandel MD;  [facility];  Service: Gynecology Oncology     Social History     Socioeconomic History    Marital status: /Civil Union     Spouse name: Not on file    Number of children: Not on file    Years of education: Not on file    Highest education level: Not on file   Occupational History    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   Social Needs    Financial resource strain: Not on file    Food insecurity:     Worry: Not on file     Inability: Not on file    Transportation needs:     Medical: Not on file     Non-medical: Not on file   Tobacco Use    Smoking status: Former Smoker     Packs/day: 0 75     Years: 15 00     Pack years: 11 25     Last attempt to quit: 2018     Years since quittin 2    Smokeless tobacco: Never Used   Substance and Sexual Activity    Alcohol use: Yes     Comment: social    Drug use: No    Sexual activity: Yes     Partners: Male   Lifestyle    Physical activity:     Days per week: Not on file     Minutes per session: Not on file    Stress: Not on file   Relationships    Social connections:     Talks on phone: Not on file     Gets together: Not on file     Attends Nondenominational service: Not on file     Active member of club or organization: Not on file     Attends meetings of clubs or organizations: Not on file     Relationship status: Not on file    Intimate partner violence:     Fear of current or ex partner: Not on file     Emotionally abused: Not on file     Physically abused: Not on file     Forced sexual activity: Not on file   Other Topics Concern    Not on file   Social History Narrative    Not on file       Current Outpatient Medications:     ALPRAZolam (XANAX) 0 5 mg tablet, Take 1 tablet (0 5 mg total) by mouth 2 (two) times a day as needed for anxiety for up to 30 days, Disp: 60 tablet, Rfl: 0    aspirin (ECOTRIN LOW STRENGTH) 81 mg EC tablet, Take 81 mg by mouth daily, Disp: , Rfl:     atorvastatin (LIPITOR) 10 mg tablet, Take 1 tablet (10 mg total) by mouth daily for 90 days, Disp: 90 tablet, Rfl: 3    buPROPion (WELLBUTRIN XL) 300 mg 24 hr tablet, Take 1 tablet (300 mg total) by mouth every morning, Disp: 90 tablet, Rfl: 3    Cholecalciferol (VITAMIN D3) 2000 units capsule, Take by mouth, Disp: , Rfl:     Cyanocobalamin (B-12) 1000 MCG CAPS, Take by mouth, Disp: , Rfl:     Dapagliflozin Propanediol (FARXIGA) 5 MG TABS, Take 1 tablet (5 mg total) by mouth daily, Disp: 30 tablet, Rfl: 5    DULoxetine (CYMBALTA) 60 mg delayed release capsule, Take 60 mg by mouth daily, Disp: , Rfl:     [id number]-SuccAc-Zn (FERIVA 21/7) 75-1 MG TABS, , Disp: , Rfl:     gabapentin (NEURONTIN) 300 mg capsule, Take 500 mg by mouth 3 (three) times a day , Disp: , Rfl:     levothyroxine 50 mcg tablet, TAKE ONE TABLET BY MOUTH ONCE DAILY AS DIRECTED, Disp: 90 tablet, Rfl: 11    liraglutide (VICTOZA) injection, Inject 0 3 mL (1 8 mg total) under the skin daily, Disp: 5 pen, Rfl: 3    lisinopril (ZESTRIL) 10 mg tablet, Take 1 tablet (10 mg total) by mouth daily, Disp: 30 tablet, Rfl: 5    Food Intake and Lifestyle Assessment   Food Intake Assessment completed via food log brought by patient:  Pt is using Jade [employer] wilver- reviewed with pt  Breakfast: dannon light and fit greek yogurt, 24 oz decaf coffee with 3 tbsp sugar free creamer  Snack: none   Lunch:  salad, half a slice of pizza, 1 can [place]  Snack: none  Dinner: Pt is experimenting with bariatric recipes online    Snack: none  Beverage intake: water  Diet texture/stage: regular  Protein supplement: none  Estimated protein intake per day: 70-90g  Estimated fluid intake per day: 70oz  Meals eaten away from home: 0  Typical meal pattern: 3 meals per day and 0-1 snacks per day  Eating Behaviors: Appropriate diet advancement, Appropriate portion sizes and Does not drink with meals and waits 30-minutes after meal before resuming drinking    Food allergies or intolerances: none noted  Cultural or Islam considerations: dislikes seafood    Physical Assessment  Nutrition Related Findings  obesity    Physical Activity  Types of exercise: None  Current physical limitations: pt reports her back starts to hurt is she walks for longer periods of time, but this is improving with her weight loss  Psychosocial Assessment   Support systems: spouse friend(s) coworkers  Socioeconomic factors: none noted    Nutrition Diagnosis- improving  Diagnosis: Overweight / Obesity (NC-3 3)  Related to: Physical inactivity and Excessive energy intake  As Evidenced by: BMI >25 and Excessive energy intake     Interventions and Teaching   Patient educated on post-op nutrition guidelines  Patient educated and handouts provided  Adequate hydration  Expected weight loss  Exercise  Suggestions for pre-op diet  Appropriate carbohydrate, protein, and fat intake, and food/fluid choices to maximize safe weight loss, nutrient intake, and tolerance   Techniques for self monitoring and keeping daily food journal    Education provided to: patient    Barriers to learning: No barriers identified    Readiness to change: action and monitoring    Comprehension: demonstrated understanding and verbalizes understanding     Expected Compliance: excellent    Evaluation/Monitoring   Eating pattern as discussed Tolerance of nutrition prescription Body weight Lab values Physical activity    Goals  Food journal, Exercise 30 minutes 5 times per week, Complete lession plans 1-6 and Eat 3 meals per day     Met with pt for pre-op weight loss support  Pt continues to follow the meal plans previously provided and has been food journaling on [employer] daily  Average 6299-8059 calories per day, 60-90 gram carb, 90 gram protein  Pt has been walking for about 35 minutes twice weekly  Pt is practicing 30/60 rule and drinking mainly water, more than 64 oz daily  Workflow:  1) 10% weight loss:  2 85# wt loss needed yet to schedule surgery  20 1# wt loss needed yet prior to surgery  2) Labs: Will re-check CBC in early February  Pt will also get letter from hematologist   3) Nicotine test:  Pt will have drawn prior to next appointment  Pt has not smoked since 12/4/18    Pt's  has been smoking in their basement  4) Sleep studies: pt has overnight test scheduled 2/28/19  Next follow-up scheduled with RD+SW for 3/29/19      Time Spent:   30 Minutes

## 2019-02-18 ENCOUNTER — HOSPITAL ENCOUNTER (OUTPATIENT)
Dept: SLEEP CENTER | Facility: CLINIC | Age: 44
Discharge: HOME | End: 2019-02-18
Payer: COMMERCIAL

## 2019-02-18 DIAGNOSIS — R29.818 SUSPECTED SLEEP APNEA: ICD-10-CM

## 2019-02-18 PROCEDURE — 95811 POLYSOM 6/>YRS CPAP 4/> PARM: CPT

## 2019-02-19 ENCOUNTER — APPOINTMENT (OUTPATIENT)
Dept: LAB | Facility: MEDICAL CENTER | Age: 44
End: 2019-02-19
Payer: COMMERCIAL

## 2019-02-19 DIAGNOSIS — F17.211 CIGARETTE NICOTINE DEPENDENCE IN REMISSION: ICD-10-CM

## 2019-02-19 PROCEDURE — 80323 ALKALOIDS NOS: CPT

## 2019-02-19 NOTE — PROGRESS NOTES
Sleep Study Documentation    Pre-Sleep Study       Sleep testing procedure explained to patient:YES    Patient napped prior to study:NO    [address] [place] worker after 12PM   Caffeine use:NO    Alcohol:Dayshift workers after 5PM: Alcohol use:NO    Typical day for patient:YES       Study Documentation    Sleep Study Indications: The patient experienced hypopneas, UAR, soft to moderate snoring without arousals, and PLM's with arousals  All stages of sleep were achieved  EKG was unremarkable  CPAP was tolerated well with a small Ros[employer] & [employer] Eson nasal mask at 11cm, with heated humidification  Snoring was eliminated at 10cm on CPAP  Treatment   Optimal PAP pressure: 11cm  Leak:None  Snore:Eliminated  REM Obtained:yes  Supplemental O2: no    Minimum SaO2 83  Baseline SaO2 92  PAP mask choice Small Rosas [employer] Paykel Eson  PAP mask type:nasal  PAP pressure at which snoring was eliminated 10cm  Mode of Therapy:CPAP  ETCO2:No  CPAP changed to BiPAP:No      EKG abnormalities: no     EEG abnormalities: no    Sleep Study Recorded < 2 hours: N/A    Sleep Study Recorded > 2 hours but incomplete study: N/A    Sleep Study Recorded 6 hours but no sleep obtained: NO    Patient classification: employed       Post-Sleep Study    Medication used at bedtime or during sleep study:YES other prescription medications    Patient reports time it took to fall asleep:30 to 60 minutes    Patient reports waking up during study:1 to 2 times  Patient reports returning to sleep without difficulty  Patient reports sleeping 4 to 6 hours without dreaming  Patient reports sleep during study:better than usual    Patient rated sleepiness: Somewhat sleepy or tired    PAP treatment:yes: Post PAP treatment patient reports feeling better and  would wear PAP mask at home

## 2019-02-20 ENCOUNTER — TELEPHONE (OUTPATIENT)
Dept: SLEEP CENTER | Facility: CLINIC | Age: 44
End: 2019-02-20

## 2019-02-20 DIAGNOSIS — G47.33 OSA (OBSTRUCTIVE SLEEP APNEA): Primary | ICD-10-CM

## 2019-02-20 NOTE — TELEPHONE ENCOUNTER
Discussed study results- scheduled for follow-up 2/28 @ 11 am followed by DME set-up- paperwork to [facility]

## 2019-02-22 LAB
COTININE SERPL-MCNC: NORMAL NG/ML
NICOTINE SERPL-MCNC: NORMAL NG/ML

## 2019-02-28 ENCOUNTER — OFFICE VISIT (OUTPATIENT)
Dept: BARIATRICS | Facility: CLINIC | Age: 44
End: 2019-02-28

## 2019-02-28 ENCOUNTER — OFFICE VISIT (OUTPATIENT)
Dept: SLEEP CENTER | Facility: CLINIC | Age: 44
End: 2019-02-28
Payer: COMMERCIAL

## 2019-02-28 VITALS
HEIGHT: 65 IN | BODY MASS INDEX: 48.82 KG/M2 | WEIGHT: 293 LBS | SYSTOLIC BLOOD PRESSURE: 132 MMHG | HEART RATE: 80 BPM | DIASTOLIC BLOOD PRESSURE: 74 MMHG

## 2019-02-28 DIAGNOSIS — E66.01 MORBID OBESITY (HCC): Primary | ICD-10-CM

## 2019-02-28 DIAGNOSIS — E66.01 MORBID OBESITY (HCC): ICD-10-CM

## 2019-02-28 DIAGNOSIS — G47.33 OSA (OBSTRUCTIVE SLEEP APNEA): Primary | ICD-10-CM

## 2019-02-28 PROBLEM — E11.9 DIABETES MELLITUS WITH NO COMPLICATION (HCC): Status: ACTIVE | Noted: 2019-02-28

## 2019-02-28 PROBLEM — Z99.89 OSA ON CPAP: Status: ACTIVE | Noted: 2019-02-28

## 2019-02-28 PROCEDURE — 99214 OFFICE O/P EST MOD 30 MIN: CPT | Performed by: NURSE PRACTITIONER

## 2019-02-28 PROCEDURE — RECHECK: Performed by: DIETITIAN, REGISTERED

## 2019-02-28 NOTE — PATIENT INSTRUCTIONS
1    your CPAP equipment today  2  Begin using your CPAP equipment every night  3  Begin cleaning your CPAP daily after use and once weekly with 1 part white vinegar and 10 parts water  4  Contact your medical equipment distributor regarding any questions concerning your CPAP equipment  5  Contact the [address] with any other questions, prior to next visit, if needed  6    Schedule compliance follow-up visit in 2-3 months

## 2019-02-28 NOTE — PROGRESS NOTES
Progress Note - Anderson RODRIGUEZ[address]inlovely [address] 40 y o  female   :1975, MRN: [id number]  2019          Follow Up Evaluation / Problem:     Obstructive Sleep Apnea  Morbid obesity  Mild insomnia related to anxiety    HPI: Juan Catalan is a 40y o  year old female  She presents for follow up of obstructive sleep apnea  She was referred by the bariatric specialist for evaluation of sleep apnea  She admitted to loud intermittent snoring, however, denied witnessed apnea, choking or gasping  She complained of some late daytime sleepiness that was worse in the afternoon  She also suffers from mild insomnia on an intermittent basis due to anxiety  This is well controlled with alprazolam that she uses on an infrequent basis  She completed a home sleep study in early February, which indicated severe sleep apnea  She subsequently completed a CPAP titration study and is here to review the results of her testing and to discuss treatment options      Review of Systems      Genitourinary hot flashes at night   Cardiology none   Gastrointestinal none   Neurology awaken with headache   Constitutional fatigue   Integumentary none   Psychiatry anxiety and depression   Musculoskeletal joint pain   Pulmonary snoring   ENT none   Endocrine none   Hematological none       Current Outpatient Medications:     ALPRAZolam (XANAX) 0 5 mg tablet, Take 1 tablet (0 5 mg total) by mouth 2 (two) times a day as needed for anxiety for up to 30 days, Disp: 60 tablet, Rfl: 0    aspirin (ECOTRIN LOW STRENGTH) 81 mg EC tablet, Take 81 mg by mouth daily, Disp: , Rfl:     atorvastatin (LIPITOR) 10 mg tablet, Take 1 tablet (10 mg total) by mouth daily for 90 days, Disp: 90 tablet, Rfl: 3    buPROPion (WELLBUTRIN XL) 300 mg 24 hr tablet, Take 1 tablet (300 mg total) by mouth every morning, Disp: 90 tablet, Rfl: 3    Cholecalciferol (VITAMIN D3) 2000 units capsule, Take by mouth, Disp: , Rfl:    Cyanocobalamin (B-12) 1000 MCG CAPS, Take by mouth, Disp: , Rfl:     Dapagliflozin Propanediol (FARXIGA) 5 MG TABS, Take 1 tablet (5 mg total) by mouth daily, Disp: 30 tablet, Rfl: 5    DULoxetine (CYMBALTA) 60 mg delayed release capsule, Take 60 mg by mouth daily, Disp: , Rfl:     gabapentin (NEURONTIN) 300 mg capsule, Take 500 mg by mouth 3 (three) times a day , Disp: , Rfl:     levothyroxine 50 mcg tablet, TAKE ONE TABLET BY MOUTH ONCE DAILY AS DIRECTED, Disp: 90 tablet, Rfl: 11    liraglutide (VICTOZA) injection, Inject 0 3 mL (1 8 mg total) under the skin daily, Disp: 5 pen, Rfl: 3    lisinopril (ZESTRIL) 10 mg tablet, Take 1 tablet (10 mg total) by mouth daily, Disp: 30 tablet, Rfl: 5    [id number]-[id number]-SuccAc-Zn (FERIVA 21/7) 75-1 MG TABS, , Disp: , Rfl:     Lincoln Sleepiness Scale  Sitting and reading: Slight chance of dozing  Watching TV: Moderate chance of dozing  Sitting, inactive in a public place (e g  a theatre or a meeting): Moderate chance of dozing  As a passenger in a car for an hour without a break: Moderate chance of dozing  Lying down to rest in the afternoon when circumstances permit: Slight chance of dozing  Sitting and talking to someone: Would never doze  Sitting quietly after a lunch without alcohol: Slight chance of dozing  In a car, while stopped for a few minutes in traffic: Would never doze  Total score: 9              Vitals:    02/28/19 1100   BP: 132/74   Pulse: 80   Weight: (!) 148 kg (327 lb)   Height: 5' 5" (1 651 m)       Body mass index is 54 42 kg/m²  EPWORTH SLEEPINESS SCORE  Total score: 9      Past History Since Last Sleep Center Visit:   She denies any changes to her health since her last visit  She continues to work with the bariatric specialist and plans to have bariatric surgery in the near future      The results of her home sleep study completed on February 6, 2019 are as follows:  Severe obstructive sleep apnea   Based on the results of this study, a follow-up study to titrate positive airway pressure is strongly recommended  The results of her CPAP titration study completed on February 19, 2019 are as follows:  1  Previously confirmed sleep disordered breathing that appears to be sufficiently remediated with positive airway pressure at 11 cm H2O    2  Moderate to severe periodic limb movements of sleep   Based on the results of this study, continued use of positive airway pressure [at the above described setting] is recommended  She may also warrant pharmacotherapy for the periodic limb movements of sleep  The review of systems and following portions of the patient's history were reviewed and updated as appropriate: allergies, current medications, past family history, past medical history, past social history, past surgical history, and problem list         OBJECTIVE    PAP Pressure: Nasal Auto PAP set between 8cm of water pressure and 14cm of water pressure  DME Provider: [employer]    Physical Exam:     General Appearance:   Alert, cooperative, no distress, appears stated age, morbidly obese     Head:   Normocephalic, without obvious abnormality, atraumatic     Eyes:   PERRL, conjunctiva/corneas clear, EOM's intact          Nose:  Nares normal, septum midline, no drainage or sinus tenderness           Throat:  Lips, teeth and gums normal; tongue normal size and  shape and midline mucosa moist and redundant bilaterally, uvula thick at the base and only partially visualized, tonsils enlarged, +3/4 bilaterally, Mallampati class 3-4       Neck:  Supple, symmetrical, trachea midline, no adenopathy;  Thyroid: No enlargement, tenderness or nodules; no carotid bruit or JVD     Lungs:      Clear to auscultation bilaterally, respirations unlabored     Heart:   Regular rate and rhythm, S1 and S2 normal, no murmur, rub or gallop       Extremities:  Extremities normal, atraumatic, no cyanosis or edema     Pulses:  2+ and symmetric all extremities     Skin:  Skin color, texture, turgor normal, no rashes or lesions       Neurologic:  No focal deficits noted  Normal strength, sensation throughout       ASSESSMENT / PLAN    1  KEAGAN (obstructive sleep apnea)  PAP DME Pressure Change   2  Morbid obesity ([address] )  PAP DME Pressure Change           Counseling / Coordination of Care  Total clinic time spent today 30 minutes  Greater than 50% of total time was spent with the patient and / or family counseling and / or coordination of care  A description of the counseling / coordination of care:     Impressions, Diagnostic results, Prognosis, Instructions for management, Risks and benefits of treatment, Patient and family education, Risk factor reductions and Importance of compliance with treatment    I reviewed the recent test results with the patient  We talked about the abnormal findings, including those consistent with Obstructive Sleep Apnea  We then discussed how the these are categorized as mild, moderate or severe  We also covered the medical comorbidities associated with untreated Obstructive Sleep Apnea including, hypertension, cardiac arrythmia, myocardial infarction and stroke  I explained how the risk of these conditions increases with the severity of the test results  I also spoke in some detail about the most common treatment options including weight loss, nasal PAP, mandibular advancement devices and uvulopalatopharyngoplasty (UPPP)  I answered all questions posed to me and gave my opinion regarding the best options for treatment based on the patient and their level of disease  In this case she chose to begin treatment using APAP  Due to her upcoming bariatric surgery plans, her equipment will be set into auto Pap from 8 cm to 14 cm of water pressure  This will allow her a equipment to fluctuate as she begins to lose weight rapidly after surgery      The patient will return in 8 to 12 weeks for a review of compliance data collected since beginning treatment  We will discuss this data and talk about any problems that may prevent successful treatment of Obstructive Sleep Apnea  Changes will be made in treatment parameters or interface devices in order to improve her ability to continue using PAP to maintain upper airway patency during sleep  The following instructions have been given to the patient today:    Patient Instructions   1   your CPAP equipment today  2  Begin using your CPAP equipment every night  3  Begin cleaning your CPAP daily after use and once weekly with 1 part white vinegar and 10 parts water  4  Contact your medical equipment distributor regarding any questions concerning your CPAP equipment  5  Contact the Sleep [address] [place] [address] with any other questions, prior to next visit, if needed  6    Schedule compliance follow-up visit in 2-3 months      Ponce Haywood, [address] [facility]

## 2019-02-28 NOTE — PROGRESS NOTES
Bariatric Follow Up Nutrition Note    Preop  Preop Program: 10% pre-op wt loss support    Type of surgery  Preop  Surgery Date: Tentative for March 2019  Surgeon: Dr Neftali Jennings  40 y o   female  LMP 11/18/2018     No calculations performed for this visit    Weight on Day of Weight Loss Surgery: day of surgery goal weight of 311lbs  Weight in (lb) to have BMI = 25: 150lbs  Pre-Op Excess Wt: 185 4lbs  4 3lb wt loss from previous visit  Total 18 7lb wt loss  15 8lb wt loss needed yet prior to surgery  Review of History and Medications   Past Medical History:   Diagnosis Date    Ankle swelling     Anxiety     Arthritis     Depression     Diabetes mellitus ([address] )     Disease of thyroid gland     Fibromyalgia     GERD (gastroesophageal reflux disease)     Hx of colonoscopy 2017    Hypertension     Morbid obesity [facility] )     Night sweats     Obesity     Other ovarian cyst, left side     Last assessed 12/05/17    Ovarian neoplasm     Last assessed 10/24/17    Rectal bleeding      Past Surgical History:   Procedure Laterality Date    COLONOSCOPY      HYSTERECTOMY Bilateral 11/17/2018    OOPHORECTOMY Bilateral 11/17/2018    AK COLONOSCOPY FLX DX W/COLLJ SPEC WHEN PFRMD N/A 6/13/2017    Procedure: COLONOSCOPY;  Surgeon: Carlos Evans DO;  [facility]; Service: Gastroenterology    AK EGD TRANSORAL BIOPSY SINGLE/MULTIPLE N/A 1/30/2019    Procedure: ESOPHAGOGASTRODUODENOSCOPY (EGD) with bx;  Surgeon: Gabi Moseley MD;  [facility];   Service: Bariatrics    AK LAPAROSCOPY W TOT HYSTERECTUTERUS <=250 GRAM  W TUBE/OVARY N/A 11/20/2017    Procedure: ROBOTIC TOTAL LAPAROSCOPIC HYSTERECTOMY/ BSO;  Surgeon: Wayne Sotelo MD;  [facility];  Service: Gynecology Oncology     Social History     Socioeconomic History    Marital status: /Civil Union     Spouse name: Not on file    Number of children: Not on file    Years of education: Not on file    Highest education level: Not on file   Occupational History    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   Social Needs    Financial resource strain: Not on file    Food insecurity:     Worry: Not on file     Inability: Not on file    Transportation needs:     Medical: Not on file     Non-medical: Not on file   Tobacco Use    Smoking status: Former Smoker     Packs/day: 0 75     Years: 15 00     Pack years: 11 25     Last attempt to quit: 2018     Years since quittin 2    Smokeless tobacco: Never Used   Substance and Sexual Activity    Alcohol use: Yes     Comment: social    Drug use: No    Sexual activity: Yes     Partners: Male   Lifestyle    Physical activity:     Days per week: Not on file     Minutes per session: Not on file    Stress: Not on file   Relationships    Social connections:     Talks on phone: Not on file     Gets together: Not on file     Attends Holiness service: Not on file     Active member of club or organization: Not on file     Attends meetings of clubs or organizations: Not on file     Relationship status: Not on file    Intimate partner violence:     Fear of current or ex partner: Not on file     Emotionally abused: Not on file     Physically abused: Not on file     Forced sexual activity: Not on file   Other Topics Concern    Not on file   Social History Narrative    Not on file       Current Outpatient Medications:     ALPRAZolam (XANAX) 0 5 mg tablet, Take 1 tablet (0 5 mg total) by mouth 2 (two) times a day as needed for anxiety for up to 30 days, Disp: 60 tablet, Rfl: 0    aspirin (ECOTRIN LOW STRENGTH) 81 mg EC tablet, Take 81 mg by mouth daily, Disp: , Rfl:     atorvastatin (LIPITOR) 10 mg tablet, Take 1 tablet (10 mg total) by mouth daily for 90 days, Disp: 90 tablet, Rfl: 3    buPROPion (WELLBUTRIN XL) 300 mg 24 hr tablet, Take 1 tablet (300 mg total) by mouth every morning, Disp: 90 tablet, Rfl: 3    Cholecalciferol (VITAMIN D3) 2000 units capsule, Take by mouth, Disp: , Rfl:     Cyanocobalamin (B-12) 1000 MCG CAPS, Take by mouth, Disp: , Rfl:     Dapagliflozin Propanediol (FARXIGA) 5 MG TABS, Take 1 tablet (5 mg total) by mouth daily, Disp: 30 tablet, Rfl: 5    DULoxetine (CYMBALTA) 60 mg delayed release capsule, Take 60 mg by mouth daily, Disp: , Rfl:     [id number]-C-DSS-SuccAc-Zn (FERIVA 21/7) 75-1 MG TABS, , Disp: , Rfl:     gabapentin (NEURONTIN) 300 mg capsule, Take 500 mg by mouth 3 (three) times a day , Disp: , Rfl:     levothyroxine 50 mcg tablet, TAKE ONE TABLET BY MOUTH ONCE DAILY AS DIRECTED, Disp: 90 tablet, Rfl: 11    liraglutide (VICTOZA) injection, Inject 0 3 mL (1 8 mg total) under the skin daily, Disp: 5 pen, Rfl: 3    lisinopril (ZESTRIL) 10 mg tablet, Take 1 tablet (10 mg total) by mouth daily, Disp: 30 tablet, Rfl: 5    Food Intake and Lifestyle Assessment   Food Intake Assessment completed via food log brought by patient:  Pt is using [employer]- reviewed with pt  Breakfast: dannon light and fit greek yogurt, 24 oz decaf coffee with 3 tbsp sugar free creamer  Snack: none   Lunch:  salad, half a slice of pizza, 1 can [place]  Snack: none  Dinner: Pt is experimenting with bariatric recipes online    Snack: none  Beverage intake: water  Diet texture/stage: regular  Protein supplement: none  Estimated protein intake per day: 70-90g  Estimated fluid intake per day: 70oz  Meals eaten away from home: 0  Typical meal pattern: 3 meals per day and 0-1 snacks per day  Eating Behaviors: Appropriate diet advancement, Appropriate portion sizes and Does not drink with meals and waits 30-minutes after meal before resuming drinking    Food allergies or intolerances: none noted  Cultural or Anabaptism considerations: dislikes seafood    Physical Assessment  Nutrition Related Findings  obesity    Physical Activity  Types of exercise: None  Current physical limitations: pt reports her back starts to hurt is she walks for longer periods of time, but this is improving with her weight loss  Psychosocial Assessment   Support systems: spouse friend(s) coworkers  Socioeconomic factors: none noted    Nutrition Diagnosis- improving  Diagnosis: Overweight / Obesity (NC-3 3)  Related to: Physical inactivity and Excessive energy intake  As Evidenced by: BMI >25 and Excessive energy intake     Interventions and Teaching   Patient educated on post-op nutrition guidelines  Patient educated and handouts provided  Adequate hydration  Expected weight loss  Exercise  Suggestions for pre-op diet  Appropriate carbohydrate, protein, and fat intake, and food/fluid choices to maximize safe weight loss, nutrient intake, and tolerance   Techniques for self monitoring and keeping daily food journal    Education provided to: patient    Barriers to learning: No barriers identified    Readiness to change: action and monitoring    Comprehension: demonstrated understanding and verbalizes understanding     Expected Compliance: excellent    Evaluation/Monitoring   Eating pattern as discussed Tolerance of nutrition prescription Body weight Lab values Physical activity    Goals  Food journal, Exercise 30 minutes 5 times per week, Complete lession plans 1-6 and Eat 3 meals per day     Met with pt for pre-op weight loss support  Pt continues to follow the meal plans previously provided and has been food journaling on [facility] daily  Average 2619-8822 calories per day, 60-90 gram carb, 90 gram protein  Pt has been walking for about 35 minutes more than twice weekly  Pt is practicing 30/60 rule and drinking mainly water, more than 64 oz daily  Workflow completed  Pt referred to  for scheduling  Pt has contact info for future questions      Time Spent:   30 Minutes

## 2019-03-04 ENCOUNTER — TELEPHONE (OUTPATIENT)
Dept: SLEEP CENTER | Facility: CLINIC | Age: 44
End: 2019-03-04

## 2019-03-06 ENCOUNTER — ANESTHESIA EVENT (OUTPATIENT)
Dept: PERIOP | Facility: HOSPITAL | Age: 44
DRG: 620 | End: 2019-03-06
Payer: COMMERCIAL

## 2019-03-06 DIAGNOSIS — Z98.84 BARIATRIC SURGERY STATUS: Primary | ICD-10-CM

## 2019-03-07 ENCOUNTER — OFFICE VISIT (OUTPATIENT)
Dept: BARIATRICS | Facility: CLINIC | Age: 44
End: 2019-03-07
Payer: COMMERCIAL

## 2019-03-07 VITALS
TEMPERATURE: 97.8 F | BODY MASS INDEX: 48.82 KG/M2 | DIASTOLIC BLOOD PRESSURE: 86 MMHG | HEIGHT: 65 IN | HEART RATE: 82 BPM | WEIGHT: 293 LBS | SYSTOLIC BLOOD PRESSURE: 128 MMHG | RESPIRATION RATE: 18 BRPM

## 2019-03-07 DIAGNOSIS — I10 BENIGN HYPERTENSION: ICD-10-CM

## 2019-03-07 DIAGNOSIS — K21.9 GASTROESOPHAGEAL REFLUX DISEASE WITHOUT ESOPHAGITIS: ICD-10-CM

## 2019-03-07 DIAGNOSIS — M19.90 ARTHRITIS: ICD-10-CM

## 2019-03-07 DIAGNOSIS — E66.01 MORBID OBESITY WITH BMI OF 50.0-59.9, ADULT (HCC): Primary | ICD-10-CM

## 2019-03-07 DIAGNOSIS — E11.69 DIABETES MELLITUS TYPE 2 IN OBESE (HCC): ICD-10-CM

## 2019-03-07 DIAGNOSIS — E66.9 DIABETES MELLITUS TYPE 2 IN OBESE (HCC): ICD-10-CM

## 2019-03-07 DIAGNOSIS — G47.33 OSA ON CPAP: ICD-10-CM

## 2019-03-07 DIAGNOSIS — Z99.89 OSA ON CPAP: ICD-10-CM

## 2019-03-07 PROCEDURE — 99213 OFFICE O/P EST LOW 20 MIN: CPT | Performed by: SURGERY

## 2019-03-07 RX ORDER — OMEPRAZOLE 20 MG/1
20 CAPSULE, DELAYED RELEASE ORAL DAILY
Qty: 90 CAPSULE | Refills: 1 | Status: SHIPPED | OUTPATIENT
Start: 2019-03-07 | End: 2019-09-03 | Stop reason: SDUPTHER

## 2019-03-07 RX ORDER — HEPARIN SODIUM 5000 [USP'U]/ML
5000 INJECTION, SOLUTION INTRAVENOUS; SUBCUTANEOUS
Status: CANCELLED | OUTPATIENT
Start: 2019-03-25 | End: 2019-03-26

## 2019-03-07 RX ORDER — ACETAMINOPHEN 500 MG
500 TABLET ORAL EVERY 6 HOURS PRN
COMMUNITY
End: 2022-02-04

## 2019-03-07 RX ORDER — OXYCODONE HYDROCHLORIDE AND ACETAMINOPHEN 5; 325 MG/1; MG/1
1 TABLET ORAL EVERY 4 HOURS PRN
Qty: 15 TABLET | Refills: 0 | Status: SHIPPED | OUTPATIENT
Start: 2019-03-07 | End: 2019-04-04 | Stop reason: ALTCHOICE

## 2019-03-07 NOTE — H&P (VIEW-ONLY)
BARIATRIC H&P - BARIATRIC SURGERY  Africa Mckeon 40 y o  female MRN: [id number]  Unit/Bed#:  Encounter: [id number]      HPI:  Africa Mckeon is a 40 y o  female who presents with a long-standing history of morbid obesity  She was found to be a good candidate to undergo a bariatric operation [facility]  She is here today to discuss details of her surgery  Review of Systems   Gastrointestinal:        Heartburn   All other systems reviewed and are negative  Historical Information   Past Medical History:   Diagnosis Date    Anxiety     Arthritis     CPAP (continuous positive airway pressure) dependence     Depression     Diabetes mellitus ([facility] [address] )     Disease of thyroid gland     Eczema     Fibromyalgia, primary     Hyperlipidemia     Hypertension     Lupus anticoagulant disorder (HCC)     Migraine     Night sweats     Obesity     Psoriasis     Sleep apnea     Wears glasses      Past Surgical History:   Procedure Laterality Date    COLONOSCOPY      HYSTERECTOMY Bilateral 11/17/2018    OOPHORECTOMY Bilateral 11/17/2018    MN COLONOSCOPY FLX DX W/COLLJ SPEC WHEN PFRMD N/A 6/13/2017    Procedure: COLONOSCOPY;  Surgeon: Demetrius Peralta DO;  [facility]; Service: Gastroenterology    MN EGD TRANSORAL BIOPSY SINGLE/MULTIPLE N/A 1/30/2019    Procedure: ESOPHAGOGASTRODUODENOSCOPY (EGD) with bx;  Surgeon: Lou Serrano MD;  [facility];   Service: Bariatrics    MN LAPAROSCOPY W TOT HYSTERECTUTERUS <=250 GRAM  W TUBE/OVARY N/A 11/20/2017    Procedure: ROBOTIC TOTAL LAPAROSCOPIC HYSTERECTOMY/ BSO;  Surgeon: Denise Segovia MD;  [facility];  Service: Gynecology Oncology    WISDOM TOOTH EXTRACTION       Social History   Social History     Substance and Sexual Activity   Alcohol Use Yes    Frequency: Monthly or less    Comment: social     Social History     Substance and Sexual Activity   Drug Use No     Social History Tobacco Use   Smoking Status Former Smoker    Packs/day: 0 75    Years: 15 00    Pack years: 11 25    Last attempt to quit: 2018    Years since quittin 2   Smokeless Tobacco Never Used     Family History: non-contributory    Meds/Allergies   all medications and allergies reviewed  Allergies   Allergen Reactions    Metformin Diarrhea       Objective     Current Vitals:   Blood Pressure: 128/86 (19)  Pulse: 82 (19)  Temperature: 97 8 °F (36 6 °C) (19)  Temp Source: Tympanic (19)  Respirations: 18 (19)  Height: 5' 5" (165 1 cm) (19)  Weight - Scale: (!) 148 kg (326 lb) (19)      Invasive Devices          None          Physical Exam   Constitutional: She is oriented to person, place, and time  Vital signs are normal  She appears well-developed and well-nourished  No distress  HENT:   Head: Normocephalic and atraumatic  Nose: Nose normal    Mouth/Throat: Oropharynx is clear and moist    Eyes: Conjunctivae are normal  Right eye exhibits no discharge  Left eye exhibits no discharge  No scleral icterus  Neck: Normal range of motion  Neck supple  Cardiovascular: Normal rate, regular rhythm, normal heart sounds and intact distal pulses  Pulmonary/Chest: Effort normal and breath sounds normal  No stridor  No respiratory distress  She has no wheezes  She has no rales  She exhibits no tenderness  Abdominal: Soft  Normal appearance and bowel sounds are normal  There is no tenderness  There is no rebound, no guarding and no CVA tenderness  Abdomen is obese  Benign  Well-healed laparoscopic incisions from previous surgery   Musculoskeletal: Normal range of motion  She exhibits no deformity  Lymphadenopathy:     She has no cervical adenopathy  Neurological: She is alert and oriented to person, place, and time  Skin: Skin is warm and dry  No rash noted  She is not diaphoretic  No erythema     Psychiatric: She has a normal mood and affect  Her behavior is normal  Judgment and thought content normal    Nursing note and vitals reviewed  Lab Results: I have personally reviewed pertinent lab results  Imaging: I have personally reviewed pertinent reports  EKG, Pathology, and Other Studies: I have personally reviewed pertinent reports  Assessment/PLAN:    40 y o  female morbidly obese found to be a good candidate to undergo a weight loss operation upon being enrolled here at the [facility]     Patient has a long history of morbid obesity and is presenting to discuss the surgical weight loss options  Despite the patient best efforts patient was unable to lose any meaningful or sustainable weight using nonsurgical means  We had a long discussion regarding all the surgical weight-loss options at our disposal at this point and reviewed the risks and benefits of each procedure in details as it relates to her age, BMI and medical conditions  She has been pre certified to undergo a Laparoscopic Jorge-en-Y gastric bypass possible sleeve gastrectomy  Here today to review her pre op test results  Has been medically cleared for the procedure   ++++++++++++++++++++++++++  She has KEAGAN and wears a CPAP machine  ++++++++++++++++++++++++++    I have discussed with her at length the risks and benefits of the operation and reiterated the components of our multidisciplinary program and the importance of compliance and follow up in the post operative period  Although there is a great statistical chance of improvement or even resolution of most of her associated comorbidities, the results vary from patient to patient and they largely depend on her commitment  The patient was also instructed with regards to the importance of behavior modification, nutritional counseling, support meeting attendance and lifestyle changes that are important to ensure success      She was given the opportunity to ask questions and I have answered all of them  I have addressed with the patient the level of CODE STATUS for this hospital stay and after explaining the different options currently she wishes to be a Level I  She understands and wishes to proceed  Still needs to lose 16 lbs prior to surgery  Will return next week for a weight check and if the weight loss is at least half the way there, the surgery date will not be re scheduled  Patient understands and agrees            Wil Schultz MD  3/7/2019  3:36 PM

## 2019-03-07 NOTE — PRE-PROCEDURE INSTRUCTIONS
Pre-Surgery Instructions:   Medication Instructions    acetaminophen (TYLENOL) 500 mg tablet Patient was instructed by Physician and understands   ALPRAZolam (XANAX) 0 5 mg tablet Patient was instructed by Physician and understands   aspirin (ECOTRIN LOW STRENGTH) 81 mg EC tablet Patient was instructed to contact Physician for medication instruction   atorvastatin (LIPITOR) 10 mg tablet Patient was instructed by Physician and understands   buPROPion (WELLBUTRIN XL) 300 mg 24 hr tablet Patient was instructed by Physician and understands   Cholecalciferol (VITAMIN D3) 2000 units capsule Patient was instructed by Physician and understands   Cyanocobalamin (B-12) 1000 MCG CAPS Patient was instructed by Physician and understands   Dapagliflozin Propanediol (FARXIGA) 5 MG TABS Patient was instructed by Physician and understands   DULoxetine (CYMBALTA) 60 mg delayed release capsule Patient was instructed by Physician and understands   GABAPENTIN PO Patient was instructed by Physician and understands   levothyroxine 50 mcg tablet Patient was instructed by Physician and understands   liraglutide (VICTOZA) injection Patient was instructed by Physician and understands   lisinopril (ZESTRIL) 10 mg tablet Patient was instructed by Physician and understands  Pt instructed to take cymbalta and levothyroxine with a small sip of water the morning of surgery (xanax if needed)  Pt given [facility] preop instructions and reviewed with pt  Pt given Chlorhexidine

## 2019-03-07 NOTE — ANESTHESIA PREPROCEDURE EVALUATION
Review of Systems/Medical History  Patient summary reviewed  Chart reviewed  No history of anesthetic complications     Cardiovascular  Hyperlipidemia, Hypertension controlled,    Pulmonary  Smoker ex-smoker  Cumulative Pack Years: 15, Sleep apnea CPAP,        GI/Hepatic    GERD well controlled,        Negative  ROS        Endo/Other  Diabetes well controlled type 2 Oral agent, History of thyroid disease , hypothyroidism,   Obesity  super morbid obesity   GYN  Negative gynecology ROS          Hematology  Negative hematology ROS      Musculoskeletal    Arthritis     Neurology    Headaches,    Psychology   Anxiety, Depression , being treated for depression,              Physical Exam    Airway    Mallampati score: III  TM Distance: >3 FB  Neck ROM: full     Dental   No notable dental hx     Cardiovascular  Rhythm: regular, Rate: normal, Cardiovascular exam normal    Pulmonary  Pulmonary exam normal Breath sounds clear to auscultation,     Other Findings        Anesthesia Plan  ASA Score- 3     Anesthesia Type- general with ASA Monitors  Additional Monitors:   Airway Plan: ETT  Comment: SCOP patch ordered  Plan Factors-    Induction- intravenous  Postoperative Plan-     Informed Consent- Anesthetic plan and risks discussed with patient

## 2019-03-07 NOTE — H&P
BARIATRIC H&P - BARIATRIC SURGERY  Alex Tellez 40 y o  female MRN: [id number]  Unit/Bed#:  Encounter: [id number]      HPI:  Alex Tellez is a 40 y o  female who presents with a long-standing history of morbid obesity  She was found to be a good candidate to undergo a bariatric operation upon [facility]  She is here today to discuss details of her surgery  Review of Systems   Gastrointestinal:        Heartburn   All other systems reviewed and are negative  Historical Information   Past Medical History:   Diagnosis Date    Anxiety     Arthritis     CPAP (continuous positive airway pressure) dependence     Depression     Diabetes mellitus ([address] )     Disease of thyroid gland     Eczema     Fibromyalgia, primary     Hyperlipidemia     Hypertension     Lupus anticoagulant disorder (HCC)     Migraine     Night sweats     Obesity     Psoriasis     Sleep apnea     Wears glasses      Past Surgical History:   Procedure Laterality Date    COLONOSCOPY      HYSTERECTOMY Bilateral 11/17/2018    OOPHORECTOMY Bilateral 11/17/2018    IA COLONOSCOPY FLX DX W/COLLJ SPEC WHEN PFRMD N/A 6/13/2017    Procedure: COLONOSCOPY;  Surgeon: Lalo Davis DO;  [facility]; Service: Gastroenterology    IA EGD TRANSORAL BIOPSY SINGLE/MULTIPLE N/A 1/30/2019    Procedure: ESOPHAGOGASTRODUODENOSCOPY (EGD) with bx;  Surgeon: Lorrie Azul MD;  [facility];   Service: Bariatrics    IA LAPAROSCOPY W TOT HYSTERECTUTERUS <=250 GRAM  W TUBE/OVARY N/A 11/20/2017    Procedure: ROBOTIC TOTAL LAPAROSCOPIC HYSTERECTOMY/ BSO;  Surgeon: Jeanella Peabody, MD;  [facility];  Service: Gynecology Oncology    WISDOM TOOTH EXTRACTION       Social History   Social History     Substance and Sexual Activity   Alcohol Use Yes    Frequency: Monthly or less    Comment: social     Social History     Substance and Sexual Activity   Drug Use No     Social History Tobacco Use   Smoking Status Former Smoker    Packs/day: 0 75    Years: 15 00    Pack years: 11 25    Last attempt to quit: 2018    Years since quittin 2   Smokeless Tobacco Never Used     Family History: non-contributory    Meds/Allergies   all medications and allergies reviewed  Allergies   Allergen Reactions    Metformin Diarrhea       Objective     Current Vitals:   Blood Pressure: 128/86 (19)  Pulse: 82 (19)  Temperature: 97 8 °F (36 6 °C) (19)  Temp Source: Tympanic (19)  Respirations: 18 (19)  Height: 5' 5" (165 1 cm) (19)  Weight - Scale: (!) 148 kg (326 lb) (19)      Invasive Devices          None          Physical Exam   Constitutional: She is oriented to person, place, and time  Vital signs are normal  She appears well-developed and well-nourished  No distress  HENT:   Head: Normocephalic and atraumatic  Nose: Nose normal    Mouth/Throat: Oropharynx is clear and moist    Eyes: Conjunctivae are normal  Right eye exhibits no discharge  Left eye exhibits no discharge  No scleral icterus  Neck: Normal range of motion  Neck supple  Cardiovascular: Normal rate, regular rhythm, normal heart sounds and intact distal pulses  Pulmonary/Chest: Effort normal and breath sounds normal  No stridor  No respiratory distress  She has no wheezes  She has no rales  She exhibits no tenderness  Abdominal: Soft  Normal appearance and bowel sounds are normal  There is no tenderness  There is no rebound, no guarding and no CVA tenderness  Abdomen is obese  Benign  Well-healed laparoscopic incisions from previous surgery   Musculoskeletal: Normal range of motion  She exhibits no deformity  Lymphadenopathy:     She has no cervical adenopathy  Neurological: She is alert and oriented to person, place, and time  Skin: Skin is warm and dry  No rash noted  She is not diaphoretic  No erythema     Psychiatric: She has a normal mood and affect  Her behavior is normal  Judgment and thought content normal    Nursing note and vitals reviewed  Lab Results: I have personally reviewed pertinent lab results  Imaging: I have personally reviewed pertinent reports  EKG, Pathology, and Other Studies: I have personally reviewed pertinent reports  Assessment/PLAN:    40 y o  female morbidly obese found to be a good candidate to undergo a weight loss operation upon being enrolled here at the [facility]     Patient has a long history of morbid obesity and is presenting to discuss the surgical weight loss options  Despite the patient best efforts patient was unable to lose any meaningful or sustainable weight using nonsurgical means  We had a long discussion regarding all the surgical weight-loss options at our disposal at this point and reviewed the risks and benefits of each procedure in details as it relates to her age, BMI and medical conditions  She has been pre certified to undergo a Laparoscopic Jorge-en-Y gastric bypass possible sleeve gastrectomy  Here today to review her pre op test results  Has been medically cleared for the procedure   ++++++++++++++++++++++++++  She has KEAGAN and wears a CPAP machine  ++++++++++++++++++++++++++    I have discussed with her at length the risks and benefits of the operation and reiterated the components of our multidisciplinary program and the importance of compliance and follow up in the post operative period  Although there is a great statistical chance of improvement or even resolution of most of her associated comorbidities, the results vary from patient to patient and they largely depend on her commitment  The patient was also instructed with regards to the importance of behavior modification, nutritional counseling, support meeting attendance and lifestyle changes that are important to ensure success      She was given the opportunity to ask questions and I have answered all of them  I have addressed with the patient the level of CODE STATUS for this hospital stay and after explaining the different options currently she wishes to be a Level I  She understands and wishes to proceed  Still needs to lose 16 lbs prior to surgery  Will return next week for a weight check and if the weight loss is at least half the way there, the surgery date will not be re scheduled  Patient understands and agrees            Kareem Gaines MD  3/7/2019  3:36 PM

## 2019-03-09 DIAGNOSIS — E03.9 HYPOTHYROIDISM, UNSPECIFIED TYPE: ICD-10-CM

## 2019-03-11 ENCOUNTER — TELEPHONE (OUTPATIENT)
Dept: FAMILY MEDICINE CLINIC | Facility: CLINIC | Age: 44
End: 2019-03-11

## 2019-03-11 DIAGNOSIS — F32.A ANXIETY AND DEPRESSION: ICD-10-CM

## 2019-03-11 DIAGNOSIS — F41.9 ANXIETY AND DEPRESSION: ICD-10-CM

## 2019-03-11 RX ORDER — LEVOTHYROXINE SODIUM 0.05 MG/1
TABLET ORAL
Qty: 90 TABLET | Refills: 3 | Status: SHIPPED | OUTPATIENT
Start: 2019-03-11 | End: 2020-03-15 | Stop reason: SDUPTHER

## 2019-03-11 NOTE — TELEPHONE ENCOUNTER
Patient was told by her gastric bypass doctor that she needs to have her medicine crushed for 3-4 weeks after surgery  Patient was notified that she needs to call the pharmacy for the equivalent of wellbutrin XL  She will call us back with that info

## 2019-03-11 NOTE — TELEPHONE ENCOUNTER
Patient called to let you know her weight loss surgery is on 3/25/19  She says that she cannot have any extended release medications any longer  Her bupropion XL will need to be changed and would also need to be switched from a capsule to a tablet  The bupropion will need to be crushed for about two/three weeks after surgery as well  Patient is inquiring if this is okay? The other pills she is on she is wondering if they could be crushed and taken that way as well (lisinopril/levothyroxine, etc)? Patient does not need a pre-op clearance she says and already has pre-authorization for surgery

## 2019-03-11 NOTE — TELEPHONE ENCOUNTER
She should check with gastric bypass people to see if they want her to crush her meds or can take the whole pills?    Please also have her check with the pharmacist to see the equivalent dose of 300 mg wellbutrin XL would be for her

## 2019-03-19 ENCOUNTER — TELEPHONE (OUTPATIENT)
Dept: BARIATRICS | Facility: CLINIC | Age: 44
End: 2019-03-19

## 2019-03-19 NOTE — TELEPHONE ENCOUNTER
Pre-op call made to patient to follow up on progress, review recommendations and requirements of surgery and offer any support if needed  Patient says she is following the diet and will be coming in this Friday, 3/22 for another weight check to confirm she is where she needs to be to have surgery on Monday  She is on the liver shrinking diet, getting between 80-90 oz of water and will start taking the Mirilax as directed in preparation  If she has any questions, she is aware she can call the office or follow up during her office visit on Friday

## 2019-03-20 DIAGNOSIS — F41.9 ANXIETY AND DEPRESSION: Primary | ICD-10-CM

## 2019-03-20 DIAGNOSIS — F32.A ANXIETY AND DEPRESSION: Primary | ICD-10-CM

## 2019-03-20 NOTE — TELEPHONE ENCOUNTER
SHAI REACHING OUT TO [facility]VISE OF THE REPLACEMENT FOR  300 mg Wellbutrin XL   SHOULD BE  mg Wellbutrin SR  300 MG      PHARMACY -[facility]

## 2019-03-21 RX ORDER — BUPROPION HYDROCHLORIDE 150 MG/1
150 TABLET, EXTENDED RELEASE ORAL 2 TIMES DAILY
Qty: 60 TABLET | Refills: 0 | Status: SHIPPED | OUTPATIENT
Start: 2019-03-21 | End: 2019-08-18 | Stop reason: SDUPTHER

## 2019-03-22 ENCOUNTER — OFFICE VISIT (OUTPATIENT)
Dept: BARIATRICS | Facility: CLINIC | Age: 44
End: 2019-03-22

## 2019-03-22 VITALS — WEIGHT: 293 LBS | BODY MASS INDEX: 52.25 KG/M2

## 2019-03-22 DIAGNOSIS — E66.01 MORBID (SEVERE) OBESITY DUE TO EXCESS CALORIES (HCC): Primary | ICD-10-CM

## 2019-03-22 PROBLEM — K21.9 GASTROESOPHAGEAL REFLUX DISEASE WITHOUT ESOPHAGITIS: Status: ACTIVE | Noted: 2019-02-28

## 2019-03-22 PROCEDURE — RECHECK

## 2019-03-22 NOTE — PROGRESS NOTES
Patient presented for final weight check before surgery and was very upset when she saw she was not at the exact weight of 311lbs told her to by the doctor  She became tearful thinking she may not be able to have the surgery on Monday, AMARILIS reviewed her previous weight from two and a half weeks ago (326lbs) and pointed out that she has lost 12lbs since then (today's weight 314lbs)  AMARILIS consulted with LAMAR Silvestre about patient's current weight and BMI and she stated that as long as patient is within two pounds of her target surgery weight she should be fine  SW relayed this information to patient and helped patient create a stress reduction plan that she can work on over the weekend  Patient plans to continue to clean her home, take her dog for a walk, walk around at work and to take deep breaths with positive thoughts  Patient acknowledged positive behavior changes that she has made to be able to lose the weight she has so far and she appreciated the support that was available to her  Patient appeared calmer and more confident with plan at end of meeting

## 2019-03-25 ENCOUNTER — HOSPITAL ENCOUNTER (INPATIENT)
Facility: HOSPITAL | Age: 44
LOS: 1 days | Discharge: HOME | DRG: 620 | End: 2019-03-26
Attending: SURGERY | Admitting: SURGERY
Payer: COMMERCIAL

## 2019-03-25 ENCOUNTER — ANESTHESIA (OUTPATIENT)
Dept: PERIOP | Facility: HOSPITAL | Age: 44
DRG: 620 | End: 2019-03-25
Payer: COMMERCIAL

## 2019-03-25 DIAGNOSIS — K21.9 GASTROESOPHAGEAL REFLUX DISEASE WITHOUT ESOPHAGITIS: ICD-10-CM

## 2019-03-25 DIAGNOSIS — E66.9 DIABETES MELLITUS TYPE 2 IN OBESE (HCC): ICD-10-CM

## 2019-03-25 DIAGNOSIS — E11.69 DIABETES MELLITUS TYPE 2 IN OBESE (HCC): ICD-10-CM

## 2019-03-25 DIAGNOSIS — E66.01 MORBID OBESITY (HCC): Primary | ICD-10-CM

## 2019-03-25 DIAGNOSIS — I10 BENIGN HYPERTENSION: ICD-10-CM

## 2019-03-25 LAB
EXT PREGNANCY TEST URINE: NEGATIVE
GLUCOSE SERPL-MCNC: 106 MG/DL (ref 65–140)
GLUCOSE SERPL-MCNC: 95 MG/DL (ref 65–140)

## 2019-03-25 PROCEDURE — 0D164ZA BYPASS STOMACH TO JEJUNUM, PERCUTANEOUS ENDOSCOPIC APPROACH: ICD-10-PCS | Performed by: SURGERY

## 2019-03-25 PROCEDURE — 0DJ08ZZ INSPECTION OF UPPER INTESTINAL TRACT, VIA NATURAL OR ARTIFICIAL OPENING ENDOSCOPIC: ICD-10-PCS | Performed by: SURGERY

## 2019-03-25 PROCEDURE — 43644 LAP GASTRIC BYPASS/ROUX-EN-Y: CPT | Performed by: SURGERY

## 2019-03-25 PROCEDURE — 82948 REAGENT STRIP/BLOOD GLUCOSE: CPT

## 2019-03-25 PROCEDURE — 43644 LAP GASTRIC BYPASS/ROUX-EN-Y: CPT | Performed by: PHYSICIAN ASSISTANT

## 2019-03-25 PROCEDURE — 81025 URINE PREGNANCY TEST: CPT | Performed by: ANESTHESIOLOGY

## 2019-03-25 DEVICE — SEAMGUARD STPL REINF ENDO GIA ULTRA UNIV 60 PURPLE: Type: IMPLANTABLE DEVICE | Site: ABDOMEN | Status: FUNCTIONAL

## 2019-03-25 RX ORDER — PROPOFOL 10 MG/ML
INJECTION, EMULSION INTRAVENOUS AS NEEDED
Status: DISCONTINUED | OUTPATIENT
Start: 2019-03-25 | End: 2019-03-25 | Stop reason: SURG

## 2019-03-25 RX ORDER — PANTOPRAZOLE SODIUM 40 MG/1
40 INJECTION, POWDER, FOR SOLUTION INTRAVENOUS
Status: DISCONTINUED | OUTPATIENT
Start: 2019-03-26 | End: 2019-03-26 | Stop reason: HOSPADM

## 2019-03-25 RX ORDER — ONDANSETRON 2 MG/ML
4 INJECTION INTRAMUSCULAR; INTRAVENOUS ONCE AS NEEDED
Status: DISCONTINUED | OUTPATIENT
Start: 2019-03-25 | End: 2019-03-25 | Stop reason: HOSPADM

## 2019-03-25 RX ORDER — MIDAZOLAM HYDROCHLORIDE 1 MG/ML
INJECTION INTRAMUSCULAR; INTRAVENOUS AS NEEDED
Status: DISCONTINUED | OUTPATIENT
Start: 2019-03-25 | End: 2019-03-25 | Stop reason: SURG

## 2019-03-25 RX ORDER — OXYCODONE HCL 5 MG/5 ML
10 SOLUTION, ORAL ORAL EVERY 4 HOURS PRN
Status: DISCONTINUED | OUTPATIENT
Start: 2019-03-25 | End: 2019-03-26 | Stop reason: HOSPADM

## 2019-03-25 RX ORDER — ACETAMINOPHEN 160 MG/5ML
320 SUSPENSION, ORAL (FINAL DOSE FORM) ORAL EVERY 4 HOURS PRN
Status: DISCONTINUED | OUTPATIENT
Start: 2019-03-25 | End: 2019-03-26 | Stop reason: HOSPADM

## 2019-03-25 RX ORDER — MAGNESIUM HYDROXIDE 1200 MG/15ML
LIQUID ORAL AS NEEDED
Status: DISCONTINUED | OUTPATIENT
Start: 2019-03-25 | End: 2019-03-25 | Stop reason: HOSPADM

## 2019-03-25 RX ORDER — ONDANSETRON 2 MG/ML
INJECTION INTRAMUSCULAR; INTRAVENOUS AS NEEDED
Status: DISCONTINUED | OUTPATIENT
Start: 2019-03-25 | End: 2019-03-25 | Stop reason: SURG

## 2019-03-25 RX ORDER — GLYCOPYRROLATE 0.2 MG/ML
INJECTION INTRAMUSCULAR; INTRAVENOUS AS NEEDED
Status: DISCONTINUED | OUTPATIENT
Start: 2019-03-25 | End: 2019-03-25 | Stop reason: SURG

## 2019-03-25 RX ORDER — ROCURONIUM BROMIDE 10 MG/ML
INJECTION, SOLUTION INTRAVENOUS AS NEEDED
Status: DISCONTINUED | OUTPATIENT
Start: 2019-03-25 | End: 2019-03-25 | Stop reason: SURG

## 2019-03-25 RX ORDER — HYDROMORPHONE HCL/PF 1 MG/ML
0.5 SYRINGE (ML) INJECTION
Status: DISCONTINUED | OUTPATIENT
Start: 2019-03-25 | End: 2019-03-25 | Stop reason: HOSPADM

## 2019-03-25 RX ORDER — FENTANYL CITRATE 50 UG/ML
INJECTION, SOLUTION INTRAMUSCULAR; INTRAVENOUS AS NEEDED
Status: DISCONTINUED | OUTPATIENT
Start: 2019-03-25 | End: 2019-03-25 | Stop reason: SURG

## 2019-03-25 RX ORDER — SODIUM CHLORIDE 9 MG/ML
125 INJECTION, SOLUTION INTRAVENOUS CONTINUOUS
Status: DISCONTINUED | OUTPATIENT
Start: 2019-03-25 | End: 2019-03-25

## 2019-03-25 RX ORDER — HYDROMORPHONE HCL/PF 1 MG/ML
SYRINGE (ML) INJECTION AS NEEDED
Status: DISCONTINUED | OUTPATIENT
Start: 2019-03-25 | End: 2019-03-25 | Stop reason: SURG

## 2019-03-25 RX ORDER — SCOLOPAMINE TRANSDERMAL SYSTEM 1 MG/1
1 PATCH, EXTENDED RELEASE TRANSDERMAL ONCE AS NEEDED
Status: DISCONTINUED | OUTPATIENT
Start: 2019-03-25 | End: 2019-03-25

## 2019-03-25 RX ORDER — HYDRALAZINE HYDROCHLORIDE 20 MG/ML
10 INJECTION INTRAMUSCULAR; INTRAVENOUS EVERY 6 HOURS PRN
Status: DISCONTINUED | OUTPATIENT
Start: 2019-03-25 | End: 2019-03-26 | Stop reason: HOSPADM

## 2019-03-25 RX ORDER — SODIUM CHLORIDE, SODIUM LACTATE, POTASSIUM CHLORIDE, CALCIUM CHLORIDE 600; 310; 30; 20 MG/100ML; MG/100ML; MG/100ML; MG/100ML
75 INJECTION, SOLUTION INTRAVENOUS CONTINUOUS
Status: DISCONTINUED | OUTPATIENT
Start: 2019-03-25 | End: 2019-03-26 | Stop reason: HOSPADM

## 2019-03-25 RX ORDER — MORPHINE SULFATE 4 MG/ML
4 INJECTION, SOLUTION INTRAMUSCULAR; INTRAVENOUS EVERY 2 HOUR PRN
Status: DISCONTINUED | OUTPATIENT
Start: 2019-03-25 | End: 2019-03-26 | Stop reason: HOSPADM

## 2019-03-25 RX ORDER — OXYCODONE HCL 5 MG/5 ML
5 SOLUTION, ORAL ORAL EVERY 4 HOURS PRN
Status: DISCONTINUED | OUTPATIENT
Start: 2019-03-25 | End: 2019-03-26 | Stop reason: HOSPADM

## 2019-03-25 RX ORDER — EPHEDRINE SULFATE 50 MG/ML
INJECTION INTRAVENOUS AS NEEDED
Status: DISCONTINUED | OUTPATIENT
Start: 2019-03-25 | End: 2019-03-25 | Stop reason: SURG

## 2019-03-25 RX ORDER — NEOSTIGMINE METHYLSULFATE 1 MG/ML
INJECTION INTRAVENOUS AS NEEDED
Status: DISCONTINUED | OUTPATIENT
Start: 2019-03-25 | End: 2019-03-25 | Stop reason: SURG

## 2019-03-25 RX ORDER — METOCLOPRAMIDE HYDROCHLORIDE 5 MG/ML
10 INJECTION INTRAMUSCULAR; INTRAVENOUS EVERY 6 HOURS PRN
Status: DISCONTINUED | OUTPATIENT
Start: 2019-03-25 | End: 2019-03-26 | Stop reason: HOSPADM

## 2019-03-25 RX ORDER — FENTANYL CITRATE/PF 50 MCG/ML
50 SYRINGE (ML) INJECTION
Status: DISCONTINUED | OUTPATIENT
Start: 2019-03-25 | End: 2019-03-25 | Stop reason: HOSPADM

## 2019-03-25 RX ORDER — PROMETHAZINE HYDROCHLORIDE 25 MG/ML
12.5 INJECTION, SOLUTION INTRAMUSCULAR; INTRAVENOUS EVERY 6 HOURS PRN
Status: DISCONTINUED | OUTPATIENT
Start: 2019-03-25 | End: 2019-03-26 | Stop reason: HOSPADM

## 2019-03-25 RX ORDER — ONDANSETRON 2 MG/ML
4 INJECTION INTRAMUSCULAR; INTRAVENOUS EVERY 4 HOURS PRN
Status: DISCONTINUED | OUTPATIENT
Start: 2019-03-25 | End: 2019-03-26 | Stop reason: HOSPADM

## 2019-03-25 RX ORDER — ACETAMINOPHEN 160 MG/5ML
325 SUSPENSION, ORAL (FINAL DOSE FORM) ORAL EVERY 4 HOURS PRN
Status: DISCONTINUED | OUTPATIENT
Start: 2019-03-25 | End: 2019-03-26 | Stop reason: HOSPADM

## 2019-03-25 RX ORDER — HEPARIN SODIUM 5000 [USP'U]/ML
5000 INJECTION, SOLUTION INTRAVENOUS; SUBCUTANEOUS
Status: COMPLETED | OUTPATIENT
Start: 2019-03-25 | End: 2019-03-25

## 2019-03-25 RX ADMIN — NEOSTIGMINE METHYLSULFATE 3 MG: 1 INJECTION INTRAVENOUS at 09:12

## 2019-03-25 RX ADMIN — MORPHINE SULFATE 4 MG: 4 INJECTION INTRAVENOUS at 11:06

## 2019-03-25 RX ADMIN — SODIUM CHLORIDE, SODIUM LACTATE, POTASSIUM CHLORIDE, AND CALCIUM CHLORIDE 125 ML/HR: .6; .31; .03; .02 INJECTION, SOLUTION INTRAVENOUS at 11:07

## 2019-03-25 RX ADMIN — ONDANSETRON 4 MG: 2 INJECTION INTRAMUSCULAR; INTRAVENOUS at 08:17

## 2019-03-25 RX ADMIN — ROCURONIUM BROMIDE 10 MG: 100 INJECTION, SOLUTION INTRAVENOUS at 08:08

## 2019-03-25 RX ADMIN — FENTANYL CITRATE 50 MCG: 50 INJECTION, SOLUTION INTRAMUSCULAR; INTRAVENOUS at 09:56

## 2019-03-25 RX ADMIN — FENTANYL CITRATE 100 MCG: 50 INJECTION, SOLUTION INTRAMUSCULAR; INTRAVENOUS at 07:27

## 2019-03-25 RX ADMIN — METRONIDAZOLE 500 MG: 500 INJECTION, SOLUTION INTRAVENOUS at 16:34

## 2019-03-25 RX ADMIN — MIDAZOLAM 2 MG: 1 INJECTION INTRAMUSCULAR; INTRAVENOUS at 07:18

## 2019-03-25 RX ADMIN — LIDOCAINE HYDROCHLORIDE 100 MG: 20 INJECTION, SOLUTION INTRAVENOUS at 07:27

## 2019-03-25 RX ADMIN — EPHEDRINE SULFATE 5 MG: 50 INJECTION, SOLUTION INTRAVENOUS at 08:05

## 2019-03-25 RX ADMIN — EPHEDRINE SULFATE 10 MG: 50 INJECTION, SOLUTION INTRAVENOUS at 07:51

## 2019-03-25 RX ADMIN — HYDROMORPHONE HYDROCHLORIDE 0.5 MG: 1 INJECTION, SOLUTION INTRAMUSCULAR; INTRAVENOUS; SUBCUTANEOUS at 09:05

## 2019-03-25 RX ADMIN — OXYCODONE HYDROCHLORIDE 10 MG: 5 SOLUTION ORAL at 19:54

## 2019-03-25 RX ADMIN — PROPOFOL 200 MG: 10 INJECTION, EMULSION INTRAVENOUS at 07:27

## 2019-03-25 RX ADMIN — ONDANSETRON 4 MG: 2 INJECTION INTRAMUSCULAR; INTRAVENOUS at 07:56

## 2019-03-25 RX ADMIN — HYDROMORPHONE HYDROCHLORIDE 0.5 MG: 1 INJECTION, SOLUTION INTRAMUSCULAR; INTRAVENOUS; SUBCUTANEOUS at 10:17

## 2019-03-25 RX ADMIN — ONDANSETRON 4 MG: 2 INJECTION INTRAMUSCULAR; INTRAVENOUS at 15:37

## 2019-03-25 RX ADMIN — CEFAZOLIN SODIUM 3000 MG: 10 INJECTION, POWDER, FOR SOLUTION INTRAVENOUS at 15:37

## 2019-03-25 RX ADMIN — SODIUM CHLORIDE 125 ML/HR: 0.9 INJECTION, SOLUTION INTRAVENOUS at 06:11

## 2019-03-25 RX ADMIN — FENTANYL CITRATE 50 MCG: 50 INJECTION, SOLUTION INTRAMUSCULAR; INTRAVENOUS at 08:06

## 2019-03-25 RX ADMIN — CEFAZOLIN SODIUM 3000 MG: 10 INJECTION, POWDER, FOR SOLUTION INTRAVENOUS at 23:50

## 2019-03-25 RX ADMIN — GLYCOPYRROLATE 0.6 MG: 0.2 INJECTION INTRAMUSCULAR; INTRAVENOUS at 09:12

## 2019-03-25 RX ADMIN — HYDROMORPHONE HYDROCHLORIDE 0.5 MG: 1 INJECTION, SOLUTION INTRAMUSCULAR; INTRAVENOUS; SUBCUTANEOUS at 09:35

## 2019-03-25 RX ADMIN — HYDROMORPHONE HYDROCHLORIDE 0.5 MG: 1 INJECTION, SOLUTION INTRAMUSCULAR; INTRAVENOUS; SUBCUTANEOUS at 10:07

## 2019-03-25 RX ADMIN — EPHEDRINE SULFATE 10 MG: 50 INJECTION, SOLUTION INTRAVENOUS at 07:46

## 2019-03-25 RX ADMIN — MORPHINE SULFATE 4 MG: 4 INJECTION INTRAVENOUS at 15:37

## 2019-03-25 RX ADMIN — HEPARIN SODIUM 5000 UNITS: 5000 INJECTION, SOLUTION INTRAVENOUS; SUBCUTANEOUS at 06:11

## 2019-03-25 RX ADMIN — FENTANYL CITRATE 50 MCG: 50 INJECTION, SOLUTION INTRAMUSCULAR; INTRAVENOUS at 09:49

## 2019-03-25 RX ADMIN — METOCLOPRAMIDE 10 MG: 5 INJECTION, SOLUTION INTRAMUSCULAR; INTRAVENOUS at 11:13

## 2019-03-25 RX ADMIN — ROCURONIUM BROMIDE 10 MG: 100 INJECTION, SOLUTION INTRAVENOUS at 08:33

## 2019-03-25 RX ADMIN — SCOPALAMINE 1 PATCH: 1 PATCH, EXTENDED RELEASE TRANSDERMAL at 06:04

## 2019-03-25 RX ADMIN — ROCURONIUM BROMIDE 50 MG: 100 INJECTION, SOLUTION INTRAVENOUS at 07:27

## 2019-03-25 RX ADMIN — METRONIDAZOLE 500 MG: 500 INJECTION, SOLUTION INTRAVENOUS at 07:33

## 2019-03-25 RX ADMIN — MORPHINE SULFATE 4 MG: 4 INJECTION INTRAVENOUS at 12:55

## 2019-03-25 RX ADMIN — Medication 3000 MG: at 07:26

## 2019-03-25 RX ADMIN — FENTANYL CITRATE 50 MCG: 50 INJECTION, SOLUTION INTRAMUSCULAR; INTRAVENOUS at 08:01

## 2019-03-25 RX ADMIN — SODIUM CHLORIDE: 0.9 INJECTION, SOLUTION INTRAVENOUS at 09:18

## 2019-03-25 RX ADMIN — ACETAMINOPHEN 325 MG: 160 SUSPENSION ORAL at 19:52

## 2019-03-25 NOTE — ANESTHESIA POSTPROCEDURE EVALUATION
Post-Op Assessment Note    CV Status:  Stable    Pain management: adequate     Mental Status:  Alert and awake   Hydration Status:  Euvolemic   PONV Controlled:  Controlled   Airway Patency:  Patent  Airway: intubated   Post Op Vitals Reviewed: Yes      Staff: Anesthesiologist           BP      Temp      Pulse     Resp      SpO2

## 2019-03-25 NOTE — OP NOTE
[facility]   [address] [facility], [address]  [phone] (Fax)      Operative Report  LAPAROSCOPIC LEESA-EN-Y GASTRIC BYPASS AND INTRAOPERATIVE EGD     Patient Name: Deepak Berg    :  1975  MRN: [id number]  [facility]: [facility]  Surgery Date : 3/25/2019  Surgeons:  Surgeon(s) and Role:     * Albina Gallagher MD - Stefani Reaves 48, JESSEE - Assisting    Diagnosis:    Pre-Op Diagnosis Codes: Morbid obesity ([facility] ) [E66 01]  Body mass index is 51 79 kg/m²  Diabetes mellitus with no complication (HCC) [G03 6]  KEAGAN on CPAP [G47 33, Z99 89]  Gastroesophageal reflux disease without esophagitis [K21 9]    Post-Op Diagnosis Codes:     * Morbid obesity ([facility] ) [E66 01]     * Body mass index is 51 79 kg/m²  * Diabetes mellitus with no complication (HCC) [B57 1]     * KEAGAN on CPAP [G47 33, Z99 89]     * Gastroesophageal reflux disease without esophagitis [K21 9]    Procedure  1  Laparoscopic Leesa-en-Y Gastric Bypass  2  Intraoperative Endoscopy    Specimen(s):  * No specimens in log *    Estimated Blood Loss:    20 cc  [REMOVED] NG/OG/Enteral Tube Orogastric 14 Fr Right mouth (Removed)   Number of days: 0       Anesthesia Type:     General    Operative Indications: Morbid obesity ([facility] ) [E66 01]  Diabetes mellitus with no complication (HCC) [T49 6]  KEAGAN on CPAP [G47 33, Z99 89]  Gastroesophageal reflux disease without esophagitis [K21 9]  Body mass index is 51 79 kg/m²  Operative Findings:    Normal    Complications:     None    Procedure and Technique:    INDICATION:    Deepak Berg is a 40 y o  female with a Body mass index is 51 79 kg/m²  and a long standing history of morbid obesity and inability to lose a significant amount of weight on its own  This patient was found to be a good candidate to undergo a bariatric procedure upon being enrolled here at the [address] [place] [address]      OPERATIVE TECHNIQUE    The patient was taken to the operating room and placed in a supine position  A dose of IV antibiotic prophylaxis that consisted of Ancef 3g and Metronidazole 500mg was given  Also, 5000 units of subcutaneous unfractionated heparin to prevent DVT were administered  Sequential compression devices were placed on both lower extremities  After satisfactory general anesthesia induction and endotracheal intubation was achieved, the extremities were secured to prevent neurovascular and musculoskeletal injuries as best as possible  Subsequently, the abdominal wall was prepped and draped in a surgical standard sterile fashion  After a timeout was done and the patient was properly identified and the type of procedure was confirmed a supra-umbilical transverse skin incision was made, and the subcutaneous tissues dissected  Access to the peritoneal cavity was gained with an optical trocar  With this device, we were able to visualize the layers of the abdominal wall, and enter the peritoneal cavity under direct visualization  Pneumoperitoneum was then established with CO2 insufflation  Under direct laparoscopic visualization, four additional trocars were placed: a 12 mm in the right upper quadrant subcostal position in the anterior axillary line, a 12-mm port was placed in the right flank midclavicular line, a 12-mm port was placed in the left upper quadrant subcostal position in the midclavicular line and another 12-mm port was placed in the left quadrant anterior axillary line lateral to the supraumbilical port  With the trocars in place, the dissection was begun  The omentum of the transverse colon was identified and elevated, this allowed for the ligament of Treitz to be visualized  The small bowel was run about 60 cm to a point distal from the ligament of Treitz and was divided with a stapler and a 60 mm cartridge   The Jorge limb was then measured at 150 cm, and the 150 cm olya was brought in side-to-side opposition to the biliopancreatic limb  A side-to-side jejunojejunostomy was then created  This was accomplished by first making an antimesenteric enterotomy with cautery energy device  We then positioned the laparoscopic stapler with a 60-mm cartridge within the lumen of the bowel to create a stapled side-to-side anastomosis  The enterotomy was then approximated with a 2-0 silk suture, subsequently elevated and the stapler was then reloaded and positioned perpendicularly to the first staple lines, just below the margin of the enterotomy on the antimesenteric border of the bowel and closed transversely utilizing an additional 60-mm cartridge  The resulting mesenteric defect was then closed with a running nonabsorbable suture  A Brolin stitch was placed to prevent kinking  At this point we repositioned the patient into a reverse Trendelenburg and the [facility] liver retractor was placed in the subxiphoid position through the use of a 5-mm trocar incision  We then turned our attention to the gastroesophageal junction  The left berta was skeletonized dissecting at the angle of His  The pars flaccida was identified and incised  The lesser sac was entered below the lesser curve at the level just inferior to the take off of the left gastric artery  The left gastric artery and hepatic vagal branches were preserved  We then created a 30 cc gastric pouch  To accomplish this, serial firings of a laparoscopic stapler 60mm cartridge were utilized  The staple lines were reinforced with a butressing material  We created a pouch based on the lesser curve and in vertical orientation  This was accomplished by a  transverse firing of the stapler along the inferior edge of the pouch and then vertical serial firings of the stapler to the angle of His  This completely  the pouch from the gastric remnant  A 25 mm circular stapler anvil was passed through the mouth and into the esophagus and subsequently placed within the pouch    The inferior edge of the pouch was then opened with cautery and the anvil stem was brought through the anterior aspect of the pouch close to the staple line  We proceeded to divide the omentum all the way to the transverse colon  The end of the Jorge limb was opened with the cautery and the circular stapling device was brought through the dilated left upper quadrant 12-mm port site, and passed through the open end of the Jorge limb  The Jorge limb was then passed in a antecolic and antegastric position to the pouch  This was accomplished without tension and without twist   The stem of the stapling device was then brought through the antimesenteric border of the Jorge limb adjacent to the pouch  The stem and the anvil were united and the stapler was fired  This created an end-to-side gastrojejunostomy  The excess Jorge limb proximal to the anastomosis was then resected with the cautery energy device and a laparoscopic stapler with a 60-mm cartridge  The anastomosis was reinforced with interrupted absorbable sutures at the intersection of the staple lines  The distal Jorge limb was occluded and an EGD as well as an air insufflation test were performed  No intraoperative bleeding nor leaks were detected  I then covered the G-J anastomosis with a tongue of omentum in a Rui patch fashion and secured it in place with a single 2/0 Vicryl stitch  The sponge, needle and instrument count was reported complete  The 12-mm port site on the left flank that was dilated for the circular stapler as well as the Visiport trocar were then closed with the use of a suture closure device and a 0 absorbable suture  The liver retractor was removed under direct laparoscopic visualization, and no bleeding was noted  The remaining ports were then also removed under laparoscopic visualization  The skin incisions were all closed with 4-0 absorbable subcuticular suture   The patient tolerated the procedure well, was extubated uneventfully and was transferred to the recovery room in stable condition  I was present for the entire length of the procedure as the attending of record  No qualified resident was available to assist   The presence of an assistant was necessary for camera holding, traction and counter traction and for help with suturing and stapling in addition to performing the intraop-EGD        Patient Disposition:    PACU     Signature: Lory Cordero MD  Date: March 25, 2019  Time: 9:21 AM

## 2019-03-25 NOTE — PLAN OF CARE
Problem: GASTROINTESTINAL - ADULT  Goal: Minimal or absence of nausea and/or vomiting  Description  INTERVENTIONS:  - Administer IV fluids as ordered to ensure adequate hydration  - Maintain NPO status until nausea and vomiting are resolved  - Nasogastric tube as ordered  - Administer ordered antiemetic medications as needed  - Provide nonpharmacologic comfort measures as appropriate  - Advance diet as tolerated, if ordered  - Nutrition services referral to assist patient with adequate nutrition and appropriate food choices  Outcome: Progressing     Problem: PAIN - ADULT  Goal: Verbalizes/displays adequate comfort level or baseline comfort level  Description  Interventions:  - Encourage patient to monitor pain and request assistance  - Assess pain using appropriate pain scale  - Administer analgesics based on type and severity of pain and evaluate response  - Implement non-pharmacological measures as appropriate and evaluate response  - Consider cultural and social influences on pain and pain management  - Notify physician/advanced practitioner if interventions unsuccessful or patient reports new pain  Outcome: Progressing     Problem: INFECTION - ADULT  Goal: Absence or prevention of progression during hospitalization  Description  INTERVENTIONS:  - Assess and monitor for signs and symptoms of infection  - Monitor lab/diagnostic results  - Monitor all insertion sites, i e  indwelling lines, tubes, and drains  - Monitor endotracheal (as able) and nasal secretions for changes in amount and color  - [place] appropriate cooling/warming therapies per order  - Administer medications as ordered  - Instruct and encourage patient and family to use good hand hygiene technique  - Identify and instruct in appropriate isolation precautions for identified infection/condition  Outcome: Progressing  Goal: Absence of fever/infection during neutropenic period  Description  INTERVENTIONS:  - Monitor WBC  - Implement neutropenic guidelines  Outcome: Progressing     Problem: SAFETY ADULT  Goal: Patient will remain free of falls  Description  INTERVENTIONS:  - Assess patient frequently for physical needs  -  Identify cognitive and physical deficits and behaviors that affect risk of falls    -  [place] fall precautions as indicated by assessment   - Educate patient/family on patient safety including physical limitations  - Instruct patient to call for assistance with activity based on assessment  - Modify environment to reduce risk of injury  - Consider OT/PT consult to assist with strengthening/mobility  Outcome: Progressing  Goal: Maintain or return to baseline ADL function  Description  INTERVENTIONS:  -  Assess patient's ability to carry out ADLs; assess patient's baseline for ADL function and identify physical deficits which impact ability to perform ADLs (bathing, care of mouth/teeth, toileting, grooming, dressing, etc )  - Assess/evaluate cause of self-care deficits   - Assess range of motion  - Assess patient's mobility; develop plan if impaired  - Assess patient's need for assistive devices and provide as appropriate  - Encourage maximum independence but intervene and supervise when necessary  ¯ Involve family in performance of ADLs  ¯ Assess for home care needs following discharge   ¯ Request OT consult to assist with ADL evaluation and planning for discharge  ¯ Provide patient education as appropriate  Outcome: Progressing  Goal: Maintain or return mobility status to optimal level  Description  INTERVENTIONS:  - Assess patient's baseline mobility status (ambulation, transfers, stairs, etc )    - Identify cognitive and physical deficits and behaviors that affect mobility  - Identify mobility aids required to assist with transfers and/or ambulation (gait belt, sit-to-stand, lift, walker, cane, etc )  - [place] fall precautions as indicated by assessment  - Record patient progress and toleration of activity level on Mobility SBAR; progress patient to next Phase/Stage  - Instruct patient to call for assistance with activity based on assessment  - Request Rehabilitation consult to assist with strengthening/weightbearing, etc   Outcome: Progressing     Problem: DISCHARGE PLANNING  Goal: Discharge to home or other facility with appropriate resources  Description  INTERVENTIONS:  - Identify barriers to discharge w/patient and caregiver  - Arrange for needed discharge resources and transportation as appropriate  - Identify discharge learning needs (meds, wound care, etc )  - Arrange for interpretive services to assist at discharge as needed  - Refer to Case Management Department for coordinating discharge planning if the patient needs post-hospital services based on physician/advanced practitioner order or complex needs related to functional status, cognitive ability, or social support system  Outcome: Progressing     Problem: Knowledge Deficit  Goal: Patient/family/caregiver demonstrates understanding of disease process, treatment plan, medications, and discharge instructions  Description  Complete learning assessment and assess knowledge base    Interventions:  - Provide teaching at level of understanding  - Provide teaching via preferred learning methods  Outcome: Progressing

## 2019-03-26 VITALS
RESPIRATION RATE: 18 BRPM | OXYGEN SATURATION: 98 % | WEIGHT: 293 LBS | HEIGHT: 65 IN | HEART RATE: 69 BPM | DIASTOLIC BLOOD PRESSURE: 69 MMHG | TEMPERATURE: 98.6 F | SYSTOLIC BLOOD PRESSURE: 132 MMHG | BODY MASS INDEX: 48.82 KG/M2

## 2019-03-26 PROBLEM — K21.9 GASTROESOPHAGEAL REFLUX DISEASE WITHOUT ESOPHAGITIS: Status: RESOLVED | Noted: 2019-02-28 | Resolved: 2019-03-26

## 2019-03-26 LAB
ANION GAP SERPL CALCULATED.3IONS-SCNC: 8 MMOL/L (ref 4–13)
BUN SERPL-MCNC: 8 MG/DL (ref 5–25)
CALCIUM SERPL-MCNC: 9.1 MG/DL (ref 8.3–10.1)
CHLORIDE SERPL-SCNC: 102 MMOL/L (ref 100–108)
CO2 SERPL-SCNC: 27 MMOL/L (ref 21–32)
CREAT SERPL-MCNC: 0.73 MG/DL (ref 0.6–1.3)
ERYTHROCYTE [DISTWIDTH] IN BLOOD BY AUTOMATED COUNT: 15.6 % (ref 11.6–15.1)
GFR SERPL CREATININE-BSD FRML MDRD: 100 ML/MIN/1.73SQ M
GLUCOSE SERPL-MCNC: 133 MG/DL (ref 65–140)
HCT VFR BLD AUTO: 40 % (ref 34.8–46.1)
HGB BLD-MCNC: 12 G/DL (ref 11.5–15.4)
MCH RBC QN AUTO: 26 PG (ref 26.8–34.3)
MCHC RBC AUTO-ENTMCNC: 30 G/DL (ref 31.4–37.4)
MCV RBC AUTO: 87 FL (ref 82–98)
PLATELET # BLD AUTO: 219 THOUSANDS/UL (ref 149–390)
PMV BLD AUTO: 10.8 FL (ref 8.9–12.7)
POTASSIUM SERPL-SCNC: 4.2 MMOL/L (ref 3.5–5.3)
RBC # BLD AUTO: 4.62 MILLION/UL (ref 3.81–5.12)
SODIUM SERPL-SCNC: 137 MMOL/L (ref 136–145)
WBC # BLD AUTO: 14.33 THOUSAND/UL (ref 4.31–10.16)

## 2019-03-26 PROCEDURE — 99024 POSTOP FOLLOW-UP VISIT: CPT | Performed by: SURGERY

## 2019-03-26 PROCEDURE — C9113 INJ PANTOPRAZOLE SODIUM, VIA: HCPCS | Performed by: SURGERY

## 2019-03-26 PROCEDURE — 85027 COMPLETE CBC AUTOMATED: CPT | Performed by: SURGERY

## 2019-03-26 PROCEDURE — 99254 IP/OBS CNSLTJ NEW/EST MOD 60: CPT | Performed by: INTERNAL MEDICINE

## 2019-03-26 PROCEDURE — 80048 BASIC METABOLIC PNL TOTAL CA: CPT | Performed by: SURGERY

## 2019-03-26 RX ADMIN — OXYCODONE HYDROCHLORIDE 10 MG: 5 SOLUTION ORAL at 00:59

## 2019-03-26 RX ADMIN — METOCLOPRAMIDE 10 MG: 5 INJECTION, SOLUTION INTRAMUSCULAR; INTRAVENOUS at 10:41

## 2019-03-26 RX ADMIN — ACETAMINOPHEN 325 MG: 160 SUSPENSION ORAL at 06:29

## 2019-03-26 RX ADMIN — ACETAMINOPHEN 325 MG: 160 SUSPENSION ORAL at 10:39

## 2019-03-26 RX ADMIN — OXYCODONE HYDROCHLORIDE 10 MG: 5 SOLUTION ORAL at 10:39

## 2019-03-26 RX ADMIN — PANTOPRAZOLE SODIUM 40 MG: 40 INJECTION, POWDER, FOR SOLUTION INTRAVENOUS at 08:40

## 2019-03-26 RX ADMIN — METRONIDAZOLE 500 MG: 500 INJECTION, SOLUTION INTRAVENOUS at 00:44

## 2019-03-26 RX ADMIN — SODIUM CHLORIDE, SODIUM LACTATE, POTASSIUM CHLORIDE, AND CALCIUM CHLORIDE 125 ML/HR: .6; .31; .03; .02 INJECTION, SOLUTION INTRAVENOUS at 05:17

## 2019-03-26 RX ADMIN — ACETAMINOPHEN 325 MG: 160 SUSPENSION ORAL at 01:00

## 2019-03-26 RX ADMIN — OXYCODONE HYDROCHLORIDE 10 MG: 5 SOLUTION ORAL at 06:29

## 2019-03-26 NOTE — CONSULTS
[address] Internal Medicine  Consult- Que Hay 1975, 40 y o  female MRN: [id number]  Unit/Bed#: E5 -01 Encounter: [id number]  Primary Care Provider: Elfego Blanco MD   Date and time admitted to hospital: 3/25/2019  5:38 AM      Inpatient consult to Internal Medicine  Consult performed by: Paulo Escobedo DO  Consult ordered by: Sarah Campos MD      ASSESSMENT AND PLAN  * Morbid obesity [facility])  Assessment & Plan  Morbid obesity status post laparoscopic Jorge-en-Y gastric bypass  Diabetes mellitus [facility])  Assessment & Plan  Lab Results   Component Value Date    HGBA1C 6 4 (H) 01/26/2019       Recent Labs     03/25/19  0619 03/25/19  0954   POCGLU 95 106     Diabetes mellitus  Has been stable off any insulin regimen  Should hold farxiga but can continue victoza    Fibromyalgia  Assessment & Plan  Continue gabapentin but should hold on duloxetine as this is sustained release and cannot be crushed    Anxiety and depression  Assessment & Plan  Patient previously on wellbutrin 300 mg daily  She has verified with PCP and pharmacy that her new prescription for wellbutrin  mg q 12 hours can be crushed    Benign hypertension  Assessment & Plan  Can continue lisinopril as previously taken 10 mg daily    Hypothyroidism  Assessment & Plan  Continue levothyroxine    Thank you for this consultation, patient is medically stable for discharge at this time  This has been communicated to primary service  _____________________________________________________________________________    HPI: Que Hay is a 40y o  year old female who presents for elective Jorge-en-Y laparoscopic gastric bypass  Postoperatively she is doing very well  She denies any chest pain or shortness of breath  Does have anticipate abdominal pain  ALLERGIES  Allergies   Allergen Reactions    Metformin Diarrhea     HOME MEDICATIONS    Prior to Admission Medications   Prescriptions Last Dose Informant Patient Reported? Taking? ALPRAZolam (XANAX) 0 5 mg tablet 3/25/2019 at 0500 Self No Yes   Sig: Take 1 tablet (0 5 mg total) by mouth 2 (two) times a day as needed for anxiety for up to 30 days   Cholecalciferol (VITAMIN D3) 2000 units capsule 3/24/2019 at 0900 Self Yes Yes   Sig: Take 2,000 Units by mouth daily    Cyanocobalamin (B-12) 1000 MCG CAPS 3/24/2019 at 0900 Self Yes Yes   Sig: Take 1 capsule by mouth daily    DULoxetine (CYMBALTA) 60 mg delayed release capsule 3/25/2019 at 0455 Self Yes Yes   Sig: Take 60 mg by mouth daily   Dapagliflozin Propanediol (FARXIGA) 5 MG TABS 3/24/2019 at 0900 Self No Yes   Sig: Take 1 tablet (5 mg total) by mouth daily   GABAPENTIN PO 3/24/2019 at 2200 Self Yes Yes   Sig: Take 500 mg by mouth 3 (three) times a day   acetaminophen (TYLENOL) 500 mg tablet 3/20/2019 Self Yes Yes   Sig: Take 500 mg by mouth every 6 (six) hours as needed for mild pain   aspirin (ECOTRIN LOW STRENGTH) 81 mg EC tablet 3/18/2019 Self Yes Yes   Sig: Take 81 mg by mouth daily   atorvastatin (LIPITOR) 10 mg tablet 3/24/2019 at 0900 Self No Yes   Sig: Take 1 tablet (10 mg total) by mouth daily for 90 days   buPROPion (WELLBUTRIN SR) 150 mg 12 hr tablet 3/24/2019 at 1800  No No   Sig: Take 1 tablet (150 mg total) by mouth 2 (two) times a day for 30 days   levothyroxine 50 mcg tablet 3/25/2019 at 0455  No No   Sig: TAKE 1 TABLET BY MOUTH ONCE DAILY AS DIRECTED   liraglutide (VICTOZA) injection 3/24/2019 at 0900 Self No Yes   Sig: Inject 0 3 mL (1 8 mg total) under the skin daily   lisinopril (ZESTRIL) 10 mg tablet 3/24/2019 at 0900 Self No Yes   Sig: Take 1 tablet (10 mg total) by mouth daily   omeprazole (PriLOSEC) 20 mg delayed release capsule   No No   Sig: Take 1 capsule (20 mg total) by mouth daily for 90 days   oxyCODONE-acetaminophen (PERCOCET) 5-325 mg per tablet   No No   Sig: Take 1 tablet by mouth every 4 (four) hours as needed for moderate painMax Daily Amount: 6 tablets      Facility-Administered Medications: None CURRENT MEDICATIONS  Current Facility-Administered Medications   Medication Dose Route Frequency Provider Last Rate Last Dose    acetaminophen (TYLENOL) oral suspension 320 mg  320 mg Oral Q4H PRN Sanam Betancourt MD        oxyCODONE (ROXICODONE) oral solution 5 mg  5 mg Oral Q4H PRN Sanam Betancourt MD        And    acetaminophen (TYLENOL) oral suspension 325 mg  325 mg Oral Q4H PRN Sanam Betancourt MD   325 mg at 03/26/19 1039    oxyCODONE (ROXICODONE) oral solution 10 mg  10 mg Oral Q4H PRN Sanam Betancourt MD   10 mg at 03/26/19 1039    And    acetaminophen (TYLENOL) oral suspension 325 mg  325 mg Oral Q4H PRN Sanam Betancourt MD   325 mg at 03/26/19 0100    hydrALAZINE (APRESOLINE) injection 10 mg  10 mg Intravenous Q6H PRN Walker Band, PA-C        lactated ringers infusion  75 mL/hr Intravenous Continuous Walker Band, PA-C   Stopped at 03/26/19 1050    metoclopramide (REGLAN) injection 10 mg  10 mg Intravenous Q6H PRN Walker Band, PA-C   10 mg at 03/26/19 1041    morphine (PF) 4 mg/mL injection 4 mg  4 mg Intravenous Q2H PRN Sanam Betancourt MD   4 mg at 03/25/19 1537    morphine injection 2 mg  2 mg Intravenous Q2H PRN Sanam Betancourt MD        ondansetron [facility]) injection 4 mg  4 mg Intravenous Q4H PRN Sanam Betancourt MD   4 mg at 03/25/19 1537    pantoprazole (PROTONIX) injection 40 mg  40 mg Intravenous Q24H Renu Pena MD   40 mg at 03/26/19 0840    phenol (CHLORASEPTIC) 1 4 % mucosal liquid 2 spray  2 spray Mouth/Throat Q2H PRN Sanam Betancourt MD        promethazine (PHENERGAN) injection 12 5 mg  12 5 mg Intravenous Q6H PRN Walker Band, PA-C         PAST HISTORY  Past Medical History:   Diagnosis Date    Anxiety     Arthritis     CPAP (continuous positive airway pressure) dependence     Depression     Diabetes mellitus ([facility] [address] )     Eczema     Fibromyalgia, primary     Hyperlipidemia     Hypertension     Hypothyroidism     Lupus anticoagulant disorder ([facility] [address] )     Migraine     Night sweats     Obesity     Psoriasis     Sleep apnea      Past Surgical History:   Procedure Laterality Date    COLONOSCOPY      HYSTERECTOMY Bilateral 2018    OOPHORECTOMY Bilateral 2018    VA COLONOSCOPY FLX DX W/COLLJ SPEC WHEN PFRMD N/A 2017    Procedure: COLONOSCOPY;  Surgeon: Winston Nassar DO;  [facility]: [facility]; Service: Gastroenterology    VA EGD TRANSORAL BIOPSY SINGLE/MULTIPLE N/A 2019    Procedure: ESOPHAGOGASTRODUODENOSCOPY (EGD) with bx;  Surgeon: Sanam Betancourt MD;  [facility];   Service: Bariatrics    VA LAP GASTRIC BYPASS/LEESA-EN-Y N/A 3/25/2019    Procedure: LAPAROSCOPIC LEESA-EN-Y GASTRIC BYPASS AND INTRAOPERATIVE EGD;  Surgeon: Sanam Betancourt MD;  [facility];  Service: Mertha Risen <=250 GRAM  W TUBE/OVARY N/A 2017    Procedure: ROBOTIC TOTAL LAPAROSCOPIC HYSTERECTOMY/ BSO;  Surgeon: Lew Rivera MD;  Location: [facility];  Service: Gynecology Oncology    WISDOM TOOTH EXTRACTION       SOCIAL HISTORY  Social History     Socioeconomic History    Marital status: /Civil Union     Spouse name: Not on file    Number of children: Not on file    Years of education: Not on file    Highest education level: Not on file   Occupational History    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     Employer: [employer]   Social Needs    Financial resource strain: Not on file    Food insecurity:     Worry: Not on file     Inability: Not on file    Transportation needs:     Medical: Not on file     Non-medical: Not on file   Tobacco Use    Smoking status: Former Smoker     Packs/day: 0 75     Years: 15 00     Pack years:  25     Last attempt to quit: 2018     Years since quittin 3    Smokeless tobacco: Never Used   Substance and Sexual Activity    Alcohol use: Yes     Frequency: Monthly or less     Comment: social    Drug use: No    Sexual activity: Not on file   Lifestyle    Physical activity:     Days per week: Not on file     Minutes per session: Not on file    Stress: Not on file   Relationships    Social connections:     Talks on phone: Not on file     Gets together: Not on file     Attends Quaker service: Not on file     Active member of club or organization: Not on file     Attends meetings of clubs or organizations: Not on file     Relationship status: Not on file    Intimate partner violence:     Fear of current or ex partner: Not on file     Emotionally abused: Not on file     Physically abused: Not on file     Forced sexual activity: Not on file   Other Topics Concern    Not on file   Social History Narrative    Not on file     FAMILY HISTORY  Family History   Problem Relation Age of Onset    Heart disease Mother         Cardiac disorder    Diabetes Mother     Hypertension Mother     Hyperthyroidism Mother     Diabetes Father     Hypertension Father     Lung cancer Maternal Grandfather     Colon cancer Cousin 79    Colon cancer Maternal Aunt 52    Lung cancer Family     Diabetes Sister     No Known Problems Brother        REVIEW OF SYSTEMS  History obtained from chart review and the patient  General ROS: negative for - chills or fever  Psychological ROS: negative for - hallucinations or irritability  Ophthalmic ROS: negative for - loss of vision or photophobia  Respiratory ROS: negative for - cough or shortness of breath  Cardiovascular ROS: negative for - chest pain  Gastrointestinal ROS: positive for - abdominal pain  Genito-Urinary ROS: negative for - hematuria  Musculoskeletal ROS: negative for - joint swelling  Neurological ROS: negative for - seizures  Otherwise, all other 12 point review of systems normal     OBJECTIVE  Temp:  [97 °F (36 1 °C)-98 6 °F (37 °C)] 98 6 °F (37 °C)  HR:  [68-81] 69  Resp:  [18-20] 18  BP: ()/(53-78) 132/69    PHYSICAL EXAM  General appearance: alert, appears stated age and cooperative  Skin: abdominal incisions intact  Head: Normocephalic, without obvious abnormality, atraumatic  Eyes: conjunctivae/corneas clear  PERRL, EOM's intact  Lungs: clear to auscultation bilaterally  Heart: regular rate and rhythm  Abdomen: Soft nontender abdominal incisions intact  Back: symmetric, no curvature  ROM normal  No CVA tenderness  Extremities: extremities normal, atraumatic, no cyanosis or edema  Neurologic: Grossly normal    Lab Results: I have personally reviewed pertinent reports  Labs:  Results from last 7 days   Lab Units 03/26/19  0553   WBC Thousand/uL 14 33*   HEMOGLOBIN g/dL 12 0   HEMATOCRIT % 40 0   MCV fL 87   PLATELETS Thousands/uL 219           Results from last 7 days   Lab Units 03/26/19  0553   SODIUM mmol/L 137   POTASSIUM mmol/L 4 2   CHLORIDE mmol/L 102   CO2 mmol/L 27   ANION GAP mmol/L 8   BUN mg/dL 8   CREATININE mg/dL 0 73   CALCIUM mg/dL 9 1   EGFR ml/min/1 73sq m 100   GLUCOSE RANDOM mg/dL 133              Results from last 7 days   Lab Units 03/25/19  0954 03/25/19  0619   POC GLUCOSE mg/dl 106 95       Imaging:   No results found  Total Time for Visit, including Counseling / Coordination of Care: 35 mins  Greater than 50% of this total time spent on direct patient counseling and coordination of care  ** Please Note: This note has been constructed using a voice recognition system   **

## 2019-03-26 NOTE — ASSESSMENT & PLAN NOTE
Patient previously on wellbutrin 300 mg daily    She has verified with PCP and pharmacy that her new prescription for wellbutrin  mg q 12 hours can be crushed

## 2019-03-26 NOTE — UTILIZATION REVIEW
Initial Clinical Review    Age/Sex: 40 y o  female     Surgery Date:    3/25/2019    Procedure: Pre-Op Diagnosis Codes: Morbid obesity ([facility] [address] ) [E66 01]  Body mass index is 51 79 kg/m²  Diabetes mellitus with no complication (HCC) [Y46 8]  KEAGAN on CPAP [G47 33, Z99 89]  Gastroesophageal reflux disease without esophagitis [K21 9]     Post-Op Diagnosis Codes:     * Morbid obesity ([facility] [address] ) [E66 01]     * Body mass index is 51 79 kg/m²  * Diabetes mellitus with no complication (HCC) [L89 2]     * KEAGAN on CPAP [G47 33, Z99 89]     * Gastroesophageal reflux disease without esophagitis [K21 9]     Procedure  1  Laparoscopic Jorge-en-Y Gastric Bypass  2  Intraoperative Endoscopy           Anesthesia:    general    Admission Orders: Date/Time/Statement: 3/25/19 @ 0926   Orders Placed This Encounter   Procedures    Inpatient Admission     Standing Status:   Standing     Number of Occurrences:   1     Order Specific Question:   Admitting Physician     Answer:   Joan Rodriguez     Order Specific Question:   Level of Care     Answer:   Med Surg [16]     Order Specific Question:   Bed Type     Answer:   Bariatric [1]     Order Specific Question:   Estimated length of stay     Answer:   One Midnight     Vital Signs: /69 (BP Location: Left arm)   Pulse 69   Temp 98 6 °F (37 °C) (Tympanic)   Resp 18   Ht 5' 5" (1 651 m)   Wt (!) 141 kg (311 lb 3 2 oz)   LMP 11/18/2018   SpO2 98%   BMI 51 79 kg/m²   Diet:        Diet Orders   (From admission, onward)            Start     Ordered    03/25/19 1800  Diet Bariatric; Bariatric Clear Liquid  Diet effective now     Question Answer Comment   Diet Type Bariatric    Bariatric Bariatric Clear Liquid    RD to adjust diet per protocol?  No        03/25/19 1054    03/25/19 1047  Room Service  Once     Question:  Type of Service  Answer:  Room Service-Appropriate    03/25/19 1046        Mobility:   As  yue    DVT Prophylaxis:   SCD'S    Pain Control:   Pain Medications acetaminophen (TYLENOL) 500 mg tablet Take 500 mg by mouth every 6 (six) hours as needed for mild pain    aspirin (ECOTRIN LOW STRENGTH) 81 mg EC tablet Take 81 mg by mouth daily    DULoxetine (CYMBALTA) 60 mg delayed release capsule Take 60 mg by mouth daily    GABAPENTIN PO Take 500 mg by mouth 3 (three) times a day      IV  protonix  Daily  IV  Flagyl Q 8 hrs  IV ancef Q 8 hrs  IVF  75/hr  IV  MSo4  PRN (  X 1  3/25)  IV  zofran Prn ( x1  3/25)    POST OP PROGRESS  NOTE   3/26    Morbid Obesity s/p LAPAROSCOPIC LEESA-EN-Y GASTRIC BYPASS AND INTRAOPERATIVE EGD 3/25/2019 with stable post op course  Encourage PO fluids, ambulation, and incentive spirometry  If patient continues to tolerate adequate PO fluids will plan for D/C this afternoon       Plan of care was discussed with patient and patient's nurse  Care plan discussed with Dr Danielle Bautista: Continue bariatric clear liquid diet, ambulation, incentive spirometry              Network Utilization Review Department  Phone: [phone]; Fax [phone]  [email or web address]  ATTENTION: Please call with any questions or concerns to [phone]  and carefully listen to the prompts so that you are directed to the right person  Send all requests for admission clinical reviews, approved or denied determinations and any other requests to fax [phone]   All voicemails are confidential

## 2019-03-26 NOTE — DISCHARGE INSTRUCTIONS

## 2019-03-26 NOTE — ASSESSMENT & PLAN NOTE
Lab Results   Component Value Date    HGBA1C 6 4 (H) 01/26/2019       Recent Labs     03/25/19  0619 03/25/19  0954   POCGLU 95 106     Diabetes mellitus  Has been stable off any insulin regimen    Should hold farxiga but can continue victoza

## 2019-03-26 NOTE — ASSESSMENT & PLAN NOTE
Continue gabapentin but should hold on duloxetine as this is sustained release and cannot be crushed

## 2019-03-26 NOTE — DISCHARGE INSTR - AVS FIRST PAGE
your pain medications from [address] in [facility]   Stay hydrated, drink cups of water every 15 mins  Mild nausea is ok as long as you can drink fluids  Take your omeprazole daily  Crush your pills and open capsules, mix with liquid to drink    Follow up with Dr Linda Montelongo and your PCP within the next week

## 2019-03-26 NOTE — DISCHARGE SUMMARY
Discharge Summary - Robi Mobley 40 y o  female MRN: [id number]    Unit/Bed#: E5 -01 Encounter: [id number]      Pre-Operative Diagnosis: Pre-Op Diagnosis Codes:     * Morbid obesity ([facility] [address] ) [E66 01]     * [facility] complication [facility] ) [I84 1]     * KEAGAN on CPAP [G47 33, Z99 89]     * Gastroesophageal reflux disease without esophagitis [K21 9]    Post-Operative Diagnosis: Post-Op Diagnosis Codes:     * Morbid obesity ([facility] [address] ) [E66 01]     * Diabetes mellitus with no complication ([facility] ) [J29 2]     * KEAGAN on CPAP [G47 33, Z99 89]     * Gastroesophageal reflux disease without esophagitis [K21 9]    Procedures Performed:  Procedure(s):  LAPAROSCOPIC LEESA-EN-Y GASTRIC BYPASS AND INTRAOPERATIVE EGD    Surgeon: Claudean Jubilee, MD    See H & P for full details of admission and Operative Note for full details of operations performed  Patient tolerated surgery well without complications  In the morning postoperative Day 1, the patient had mild nausea and abdominal pain  Tolerated a clear liquid diet without vomiting  Able to ambulate and voiding independently  Patient was deemed ready for discharge home  Patient was seen and examined prior to discharge  Provisions for Follow-Up Care:  See After Visit Summary/Discharge Instructions for information related to follow-up care and home orders  Disposition: Home, in stable condition  Planned Readmission: No    Discharge Medications:  See After Visit Summary/Discharge Instructions for reconciled discharge medications provided to patient and family  Post Operative instructions: Reviewed with patient and/or family      Signature:   Zeny Rosenthal PA-C  Date: 3/26/2019 Time: 10:20 AM

## 2019-03-26 NOTE — PROGRESS NOTES
Progress Note - Bariatric Surgery   Dick Sick 40 y o  female MRN: [id number]  Unit/Bed#: E5 -01 Encounter: [id number]      Subjective/Objective     Subjective: Patient with morbid obesity s/p LAPAROSCOPIC LEESA-EN-Y GASTRIC BYPASS AND INTRAOPERATIVE EGD 3/25/2019 with Dr Lion Craig  Tolerating liquid diet without nausea or vomiting, pain adequately controlled on oral pain medication, ambulating without assistance, voiding well, using incentive spirometer  Denies fevers, chills, sweats, SOB, CP, calf pain  Objective:    /69 (BP Location: Left arm)   Pulse 69   Temp 98 6 °F (37 °C) (Tympanic)   Resp 18   Ht 5' 5" (1 651 m)   Wt (!) 141 kg (311 lb 3 2 oz)   LMP 11/18/2018   SpO2 98%   BMI 51 79 kg/m²       Intake/Output Summary (Last 24 hours) at 3/26/2019 0839  Last data filed at 3/26/2019 9173  Gross per 24 hour   Intake 2454 17 ml   Output 735 ml   Net 1719 17 ml       Invasive Devices     Peripheral Intravenous Line            Peripheral IV 03/25/19 Left Hand 1 day                ROS: 10-point system completed  All negative except see HPI  Physical Exam    General Appearance:    Alert, cooperative, no distress, appears stated age   Head:    Normocephalic, without obvious abnormality, atraumatic   Lungs:     Respirations unlabored   Heart:    Regular rate and rhythm   Abdomen:     Soft, appropriate tenderness,  no masses, no organomegaly,   non distended   Extremities:   Extremities normal, atraumatic, no cyanosis or edema   Neurologic:  Incision:  Psych:   Normal strength and sensation    Clean, dry, and intact    Normal mood and affect       Lab, Imaging and other studies:  I have personally reviewed pertinent lab results    , CBC:   Lab Results   Component Value Date    WBC 14 33 (H) 03/26/2019    HGB 12 0 03/26/2019    HCT 40 0 03/26/2019    MCV 87 03/26/2019     03/26/2019    MCH 26 0 (L) 03/26/2019    MCHC 30 0 (L) 03/26/2019    RDW 15 6 (H) 03/26/2019    MPV 10 8 03/26/2019 , CMP:   Lab Results   Component Value Date    SODIUM 137 03/26/2019    K 4 2 03/26/2019     03/26/2019    CO2 27 03/26/2019    BUN 8 03/26/2019    CREATININE 0 73 03/26/2019    CALCIUM 9 1 03/26/2019    EGFR 100 03/26/2019        VTE Mechanical Prophylaxis: sequential compression device    Assessment/Plan  1)  Morbid Obesity s/p LAPAROSCOPIC LEESA-EN-Y GASTRIC BYPASS AND INTRAOPERATIVE EGD 3/25/2019 with stable post op course  Encourage PO fluids, ambulation, and incentive spirometry  If patient continues to tolerate adequate PO fluids will plan for D/C this afternoon  Plan of care was discussed with patient and patient's nurse  Care plan discussed with Dr Cherylene Bonus: Continue bariatric clear liquid diet, ambulation, incentive spirometry

## 2019-03-26 NOTE — PLAN OF CARE
Problem: GASTROINTESTINAL - ADULT  Goal: Minimal or absence of nausea and/or vomiting  Description  INTERVENTIONS:  - Administer IV fluids as ordered to ensure adequate hydration  - Maintain NPO status until nausea and vomiting are resolved  - Nasogastric tube as ordered  - Administer ordered antiemetic medications as needed  - Provide nonpharmacologic comfort measures as appropriate  - Advance diet as tolerated, if ordered  - Nutrition services referral to assist patient with adequate nutrition and appropriate food choices  Outcome: Progressing     Problem: PAIN - ADULT  Goal: Verbalizes/displays adequate comfort level or baseline comfort level  Description  Interventions:  - Encourage patient to monitor pain and request assistance  - Assess pain using appropriate pain scale  - Administer analgesics based on type and severity of pain and evaluate response  - Implement non-pharmacological measures as appropriate and evaluate response  - Consider cultural and social influences on pain and pain management  - Notify physician/advanced practitioner if interventions unsuccessful or patient reports new pain  Outcome: Progressing     Problem: INFECTION - ADULT  Goal: Absence or prevention of progression during hospitalization  Description  INTERVENTIONS:  - Assess and monitor for signs and symptoms of infection  - Monitor lab/diagnostic results  - Monitor all insertion sites, i e  indwelling lines, tubes, and drains  - Monitor endotracheal (as able) and nasal secretions for changes in amount and color  - [place] appropriate cooling/warming therapies per order  - Administer medications as ordered  - Instruct and encourage patient and family to use good hand hygiene technique  - Identify and instruct in appropriate isolation precautions for identified infection/condition  Outcome: Progressing  Goal: Absence of fever/infection during neutropenic period  Description  INTERVENTIONS:  - Monitor WBC  - Implement neutropenic guidelines  Outcome: Progressing     Problem: SAFETY ADULT  Goal: Patient will remain free of falls  Description  INTERVENTIONS:  - Assess patient frequently for physical needs  -  Identify cognitive and physical deficits and behaviors that affect risk of falls    -  [place] fall precautions as indicated by assessment   - Educate patient/family on patient safety including physical limitations  - Instruct patient to call for assistance with activity based on assessment  - Modify environment to reduce risk of injury  - Consider OT/PT consult to assist with strengthening/mobility  Outcome: Progressing  Goal: Maintain or return to baseline ADL function  Description  INTERVENTIONS:  -  Assess patient's ability to carry out ADLs; assess patient's baseline for ADL function and identify physical deficits which impact ability to perform ADLs (bathing, care of mouth/teeth, toileting, grooming, dressing, etc )  - Assess/evaluate cause of self-care deficits   - Assess range of motion  - Assess patient's mobility; develop plan if impaired  - Assess patient's need for assistive devices and provide as appropriate  - Encourage maximum independence but intervene and supervise when necessary  ¯ Involve family in performance of ADLs  ¯ Assess for home care needs following discharge   ¯ Request OT consult to assist with ADL evaluation and planning for discharge  ¯ Provide patient education as appropriate  Outcome: Progressing  Goal: Maintain or return mobility status to optimal level  Description  INTERVENTIONS:  - Assess patient's baseline mobility status (ambulation, transfers, stairs, etc )    - Identify cognitive and physical deficits and behaviors that affect mobility  - Identify mobility aids required to assist with transfers and/or ambulation (gait belt, sit-to-stand, lift, walker, cane, etc )  - [place] fall precautions as indicated by assessment  - Record patient progress and toleration of activity level on Mobility SBAR; progress patient to next Phase/Stage  - Instruct patient to call for assistance with activity based on assessment  - Request Rehabilitation consult to assist with strengthening/weightbearing, etc   Outcome: Progressing     Problem: DISCHARGE PLANNING  Goal: Discharge to home or other facility with appropriate resources  Description  INTERVENTIONS:  - Identify barriers to discharge w/patient and caregiver  - Arrange for needed discharge resources and transportation as appropriate  - Identify discharge learning needs (meds, wound care, etc )  - Arrange for interpretive services to assist at discharge as needed  - Refer to Case Management Department for coordinating discharge planning if the patient needs post-hospital services based on physician/advanced practitioner order or complex needs related to functional status, cognitive ability, or social support system  Outcome: Progressing     Problem: Knowledge Deficit  Goal: Patient/family/caregiver demonstrates understanding of disease process, treatment plan, medications, and discharge instructions  Description  Complete learning assessment and assess knowledge base    Interventions:  - Provide teaching at level of understanding  - Provide teaching via preferred learning methods  Outcome: Progressing

## 2019-03-27 ENCOUNTER — TELEPHONE (OUTPATIENT)
Dept: BEHAVIORAL/MENTAL HEALTH CLINIC | Facility: CLINIC | Age: 44
End: 2019-03-27

## 2019-03-27 ENCOUNTER — TELEPHONE (OUTPATIENT)
Dept: BARIATRICS | Facility: CLINIC | Age: 44
End: 2019-03-27

## 2019-03-28 ENCOUNTER — TRANSITIONAL CARE MANAGEMENT (OUTPATIENT)
Dept: FAMILY MEDICINE CLINIC | Facility: CLINIC | Age: 44
End: 2019-03-28

## 2019-04-04 ENCOUNTER — OFFICE VISIT (OUTPATIENT)
Dept: BARIATRICS | Facility: CLINIC | Age: 44
End: 2019-04-04

## 2019-04-04 VITALS
WEIGHT: 293 LBS | DIASTOLIC BLOOD PRESSURE: 70 MMHG | SYSTOLIC BLOOD PRESSURE: 122 MMHG | HEIGHT: 65 IN | TEMPERATURE: 98 F | BODY MASS INDEX: 48.82 KG/M2 | HEART RATE: 70 BPM

## 2019-04-04 DIAGNOSIS — E66.01 MORBID OBESITY WITH BMI OF 50.0-59.9, ADULT (HCC): Primary | ICD-10-CM

## 2019-04-04 DIAGNOSIS — Z98.84 BARIATRIC SURGERY STATUS: Primary | ICD-10-CM

## 2019-04-04 DIAGNOSIS — E66.01 MORBID OBESITY WITH BMI OF 50.0-59.9, ADULT (HCC): ICD-10-CM

## 2019-04-04 PROCEDURE — 99024 POSTOP FOLLOW-UP VISIT: CPT | Performed by: STUDENT IN AN ORGANIZED HEALTH CARE EDUCATION/TRAINING PROGRAM

## 2019-04-04 PROCEDURE — RECHECK: Performed by: DIETITIAN, REGISTERED

## 2019-04-04 RX ORDER — MULTIVITAMIN
1 TABLET ORAL DAILY
COMMUNITY
End: 2022-02-04 | Stop reason: SDUPTHER

## 2019-04-09 ENCOUNTER — OFFICE VISIT (OUTPATIENT)
Dept: FAMILY MEDICINE CLINIC | Facility: CLINIC | Age: 44
End: 2019-04-09
Payer: COMMERCIAL

## 2019-04-09 VITALS
RESPIRATION RATE: 18 BRPM | SYSTOLIC BLOOD PRESSURE: 96 MMHG | HEART RATE: 87 BPM | BODY MASS INDEX: 48.82 KG/M2 | WEIGHT: 293 LBS | HEIGHT: 65 IN | OXYGEN SATURATION: 97 % | DIASTOLIC BLOOD PRESSURE: 56 MMHG

## 2019-04-09 DIAGNOSIS — F41.9 ANXIETY AND DEPRESSION: ICD-10-CM

## 2019-04-09 DIAGNOSIS — E11.69 DIABETES MELLITUS TYPE 2 IN OBESE (HCC): ICD-10-CM

## 2019-04-09 DIAGNOSIS — F51.01 PRIMARY INSOMNIA: ICD-10-CM

## 2019-04-09 DIAGNOSIS — I10 ESSENTIAL HYPERTENSION: ICD-10-CM

## 2019-04-09 DIAGNOSIS — F32.A ANXIETY AND DEPRESSION: ICD-10-CM

## 2019-04-09 DIAGNOSIS — E66.9 DIABETES MELLITUS TYPE 2 IN OBESE (HCC): ICD-10-CM

## 2019-04-09 DIAGNOSIS — Z90.710 STATUS POST LAPAROSCOPIC HYSTERECTOMY: ICD-10-CM

## 2019-04-09 DIAGNOSIS — Z09 HOSPITAL DISCHARGE FOLLOW-UP: Primary | ICD-10-CM

## 2019-04-09 PROBLEM — E66.813 CLASS 3 SEVERE OBESITY DUE TO EXCESS CALORIES WITH SERIOUS COMORBIDITY IN ADULT (HCC): Status: ACTIVE | Noted: 2018-10-24

## 2019-04-09 PROCEDURE — 99495 TRANSJ CARE MGMT MOD F2F 14D: CPT | Performed by: FAMILY MEDICINE

## 2019-04-09 PROCEDURE — 1111F DSCHRG MED/CURRENT MED MERGE: CPT | Performed by: FAMILY MEDICINE

## 2019-04-09 RX ORDER — LISINOPRIL 10 MG/1
5 TABLET ORAL DAILY
Qty: 30 TABLET | Refills: 5
Start: 2019-04-09 | End: 2019-05-23

## 2019-04-09 RX ORDER — ALPRAZOLAM 0.5 MG/1
0.5 TABLET ORAL 2 TIMES DAILY PRN
Qty: 60 TABLET | Refills: 0 | Status: SHIPPED | OUTPATIENT
Start: 2019-04-09 | End: 2019-07-02 | Stop reason: SDUPTHER

## 2019-04-11 DIAGNOSIS — F41.9 ANXIETY AND DEPRESSION: ICD-10-CM

## 2019-04-11 DIAGNOSIS — F32.A ANXIETY AND DEPRESSION: ICD-10-CM

## 2019-04-11 DIAGNOSIS — F51.01 PRIMARY INSOMNIA: ICD-10-CM

## 2019-04-11 RX ORDER — ALPRAZOLAM 0.5 MG/1
0.5 TABLET ORAL 2 TIMES DAILY PRN
Qty: 60 TABLET | Refills: 0 | OUTPATIENT
Start: 2019-04-11 | End: 2019-05-11

## 2019-04-17 DIAGNOSIS — E11.9 TYPE 2 DIABETES MELLITUS WITHOUT COMPLICATION, WITHOUT LONG-TERM CURRENT USE OF INSULIN (HCC): ICD-10-CM

## 2019-04-17 RX ORDER — LIRAGLUTIDE 6 MG/ML
INJECTION SUBCUTANEOUS
Qty: 15 PEN | Refills: 3 | Status: SHIPPED | OUTPATIENT
Start: 2019-04-17 | End: 2020-04-23 | Stop reason: SDUPTHER

## 2019-05-07 ENCOUNTER — TELEPHONE (OUTPATIENT)
Dept: SLEEP CENTER | Facility: CLINIC | Age: 44
End: 2019-05-07

## 2019-05-07 ENCOUNTER — OFFICE VISIT (OUTPATIENT)
Dept: SLEEP CENTER | Facility: CLINIC | Age: 44
End: 2019-05-07
Payer: COMMERCIAL

## 2019-05-07 VITALS
WEIGHT: 280 LBS | HEART RATE: 76 BPM | DIASTOLIC BLOOD PRESSURE: 72 MMHG | HEIGHT: 65 IN | BODY MASS INDEX: 46.65 KG/M2 | SYSTOLIC BLOOD PRESSURE: 108 MMHG

## 2019-05-07 DIAGNOSIS — G47.33 OBSTRUCTIVE SLEEP APNEA TREATED WITH CONTINUOUS POSITIVE AIRWAY PRESSURE (CPAP): Primary | ICD-10-CM

## 2019-05-07 DIAGNOSIS — Z99.89 OBSTRUCTIVE SLEEP APNEA TREATED WITH CONTINUOUS POSITIVE AIRWAY PRESSURE (CPAP): Primary | ICD-10-CM

## 2019-05-07 PROCEDURE — 99213 OFFICE O/P EST LOW 20 MIN: CPT | Performed by: NURSE PRACTITIONER

## 2019-05-13 ENCOUNTER — HOSPITAL ENCOUNTER (OUTPATIENT)
Dept: NEUROLOGY | Facility: AMBULATORY SURGERY CENTER | Age: 44
Discharge: HOME | End: 2019-05-13
Payer: COMMERCIAL

## 2019-05-13 DIAGNOSIS — G60.8 HEREDITARY SENSORY NEUROPATHY: ICD-10-CM

## 2019-05-13 PROCEDURE — 95911 NRV CNDJ TEST 9-10 STUDIES: CPT | Performed by: PSYCHIATRY & NEUROLOGY

## 2019-05-13 PROCEDURE — 95886 MUSC TEST DONE W/N TEST COMP: CPT | Performed by: PSYCHIATRY & NEUROLOGY

## 2019-05-15 ENCOUNTER — CLINICAL SUPPORT (OUTPATIENT)
Dept: BARIATRICS | Facility: CLINIC | Age: 44
End: 2019-05-15

## 2019-05-15 VITALS — HEIGHT: 65 IN | WEIGHT: 276 LBS | BODY MASS INDEX: 45.98 KG/M2

## 2019-05-15 DIAGNOSIS — Z98.84 S/P GASTRIC BYPASS: Primary | ICD-10-CM

## 2019-05-15 DIAGNOSIS — E66.01 OBESITY, CLASS III, BMI 40-49.9 (MORBID OBESITY) (HCC): ICD-10-CM

## 2019-05-15 PROCEDURE — RECHECK: Performed by: DIETITIAN, REGISTERED

## 2019-05-22 ENCOUNTER — APPOINTMENT (OUTPATIENT)
Dept: LAB | Facility: MEDICAL CENTER | Age: 44
End: 2019-05-22
Payer: COMMERCIAL

## 2019-05-22 LAB
CREAT UR-MCNC: 278 MG/DL
EST. AVERAGE GLUCOSE BLD GHB EST-MCNC: 123 MG/DL
HBA1C MFR BLD: 5.9 % (ref 4.2–6.3)
MICROALBUMIN UR-MCNC: 25.2 MG/L (ref 0–20)
MICROALBUMIN/CREAT 24H UR: 9 MG/G CREATININE (ref 0–30)

## 2019-05-22 PROCEDURE — 3061F NEG MICROALBUMINURIA REV: CPT | Performed by: FAMILY MEDICINE

## 2019-05-22 PROCEDURE — 82570 ASSAY OF URINE CREATININE: CPT | Performed by: FAMILY MEDICINE

## 2019-05-22 PROCEDURE — 36415 COLL VENOUS BLD VENIPUNCTURE: CPT | Performed by: FAMILY MEDICINE

## 2019-05-22 PROCEDURE — 83036 HEMOGLOBIN GLYCOSYLATED A1C: CPT | Performed by: FAMILY MEDICINE

## 2019-05-22 PROCEDURE — 82043 UR ALBUMIN QUANTITATIVE: CPT | Performed by: FAMILY MEDICINE

## 2019-05-22 RX ORDER — GABAPENTIN 300 MG/1
CAPSULE ORAL 3 TIMES DAILY
Refills: 3 | COMMUNITY
Start: 2019-05-10 | End: 2022-01-24

## 2019-05-22 RX ORDER — ATORVASTATIN CALCIUM 10 MG/1
TABLET, FILM COATED ORAL
Refills: 3 | COMMUNITY
Start: 2019-05-10 | End: 2020-01-05

## 2019-05-22 RX ORDER — DULOXETIN HYDROCHLORIDE 60 MG/1
CAPSULE, DELAYED RELEASE ORAL
COMMUNITY
Start: 2019-05-16 | End: 2021-12-06

## 2019-05-23 ENCOUNTER — OFFICE VISIT (OUTPATIENT)
Dept: FAMILY MEDICINE CLINIC | Facility: CLINIC | Age: 44
End: 2019-05-23
Payer: COMMERCIAL

## 2019-05-23 VITALS
BODY MASS INDEX: 45.42 KG/M2 | HEART RATE: 69 BPM | DIASTOLIC BLOOD PRESSURE: 68 MMHG | SYSTOLIC BLOOD PRESSURE: 98 MMHG | TEMPERATURE: 97.2 F | WEIGHT: 272.6 LBS | RESPIRATION RATE: 16 BRPM | HEIGHT: 65 IN | OXYGEN SATURATION: 96 %

## 2019-05-23 DIAGNOSIS — Z23 NEED FOR DIPHTHERIA-TETANUS-PERTUSSIS (TDAP) VACCINE: Primary | ICD-10-CM

## 2019-05-23 DIAGNOSIS — G47.33 OBSTRUCTIVE SLEEP APNEA TREATED WITH CONTINUOUS POSITIVE AIRWAY PRESSURE (CPAP): ICD-10-CM

## 2019-05-23 DIAGNOSIS — Z99.89 OBSTRUCTIVE SLEEP APNEA TREATED WITH CONTINUOUS POSITIVE AIRWAY PRESSURE (CPAP): ICD-10-CM

## 2019-05-23 DIAGNOSIS — I10 BENIGN HYPERTENSION: ICD-10-CM

## 2019-05-23 DIAGNOSIS — E66.9 DIABETES MELLITUS TYPE 2 IN OBESE (HCC): ICD-10-CM

## 2019-05-23 DIAGNOSIS — I10 ESSENTIAL HYPERTENSION: ICD-10-CM

## 2019-05-23 DIAGNOSIS — E11.69 DIABETES MELLITUS TYPE 2 IN OBESE (HCC): ICD-10-CM

## 2019-05-23 DIAGNOSIS — E11.65 TYPE 2 DIABETES MELLITUS WITH HYPERGLYCEMIA, WITHOUT LONG-TERM CURRENT USE OF INSULIN (HCC): ICD-10-CM

## 2019-05-23 PROCEDURE — 90471 IMMUNIZATION ADMIN: CPT

## 2019-05-23 PROCEDURE — 3078F DIAST BP <80 MM HG: CPT | Performed by: FAMILY MEDICINE

## 2019-05-23 PROCEDURE — 90715 TDAP VACCINE 7 YRS/> IM: CPT

## 2019-05-23 PROCEDURE — 99214 OFFICE O/P EST MOD 30 MIN: CPT | Performed by: FAMILY MEDICINE

## 2019-05-23 PROCEDURE — 3074F SYST BP LT 130 MM HG: CPT | Performed by: FAMILY MEDICINE

## 2019-05-23 RX ORDER — LISINOPRIL 10 MG/1
5 TABLET ORAL EVERY OTHER DAY
Qty: 30 TABLET | Refills: 5
Start: 2019-05-23 | End: 2020-01-17 | Stop reason: ALTCHOICE

## 2019-07-02 DIAGNOSIS — F51.01 PRIMARY INSOMNIA: ICD-10-CM

## 2019-07-02 DIAGNOSIS — F32.A ANXIETY AND DEPRESSION: ICD-10-CM

## 2019-07-02 DIAGNOSIS — F41.9 ANXIETY AND DEPRESSION: ICD-10-CM

## 2019-07-02 RX ORDER — ALPRAZOLAM 0.5 MG/1
TABLET ORAL
Qty: 60 TABLET | Refills: 0 | Status: SHIPPED | OUTPATIENT
Start: 2019-07-02 | End: 2019-08-28 | Stop reason: SDUPTHER

## 2019-07-29 ENCOUNTER — OFFICE VISIT (OUTPATIENT)
Dept: BARIATRICS | Facility: CLINIC | Age: 44
End: 2019-07-29
Payer: COMMERCIAL

## 2019-07-29 VITALS
SYSTOLIC BLOOD PRESSURE: 122 MMHG | HEART RATE: 76 BPM | WEIGHT: 244 LBS | HEIGHT: 65 IN | TEMPERATURE: 98 F | BODY MASS INDEX: 40.65 KG/M2 | DIASTOLIC BLOOD PRESSURE: 70 MMHG

## 2019-07-29 DIAGNOSIS — E11.69 DIABETES MELLITUS TYPE 2 IN OBESE (HCC): ICD-10-CM

## 2019-07-29 DIAGNOSIS — G47.33 OBSTRUCTIVE SLEEP APNEA TREATED WITH CONTINUOUS POSITIVE AIRWAY PRESSURE (CPAP): ICD-10-CM

## 2019-07-29 DIAGNOSIS — E78.49 OTHER HYPERLIPIDEMIA: ICD-10-CM

## 2019-07-29 DIAGNOSIS — E03.9 ACQUIRED HYPOTHYROIDISM: ICD-10-CM

## 2019-07-29 DIAGNOSIS — K91.2 POSTSURGICAL MALABSORPTION: ICD-10-CM

## 2019-07-29 DIAGNOSIS — E66.9 DIABETES MELLITUS TYPE 2 IN OBESE (HCC): ICD-10-CM

## 2019-07-29 DIAGNOSIS — Z99.89 OBSTRUCTIVE SLEEP APNEA TREATED WITH CONTINUOUS POSITIVE AIRWAY PRESSURE (CPAP): ICD-10-CM

## 2019-07-29 DIAGNOSIS — I10 BENIGN HYPERTENSION: ICD-10-CM

## 2019-07-29 DIAGNOSIS — E66.01 CLASS 3 SEVERE OBESITY DUE TO EXCESS CALORIES WITH SERIOUS COMORBIDITY AND BODY MASS INDEX (BMI) OF 40.0 TO 44.9 IN ADULT (HCC): ICD-10-CM

## 2019-07-29 DIAGNOSIS — Z98.84 BARIATRIC SURGERY STATUS: Primary | ICD-10-CM

## 2019-07-29 DIAGNOSIS — K21.9 GASTROESOPHAGEAL REFLUX DISEASE WITHOUT ESOPHAGITIS: ICD-10-CM

## 2019-07-29 PROBLEM — N83.292 OTHER OVARIAN CYST, LEFT SIDE: Status: RESOLVED | Noted: 2017-11-20 | Resolved: 2019-07-29

## 2019-07-29 PROCEDURE — 3074F SYST BP LT 130 MM HG: CPT | Performed by: PHYSICIAN ASSISTANT

## 2019-07-29 PROCEDURE — 99214 OFFICE O/P EST MOD 30 MIN: CPT | Performed by: PHYSICIAN ASSISTANT

## 2019-07-29 NOTE — PROGRESS NOTES
Assessment/Plan:    Bariatric surgery status  -s/p Jorge-En-Y Gastric Bypass with Dr Nova Webb on 3/25/2019  Overall doing Well  Initial:348 lb  Current:244 lb  EWL: 52% which is above average for this time frame  Yonathan: current  Current BMI is Body mass index is 40 6 kg/m²  Tolerating a regular diet-yes  Eating at least 60 grams of protein per day-yes  Following 30/60 minute rule with liquids-yes  Drinking at least 64 ounces of fluid per day-yes  Drinking carbonated beverages-no  Sufficient exercise-yes  she is currently doing aerobics and light weights and plans to add biking  Using NSAIDs regularly-no  Using nicotine-no/but her  smokes outside  Using alcohol-no      Hypothyroidism  Followed by pcp/on medication    Diabetes mellitus type 2 in obese [facility])  Lab Results   Component Value Date    HGBA1C 5 9 05/22/2019   She is on victoza daily injection 1 8 and is on farxiga 5 mg one every other day currently as her PCP is tapering her off this now-per patient  Improved hgA1c from January level of 6 4%-weight loss has helped    No results for input(s): POCGLU in the last 72 hours  Blood Sugar Average: Last 72 hrs:      Obstructive sleep apnea treated with continuous positive airway pressure (CPAP)  Is wearing Cpap  Advised that we recommend continuing to use Cpap for up to one year post-op unless otherwise advised by sleep medicine as this may have a beneficial effect on long-term weight loss      Benign hypertension  Appears controlled on her medication  Continued weight loss can improve this further    Advised that with continued weight loss blood pressure can come done  Advised on symptoms of hypotension and if this occurs to follow-up with PCP for further management since blood pressure medications may need to be adjusted at that time        Class 3 severe obesity due to excess calories with serious comorbidity in adult [facility])  Improved from her first post-op visit with her surgeon with bmi of 50 3  And now bmi of 40 6     Postsurgical malabsorption  Malabsorption- patient is at risk for malabsorption of vitamins/minerals secondary to malabsorption from her procedure and restriction of intakes  Reviewed current supplements and advised on same    She is taking 4 fusion mvi and one calcium citrate with D-she is due for routine labs-will order same    Gastroesophageal reflux disease without esophagitis  Controlled on her ppi  Continue for now    Hyperlipidemia  High TG and Low HDL by March 2018 labs-likely to improve with her weight loss, improvement in DM control and her regular exercise-this is being followed by PCP-on statin       Diagnoses and all orders for this visit:    Bariatric surgery status    Postsurgical malabsorption    Diabetes mellitus type 2 in obese [facility])    Acquired hypothyroidism    Benign hypertension    Other hyperlipidemia    Obstructive sleep apnea treated with continuous positive airway pressure (CPAP)    Class 3 severe obesity due to excess calories with serious comorbidity and body mass index (BMI) of 40 0 to 44 9 in adult [facility])    Gastroesophageal reflux disease without esophagitis          Subjective:      Patient ID: Salma Palomino is a 40 y o  female  She is here in routine follow-up  She is tolerating a regular diet  She is exercising  She is taking baraitric supplements  She is happy with her weight loss and notes her mood is good on her medicaiton      The following portions of the patient's history were reviewed and updated as appropriate: allergies, current medications, past family history, past medical history, past social history, past surgical history and problem list     Review of Systems   Constitutional: Negative for chills and fever  Unexpected weight change: planned weight loss  Respiratory: Negative for shortness of breath and wheezing  Cardiovascular: Negative for chest pain and palpitations     Gastrointestinal: Negative for abdominal pain, constipation, diarrhea, nausea and vomiting  Psychiatric/Behavioral: Negative for suicidal ideas (prone to anxiety more than depression but has had both in the past-feels this is controlled on her medication)  Objective:      /70   Pulse 76   Temp 98 °F (36 7 °C) (Tympanic)   Ht 5' 5" (1 651 m)   Wt 111 kg (244 lb)   LMP 11/18/2018   BMI 40 60 kg/m²          Physical Exam   Constitutional: She is oriented to person, place, and time  She appears well-developed and well-nourished  HENT:   Mouth/Throat: Oropharynx is clear and moist    Eyes: Conjunctivae are normal  No scleral icterus  Cardiovascular: Normal rate, regular rhythm and normal heart sounds  Pulmonary/Chest: Effort normal and breath sounds normal    Abdominal: Soft  There is no tenderness  No incisional hernias appreciated   Musculoskeletal:   Normal gait   Neurological: She is alert and oriented to person, place, and time  Skin: Skin is warm and dry  Psychiatric: She has a normal mood and affect  Her behavior is normal  Judgment and thought content normal    Nursing note and vitals reviewed  GOALS: Continued weight loss with good nutrition intakes    Normal vitamin and mineral levels  Exercise as tolerated    BARRIERS: none identified

## 2019-07-29 NOTE — ASSESSMENT & PLAN NOTE
Appears controlled on her medication  Continued weight loss can improve this further    Advised that with continued weight loss blood pressure can come done  Advised on symptoms of hypotension and if this occurs to follow-up with PCP for further management since blood pressure medications may need to be adjusted at that time

## 2019-07-29 NOTE — ASSESSMENT & PLAN NOTE
Lab Results   Component Value Date    HGBA1C 5 9 05/22/2019   She is on victoza daily injection 1 8 and is on farxiga 5 mg one every other day currently as her PCP is tapering her off this now-per patient  Improved hgA1c from January level of 6 4%-weight loss has helped    No results for input(s): POCGLU in the last 72 hours      Blood Sugar Average: Last 72 hrs:

## 2019-07-29 NOTE — ASSESSMENT & PLAN NOTE
-s/p Jorge-En-Y Gastric Bypass with Dr Marcie Jain on 3/25/2019  Overall doing Well  Initial:348 lb  Current:244 lb  EWL: 52% which is above average for this time frame  Yonathan: current  Current BMI is Body mass index is 40 6 kg/m²  Tolerating a regular diet-yes  Eating at least 60 grams of protein per day-yes  Following 30/60 minute rule with liquids-yes  Drinking at least 64 ounces of fluid per day-yes  Drinking carbonated beverages-no  Sufficient exercise-yes  she is currently doing aerobics and light weights and plans to add biking  Using NSAIDs regularly-no  Using nicotine-no/but her  smokes outside  Using alcohol-no

## 2019-07-29 NOTE — ASSESSMENT & PLAN NOTE
High TG and Low HDL by March 2018 labs-likely to improve with her weight loss, improvement in DM control and her regular exercise-this is being followed by PCP-on statin

## 2019-07-29 NOTE — ASSESSMENT & PLAN NOTE
Malabsorption- patient is at risk for malabsorption of vitamins/minerals secondary to malabsorption from her procedure and restriction of intakes  Reviewed current supplements and advised on same    She is taking 4 fusion mvi and one calcium citrate with D-she is due for routine labs-will order same

## 2019-08-11 DIAGNOSIS — I10 ESSENTIAL HYPERTENSION: ICD-10-CM

## 2019-08-11 DIAGNOSIS — E11.65 TYPE 2 DIABETES MELLITUS WITH HYPERGLYCEMIA, WITHOUT LONG-TERM CURRENT USE OF INSULIN (HCC): ICD-10-CM

## 2019-08-12 PROCEDURE — 4010F ACE/ARB THERAPY RXD/TAKEN: CPT | Performed by: FAMILY MEDICINE

## 2019-08-12 RX ORDER — LISINOPRIL 10 MG/1
5 TABLET ORAL DAILY
Qty: 30 TABLET | Refills: 5 | Status: SHIPPED | OUTPATIENT
Start: 2019-08-12 | End: 2020-04-30

## 2019-08-12 RX ORDER — DAPAGLIFLOZIN 5 MG/1
TABLET, FILM COATED ORAL
Qty: 30 TABLET | Refills: 5 | Status: SHIPPED | OUTPATIENT
Start: 2019-08-12 | End: 2019-09-26 | Stop reason: ALTCHOICE

## 2019-08-18 DIAGNOSIS — F41.9 ANXIETY AND DEPRESSION: ICD-10-CM

## 2019-08-18 DIAGNOSIS — F32.A ANXIETY AND DEPRESSION: ICD-10-CM

## 2019-08-19 RX ORDER — BUPROPION HYDROCHLORIDE 150 MG/1
TABLET, EXTENDED RELEASE ORAL
Qty: 60 TABLET | Refills: 0 | Status: SHIPPED | OUTPATIENT
Start: 2019-08-19 | End: 2019-09-16 | Stop reason: SDUPTHER

## 2019-08-19 NOTE — TELEPHONE ENCOUNTER
Patient called back and stated yes she is still taking the medication, she stated she is taking it 1 tablet by mouth 2 times a day

## 2019-08-28 DIAGNOSIS — F51.01 PRIMARY INSOMNIA: ICD-10-CM

## 2019-08-28 DIAGNOSIS — F32.A ANXIETY AND DEPRESSION: ICD-10-CM

## 2019-08-28 DIAGNOSIS — F41.9 ANXIETY AND DEPRESSION: ICD-10-CM

## 2019-08-28 RX ORDER — ALPRAZOLAM 0.5 MG/1
TABLET ORAL
Qty: 60 TABLET | Refills: 0 | Status: SHIPPED | OUTPATIENT
Start: 2019-08-28 | End: 2019-12-03 | Stop reason: SDUPTHER

## 2019-09-03 DIAGNOSIS — Z98.84 BARIATRIC SURGERY STATUS: ICD-10-CM

## 2019-09-05 DIAGNOSIS — Z98.84 BARIATRIC SURGERY STATUS: Primary | ICD-10-CM

## 2019-09-06 LAB
LEFT EYE DIABETIC RETINOPATHY: NORMAL
RIGHT EYE DIABETIC RETINOPATHY: NORMAL

## 2019-09-06 RX ORDER — OMEPRAZOLE 20 MG/1
20 CAPSULE, DELAYED RELEASE ORAL DAILY
Qty: 90 CAPSULE | Refills: 0 | Status: SHIPPED | OUTPATIENT
Start: 2019-09-06 | End: 2019-09-26 | Stop reason: ALTCHOICE

## 2019-09-09 RX ORDER — OMEPRAZOLE 20 MG/1
CAPSULE, DELAYED RELEASE ORAL
Qty: 90 CAPSULE | Refills: 1 | Status: SHIPPED | OUTPATIENT
Start: 2019-09-09 | End: 2020-06-06 | Stop reason: SDUPTHER

## 2019-09-16 DIAGNOSIS — F41.9 ANXIETY AND DEPRESSION: ICD-10-CM

## 2019-09-16 DIAGNOSIS — F32.A ANXIETY AND DEPRESSION: ICD-10-CM

## 2019-09-16 RX ORDER — BUPROPION HYDROCHLORIDE 150 MG/1
TABLET, EXTENDED RELEASE ORAL
Qty: 60 TABLET | Refills: 0 | Status: SHIPPED | OUTPATIENT
Start: 2019-09-16 | End: 2019-10-27 | Stop reason: SDUPTHER

## 2019-09-21 ENCOUNTER — TRANSCRIBE ORDERS (OUTPATIENT)
Dept: ADMINISTRATIVE | Facility: HOSPITAL | Age: 44
End: 2019-09-21

## 2019-09-21 ENCOUNTER — APPOINTMENT (OUTPATIENT)
Dept: LAB | Facility: MEDICAL CENTER | Age: 44
End: 2019-09-21
Payer: COMMERCIAL

## 2019-09-21 DIAGNOSIS — M15.0 PRIMARY GENERALIZED HYPERTROPHIC OSTEOARTHROSIS: ICD-10-CM

## 2019-09-21 DIAGNOSIS — E66.9 DIABETES MELLITUS TYPE 2 IN OBESE (HCC): ICD-10-CM

## 2019-09-21 DIAGNOSIS — M35.9 DIFFUSE DISEASE OF CONNECTIVE TISSUE (HCC): ICD-10-CM

## 2019-09-21 DIAGNOSIS — I10 BENIGN HYPERTENSION: ICD-10-CM

## 2019-09-21 DIAGNOSIS — E11.69 DIABETES MELLITUS TYPE 2 IN OBESE (HCC): ICD-10-CM

## 2019-09-21 DIAGNOSIS — R76.0 RAISED ANTIBODY TITER: Primary | ICD-10-CM

## 2019-09-21 DIAGNOSIS — Z79.899 ENCOUNTER FOR LONG-TERM (CURRENT) USE OF OTHER MEDICATIONS: ICD-10-CM

## 2019-09-21 DIAGNOSIS — R76.0 RAISED ANTIBODY TITER: ICD-10-CM

## 2019-09-21 DIAGNOSIS — Z98.84 BARIATRIC SURGERY STATUS: ICD-10-CM

## 2019-09-21 DIAGNOSIS — E66.01 CLASS 3 SEVERE OBESITY DUE TO EXCESS CALORIES WITH SERIOUS COMORBIDITY AND BODY MASS INDEX (BMI) OF 40.0 TO 44.9 IN ADULT (HCC): ICD-10-CM

## 2019-09-21 DIAGNOSIS — E78.49 OTHER HYPERLIPIDEMIA: ICD-10-CM

## 2019-09-21 DIAGNOSIS — K91.2 POSTSURGICAL MALABSORPTION: ICD-10-CM

## 2019-09-21 LAB
25(OH)D3 SERPL-MCNC: 62.4 NG/ML (ref 30–100)
ALBUMIN SERPL BCP-MCNC: 3.5 G/DL (ref 3.5–5)
ALP SERPL-CCNC: 94 U/L (ref 46–116)
ALT SERPL W P-5'-P-CCNC: 23 U/L (ref 12–78)
ANION GAP SERPL CALCULATED.3IONS-SCNC: 9 MMOL/L (ref 4–13)
AST SERPL W P-5'-P-CCNC: 13 U/L (ref 5–45)
BACTERIA UR QL AUTO: ABNORMAL /HPF
BASOPHILS # BLD AUTO: 0.04 THOUSANDS/ΜL (ref 0–0.1)
BASOPHILS NFR BLD AUTO: 1 % (ref 0–1)
BILIRUB SERPL-MCNC: 0.33 MG/DL (ref 0.2–1)
BILIRUB UR QL STRIP: NEGATIVE
BUN SERPL-MCNC: 7 MG/DL (ref 5–25)
CALCIUM SERPL-MCNC: 9.2 MG/DL (ref 8.3–10.1)
CHLORIDE SERPL-SCNC: 106 MMOL/L (ref 100–108)
CHOLEST SERPL-MCNC: 128 MG/DL (ref 50–200)
CLARITY UR: ABNORMAL
CO2 SERPL-SCNC: 24 MMOL/L (ref 21–32)
COLOR UR: YELLOW
CREAT SERPL-MCNC: 0.72 MG/DL (ref 0.6–1.3)
CREAT UR-MCNC: 105 MG/DL
EOSINOPHIL # BLD AUTO: 0.38 THOUSAND/ΜL (ref 0–0.61)
EOSINOPHIL NFR BLD AUTO: 4 % (ref 0–6)
ERYTHROCYTE [DISTWIDTH] IN BLOOD BY AUTOMATED COUNT: 16.3 % (ref 11.6–15.1)
ERYTHROCYTE [SEDIMENTATION RATE] IN BLOOD: 44 MM/HOUR (ref 0–20)
EST. AVERAGE GLUCOSE BLD GHB EST-MCNC: 114 MG/DL
FERRITIN SERPL-MCNC: 48 NG/ML (ref 8–388)
FOLATE SERPL-MCNC: 8.2 NG/ML (ref 3.1–17.5)
GFR SERPL CREATININE-BSD FRML MDRD: 102 ML/MIN/1.73SQ M
GLUCOSE P FAST SERPL-MCNC: 74 MG/DL (ref 65–99)
GLUCOSE UR STRIP-MCNC: NEGATIVE MG/DL
HBA1C MFR BLD: 5.6 % (ref 4.2–6.3)
HCT VFR BLD AUTO: 40.3 % (ref 34.8–46.1)
HDLC SERPL-MCNC: 34 MG/DL (ref 40–60)
HGB BLD-MCNC: 12.5 G/DL (ref 11.5–15.4)
HGB UR QL STRIP.AUTO: NEGATIVE
HYALINE CASTS #/AREA URNS LPF: ABNORMAL /LPF
IMM GRANULOCYTES # BLD AUTO: 0.02 THOUSAND/UL (ref 0–0.2)
IMM GRANULOCYTES NFR BLD AUTO: 0 % (ref 0–2)
IRON SATN MFR SERPL: 9 %
IRON SERPL-MCNC: 28 UG/DL (ref 50–170)
KETONES UR STRIP-MCNC: NEGATIVE MG/DL
LDLC SERPL CALC-MCNC: 71 MG/DL (ref 0–100)
LEUKOCYTE ESTERASE UR QL STRIP: NEGATIVE
LYMPHOCYTES # BLD AUTO: 1.63 THOUSANDS/ΜL (ref 0.6–4.47)
LYMPHOCYTES NFR BLD AUTO: 19 % (ref 14–44)
MCH RBC QN AUTO: 26.8 PG (ref 26.8–34.3)
MCHC RBC AUTO-ENTMCNC: 31 G/DL (ref 31.4–37.4)
MCV RBC AUTO: 86 FL (ref 82–98)
MICROALBUMIN UR-MCNC: 5.2 MG/L (ref 0–20)
MICROALBUMIN/CREAT 24H UR: 5 MG/G CREATININE (ref 0–30)
MONOCYTES # BLD AUTO: 0.57 THOUSAND/ΜL (ref 0.17–1.22)
MONOCYTES NFR BLD AUTO: 7 % (ref 4–12)
NEUTROPHILS # BLD AUTO: 5.92 THOUSANDS/ΜL (ref 1.85–7.62)
NEUTS SEG NFR BLD AUTO: 69 % (ref 43–75)
NITRITE UR QL STRIP: NEGATIVE
NON-SQ EPI CELLS URNS QL MICRO: ABNORMAL /HPF
NONHDLC SERPL-MCNC: 94 MG/DL
NRBC BLD AUTO-RTO: 0 /100 WBCS
PH UR STRIP.AUTO: 7 [PH]
PLATELET # BLD AUTO: 253 THOUSANDS/UL (ref 149–390)
PMV BLD AUTO: 12.6 FL (ref 8.9–12.7)
POTASSIUM SERPL-SCNC: 4 MMOL/L (ref 3.5–5.3)
PROT SERPL-MCNC: 7.6 G/DL (ref 6.4–8.2)
PROT UR STRIP-MCNC: NEGATIVE MG/DL
PTH-INTACT SERPL-MCNC: 25.4 PG/ML (ref 18.4–80.1)
RBC # BLD AUTO: 4.67 MILLION/UL (ref 3.81–5.12)
RBC #/AREA URNS AUTO: ABNORMAL /HPF
SODIUM SERPL-SCNC: 139 MMOL/L (ref 136–145)
SP GR UR STRIP.AUTO: 1.01 (ref 1–1.03)
TIBC SERPL-MCNC: 326 UG/DL (ref 250–450)
TRIGL SERPL-MCNC: 115 MG/DL
UROBILINOGEN UR QL STRIP.AUTO: 0.2 E.U./DL
VIT B12 SERPL-MCNC: 2430 PG/ML (ref 100–900)
WBC # BLD AUTO: 8.56 THOUSAND/UL (ref 4.31–10.16)
WBC #/AREA URNS AUTO: ABNORMAL /HPF

## 2019-09-21 PROCEDURE — 82525 ASSAY OF COPPER: CPT

## 2019-09-21 PROCEDURE — 81001 URINALYSIS AUTO W/SCOPE: CPT | Performed by: PHYSICIAN ASSISTANT

## 2019-09-21 PROCEDURE — 83550 IRON BINDING TEST: CPT

## 2019-09-21 PROCEDURE — 85613 RUSSELL VIPER VENOM DILUTED: CPT

## 2019-09-21 PROCEDURE — 83540 ASSAY OF IRON: CPT

## 2019-09-21 PROCEDURE — 82607 VITAMIN B-12: CPT

## 2019-09-21 PROCEDURE — 85652 RBC SED RATE AUTOMATED: CPT | Performed by: PHYSICIAN ASSISTANT

## 2019-09-21 PROCEDURE — 85598 HEXAGNAL PHOSPH PLTLT NEUTRL: CPT

## 2019-09-21 PROCEDURE — 86147 CARDIOLIPIN ANTIBODY EA IG: CPT

## 2019-09-21 PROCEDURE — 82043 UR ALBUMIN QUANTITATIVE: CPT | Performed by: FAMILY MEDICINE

## 2019-09-21 PROCEDURE — 82306 VITAMIN D 25 HYDROXY: CPT

## 2019-09-21 PROCEDURE — 84590 ASSAY OF VITAMIN A: CPT

## 2019-09-21 PROCEDURE — 36415 COLL VENOUS BLD VENIPUNCTURE: CPT | Performed by: PHYSICIAN ASSISTANT

## 2019-09-21 PROCEDURE — 85705 THROMBOPLASTIN INHIBITION: CPT

## 2019-09-21 PROCEDURE — 85732 THROMBOPLASTIN TIME PARTIAL: CPT

## 2019-09-21 PROCEDURE — 82570 ASSAY OF URINE CREATININE: CPT | Performed by: FAMILY MEDICINE

## 2019-09-21 PROCEDURE — 83970 ASSAY OF PARATHORMONE: CPT

## 2019-09-21 PROCEDURE — 80061 LIPID PANEL: CPT | Performed by: FAMILY MEDICINE

## 2019-09-21 PROCEDURE — 85670 THROMBIN TIME PLASMA: CPT

## 2019-09-21 PROCEDURE — 84630 ASSAY OF ZINC: CPT

## 2019-09-21 PROCEDURE — 80053 COMPREHEN METABOLIC PANEL: CPT | Performed by: PHYSICIAN ASSISTANT

## 2019-09-21 PROCEDURE — 84425 ASSAY OF VITAMIN B-1: CPT

## 2019-09-21 PROCEDURE — 86146 BETA-2 GLYCOPROTEIN ANTIBODY: CPT

## 2019-09-21 PROCEDURE — 82728 ASSAY OF FERRITIN: CPT

## 2019-09-21 PROCEDURE — 83036 HEMOGLOBIN GLYCOSYLATED A1C: CPT | Performed by: FAMILY MEDICINE

## 2019-09-21 PROCEDURE — 82746 ASSAY OF FOLIC ACID SERUM: CPT

## 2019-09-21 PROCEDURE — 85025 COMPLETE CBC W/AUTO DIFF WBC: CPT | Performed by: PHYSICIAN ASSISTANT

## 2019-09-23 LAB
CARDIOLIPIN IGA SER IA-ACNC: 27 APL U/ML (ref 0–11)
CARDIOLIPIN IGG SER IA-ACNC: <9 GPL U/ML (ref 0–14)
CARDIOLIPIN IGM SER IA-ACNC: 22 MPL U/ML (ref 0–12)

## 2019-09-24 LAB
B2 GLYCOPROT1 IGA SER-ACNC: <9 GPI IGA UNITS (ref 0–25)
B2 GLYCOPROT1 IGG SER-ACNC: <9 GPI IGG UNITS (ref 0–20)
B2 GLYCOPROT1 IGM SER-ACNC: 42 GPI IGM UNITS (ref 0–32)

## 2019-09-25 LAB
COPPER SERPL-MCNC: 163 UG/DL (ref 72–166)
VIT A SERPL-MCNC: 35.5 UG/DL (ref 20.1–62)
VIT B1 BLD-SCNC: 109.8 NMOL/L (ref 66.5–200)
ZINC SERPL-MCNC: 65 UG/DL (ref 56–134)

## 2019-09-26 ENCOUNTER — OFFICE VISIT (OUTPATIENT)
Dept: FAMILY MEDICINE CLINIC | Facility: CLINIC | Age: 44
End: 2019-09-26
Payer: COMMERCIAL

## 2019-09-26 VITALS
DIASTOLIC BLOOD PRESSURE: 62 MMHG | OXYGEN SATURATION: 97 % | HEIGHT: 65 IN | BODY MASS INDEX: 37.59 KG/M2 | TEMPERATURE: 97.3 F | HEART RATE: 66 BPM | WEIGHT: 225.6 LBS | SYSTOLIC BLOOD PRESSURE: 110 MMHG | RESPIRATION RATE: 16 BRPM

## 2019-09-26 DIAGNOSIS — G47.33 OBSTRUCTIVE SLEEP APNEA TREATED WITH CONTINUOUS POSITIVE AIRWAY PRESSURE (CPAP): ICD-10-CM

## 2019-09-26 DIAGNOSIS — E66.01 CLASS 2 SEVERE OBESITY DUE TO EXCESS CALORIES WITH SERIOUS COMORBIDITY AND BODY MASS INDEX (BMI) OF 37.0 TO 37.9 IN ADULT (HCC): ICD-10-CM

## 2019-09-26 DIAGNOSIS — F41.9 ANXIETY AND DEPRESSION: ICD-10-CM

## 2019-09-26 DIAGNOSIS — Z99.89 OBSTRUCTIVE SLEEP APNEA TREATED WITH CONTINUOUS POSITIVE AIRWAY PRESSURE (CPAP): ICD-10-CM

## 2019-09-26 DIAGNOSIS — M79.7 FIBROMYALGIA: ICD-10-CM

## 2019-09-26 DIAGNOSIS — F32.A ANXIETY AND DEPRESSION: ICD-10-CM

## 2019-09-26 DIAGNOSIS — E11.69 DIABETES MELLITUS TYPE 2 IN OBESE (HCC): Primary | ICD-10-CM

## 2019-09-26 DIAGNOSIS — E66.9 DIABETES MELLITUS TYPE 2 IN OBESE (HCC): Primary | ICD-10-CM

## 2019-09-26 DIAGNOSIS — I10 BENIGN HYPERTENSION: ICD-10-CM

## 2019-09-26 PROBLEM — E66.812 CLASS 2 SEVERE OBESITY DUE TO EXCESS CALORIES WITH SERIOUS COMORBIDITY IN ADULT (HCC): Status: ACTIVE | Noted: 2018-10-24

## 2019-09-26 LAB
APTT HEX PL PPP: 29 SEC (ref 0–11)
APTT SCREEN TO CONFIRM RATIO: 1.06 RATIO (ref 0–1.4)
APTT-LA IMM 4:1 NP PPP: 56.8 SEC (ref 0–48.9)
CONFIRM APTT/NORMAL: 47.9 SEC (ref 0–55)
DRVVT IMM 1:2 NP PPP: 44 SEC (ref 0–47)
LA PPP-IMP: ABNORMAL
SCREEN APTT: 64.1 SEC (ref 0–51.9)
SCREEN DRVVT: 53.3 SEC (ref 0–47)
THROMBIN TIME: 17.2 SEC (ref 0–23)

## 2019-09-26 PROCEDURE — 99214 OFFICE O/P EST MOD 30 MIN: CPT | Performed by: FAMILY MEDICINE

## 2019-09-26 NOTE — PROGRESS NOTES
Assessment/Plan:    No problem-specific Assessment & Plan notes found for this encounter  Diagnoses and all orders for this visit:    Diabetes mellitus type 2 in obese [facility])  Comments:  stable  stopped [place] today  continue Victoza    Obstructive sleep apnea treated with continuous positive airway pressure (CPAP)  Comments:  stable  off of CPAP machine     Benign hypertension  Comments:  stable  continue current meds    Anxiety and depression  Comments:  stable  continue current meds     Fibromyalgia  Comments:  doing well      Class 2 severe obesity due to excess calories with serious comorbidity and body mass index (BMI) of 37 0 to 37 9 in adult [facility])  Comments:  doing well  continue diet and exercise       BMI Counseling: Body mass index is 37 54 kg/m²  The BMI is above normal  Nutrition recommendations include decreasing overall calorie intake, reducing fast food intake, moderation in carbohydrate intake and reducing intake of saturated fat and trans fat  Exercise recommendations include moderate aerobic physical activity for 150 minutes/week  Subjective:      Patient ID: Darren Noble is a 40 y o  female  Here for follow up  Lost 140 pounds since 6 months ago s/p surgery  Goal: 175 pounds      Diabetes   She presents for her follow-up diabetic visit  She has type 2 diabetes mellitus  Her disease course has been stable  Pertinent negatives for hypoglycemia include no confusion or nervousness/anxiousness  There are no diabetic associated symptoms  Pertinent negatives for diabetes include no chest pain  There are no hypoglycemic complications  Symptoms are stable  There are no diabetic complications  Pertinent negatives for diabetic complications include no impotence or nephropathy  Risk factors for coronary artery disease include obesity  When asked about current treatments, none were reported  She is compliant with treatment all of the time  Her weight is stable   Her home blood glucose trend is decreasing steadily  Her overall blood glucose range is  mg/dl  An ACE inhibitor/angiotensin II receptor blocker is being taken  She does not see a podiatrist Eye exam is current  Anxiety   Presents for follow-up visit  Patient reports no chest pain, compulsions, confusion, decreased concentration, depressed mood, excessive worry, feeling of choking, hyperventilation, impotence, malaise, nausea, nervous/anxious behavior, obsessions, palpitations, panic, restlessness or shortness of breath  Symptoms occur occasionally  The severity of symptoms is mild  The quality of sleep is good  Compliance with medications is %  The following portions of the patient's history were reviewed and updated as appropriate: allergies, current medications, past family history, past medical history, past social history, past surgical history and problem list     Review of Systems   Respiratory: Negative for shortness of breath  Cardiovascular: Negative for chest pain and palpitations  Gastrointestinal: Negative for nausea  Genitourinary: Negative for impotence  Psychiatric/Behavioral: Negative for confusion and decreased concentration  The patient is not nervous/anxious  Objective:      /62 (BP Location: Right arm, Patient Position: Sitting, Cuff Size: Large)   Pulse 66   Temp (!) 97 3 °F (36 3 °C) (Tympanic)   Resp 16   Ht 5' 5" (1 651 m)   Wt 102 kg (225 lb 9 6 oz)   LMP 11/18/2018   SpO2 97%   BMI 37 54 kg/m²          Physical Exam   Constitutional: She is oriented to person, place, and time  She appears well-developed and well-nourished  HENT:   Mouth/Throat: No oropharyngeal exudate  Eyes: Pupils are equal, round, and reactive to light  EOM are normal    Neck: No tracheal deviation present  No thyromegaly present  Cardiovascular: Normal rate and regular rhythm  Exam reveals no friction rub  No murmur heard    Pulmonary/Chest: Effort normal and breath sounds normal  Abdominal: She exhibits no distension  There is no tenderness  Musculoskeletal: She exhibits no edema or deformity  Neurological: She is alert and oriented to person, place, and time  No cranial nerve deficit  Coordination normal    Skin: No erythema  Psychiatric: She has a normal mood and affect   Her behavior is normal

## 2019-10-01 DIAGNOSIS — E61.1 LOW SERUM IRON: ICD-10-CM

## 2019-10-01 DIAGNOSIS — K91.2 POSTSURGICAL MALABSORPTION: Primary | ICD-10-CM

## 2019-10-01 DIAGNOSIS — Z98.84 BARIATRIC SURGERY STATUS: ICD-10-CM

## 2019-10-14 ENCOUNTER — TELEPHONE (OUTPATIENT)
Dept: BARIATRICS | Facility: CLINIC | Age: 44
End: 2019-10-14

## 2019-10-27 DIAGNOSIS — F41.9 ANXIETY AND DEPRESSION: ICD-10-CM

## 2019-10-27 DIAGNOSIS — F32.A ANXIETY AND DEPRESSION: ICD-10-CM

## 2019-10-28 RX ORDER — BUPROPION HYDROCHLORIDE 150 MG/1
TABLET, EXTENDED RELEASE ORAL
Qty: 60 TABLET | Refills: 0 | Status: SHIPPED | OUTPATIENT
Start: 2019-10-28 | End: 2019-11-29 | Stop reason: SDUPTHER

## 2019-11-05 ENCOUNTER — OFFICE VISIT (OUTPATIENT)
Dept: BARIATRICS | Facility: CLINIC | Age: 44
End: 2019-11-05
Payer: COMMERCIAL

## 2019-11-05 VITALS
BODY MASS INDEX: 35.82 KG/M2 | TEMPERATURE: 97.3 F | DIASTOLIC BLOOD PRESSURE: 62 MMHG | SYSTOLIC BLOOD PRESSURE: 120 MMHG | WEIGHT: 215 LBS | HEART RATE: 66 BPM | HEIGHT: 65 IN

## 2019-11-05 DIAGNOSIS — I10 BENIGN HYPERTENSION: ICD-10-CM

## 2019-11-05 DIAGNOSIS — E03.9 ACQUIRED HYPOTHYROIDISM: ICD-10-CM

## 2019-11-05 DIAGNOSIS — Z98.84 BARIATRIC SURGERY STATUS: ICD-10-CM

## 2019-11-05 DIAGNOSIS — E78.49 OTHER HYPERLIPIDEMIA: ICD-10-CM

## 2019-11-05 DIAGNOSIS — L98.7 EXCESS SKIN OF ABDOMEN: ICD-10-CM

## 2019-11-05 DIAGNOSIS — K91.2 POSTSURGICAL MALABSORPTION: ICD-10-CM

## 2019-11-05 DIAGNOSIS — Z09 POSTOPERATIVE EXAMINATION: Primary | ICD-10-CM

## 2019-11-05 PROCEDURE — 99214 OFFICE O/P EST MOD 30 MIN: CPT | Performed by: PHYSICIAN ASSISTANT

## 2019-11-05 PROCEDURE — 3074F SYST BP LT 130 MM HG: CPT | Performed by: PHYSICIAN ASSISTANT

## 2019-11-05 PROCEDURE — 3078F DIAST BP <80 MM HG: CPT | Performed by: PHYSICIAN ASSISTANT

## 2019-11-05 NOTE — PROGRESS NOTES
Assessment/Plan:  Patient seen at [address] [place] [address]      Patient ID: Madai Arechiga is a 40 y o  female  Bariatric Surgery Status    · Continued/Maintain healthy weight loss with good nutrition intakes  · Adequate hydration with at least 64oz  fluid intake  · Follow diet as discussed  · Follow vitamin and mineral recommendations as reviewed with you  · Exercise as tolerated  · Colonoscopy referral made: no; had colonoscopy 2 years ago which was unremarkable as per patient     · Follow-up for annual  We kindly ask that your arrive 15 minutes before your scheduled appointment time with your provider to allow our staff to room you, get your vital signs and update your chart  · Get lab work done prior to annual visit  Please call the office if you need a script  It is recommended to check with your insurance BEFORE getting labs done to make sure they are covered by your policy  · Call our office if you have any problems with abdominal pain especially associated with fever, chills, nausea, vomiting or any other concerns  · All  Post-bariatric surgery patients should be aware that very small quantities of any alcohol can cause impairment and it is very possible not to feel the effect  The effect can be in the system for several hours  It is also a stomach irritant  · It is advised to AVOID alcohol, Nonsteroidal antiinflammatory drugs (NSAIDS) and nicotine of all forms   Any of these can cause stomach irritation/pain  · Discussed the effects of alcohol on a bariatric patient and the increased impairment risk  · Keep up the good work! Postsurgical Malabsorption   -At risk for malabsorption of vitamins/minerals secondary to malabsorption and restriction of intake from bariatric surgery  -Currently taking adequate postop bariatric surgery vitamin supplementation  -Last set of bariatric labs completed 9/2019 and showed low iron and high B12  Please refer to letter sent   This was also reviewed at today's visit as well  Patient states she was difficulty tolerating oral iron due to constipation  Her multivitamin contains iron  Recommended she add 45 mg of oral iron and start with every other day then slowly increase to every day as tolerated  She can also use OTC stool softeners (miraalax, senna, colace) for constipation PRN  -Next set of bariatric labs at annual   -Patient received education about the importance of adhering to a lifelong supplementation regimen to avoid vitamin/mineral deficiencies    Acquired hypothyroidism  - continue Levothyroxine and management per primary    Other hyperlipidemia  - continue Lipitor and management per primary     Excess skin of abdomen   - referral to plastic surgery for evaluation   - continue OTC creams and powders as needed for rashes  Keep area clean and dry  Continued weight lifting exercises may help  Diagnoses and all orders for this visit:    Postoperative examination    Bariatric surgery status    Postsurgical malabsorption    Benign hypertension    Acquired hypothyroidism    Other hyperlipidemia    Excess skin of abdomen    Subjective:      Patient ID: Madai Arechiga is a 40 y o  female  -s/p Jorge-En-Y Gastric Bypass with Dr Ladonna Church on 3/25/19  Presents to the office today for routine follow up  Tolerating diet without issues; denies N/V, dysphagia, reflux  Overall doing Well  Complains of excess loose abdominal skin which frequently causes her rashes  Also complaints of recurrent "boil" on inside of right thigh due to the continuous friction caused by skin rubbing  Patient has been treating this with OTC creams and powders and recently changed the type of underwear she wears to lessen the friction  Initial:336  Current:215  EWL: (Weight loss is ahead of schedule at this post surgical period )  Yonathan: current  Current BMI is Body mass index is 35 78 kg/m²      · Tolerating a regular diet-yes  · Eating at least 60 grams of protein per day-yes  · Following 30/60 minute rule with liquids-yes  · Drinking at least 64 ounces of fluid per day-yes  · Drinking carbonated beverages-occasionally diet gingerale   · Sufficient exercise-yes  · Using NSAIDs regularly-no  · Using nicotine-no  · Using alcohol-socially, once every 3 months   · Supplements: multivitmamins (fusion), B12, vit D, biotin     · EWL is 67%, which places the patient ahead of schedule for expected post surgical weight loss at this time  The following portions of the patient's history were reviewed and updated as appropriate: allergies, current medications, past family history, past medical history, past social history, past surgical history and problem list     Review of Systems   Constitutional: Negative  HENT: Negative  Respiratory: Negative  Cardiovascular: Negative  Gastrointestinal: Negative  Skin: Positive for rash (will often get rash in betweenskin folds )  Neurological: Negative  Psychiatric/Behavioral: Negative  Objective:    /62 (BP Location: Right arm, Patient Position: Sitting)   Pulse 66   Temp (!) 97 3 °F (36 3 °C) (Tympanic)   Ht 5' 5" (1 651 m)   Wt 97 5 kg (215 lb)   LMP 11/18/2018   BMI 35 78 kg/m²      Physical Exam   Constitutional: She is oriented to person, place, and time  She appears well-developed and well-nourished  HENT:   Head: Normocephalic and atraumatic  Neck: Normal range of motion  Cardiovascular: Normal rate and regular rhythm  Pulmonary/Chest: Effort normal and breath sounds normal    Abdominal: Soft  Bowel sounds are normal    Musculoskeletal: Normal range of motion  Neurological: She is alert and oriented to person, place, and time  Skin: Skin is warm and dry  Psychiatric: She has a normal mood and affect  Nursing note and vitals reviewed

## 2019-11-13 ENCOUNTER — TELEPHONE (OUTPATIENT)
Dept: FAMILY MEDICINE CLINIC | Facility: CLINIC | Age: 44
End: 2019-11-13

## 2019-11-13 NOTE — TELEPHONE ENCOUNTER
PT needs to reschedule her appt from 11/25/19 for a 6 month f/u  She will be out a couple of days  I dont see anything till 12/31  And she thinks that is too long of a wait unless Dr Toledo says its ok

## 2019-11-29 DIAGNOSIS — Z98.84 BARIATRIC SURGERY STATUS: ICD-10-CM

## 2019-11-29 DIAGNOSIS — F41.9 ANXIETY AND DEPRESSION: ICD-10-CM

## 2019-11-29 DIAGNOSIS — F32.A ANXIETY AND DEPRESSION: ICD-10-CM

## 2019-11-29 RX ORDER — BUPROPION HYDROCHLORIDE 150 MG/1
150 TABLET, EXTENDED RELEASE ORAL 2 TIMES DAILY
Qty: 180 TABLET | Refills: 0 | Status: SHIPPED | OUTPATIENT
Start: 2019-11-29 | End: 2020-03-05

## 2019-12-03 DIAGNOSIS — F32.A ANXIETY AND DEPRESSION: ICD-10-CM

## 2019-12-03 DIAGNOSIS — F41.9 ANXIETY AND DEPRESSION: ICD-10-CM

## 2019-12-03 DIAGNOSIS — F51.01 PRIMARY INSOMNIA: ICD-10-CM

## 2019-12-03 RX ORDER — ALPRAZOLAM 0.5 MG/1
0.5 TABLET ORAL 2 TIMES DAILY PRN
Qty: 60 TABLET | Refills: 0 | Status: SHIPPED | OUTPATIENT
Start: 2019-12-03 | End: 2020-02-28 | Stop reason: SDUPTHER

## 2020-01-04 DIAGNOSIS — E66.9 DIABETES MELLITUS TYPE 2 IN OBESE (HCC): Primary | ICD-10-CM

## 2020-01-04 DIAGNOSIS — E11.69 DIABETES MELLITUS TYPE 2 IN OBESE (HCC): Primary | ICD-10-CM

## 2020-01-05 RX ORDER — ATORVASTATIN CALCIUM 10 MG/1
TABLET, FILM COATED ORAL
Qty: 90 TABLET | Refills: 0 | Status: SHIPPED | OUTPATIENT
Start: 2020-01-05 | End: 2020-04-19 | Stop reason: SDUPTHER

## 2020-01-17 ENCOUNTER — OFFICE VISIT (OUTPATIENT)
Dept: FAMILY MEDICINE CLINIC | Facility: CLINIC | Age: 45
End: 2020-01-17
Payer: COMMERCIAL

## 2020-01-17 VITALS
HEART RATE: 79 BPM | RESPIRATION RATE: 16 BRPM | SYSTOLIC BLOOD PRESSURE: 100 MMHG | BODY MASS INDEX: 33.02 KG/M2 | HEIGHT: 65 IN | WEIGHT: 198.2 LBS | DIASTOLIC BLOOD PRESSURE: 66 MMHG | TEMPERATURE: 96 F | OXYGEN SATURATION: 98 %

## 2020-01-17 DIAGNOSIS — K91.2 POSTSURGICAL MALABSORPTION: Chronic | ICD-10-CM

## 2020-01-17 DIAGNOSIS — M79.7 FIBROMYALGIA: ICD-10-CM

## 2020-01-17 DIAGNOSIS — E66.9 DIABETES MELLITUS TYPE 2 IN OBESE (HCC): Primary | ICD-10-CM

## 2020-01-17 DIAGNOSIS — E11.69 DIABETES MELLITUS TYPE 2 IN OBESE (HCC): Primary | ICD-10-CM

## 2020-01-17 DIAGNOSIS — I10 BENIGN HYPERTENSION: ICD-10-CM

## 2020-01-17 DIAGNOSIS — G47.33 OBSTRUCTIVE SLEEP APNEA TREATED WITH CONTINUOUS POSITIVE AIRWAY PRESSURE (CPAP): ICD-10-CM

## 2020-01-17 DIAGNOSIS — E78.49 OTHER HYPERLIPIDEMIA: ICD-10-CM

## 2020-01-17 DIAGNOSIS — F32.A ANXIETY AND DEPRESSION: ICD-10-CM

## 2020-01-17 DIAGNOSIS — Z99.89 OBSTRUCTIVE SLEEP APNEA TREATED WITH CONTINUOUS POSITIVE AIRWAY PRESSURE (CPAP): ICD-10-CM

## 2020-01-17 DIAGNOSIS — F41.9 ANXIETY AND DEPRESSION: ICD-10-CM

## 2020-01-17 DIAGNOSIS — E03.9 ACQUIRED HYPOTHYROIDISM: ICD-10-CM

## 2020-01-17 DIAGNOSIS — L57.0 KERATOSIS: ICD-10-CM

## 2020-01-17 PROBLEM — E66.09 CLASS 1 OBESITY DUE TO EXCESS CALORIES WITH SERIOUS COMORBIDITY IN ADULT: Status: ACTIVE | Noted: 2018-10-24

## 2020-01-17 PROBLEM — E66.811 CLASS 1 OBESITY DUE TO EXCESS CALORIES WITH SERIOUS COMORBIDITY IN ADULT: Status: ACTIVE | Noted: 2018-10-24

## 2020-01-17 PROCEDURE — 3074F SYST BP LT 130 MM HG: CPT | Performed by: FAMILY MEDICINE

## 2020-01-17 PROCEDURE — 3008F BODY MASS INDEX DOCD: CPT | Performed by: FAMILY MEDICINE

## 2020-01-17 PROCEDURE — 3078F DIAST BP <80 MM HG: CPT | Performed by: FAMILY MEDICINE

## 2020-01-17 PROCEDURE — 99214 OFFICE O/P EST MOD 30 MIN: CPT | Performed by: FAMILY MEDICINE

## 2020-01-17 NOTE — PROGRESS NOTES
Assessment/Plan:    No problem-specific Assessment & Plan notes found for this encounter  Diagnoses and all orders for this visit:    Diabetes mellitus type 2 in obese [facility])  Comments:  stable  continue Victoza as directed  Orders:  -     Lipid Panel with Direct LDL reflex; Future  -     Hemoglobin A1C  -     Microalbumin / creatinine urine ratio    Acquired hypothyroidism  Comments:  stable  continue current meds     Postsurgical malabsorption    Obstructive sleep apnea treated with continuous positive airway pressure (CPAP)  Comments:  not on CPAP since she lost the weight    Benign hypertension  Comments:  lisinopril 5 mg three times a week     Anxiety and depression  Comments:  stable  continue current meds     Fibromyalgia  Comments:  care per rheumatologist     Other hyperlipidemia  Comments:  atorvastatin as directed    Keratosis  -     Ambulatory referral to Dermatology; Future          Subjective:      Patient ID: Eliz Leiva is a 40 y o  female  Here to follow up  Lost 150 pounds since 10 months ago  Size 26 to size 14/16  Less anxiety since lost the weight     Diabetes   She presents for her follow-up diabetic visit  She has type 2 diabetes mellitus  Her disease course has been stable  There are no hypoglycemic associated symptoms  Pertinent negatives for hypoglycemia include no dizziness or headaches  There are no diabetic associated symptoms  Pertinent negatives for diabetes include no chest pain  There are no hypoglycemic complications  Symptoms are stable  Pertinent negatives for diabetic complications include no autonomic neuropathy or CVA  She is compliant with treatment all of the time  Her weight is decreasing rapidly  She is following a diabetic diet  When asked about meal planning, she reported none  She has not had a previous visit with a dietitian  She participates in exercise three times a week  Her home blood glucose trend is decreasing steadily   An ACE inhibitor/angiotensin II receptor blocker is being taken  Eye exam is not current  Hypertension   This is a chronic problem  The current episode started more than 1 year ago  The problem has been waxing and waning since onset  The problem is controlled  Pertinent negatives include no anxiety, chest pain, headaches, malaise/fatigue, peripheral edema or PND  There are no associated agents to hypertension  Risk factors for coronary artery disease include diabetes mellitus  There is no history of angina, kidney disease, CAD/MI or CVA  There is no history of chronic renal disease, coarctation of the aorta, hyperaldosteronism, hypercortisolism, hyperparathyroidism, a hypertension causing med, pheochromocytoma, renovascular disease, sleep apnea or a thyroid problem  The following portions of the patient's history were reviewed and updated as appropriate: allergies, current medications, past family history, past medical history, past social history, past surgical history and problem list     Review of Systems   Constitutional: Negative for activity change, appetite change and malaise/fatigue  HENT: Negative for congestion and dental problem  Eyes: Negative for discharge and itching  Respiratory: Negative for apnea and chest tightness  Cardiovascular: Negative for chest pain, leg swelling and PND  Endocrine: Negative for cold intolerance  Genitourinary: Negative for difficulty urinating and dyspareunia  Musculoskeletal: Negative for arthralgias and back pain  Skin: Positive for rash  Neurological: Negative for dizziness, facial asymmetry and headaches  Psychiatric/Behavioral: Negative for agitation and behavioral problems           Objective:      /66 (BP Location: Left arm, Patient Position: Sitting, Cuff Size: Standard)   Pulse 79   Temp (!) 96 °F (35 6 °C) (Tympanic)   Resp 16   Ht 5' 5" (1 651 m)   Wt 89 9 kg (198 lb 3 2 oz)   LMP 11/18/2018   SpO2 98%   BMI 32 98 kg/m²          Physical Exam Constitutional: She is oriented to person, place, and time  She appears well-developed and well-nourished  HENT:   Head: Normocephalic  Mouth/Throat: No oropharyngeal exudate  Eyes: Pupils are equal, round, and reactive to light  EOM are normal  Left eye exhibits no discharge  Neck: No thyromegaly present  Cardiovascular: Normal rate and regular rhythm  Exam reveals no friction rub  No murmur heard  Pulmonary/Chest: No stridor  No respiratory distress  Abdominal: She exhibits no distension  There is no tenderness  Neurological: She is alert and oriented to person, place, and time  No cranial nerve deficit  Skin: No erythema  No pallor     Keratosis over right chest, left ankle

## 2020-01-20 ENCOUNTER — VBI (OUTPATIENT)
Dept: ADMINISTRATIVE | Facility: OTHER | Age: 45
End: 2020-01-20

## 2020-01-28 ENCOUNTER — VBI (OUTPATIENT)
Dept: ADMINISTRATIVE | Facility: OTHER | Age: 45
End: 2020-01-28

## 2020-02-03 DIAGNOSIS — L02.31 LEFT BUTTOCK ABSCESS: Primary | ICD-10-CM

## 2020-02-03 RX ORDER — CEPHALEXIN 500 MG/1
500 CAPSULE ORAL EVERY 8 HOURS SCHEDULED
Qty: 21 CAPSULE | Refills: 0 | Status: SHIPPED | OUTPATIENT
Start: 2020-02-03 | End: 2020-02-10

## 2020-02-13 ENCOUNTER — TELEPHONE (OUTPATIENT)
Dept: SURGICAL ONCOLOGY | Facility: CLINIC | Age: 45
End: 2020-02-13

## 2020-02-13 NOTE — TELEPHONE ENCOUNTER
New Patient Encounter    New Patient Intake Form   Patient Details:  Carmelina Vargas  1975  [id number]    Background Information:  [address] starts by opening a telephone encounter and gathering the following information   Who is calling to schedule? If not self, relationship to patient? self   Referring Provider Peggy Nix PA-C under [facility]   What is the diagnosis? Abnormal blood work   Is this diagnosis confirmed Yes   Is there a confirmed diagnosis from a biopsy/tissue reviewed by pathology? No   Is there any prior history of Cancer? No   If yes, please explain na   When was the diagnosis? 1/16   Is patient aware of diagnosis? Yes   Reason for visit? NP DX   Have you had any testing done? If so: when, where? Yes   Are records in [employer]? yes   Was the patient told to bring a disk? no   Scheduling Information:   Preferred [facility]: [place]     Requesting Specific Provider? na   Are there any dates/time the patient cannot be seen? na      Miscellaneous: Had gastric bypass    After completing the above information, please route to Financial Counselor and the appropriate Nurse Navigator for review

## 2020-02-18 ENCOUNTER — TELEPHONE (OUTPATIENT)
Dept: HEMATOLOGY ONCOLOGY | Facility: CLINIC | Age: 45
End: 2020-02-18

## 2020-02-18 NOTE — TELEPHONE ENCOUNTER
Patient called to request to reschedule her 4/6 consult with Cristine Rodarte because she will be out of town  She requested an appt on 4/20 and has been scheduled

## 2020-02-28 DIAGNOSIS — F41.9 ANXIETY AND DEPRESSION: ICD-10-CM

## 2020-02-28 DIAGNOSIS — F32.A ANXIETY AND DEPRESSION: ICD-10-CM

## 2020-02-28 DIAGNOSIS — F51.01 PRIMARY INSOMNIA: ICD-10-CM

## 2020-03-02 RX ORDER — ALPRAZOLAM 0.5 MG/1
0.5 TABLET ORAL 2 TIMES DAILY PRN
Qty: 60 TABLET | Refills: 0 | Status: SHIPPED | OUTPATIENT
Start: 2020-03-02 | End: 2020-04-19 | Stop reason: SDUPTHER

## 2020-03-02 NOTE — TELEPHONE ENCOUNTER
Medication: xanax  PDMP:   12/04/2019  1  12/03/2019  ALPRAZOLAM 0 5 MG TABLET  60 0  30  TI [facility]       Active agreement on file -No

## 2020-03-03 ENCOUNTER — OFFICE VISIT (OUTPATIENT)
Dept: DERMATOLOGY | Facility: CLINIC | Age: 45
End: 2020-03-03
Payer: COMMERCIAL

## 2020-03-03 VITALS — HEIGHT: 65 IN | BODY MASS INDEX: 31.89 KG/M2 | TEMPERATURE: 96.9 F | WEIGHT: 191.4 LBS

## 2020-03-03 DIAGNOSIS — L57.0 ACTINIC KERATOSES: ICD-10-CM

## 2020-03-03 DIAGNOSIS — D23.9 DERMATOFIBROMA: ICD-10-CM

## 2020-03-03 DIAGNOSIS — D22.9 MULTIPLE BENIGN MELANOCYTIC NEVI: ICD-10-CM

## 2020-03-03 DIAGNOSIS — L73.2 HIDRADENITIS: Primary | ICD-10-CM

## 2020-03-03 DIAGNOSIS — L82.1 SEBORRHEIC KERATOSES: ICD-10-CM

## 2020-03-03 DIAGNOSIS — L91.8 SKIN TAG: ICD-10-CM

## 2020-03-03 PROCEDURE — 11900 INJECT SKIN LESIONS </W 7: CPT

## 2020-03-03 PROCEDURE — 99244 OFF/OP CNSLTJ NEW/EST MOD 40: CPT

## 2020-03-03 PROCEDURE — 17000 DESTRUCT PREMALG LESION: CPT

## 2020-03-03 RX ORDER — DOXYCYCLINE HYCLATE 100 MG/1
100 CAPSULE ORAL EVERY 12 HOURS SCHEDULED
Qty: 60 CAPSULE | Refills: 0 | Status: SHIPPED | OUTPATIENT
Start: 2020-03-03 | End: 2020-04-02

## 2020-03-03 NOTE — PATIENT INSTRUCTIONS
1  SEBORRHEIC KERATOSIS; NON-INFLAMED      Assessment and Plan:  Based on a thorough discussion of this condition and the management approach to it (including a comprehensive discussion of the known risks, side effects and potential benefits of treatment), the patient (family) agrees to implement the following specific plan:   Reassured benign   Discuss importance of wearing sunscreen daily, use SPF 30+ daily reapply every 2 hours daily    Seborrheic Keratosis  A seborrheic keratosis is a harmless warty spot that appears during adult life as a common sign of skin aging  Seborrheic keratoses can arise on any area of skin, covered or uncovered, with the usual exception of the palms and soles  They do not arise from mucous membranes  Seborrheic keratoses can have highly variable appearance  Seborrheic keratoses are extremely common  It has been estimated that over 90% of adults over the age of 61 years have one or more of them  They occur in males and females of all races, typically beginning to erupt in the 35s or 45s  They are uncommon under the age of 21 years  The precise cause of seborrhoeic keratoses is not known  Seborrhoeic keratoses are considered degenerative in nature  As time goes by, seborrheic keratoses tend to become more numerous  Some people inherit a tendency to develop a very large number of them; some people may have hundreds of them  The name "seborrheic keratosis" is misleading, because these lesions are not limited to a seborrhoeic distribution (scalp, mid-face, chest, upper back), nor are they formed from sebaceous glands, nor are they associated with sebum -- which is greasy    Seborrheic keratosis may also be called "SK," "Seb K," "basal cell papilloma," "senile wart," or "barnacle "      Researchers have noted:   Eruptive seborrhoeic keratoses can follow sunburn or dermatitis   Skin friction may be the reason they appear in body folds   Viral cause (e g , human papillomavirus) seems unlikely   Stable and clonal mutations or activation of FRFR3, PIK3CA, AMBER, AKT1 and EGFR genes are found in seborrhoeic keratoses   Seborrhoeic keratosis can arise from solar lentigo   FRFR3 mutations also arise in solar lentigines  These mutations are associated with increased age and location on the head and neck, suggesting a role of ultraviolet radiation in these lesions   Seborrheic keratoses do not harbour tumour suppressor gene mutations   Epidermal growth factor receptor inhibitors, which are used to treat some cancers, often result in an increase in verrucal (warty) keratoses  There is no easy way to remove multiple lesions on a single occasion  Unless a specific lesion is "inflamed" and is causing pain or stinging/burning or is bleeding, most insurance companies do not authorize treatment  2  ACTINIC KERATOSIS      Assessment and Plan:  Based on a thorough discussion of this condition and the management approach to it (including a comprehensive discussion of the known risks, side effects and potential benefits of treatment), the patient (family) agrees to implement the following specific plan:     Recommend treating with cryo therapy    Actinic keratoses are very common on sites repeatedly exposed to the sun, especially the backs of the hands and the face, most often affecting the ears, nose, cheeks, upper lip, vermilion of the lower lip, temples, forehead and balding scalp  In severely chronically sun-damaged individuals, they may also be found on the upper trunk, upper and lower limbs, and dorsum of feet  We discussed the theoretical premalignant (pre-cancerous) nature and etiology of these growths  We discussed the prevailing notion that actinic keratoses are a reflection of abnormal skin cell development due to DNA damage by short wavelength UVB    They are more likely to appear if the immune function is poor, due to aging, recent sun exposure, predisposing disease or certain drugs  We discussed that the main concern is that actinic keratoses may predispose to squamous cell carcinoma  It is rare for a solitary actinic keratosis to evolve to squamous cell carcinoma (SCC), but the risk of SCC occurring at some stage in a patient with more than 10 actinic keratoses is thought to be about 10 to 15%  A tender, thickened, ulcerated or enlarging actinic keratosis is suspicious of SCC  Actinic keratoses may be prevented by strict sun protection  If already present, keratoses may improve with a very high sun protection factor (50+) broad-spectrum sunscreen applied at least daily to affected areas, year-round  We recommend that UPF-rated clothing and hats and sunglasses be worn whenever possible and that a sunscreen-moisturizer combination product such as Neutrogena Daily Defense be applied at least three times a day  We performed a thorough discussion of treatment options and specific risk/benefits/alternatives including but not limited to medical field treatment with medications such as the following:     Topical field area medications such as 5-fluorouracil or Aldara (specifically, the trouble with long-term compliance, blistering and local skin reaction versus the convenience of at-home therapy and that field therapy gets what is not yet seen)   Cryotherapy (specifically, local pain, scarring, dyspigmentation, blistering, possible superinfection, and treats only what we see versus directed treatment today)   Photodynamic therapy (specifically, local pain, scarring, dyspigmentation, blistering, possible superinfection, need to schedule for a later date, and time spent in the office versus field therapy that gets what is not yet seen)      PROCEDURE:  DESTRUCTION OF BENIGN LESIONS  After a thorough discussion of treatment options and risk/benefits/alternatives (including but not limited to local pain, scarring, dyspigmentation, blistering, and possible superinfection), verbal and written consent were obtained and the aforementioned lesions were treated on with cryotherapy using liquid nitrogen x 1 cycle for 5-10 seconds  The patient tolerated the procedure well, and after-care instructions were provided  3  MELANOCYTIC NEVI ("Moles")      Assessment and Plan:  Based on a thorough discussion of this condition and the management approach to it (including a comprehensive discussion of the known risks, side effects and potential benefits of treatment), the patient (family) agrees to implement the following specific plan:   Reassured benign     Melanocytic Nevi  Melanocytic nevi ("moles") are tan or brown, raised or flat areas of the skin which have an increased number of melanocytes  Melanocytes are the cells in our body which make pigment and account for skin color  Some moles are present at birth (I e , "congenital nevi"), while others come up later in life (i e , "acquired nevi")  The sun can stimulate the body to make more moles  Sunburns are not the only thing that triggers more moles  Chronic sun exposure can do it too  Clinically distinguishing a healthy mole from melanoma may be difficult, even for experienced dermatologists  The "ABCDE's" of moles have been suggested as a means of helping to alert a person to a suspicious mole and the possible increased risk of melanoma  The suggestions for raising alert are as follows:    Asymmetry: Healthy moles tend to be symmetric, while melanomas are often asymmetric  Asymmetry means if you draw a line through the mole, the two halves do not match in color, size, shape, or surface texture  Asymmetry can be a result of rapid enlargement of a mole, the development of a raised area on a previously flat lesion, scaling, ulceration, bleeding or scabbing within the mole    Any mole that starts to demonstrate "asymmetry" should be examined promptly by a board certified dermatologist  Border: Healthy moles tend to have discrete, even borders  The border of a melanoma often blends into the normal skin and does not sharply delineate the mole from normal skin  Any mole that starts to demonstrate "uneven borders" should be examined promptly by a board certified dermatologist      Color: Healthy moles tend to be one color throughout  Melanomas tend to be made up of different colors ranging from dark black, blue, white, or red  Any mole that demonstrates a color change should be examined promptly by a board certified dermatologist      Diameter: Healthy moles tend to be smaller than 0 6 cm in size; an exception are "congenital nevi" that can be larger  Melanomas tend to grow and can often be greater than 0 6 cm (1/4 of an inch, or the size of a pencil eraser)  This is only a guideline, and many normal moles may be larger than 0 6 cm without being unhealthy  Any mole that starts to change in size (small to bigger or bigger to smaller) should be examined promptly by a board certified dermatologist      Evolving: Healthy moles tend to "stay the same "  Melanomas may often show signs of change or evolution such as a change in size, shape, color, or elevation  Any mole that starts to itch, bleed, crust, burn, hurt, or ulcerate or demonstrate a change or evolution should be examined promptly by a board certified dermatologist       Dysplastic Nevi  Dysplastic moles are moles that fit the ABCDE rules of melanoma but are not identified as melanomas when examined under the microscope  They may indicate an increased risk of melanoma in that person  If there is a family history of melanoma, most experts agree that the person may be at an increased risk for developing a melanoma  Experts still do not agree on what dysplastic moles mean in patients without a personal or family history of melanoma    Dysplastic moles are usually larger than common moles and have different colors within it with irregular borders  The appearance can be very similar to a melanoma  Biopsies of dysplastic moles may show abnormalities which are different from a regular mole  Melanoma  Malignant melanoma is a type of skin cancer that can be deadly if it spreads throughout the body  The incidence of melanoma in the [place] is growing faster than any other cancer  Melanoma usually grows near the surface of the skin for a period of time, and then begins to grow deeper into the skin  Once it grows deeper into the skin, the risk of spread to other organs greatly increases  Therefore, early detection and removal of a malignant melanoma may result in a better chance at a complete cure; removal after the tumor has spread may not be as effective, leading to worse clinical outcomes such as death  The true rate of nevus transformation into a melanoma is unknown  It has been estimated that the lifetime risk for any acquired melanocytic nevus on any 21year-old individual transforming into melanoma by age [de-identified] is 0 03% (1 in 3,164) for men and 0 009% (1 in 10,800) for women  The appearance of a "new mole" remains one of the most reliable methods for identifying a malignant melanoma  Occasionally, melanomas appear as rapidly growing, blue-black, dome-shaped bumps within a previous mole or previous area of normal skin  Other times, melanomas are suspected when a mole suddenly appears or changes  Itching, burning, or pain in a pigmented lesion should increase suspicion, but most patients with early melanoma have no skin discomfort whatsoever  Melanoma can occur anywhere on the skin, including areas that are difficult for self-examination  Many melanomas are first noticed by other family members  Suspicious-looking moles may be removed for microscopic examination  You may be able to prevent death from melanoma by doing two simple things:    1   Try to avoid unnecessary sun exposure and protect your skin when it is exposed to the sun   People who live near the equator, people who have intermittent exposures to large amounts of sun, and people who have had sunburns in childhood or adolescence have an increased risk for melanoma  Sun sense and vigilant sun protection may be keys to helping to prevent melanoma  We recommend wearing UPF-rated sun protective clothing and sunglasses whenever possible and applying a moisturizer-sunscreen combination product (SPF 50+) such as Neutrogena Daily Defense to sun exposed areas of skin at least three times a day  2  Have your moles regularly examined by a board certified dermatologist AND by yourself or a family member/friend at home  We recommend that you have your moles examined at least once a year by a board certified dermatologist   Use your birthday as an annual reminder to have your "Birthday Suit" (I e , your skin) examined; it is a nice birthday gift to yourself to know that your skin is healthy appearing! Additionally, at-home self examinations may be helpful for detecting a possible melanoma  Use the ABCDEs we discussed and check your moles once a month at home  4  HIDRADENITIS SUPPURATIVA      Assessment and Plan:  Based on a thorough discussion of this condition and the management approach to it (including a comprehensive discussion of the known risks, side effects and potential benefits of treatment), the patient (family) agrees to implement the following specific plan:   Recommend injection of Kenalog 10 mg   Recommend Hibiclens (chlorhexidine 4%) wash daily   Oral Antibiotics- doxycycline 100 mg 2x/day   , discuss surgical removal by a general surgeon    What is hidradenitis suppurativa? Hidradenitis suppurativa is an inflammatory skin disease that affects apocrine gland-bearing skin in the axillae, in the groin, and under the breasts   It is characterised by recurrent boil-like nodules and abscesses that culminate in pus-like discharge, difficult-to-heal open wounds (sinuses) and scarring  Hidradenitis suppurativa also has significant psychological impact and many patients suffer from impairment of body image, depression and anxiety  The term hidradenitis implies it starts as an inflammatory disorder of sweat glands, which is now known to be incorrect  Hidradenitis suppurativa is also known as acne inversa  Who gets hidradenitis suppurativa? Hidradenitis often starts at puberty, and is most active between the ages of 21 and 36 years, and in women, can resolve at menopause  It is three times more common in females than in males  Risk factors include:   Other family members with hidradenitis suppurativa   Obesity and insulin resistance/metabolic syndrome   Cigarette smoking   Follicular occlusion disorders: acne conglobata, dissecting cellulitis, pilonidal sinus   Inflammatory bowel disease (Crohn disease)   Rare autoinflammatory syndromes associated with abnormalities of PSTPIP1 gene  *    * PAPA syndrome (pyogenic arthritis, pyoderma gangrenosum and acne), PASH syndrome (pyoderma gangrenosum, acne, suppurative hidradenitis) and PAPASH syndrome (pyogenic arthritis, pyoderma gangrenosum, acne, suppurative hidradenitis)  What causes hidradenitis suppurativa? Hidradenitis suppurativa is an autoinflammatory disorder  Although the exact cause is not yet understood, contributing factors include:   Friction from clothing and body folds   Aberrant immune response to commensal bacteria   Abnormal cutaneous or follicular microbiome   Follicular occlusion   Release of pro-inflammatory cytokines   Inflammation causing rupture of the follicular wall and destroying apocrine glands and ducts   Secondary bacterial infection   Certain drugs  What are the clinical features of hidradenitis suppurativa? Hidradenitis can affect a single or multiple areas in the armpits, neck, sub mammary area, and inner thighs   Anogenital involvement most commonly affects the groin, mons pubis, vulva (in females), sides of the scrotum (in males), perineum, buttocks and perianal folds  Signs include:   Open and closed comedones   Painful firm papules, larger nodules and pleated ridges   Pustules, fluctuant pseudocysts and abscesses   Pyogenic granulomas   Draining sinuses linking inflammatory lesions   Hypertrophic and atrophic scars  Many patients with hidradenitis suppurativa also suffer from other skin disorders, including acne, hirsutism and psoriasis  The severity and extent of hidradenitis suppurativa is recorded at assessment and when determining the impact of a treatment  The Garrett system describes three distinct clinical stages:  1  Solitary or multiple, isolated abscess formation without scarring or sinus tracts  2  Recurrent abscesses, single or multiple widely  lesions, with sinus tract formation  3  Diffuse or broad involvement, with multiple interconnected sinus tracts and abscesses  Severe hidradenitis (Najma Stage 3) has been associated with:   Male sex   Axillary and perianal involvement   Obesity   Smoking   Higher risk of stroke, coronary artery disease, heart failure, and peripheral artery disease   Disease duration  What is the treatment for hidradenitis suppurativa? General measures   Weight loss; follow an anti-inflammatory, low-sugar, low-grain, low-dairy diet (mainly plants)   Smoking cessation: this can lead to improvement within a few months   Loose fitting clothing   Daily unfragranced antiperspirants   If prone to secondary infection, wash with antiseptics or take bleach baths   Apply hydrogen peroxide solution or medical grade honey to reduce malodour   Use peeling agents such as resorcinol 15% cream to de-roof nodules   Apply simple dressings to draining sinuses   Analgesics, such as paracetamol (acetaminophen), for pain control   Seek help to manage anxiety and depression      Medical management of hidradenitis suppurativa  Medical management of hidradenitis suppurativa is difficult  Treatment is required long term  Effective options are listed below  Antibiotics   Topical clindamycin, with benzoyl peroxide to reduce bacterial resistance   Short course of oral antibiotics for acute staphylococcal abscesses, eg flucloxacillin   Prolonged courses (minimum 3 months) of tetracycline, metronidazole, trimethoprim + sulphamethoxazole, fluoroquinolones, ertapenem or dapsone for their anti-inflammatory action   6-12 week courses of the combination of clindamycin (or doxycycline) and rifampicin for severe disease  Antiandrogens   Long-term oral contraceptive pill; antiandrogenic progesterones drospirenone or cyproterone acetate may be more effective than standard combined pills  These are more suitable than progesterone-only pills or devices   Spironolactone and finasteride   Response takes 6 months or longer  Immunomodulatory treatments for severe disease   Intralesional corticosteroids into nodules   Systemic corticosteroids short-term for flares   Methotrexate, ciclosporin, and azathioprine   TNF-? inhibitors adalimumab and infliximab, used in higher dose than required for psoriasis, are the most successful treatments to date  Note that paradoxically, they may sometimes induce new-onset hidradenitis suppurativa   Other biologics are under investigation, such as the IL-1?  antagonist, canakinumab    Other medical treatments   Metformin in patients with insulin resistance   Acitretin (unsuitable for females of childbearing potential)   Isotretinoin -- effective for acne but appears unhelpful for most cases of hidradenitis   Colchicine   Medical management of anxiety and depression    Surgical management of hidradenitis suppurativa   Incision and drainage of acute abscesses   Curettage and deroofing of nodules, abscesses and sinuses   Laser ablation of nodules, abscesses and sinuses   Wide local excision of persistent nodules   Radical excisional surgery of entire affected area   Nd:YAG laser hair removal    PROCEDURE:  INTRALESIONAL STEROID INJECTION (KENALOG INJECTION)    Purpose: Triamcinolone is a synthetic glucocorticoid corticosteroid that has marked anti-inflammatory action  It is prepared in sterile aqueous suspension suitable for injecting directly into a lesion on or immediately below the skin to treat a dermal inflammatory process  Indications: It is indicated for alopecia areata; inflammatory acne cysts; discoid lupus erythematosus; keloids and hypertrophic scars; inflammatory lesions of granuloma annulare, lichen planus, lichen simplex chronicus (neurodermatitis), psoriatic plaques, and other localized inflammatory skin conditions  Potential Side Effects: I understand that triamcinolone injection can potentially cause early and/or delayed adverse effects such as:    Pain    Impaired wound healing    Increased hair growth    Bleeding    White or brown marks    Steroid acne    Infection    Telangiectasia    Skin thinning    Cutaneous and subcutaneous lipoatrophy (most common) appearing as skin indentations or dimples around the injection sites a few weeks after treatment     PROCEDURE NOTE:  After verbal and written consent were obtained, the to-be-treated area was wiped and cleaned with rubbing alcohol 70%  There was less than 1 mL of blood loss and little to no discomfort  The area was bandaged with a Band-aid  The patient tolerated the procedure well and remained in the office for observation  With no signs of an adverse reaction, the patient was eventually discharged from clinic            5  ACROCHORDON ("SKIN TAG")      Assessment and Plan:  Based on a thorough discussion of this condition and the management approach to it (including a comprehensive discussion of the known risks, side effects and potential benefits of treatment), the patient (family) agrees to implement the following specific plan:   Reassured benign    Skin tags are common, soft, harmless skin lesions that are also called, in the appropriate settings, papillomas, fibroepithelial polyps, and soft fibromas  They are made up of loosely arranged collagen fibers and blood vessels surrounded by a thickened or thinned-out epidermis  Skin tags tend to develop in both men and women as we grow older  They are usually found on the skin folds (neck, armpits, groin)  It is not known what specifically causes skin tags  Certain factors, though, do appear to play a role:   Chaffing and irritation from skin rubbing together   High levels of growth factors (as seen, for example, in pregnancy or in acromegaly/gigantism)   Insulin resistance   Human papillomavirus (wart virus)    We discussed that most skin tags do not need to be treated unless they are specifically causing the patient physical distress or limitation or pose a risk for a larger problem such as an infection that forms secondary to excoriation or chronic irritation  We had a thorough discussion of treatment options and specific risks (including that any procedural treatment may not be covered by insurance and would then be the patient's responsibility) and benefits/alternatives including but not limited to the following:   Cryotherapy (freezing)   Shave removal   Surgical excision (snip excision with scissors)   Electrosurgery   Ligation (we do not do this procedure and counseled against it due to risk of tissue necrosis and infection)      6   DERMATOFIBROMA      Assessment and Plan:  Based on a thorough discussion of this condition and the management approach to it (including a comprehensive discussion of the known risks, side effects and potential benefits of treatment), the patient (family) agrees to implement the following specific plan:   Reassured benign    Assessment and Plan:  A dermatofibroma is a common benign fibrous nodule that most often arises on the skin of the lower legs  A dermatofibroma is also called a "cutaneous fibrous histiocytoma "  Dermatofibromas occur at all ages and in people of every ethnicity  They are more common in women than in men  It is not clear if dermatofibroma is a reactive process or if it is a neoplasm  The lesions are made up of proliferating fibroblasts  Histiocytes may also be involved  They are sometimes attributed to an insect bite or ingrownhair or local trauma, but not consistently  They may be more numerous in patients with altered immunity  Dermatofibromas most often occur on the legs and arms, but may also arise on the trunk or any site of the body  Typical clinical features include the following:   People may have 1 or up to 15 lesions   Size varies from 0 5-1 5 cm diameter; most lesions are 7-10 mm diameter   They are firm nodules tethered to the skin surface and mobile over subcutaneous tissue   The skin "dimples" on pinching the lesion   Color may be pink to light brown in white skin, and dark brown to black in dark skin; some appear paler in the center   They do not usually cause symptoms, but they are sometimes painful or itchy   Because they are often raised lesions, they may be traumatized, for example by a razor   Occasionally dozens may erupt within a few months, usually in the setting of immunosuppression (for example autoimmune disease, cancer or certain medications)   Dermatofibroma does not give rise to cancer  However, occasionally, it may be mistaken for dermatofibrosarcoma or desmoplastic melanoma  A dermatofibroma is harmless and seldom causes any symptoms  Usually, only reassurance is needed  If it is nuisance or causing concern, the lesion can be removed surgically, resulting in a scar that is, by definition, usually longer in diameter than the widest portion of the dermatofibroma    Cryotherapy, shave biopsy and laser surgery are rarely completely successful  Skin punch biopsy or incisional biopsy may be undertaken if there is an atypical feature such as recent enlargement, ulceration, or asymmetrical structures and colours on dermatoscopy

## 2020-03-03 NOTE — PROGRESS NOTES
[facility] Note     Patient Name: Jose Luis Hernandez  Encounter Date: 3/3/2020    Today's Chief Concerns:  Castro Concern #1:  Skin exam   Concern #2: Keratoses   Concern##:   Skin rash      Past Medical History:  Have you ever had or currently have any of the following medical conditions or treatments? · HIV/AIDS: No  · Hepatitis B: No  · Hepatitis C: No   · Diabetes: No  · Tuberculosis: No  · Biologic Therapy/Chemotherapy: No  · Organ or Bone Marrow Transplantation: No  · Radiation Treatment: No  · Cancer (If Yes, which types)- No      Have you ever had any of the following skin conditions? · Melanoma? (If Yes, please provide more detail)- No  · Basal Cell Carcinoma: No  · Squamous Cell Carcinoma: No  · Sebaceous Cell Carcinoma: No  · Merkel Cell Carcinoma: No  · Angiosarcoma: No  · Blistering Sunburns: No  · Eczema: YES  · Psoriasis: YES    Social History:    What is your current Smoking Status? 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 Family history:  Do any of your "first degree relatives" (parent, brother, sister, or child) have any of the following conditions? · Melanoma? (If Yes, which relatives?) No  · Eczema: YES, father  · Asthma: No  · Hay Fever/Seasonal Allergies: No  · Psoriasis: YES, father  · Arthritis: YES, mother, sisiter  · Thyroid Problems: YES, mother  · Lupus/Connective Tissue Disease: No  · Diabetes: YES, father, mother, sister  · Stroke: No  · Blood Clots: No  · IBD/Crohn's/Ulcerative Colitis: No  · Vitiligo: No  · Scarring/Keloids: No  · Severe Acne: No  · Pancreatic Cancer: No  · Other known Skin Condition? If Yes, what condition and which relatives?   No    Current Medications:    Current Outpatient Medications:     acetaminophen (TYLENOL) 500 mg tablet, Take 500 mg by mouth every 6 (six) hours as needed for mild pain, Disp: , Rfl:     ALPRAZolam (XANAX) 0 5 mg tablet, Take 1 tablet (0 5 mg total) by mouth 2 (two) times a day as needed for anxiety, Disp: 60 tablet, Rfl: 0    atorvastatin (LIPITOR) 10 mg tablet, TAKE 1 TABLET BY MOUTH ONCE DAILY, Disp: 90 tablet, Rfl: 0    BIOTIN PO, Take by mouth, Disp: , Rfl:     buPROPion (WELLBUTRIN SR) 150 mg 12 hr tablet, Take 1 tablet (150 mg total) by mouth 2 (two) times a day, Disp: 180 tablet, Rfl: 0    Calcium Citrate-Vitamin D (MINE-CITRATE PLUS VITAMIN D PO), Take by mouth, Disp: , Rfl:     Cholecalciferol (VITAMIN D3) 2000 units capsule, Take 2,000 Units by mouth daily , Disp: , Rfl:     Cyanocobalamin (B-12) 1000 MCG CAPS, Take 1 capsule by mouth daily , Disp: , Rfl:     DULoxetine (CYMBALTA) 60 mg delayed release capsule, , Disp: , Rfl:     gabapentin (NEURONTIN) 300 mg capsule,  5 times a day , Disp: , Rfl: 3    levothyroxine 50 mcg tablet, TAKE 1 TABLET BY MOUTH ONCE DAILY AS DIRECTED, Disp: 90 tablet, Rfl: 3    lisinopril (ZESTRIL) 10 mg tablet, Take 0 5 tablets (5 mg total) by mouth daily, Disp: 30 tablet, Rfl: 5    Multiple Vitamin (MULTIVITAMIN) tablet, Take 1 tablet by mouth daily, Disp: , Rfl:     omeprazole (PriLOSEC) 20 mg delayed release capsule, TAKE 1 CAPSULE BY MOUTH ONCE DAILY, Disp: 90 capsule, Rfl: 1    VICTOZA injection, INJECT 0 3 ML (1 8 MG) SUBCUTANEOUSLY ONCE DAILY, Disp: 15 pen, Rfl: 3    Specific Alerts:    Have you been seen by a [facility]atologist in the last 3 years? No    Are you pregnant or planning to become pregnant? No    Are you currently or planning to be nursing or breast feeding? No    Allergies   Allergen Reactions    Metformin Diarrhea       May we call your Preferred Phone number to discuss your specific medical information? YES    May we leave a detailed message that includes your specific medical information? YES    Have you traveled outside of the [facility] in the past 3 months? No    Do you currently have a pacemaker or defibrillator?  No    Do you have any artificial heart valves, joints, plates, screws, rods, stents, pins, etc? No   - If Yes, were any placed within the last 2 years? Do you require any medications prior to a surgical procedure? No   - If Yes, for which procedure? - If Yes, what medications to you require? Are you taking any medications that cause you to bleed more easily ("blood thinners") No    Have you ever experienced a rapid heartbeat with epinephrine? No    Have you ever been treated with "gold" (gold sodium thiomalate) therapy? No    [facility] can help with wrinkles, "laugh lines," facial volume loss, "double chin," "love handles," age spots, and more  Are you interested in learning today about some of the skin enhancement procedures that we offer? (If Yes, please provide more detail) No    Review of Systems:  Have you recently had or currently have any of the following? · Fever or chills: No  · Night Sweats: No  · Headaches: No  · Weight Gain: No  · Weight Loss: No  · Blurry Vision: No  · Nausea: No  · Vomiting: No  · Diarrhea: No  · Blood in Stool: No  · Abdominal Pain: No  · Itchy Skin: YES  · Painful Joints: YES  · Swollen Joints: No  · Muscle Pain: No  · Irregular Mole: No  · Sun Burn: No  · Dry Skin: YES  · Skin Color Changes: No  · Scar or Keloid: No  · Cold Sores/Fever Blisters: No  · Bacterial Infections/MRSA: No  · Anxiety: No  · Depression: No  · Suicidal or Homicidal Thoughts: No      PHYSICAL EXAM:      Was a chaperone (Derm Clinical Assistant) present for the entirety of the Physical Exam? YES    Did the Dermatology Team specifically ask and  the patient on the importance of a Full Skin Exam to be sure that nothing is missed clinically?  YES    Did the patient request or accept a Full Skin Exam?  YES    Did the patient specifically refuse to have the areas "under-the-bra" examined by the Dermatologist? No    Did the patient specifically refuse to have the areas "under-the-underwear" examined by the Dermatologist? No      CONSTITUTIONAL:   Vitals:    03/03/20 1525   Temp: (!) 96 9 °F (36 1 °C)   TempSrc: Tympanic   Weight: 86 8 kg (191 lb 6 4 oz)   Height: 5' 5" (1 651 m)           PSYCH: Normal mood and affect  EYES: Normal conjunctiva  ENT: Normal lips and oral mucosa  CARDIOVASCULAR: No edema  RESPIRATORY: Normal respirations  HEME/LYMPH/IMMUNO:  No regional lymphadenopathy except as noted below in ASSESSMENT AND PLAN BY DIAGNOSIS    FULL ORGAN SYSTEM SKIN EXAM (SKIN)  Hair, Scalp, Ears, Face Normal except as noted below in Assessment   Neck, Cervical Chain Nodes Normal except as noted below in Assessment   Right Arm/Hand/Fingers Normal except as noted below in Assessment   Left Arm/Hand/Fingers Normal except as noted below in Assessment   Chest/Breasts/Axillae Viewed areas Normal except as noted below in Assessment   Abdomen, Umbilicus Normal except as noted below in Assessment   Back/Spine Normal except as noted below in Assessment   Groin/Genitalia/Buttocks Viewed areas Normal except as noted below in Assessment   Right Leg, Foot, Toes Normal except as noted below in Assessment   Left Leg, Foot, Toes Normal except as noted below in Assessment        ASSESSMENT AND PLAN BY DIAGNOSIS:    History of Present Condition:     Duration:  How long has this been an issue for you?    o  patient stated keratoses has been present for 8 months   Location Affected:  Where on the body is this affecting you?    o  chest, lower legs   Quality:  Is there any bleeding, pain, itch, burning/irritation, or redness associated with the skin lesion?    o Symptoms of itching present   Severity:  Describe any bleeding, pain, itch, burning/irritation, or redness on a scale of 1 to 10 (with 10 being the worst)    o  4   Timing:  Does this condition seem to be there pretty constantly or do you notice it more at specific times throughout the day?    o  denies, comes and goes   Context:  Have you ever noticed that this condition seems to be associated with specific activities you do?    o  denies   Modifying Factors:    o Anything that seems to make the condition worse?    -  denies  o What have you tried to do to make the condition better?    - OTC hydrocortisone cream   Associated Signs and Symptoms:  Does this skin lesion seem to be associated with any of the following:  o  DERM ASSOCIATED SIGNS AND SYMPTOMS: Itching and Scratching       1  SEBORRHEIC KERATOSIS; NON-INFLAMED    Physical Exam:   Anatomic Location Affected:  Right clavicle, Left foot, Right forearm   Morphological Description:  Flat and raised, waxy, smooth to warty textured, yellow to brownish-grey to dark brown to blackish, discrete, "stuck-on" appearing papules  Additional History of Present Condition:  Patient reports new bumps on the skin  Denies burn, pain, bleeding or ulceration  Present constantly; nothing seems to make it worse or better  No prior treatment  Assessment and Plan:  Based on a thorough discussion of this condition and the management approach to it (including a comprehensive discussion of the known risks, side effects and potential benefits of treatment), the patient (family) agrees to implement the following specific plan:   Reassured benign    2   ACTINIC KERATOSIS    Physical Exam:   Anatomic Location Affected:  Left medial eybrow   Morphological Description:  Pink scaly papule        Assessment and Plan:  Based on a thorough discussion of this condition and the management approach to it (including a comprehensive discussion of the known risks, side effects and potential benefits of treatment), the patient (family) agrees to implement the following specific plan:     Recommend treating with cryo therapy    PROCEDURE:  DESTRUCTION OF PRE-MALIGNANT LESIONS  After a thorough discussion of treatment options and risk/benefits/alternatives (including but not limited to local pain, scarring, dyspigmentation, blistering, and possible superinfection), verbal and written consent were obtained and the aforementioned lesions were treated on with cryotherapy using liquid nitrogen x 3 cycle for 5-10 seconds   TOTAL NUMBER of 1 pre-malignant lesions were treated today on the ANATOMIC LOCATION: left medial eyebrow  The patient tolerated the procedure well, and after-care instructions were provided  3  MELANOCYTIC NEVI ("Moles")    Physical Exam:   Anatomic Location Affected:   Mostly on sun-exposed areas of the trunk, extremities   Morphological Description:  Scattered, 1-4mm round to ovoid, symmetrical-appearing, even bordered, skin colored to dark brown macules/papules, mostly in sun-exposed areas      Assessment and Plan:  Based on a thorough discussion of this condition and the management approach to it (including a comprehensive discussion of the known risks, side effects and potential benefits of treatment), the patient (family) agrees to implement the following specific plan:  Monitor for changes  Benign, reassured   When outside we recommend using a wide brim hat, sunglasses, long sleeve and pants, sunscreen with SPF 20+ with reapplication every 2 hours, or SPF specific clothing            4  HIDRADENITIS SUPPURATIVA    Physical Exam:   Anatomic Location Affected:  Bilateral axilla, inferior left breast, groin fold, perianal   Morphological Description:  Scarred sinus tract and nodules, no tenderness, few double headed comedones, only drainage from perianal lesion   Pertinent Positives: family history(father)    Noticed draining perianal lesion x 2 months  Not painful      Assessment and Plan:  Based on a thorough discussion of this condition and the management approach to it (including a comprehensive discussion of the known risks, side effects and potential benefits of treatment), the patient (family) agrees to implement the following specific plan:   Recommend injection of Kenalog 10 mg/cc   Recommend Hibiclens (chlorhexidine 4%) wash daily   Oral Antibiotics- doxycycline 100 mg 2x/day   , discuss surgical removal by a general surgeon         PROCEDURE:  INTRALESIONAL STEROID INJECTION (KENALOG INJECTION)    Purpose: Triamcinolone is a synthetic glucocorticoid corticosteroid that has marked anti-inflammatory action  It is prepared in sterile aqueous suspension suitable for injecting directly into a lesion on or immediately below the skin to treat a dermal inflammatory process  Indications: It is indicated for alopecia areata; inflammatory acne cysts; discoid lupus erythematosus; keloids and hypertrophic scars; inflammatory lesions of granuloma annulare, lichen planus, lichen simplex chronicus (neurodermatitis), psoriatic plaques, and other localized inflammatory skin conditions  Potential Side Effects: I understand that triamcinolone injection can potentially cause early and/or delayed adverse effects such as:    Pain    Impaired wound healing    Increased hair growth    Bleeding    White or brown marks    Steroid acne    Infection    Telangiectasia    Skin thinning    Cutaneous and subcutaneous lipoatrophy (most common) appearing as skin indentations or dimples around the injection sites a few weeks after treatment     PROCEDURE NOTE:  After verbal and written consent were obtained, the to-be-treated area was wiped and cleaned with rubbing alcohol 70%  Then, a total of 0 5 mL of Kenalog CONCENTRATION:  10 mg/mL   (Lot# [id number]; Expiration MAY 2021, NDC#: 5462-0125-89) was injected intralesionally into a total of 1 lesion/s on the following anatomic areas:  Right Perianal using a 1-mL syringe and a 30-gauge needle  There was less than 1 mL of blood loss and little to no discomfort  The area was bandaged with a Band-aid  The patient tolerated the procedure well and remained in the office for observation    With no signs of an adverse reaction, the patient was eventually discharged from clinic  5  ACROCHORDON ("SKIN TAG")    Physical Exam:   Anatomic Location Affected:  Around the neck   Morphological Description:  Pink pedunculated papules        Assessment and Plan:  Based on a thorough discussion of this condition and the management approach to it (including a comprehensive discussion of the known risks, side effects and potential benefits of treatment), the patient (family) agrees to implement the following specific plan:   Reassured benign        6   DERMATOFIBROMA    Physical Exam:   Anatomic Location Affected:  Left lower leg   Morphological Description:  Skin colored papule with dimple sign      Assessment and Plan:  Based on a thorough discussion of this condition and the management approach to it (including a comprehensive discussion of the known risks, side effects and potential benefits of treatment), the patient (family) agrees to implement the following specific plan:   Reassured benign      Scribe Attestation    I,:   Cait Hannah am acting as a scribe while in the presence of the attending physician :        I,:   Juanita Jacob MD personally performed the services described in this documentation    as scribed in my presence :

## 2020-03-04 DIAGNOSIS — F32.A ANXIETY AND DEPRESSION: ICD-10-CM

## 2020-03-04 DIAGNOSIS — F41.9 ANXIETY AND DEPRESSION: ICD-10-CM

## 2020-03-05 RX ORDER — BUPROPION HYDROCHLORIDE 150 MG/1
150 TABLET, EXTENDED RELEASE ORAL 2 TIMES DAILY
Qty: 180 TABLET | Refills: 0 | OUTPATIENT
Start: 2020-03-05

## 2020-03-05 RX ORDER — BUPROPION HYDROCHLORIDE 150 MG/1
TABLET, EXTENDED RELEASE ORAL
Qty: 180 TABLET | Refills: 0 | Status: SHIPPED | OUTPATIENT
Start: 2020-03-05 | End: 2020-03-08 | Stop reason: SDUPTHER

## 2020-03-05 NOTE — TELEPHONE ENCOUNTER
Requested medication(s) are due for refill today: Yes  Patient has already received a courtesy refill: No  Other reason request has been forwarded to provider:    Per protocols

## 2020-03-08 DIAGNOSIS — F32.A ANXIETY AND DEPRESSION: ICD-10-CM

## 2020-03-08 DIAGNOSIS — F41.9 ANXIETY AND DEPRESSION: ICD-10-CM

## 2020-03-13 DIAGNOSIS — F41.9 ANXIETY AND DEPRESSION: ICD-10-CM

## 2020-03-13 DIAGNOSIS — F32.A ANXIETY AND DEPRESSION: ICD-10-CM

## 2020-03-15 DIAGNOSIS — E03.9 HYPOTHYROIDISM, UNSPECIFIED TYPE: ICD-10-CM

## 2020-03-16 RX ORDER — BUPROPION HYDROCHLORIDE 150 MG/1
TABLET, EXTENDED RELEASE ORAL
Qty: 180 TABLET | Refills: 0 | OUTPATIENT
Start: 2020-03-16

## 2020-03-16 RX ORDER — LEVOTHYROXINE SODIUM 0.05 MG/1
50 TABLET ORAL DAILY
Qty: 90 TABLET | Refills: 0 | Status: SHIPPED | OUTPATIENT
Start: 2020-03-16 | End: 2020-06-06 | Stop reason: SDUPTHER

## 2020-03-17 RX ORDER — BUPROPION HYDROCHLORIDE 150 MG/1
150 TABLET, EXTENDED RELEASE ORAL 2 TIMES DAILY
Qty: 180 TABLET | Refills: 0 | Status: SHIPPED | OUTPATIENT
Start: 2020-03-17 | End: 2020-06-06 | Stop reason: SDUPTHER

## 2020-03-18 ENCOUNTER — TELEPHONE (OUTPATIENT)
Dept: FAMILY MEDICINE CLINIC | Facility: CLINIC | Age: 45
End: 2020-03-18

## 2020-03-18 NOTE — TELEPHONE ENCOUNTER
Patient called because she was notified by work that upon returning from vacation, she needs to have a note from doctor before returning to work  Does she need to make an appointment to have the letter?     Please advise

## 2020-04-07 ENCOUNTER — TELEMEDICINE (OUTPATIENT)
Dept: DERMATOLOGY | Facility: CLINIC | Age: 45
End: 2020-04-07
Payer: COMMERCIAL

## 2020-04-07 DIAGNOSIS — L73.2 HIDRADENITIS: Primary | ICD-10-CM

## 2020-04-07 DIAGNOSIS — L57.0 ACTINIC KERATOSES: ICD-10-CM

## 2020-04-07 DIAGNOSIS — L30.4 INTERTRIGO: ICD-10-CM

## 2020-04-07 DIAGNOSIS — L30.0 NUMMULAR ECZEMA: ICD-10-CM

## 2020-04-07 PROCEDURE — 99213 OFFICE O/P EST LOW 20 MIN: CPT | Performed by: DERMATOLOGY

## 2020-04-07 RX ORDER — NYSTATIN 100000 U/G
CREAM TOPICAL
Qty: 60 G | Refills: 6 | Status: SHIPPED | OUTPATIENT
Start: 2020-04-07

## 2020-04-15 ENCOUNTER — TELEMEDICINE (OUTPATIENT)
Dept: FAMILY MEDICINE CLINIC | Facility: CLINIC | Age: 45
End: 2020-04-15
Payer: COMMERCIAL

## 2020-04-15 ENCOUNTER — TELEPHONE (OUTPATIENT)
Dept: HEMATOLOGY ONCOLOGY | Facility: CLINIC | Age: 45
End: 2020-04-15

## 2020-04-15 VITALS
HEIGHT: 65 IN | SYSTOLIC BLOOD PRESSURE: 104 MMHG | HEART RATE: 66 BPM | BODY MASS INDEX: 31.85 KG/M2 | DIASTOLIC BLOOD PRESSURE: 63 MMHG

## 2020-04-15 DIAGNOSIS — E66.9 DIABETES MELLITUS TYPE 2 IN OBESE (HCC): ICD-10-CM

## 2020-04-15 DIAGNOSIS — I10 BENIGN HYPERTENSION: ICD-10-CM

## 2020-04-15 DIAGNOSIS — E11.69 DIABETES MELLITUS TYPE 2 IN OBESE (HCC): ICD-10-CM

## 2020-04-15 DIAGNOSIS — R26.81 GAIT INSTABILITY: ICD-10-CM

## 2020-04-15 DIAGNOSIS — G47.33 OBSTRUCTIVE SLEEP APNEA TREATED WITH CONTINUOUS POSITIVE AIRWAY PRESSURE (CPAP): ICD-10-CM

## 2020-04-15 DIAGNOSIS — R27.0 ATAXIA: Primary | ICD-10-CM

## 2020-04-15 DIAGNOSIS — K91.2 POSTSURGICAL MALABSORPTION: Chronic | ICD-10-CM

## 2020-04-15 DIAGNOSIS — E03.9 ACQUIRED HYPOTHYROIDISM: ICD-10-CM

## 2020-04-15 DIAGNOSIS — Z99.89 OBSTRUCTIVE SLEEP APNEA TREATED WITH CONTINUOUS POSITIVE AIRWAY PRESSURE (CPAP): ICD-10-CM

## 2020-04-15 DIAGNOSIS — R29.6 FALLS FREQUENTLY: ICD-10-CM

## 2020-04-15 DIAGNOSIS — R42 DIZZINESS: ICD-10-CM

## 2020-04-15 PROCEDURE — 99214 OFFICE O/P EST MOD 30 MIN: CPT | Performed by: FAMILY MEDICINE

## 2020-04-16 ENCOUNTER — LAB (OUTPATIENT)
Dept: LAB | Facility: CLINIC | Age: 45
End: 2020-04-16
Payer: COMMERCIAL

## 2020-04-16 ENCOUNTER — TRANSCRIBE ORDERS (OUTPATIENT)
Dept: LAB | Facility: CLINIC | Age: 45
End: 2020-04-16

## 2020-04-16 DIAGNOSIS — E11.69 DIABETES MELLITUS TYPE 2 IN OBESE (HCC): ICD-10-CM

## 2020-04-16 DIAGNOSIS — R27.0 ATAXIA: ICD-10-CM

## 2020-04-16 DIAGNOSIS — E66.9 DIABETES MELLITUS TYPE 2 IN OBESE (HCC): ICD-10-CM

## 2020-04-16 LAB
ATRIAL RATE: 56 BPM
BASOPHILS # BLD AUTO: 0.05 THOUSANDS/ΜL (ref 0–0.1)
BASOPHILS NFR BLD AUTO: 1 % (ref 0–1)
CHOLEST SERPL-MCNC: 119 MG/DL (ref 50–200)
CREAT UR-MCNC: 80.7 MG/DL
EOSINOPHIL # BLD AUTO: 0.64 THOUSAND/ΜL (ref 0–0.61)
EOSINOPHIL NFR BLD AUTO: 9 % (ref 0–6)
ERYTHROCYTE [DISTWIDTH] IN BLOOD BY AUTOMATED COUNT: 14.5 % (ref 11.6–15.1)
EST. AVERAGE GLUCOSE BLD GHB EST-MCNC: 111 MG/DL
HBA1C MFR BLD: 5.5 %
HCT VFR BLD AUTO: 43.5 % (ref 34.8–46.1)
HDLC SERPL-MCNC: 38 MG/DL
HGB BLD-MCNC: 13.9 G/DL (ref 11.5–15.4)
IMM GRANULOCYTES # BLD AUTO: 0.01 THOUSAND/UL (ref 0–0.2)
IMM GRANULOCYTES NFR BLD AUTO: 0 % (ref 0–2)
IRON SERPL-MCNC: 72 UG/DL (ref 50–170)
LDLC SERPL CALC-MCNC: 63 MG/DL (ref 0–100)
LYMPHOCYTES # BLD AUTO: 1.69 THOUSANDS/ΜL (ref 0.6–4.47)
LYMPHOCYTES NFR BLD AUTO: 23 % (ref 14–44)
MAGNESIUM SERPL-MCNC: 2.1 MG/DL (ref 1.6–2.6)
MCH RBC QN AUTO: 28.5 PG (ref 26.8–34.3)
MCHC RBC AUTO-ENTMCNC: 32 G/DL (ref 31.4–37.4)
MCV RBC AUTO: 89 FL (ref 82–98)
MICROALBUMIN UR-MCNC: 5 MG/L (ref 0–20)
MICROALBUMIN/CREAT 24H UR: 6 MG/G CREATININE (ref 0–30)
MONOCYTES # BLD AUTO: 0.57 THOUSAND/ΜL (ref 0.17–1.22)
MONOCYTES NFR BLD AUTO: 8 % (ref 4–12)
NEUTROPHILS # BLD AUTO: 4.52 THOUSANDS/ΜL (ref 1.85–7.62)
NEUTS SEG NFR BLD AUTO: 59 % (ref 43–75)
NRBC BLD AUTO-RTO: 0 /100 WBCS
P AXIS: 60 DEGREES
PLATELET # BLD AUTO: 219 THOUSANDS/UL (ref 149–390)
PMV BLD AUTO: 10.8 FL (ref 8.9–12.7)
PR INTERVAL: 142 MS
QRS AXIS: 70 DEGREES
QRSD INTERVAL: 86 MS
QT INTERVAL: 440 MS
QTC INTERVAL: 424 MS
RBC # BLD AUTO: 4.87 MILLION/UL (ref 3.81–5.12)
T WAVE AXIS: 66 DEGREES
TRIGL SERPL-MCNC: 91 MG/DL
VENTRICULAR RATE: 56 BPM
VIT B12 SERPL-MCNC: 1137 PG/ML (ref 100–900)
WBC # BLD AUTO: 7.48 THOUSAND/UL (ref 4.31–10.16)

## 2020-04-16 PROCEDURE — 93005 ELECTROCARDIOGRAM TRACING: CPT

## 2020-04-16 PROCEDURE — 82607 VITAMIN B-12: CPT

## 2020-04-16 PROCEDURE — 36415 COLL VENOUS BLD VENIPUNCTURE: CPT | Performed by: FAMILY MEDICINE

## 2020-04-16 PROCEDURE — 80061 LIPID PANEL: CPT

## 2020-04-16 PROCEDURE — 82570 ASSAY OF URINE CREATININE: CPT | Performed by: FAMILY MEDICINE

## 2020-04-16 PROCEDURE — 83036 HEMOGLOBIN GLYCOSYLATED A1C: CPT | Performed by: FAMILY MEDICINE

## 2020-04-16 PROCEDURE — 82043 UR ALBUMIN QUANTITATIVE: CPT | Performed by: FAMILY MEDICINE

## 2020-04-16 PROCEDURE — 85025 COMPLETE CBC W/AUTO DIFF WBC: CPT | Performed by: FAMILY MEDICINE

## 2020-04-16 PROCEDURE — 83735 ASSAY OF MAGNESIUM: CPT

## 2020-04-16 PROCEDURE — 83540 ASSAY OF IRON: CPT

## 2020-04-16 PROCEDURE — 93010 ELECTROCARDIOGRAM REPORT: CPT | Performed by: INTERNAL MEDICINE

## 2020-04-17 ENCOUNTER — TELEPHONE (OUTPATIENT)
Dept: HEMATOLOGY ONCOLOGY | Facility: CLINIC | Age: 45
End: 2020-04-17

## 2020-04-19 DIAGNOSIS — F41.9 ANXIETY AND DEPRESSION: ICD-10-CM

## 2020-04-19 DIAGNOSIS — E66.9 DIABETES MELLITUS TYPE 2 IN OBESE (HCC): ICD-10-CM

## 2020-04-19 DIAGNOSIS — F32.A ANXIETY AND DEPRESSION: ICD-10-CM

## 2020-04-19 DIAGNOSIS — F51.01 PRIMARY INSOMNIA: ICD-10-CM

## 2020-04-19 DIAGNOSIS — E11.69 DIABETES MELLITUS TYPE 2 IN OBESE (HCC): ICD-10-CM

## 2020-04-20 ENCOUNTER — CONSULT (OUTPATIENT)
Dept: HEMATOLOGY ONCOLOGY | Facility: CLINIC | Age: 45
End: 2020-04-20
Payer: COMMERCIAL

## 2020-04-20 VITALS
WEIGHT: 186 LBS | HEART RATE: 70 BPM | SYSTOLIC BLOOD PRESSURE: 112 MMHG | TEMPERATURE: 97.5 F | BODY MASS INDEX: 30.99 KG/M2 | RESPIRATION RATE: 18 BRPM | OXYGEN SATURATION: 96 % | DIASTOLIC BLOOD PRESSURE: 70 MMHG | HEIGHT: 65 IN

## 2020-04-20 DIAGNOSIS — K91.2 POSTSURGICAL MALABSORPTION: Primary | Chronic | ICD-10-CM

## 2020-04-20 PROCEDURE — 3044F HG A1C LEVEL LT 7.0%: CPT | Performed by: PHYSICIAN ASSISTANT

## 2020-04-20 PROCEDURE — 99244 OFF/OP CNSLTJ NEW/EST MOD 40: CPT | Performed by: PHYSICIAN ASSISTANT

## 2020-04-20 RX ORDER — ATORVASTATIN CALCIUM 10 MG/1
10 TABLET, FILM COATED ORAL DAILY
Qty: 90 TABLET | Refills: 0 | Status: SHIPPED | OUTPATIENT
Start: 2020-04-20 | End: 2020-07-11 | Stop reason: SDUPTHER

## 2020-04-21 RX ORDER — ALPRAZOLAM 0.5 MG/1
0.5 TABLET ORAL 2 TIMES DAILY PRN
Qty: 60 TABLET | Refills: 0 | Status: SHIPPED | OUTPATIENT
Start: 2020-04-21 | End: 2020-08-03 | Stop reason: SDUPTHER

## 2020-04-23 ENCOUNTER — HOSPITAL ENCOUNTER (OUTPATIENT)
Dept: RADIOLOGY | Facility: HOSPITAL | Age: 45
Discharge: HOME | End: 2020-04-23
Payer: COMMERCIAL

## 2020-04-23 DIAGNOSIS — E11.9 TYPE 2 DIABETES MELLITUS WITHOUT COMPLICATION, WITHOUT LONG-TERM CURRENT USE OF INSULIN (HCC): ICD-10-CM

## 2020-04-23 DIAGNOSIS — R29.6 FALLS FREQUENTLY: ICD-10-CM

## 2020-04-23 DIAGNOSIS — R26.81 GAIT INSTABILITY: ICD-10-CM

## 2020-04-23 DIAGNOSIS — R27.0 ATAXIA: ICD-10-CM

## 2020-04-23 PROCEDURE — 70553 MRI BRAIN STEM W/O & W/DYE: CPT

## 2020-04-23 PROCEDURE — A9585 GADOBUTROL INJECTION: HCPCS | Performed by: FAMILY MEDICINE

## 2020-04-23 RX ADMIN — GADOBUTROL 8 ML: 604.72 INJECTION INTRAVENOUS at 17:26

## 2020-04-30 ENCOUNTER — OFFICE VISIT (OUTPATIENT)
Dept: FAMILY MEDICINE CLINIC | Facility: CLINIC | Age: 45
End: 2020-04-30
Payer: COMMERCIAL

## 2020-04-30 VITALS
OXYGEN SATURATION: 98 % | RESPIRATION RATE: 18 BRPM | BODY MASS INDEX: 30.46 KG/M2 | TEMPERATURE: 98.1 F | WEIGHT: 182.8 LBS | HEIGHT: 65 IN | SYSTOLIC BLOOD PRESSURE: 100 MMHG | DIASTOLIC BLOOD PRESSURE: 70 MMHG | HEART RATE: 68 BPM

## 2020-04-30 DIAGNOSIS — Z11.4 ENCOUNTER FOR HIV (HUMAN IMMUNODEFICIENCY VIRUS) TEST: ICD-10-CM

## 2020-04-30 DIAGNOSIS — I10 ESSENTIAL HYPERTENSION: ICD-10-CM

## 2020-04-30 DIAGNOSIS — E66.9 DIABETES MELLITUS TYPE 2 IN OBESE (HCC): ICD-10-CM

## 2020-04-30 DIAGNOSIS — M21.372 LEFT FOOT DROP: ICD-10-CM

## 2020-04-30 DIAGNOSIS — Z00.00 ANNUAL PHYSICAL EXAM: Primary | ICD-10-CM

## 2020-04-30 DIAGNOSIS — E11.69 DIABETES MELLITUS TYPE 2 IN OBESE (HCC): ICD-10-CM

## 2020-04-30 DIAGNOSIS — E66.09 CLASS 1 OBESITY DUE TO EXCESS CALORIES WITH SERIOUS COMORBIDITY AND BODY MASS INDEX (BMI) OF 30.0 TO 30.9 IN ADULT: ICD-10-CM

## 2020-04-30 DIAGNOSIS — M79.7 FIBROMYALGIA: ICD-10-CM

## 2020-04-30 PROCEDURE — 99396 PREV VISIT EST AGE 40-64: CPT | Performed by: FAMILY MEDICINE

## 2020-04-30 PROCEDURE — 3044F HG A1C LEVEL LT 7.0%: CPT | Performed by: FAMILY MEDICINE

## 2020-04-30 RX ORDER — LISINOPRIL 10 MG/1
TABLET ORAL
Qty: 30 TABLET | Refills: 5
Start: 2020-04-30 | End: 2020-08-30 | Stop reason: SDUPTHER

## 2020-05-05 ENCOUNTER — TELEMEDICINE (OUTPATIENT)
Dept: BARIATRICS | Facility: CLINIC | Age: 45
End: 2020-05-05
Payer: COMMERCIAL

## 2020-05-05 VITALS — HEIGHT: 65 IN | WEIGHT: 182 LBS | BODY MASS INDEX: 30.32 KG/M2

## 2020-05-05 DIAGNOSIS — M21.372 LEFT FOOT DROP: ICD-10-CM

## 2020-05-05 DIAGNOSIS — G47.33 OBSTRUCTIVE SLEEP APNEA TREATED WITH CONTINUOUS POSITIVE AIRWAY PRESSURE (CPAP): ICD-10-CM

## 2020-05-05 DIAGNOSIS — Z98.84 BARIATRIC SURGERY STATUS: ICD-10-CM

## 2020-05-05 DIAGNOSIS — I10 BENIGN HYPERTENSION: ICD-10-CM

## 2020-05-05 DIAGNOSIS — E11.69 DIABETES MELLITUS TYPE 2 IN OBESE (HCC): ICD-10-CM

## 2020-05-05 DIAGNOSIS — Z48.815 ENCOUNTER FOR SURGICAL AFTERCARE FOLLOWING SURGERY OF DIGESTIVE SYSTEM: ICD-10-CM

## 2020-05-05 DIAGNOSIS — E03.9 ACQUIRED HYPOTHYROIDISM: ICD-10-CM

## 2020-05-05 DIAGNOSIS — Z99.89 OBSTRUCTIVE SLEEP APNEA TREATED WITH CONTINUOUS POSITIVE AIRWAY PRESSURE (CPAP): ICD-10-CM

## 2020-05-05 DIAGNOSIS — E66.9 DIABETES MELLITUS TYPE 2 IN OBESE (HCC): ICD-10-CM

## 2020-05-05 DIAGNOSIS — M21.372 LEFT FOOT DROP: Primary | ICD-10-CM

## 2020-05-05 DIAGNOSIS — E66.9 OBESITY, CLASS I, BMI 30-34.9: Primary | ICD-10-CM

## 2020-05-05 DIAGNOSIS — K91.2 POSTSURGICAL MALABSORPTION: Chronic | ICD-10-CM

## 2020-05-05 PROBLEM — E66.811 OBESITY, CLASS I, BMI 30-34.9: Status: ACTIVE | Noted: 2020-05-05

## 2020-05-05 PROCEDURE — 99214 OFFICE O/P EST MOD 30 MIN: CPT | Performed by: PHYSICIAN ASSISTANT

## 2020-05-06 ENCOUNTER — TELEPHONE (OUTPATIENT)
Dept: NEUROLOGY | Facility: CLINIC | Age: 45
End: 2020-05-06

## 2020-05-12 ENCOUNTER — CONSULT (OUTPATIENT)
Dept: NEUROLOGY | Facility: CLINIC | Age: 45
End: 2020-05-12
Payer: COMMERCIAL

## 2020-05-12 ENCOUNTER — TELEPHONE (OUTPATIENT)
Dept: NEUROLOGY | Facility: CLINIC | Age: 45
End: 2020-05-12

## 2020-05-12 VITALS
SYSTOLIC BLOOD PRESSURE: 115 MMHG | HEIGHT: 65 IN | BODY MASS INDEX: 30.99 KG/M2 | DIASTOLIC BLOOD PRESSURE: 58 MMHG | WEIGHT: 186 LBS | HEART RATE: 66 BPM

## 2020-05-12 DIAGNOSIS — R29.898 LEG WEAKNESS, BILATERAL: ICD-10-CM

## 2020-05-12 DIAGNOSIS — D49.59 OVARIAN NEOPLASM: ICD-10-CM

## 2020-05-12 DIAGNOSIS — F32.A ANXIETY AND DEPRESSION: ICD-10-CM

## 2020-05-12 DIAGNOSIS — M21.379 FOOT-DROP, UNSPECIFIED LATERALITY: Primary | ICD-10-CM

## 2020-05-12 DIAGNOSIS — G25.0 BENIGN HEAD TREMOR: ICD-10-CM

## 2020-05-12 DIAGNOSIS — R25.1 TREMORS OF NERVOUS SYSTEM: ICD-10-CM

## 2020-05-12 DIAGNOSIS — E66.9 DIABETES MELLITUS TYPE 2 IN OBESE (HCC): ICD-10-CM

## 2020-05-12 DIAGNOSIS — R29.898 LUE WEAKNESS: ICD-10-CM

## 2020-05-12 DIAGNOSIS — D68.62 LUPUS ANTICOAGULANT DISORDER (HCC): ICD-10-CM

## 2020-05-12 DIAGNOSIS — E11.69 DIABETES MELLITUS TYPE 2 IN OBESE (HCC): ICD-10-CM

## 2020-05-12 DIAGNOSIS — F41.9 ANXIETY AND DEPRESSION: ICD-10-CM

## 2020-05-12 PROCEDURE — 3044F HG A1C LEVEL LT 7.0%: CPT | Performed by: PSYCHIATRY & NEUROLOGY

## 2020-05-12 PROCEDURE — 99245 OFF/OP CONSLTJ NEW/EST HI 55: CPT | Performed by: PSYCHIATRY & NEUROLOGY

## 2020-05-26 ENCOUNTER — TELEPHONE (OUTPATIENT)
Dept: FAMILY MEDICINE CLINIC | Facility: CLINIC | Age: 45
End: 2020-05-26

## 2020-05-26 DIAGNOSIS — L23.7 POISON IVY: Primary | ICD-10-CM

## 2020-05-26 RX ORDER — PREDNISONE 10 MG/1
TABLET ORAL
Qty: 20 TABLET | Refills: 0 | Status: SHIPPED | OUTPATIENT
Start: 2020-05-26 | End: 2020-08-03 | Stop reason: ALTCHOICE

## 2020-05-31 ENCOUNTER — HOSPITAL ENCOUNTER (OUTPATIENT)
Dept: CT IMAGING | Facility: HOSPITAL | Age: 45
Discharge: HOME | End: 2020-05-31
Attending: PSYCHIATRY & NEUROLOGY
Payer: COMMERCIAL

## 2020-05-31 ENCOUNTER — HOSPITAL ENCOUNTER (OUTPATIENT)
Dept: MRI IMAGING | Facility: HOSPITAL | Age: 45
Discharge: HOME | End: 2020-05-31
Attending: PSYCHIATRY & NEUROLOGY
Payer: COMMERCIAL

## 2020-05-31 DIAGNOSIS — D68.62 LUPUS ANTICOAGULANT DISORDER (HCC): ICD-10-CM

## 2020-05-31 DIAGNOSIS — E11.69 DIABETES MELLITUS TYPE 2 IN OBESE (HCC): ICD-10-CM

## 2020-05-31 DIAGNOSIS — E66.9 DIABETES MELLITUS TYPE 2 IN OBESE (HCC): ICD-10-CM

## 2020-05-31 DIAGNOSIS — R29.898 LEG WEAKNESS, BILATERAL: ICD-10-CM

## 2020-05-31 DIAGNOSIS — R29.898 LUE WEAKNESS: ICD-10-CM

## 2020-05-31 DIAGNOSIS — M21.379 FOOT-DROP, UNSPECIFIED LATERALITY: ICD-10-CM

## 2020-05-31 PROCEDURE — 72131 CT LUMBAR SPINE W/O DYE: CPT

## 2020-05-31 PROCEDURE — 72156 MRI NECK SPINE W/O & W/DYE: CPT

## 2020-05-31 PROCEDURE — A9585 GADOBUTROL INJECTION: HCPCS | Performed by: PSYCHIATRY & NEUROLOGY

## 2020-05-31 RX ADMIN — GADOBUTROL 8 ML: 604.72 INJECTION INTRAVENOUS at 11:44

## 2020-06-06 DIAGNOSIS — Z98.84 BARIATRIC SURGERY STATUS: ICD-10-CM

## 2020-06-06 DIAGNOSIS — F41.9 ANXIETY AND DEPRESSION: ICD-10-CM

## 2020-06-06 DIAGNOSIS — F32.A ANXIETY AND DEPRESSION: ICD-10-CM

## 2020-06-06 DIAGNOSIS — E03.9 HYPOTHYROIDISM, UNSPECIFIED TYPE: ICD-10-CM

## 2020-06-08 RX ORDER — LEVOTHYROXINE SODIUM 0.05 MG/1
50 TABLET ORAL DAILY
Qty: 90 TABLET | Refills: 2 | Status: SHIPPED | OUTPATIENT
Start: 2020-06-08 | End: 2021-03-22

## 2020-06-08 RX ORDER — OMEPRAZOLE 20 MG/1
20 CAPSULE, DELAYED RELEASE ORAL DAILY
Qty: 90 CAPSULE | Refills: 0 | Status: SHIPPED | OUTPATIENT
Start: 2020-06-08 | End: 2020-08-30 | Stop reason: SDUPTHER

## 2020-06-08 RX ORDER — BUPROPION HYDROCHLORIDE 150 MG/1
150 TABLET, EXTENDED RELEASE ORAL 2 TIMES DAILY
Qty: 180 TABLET | Refills: 2 | Status: SHIPPED | OUTPATIENT
Start: 2020-06-08 | End: 2021-03-22

## 2020-06-18 ENCOUNTER — HOSPITAL ENCOUNTER (OUTPATIENT)
Dept: NEUROLOGY | Facility: CLINIC | Age: 45
Discharge: HOME | End: 2020-06-18
Payer: COMMERCIAL

## 2020-06-18 ENCOUNTER — EVALUATION (OUTPATIENT)
Dept: PHYSICAL THERAPY | Facility: CLINIC | Age: 45
End: 2020-06-18
Payer: COMMERCIAL

## 2020-06-18 DIAGNOSIS — R29.898 WEAKNESS OF LEFT LOWER EXTREMITY: Primary | ICD-10-CM

## 2020-06-18 DIAGNOSIS — M21.379 FOOT-DROP, UNSPECIFIED LATERALITY: ICD-10-CM

## 2020-06-18 DIAGNOSIS — M21.372 LEFT FOOT DROP: ICD-10-CM

## 2020-06-18 DIAGNOSIS — R29.898 LUE WEAKNESS: ICD-10-CM

## 2020-06-18 DIAGNOSIS — R29.898 LEG WEAKNESS, BILATERAL: ICD-10-CM

## 2020-06-18 PROCEDURE — 95886 MUSC TEST DONE W/N TEST COMP: CPT | Performed by: PHYSICAL MEDICINE & REHABILITATION

## 2020-06-18 PROCEDURE — 97163 PT EVAL HIGH COMPLEX 45 MIN: CPT | Performed by: PHYSICAL THERAPIST

## 2020-06-18 PROCEDURE — 95911 NRV CNDJ TEST 9-10 STUDIES: CPT | Performed by: PHYSICAL MEDICINE & REHABILITATION

## 2020-06-18 PROCEDURE — 97110 THERAPEUTIC EXERCISES: CPT | Performed by: PHYSICAL THERAPIST

## 2020-07-01 ENCOUNTER — APPOINTMENT (OUTPATIENT)
Dept: PHYSICAL THERAPY | Facility: CLINIC | Age: 45
End: 2020-07-01
Payer: COMMERCIAL

## 2020-07-07 NOTE — PROGRESS NOTES
Daily Note     Today's date: 2020  Patient name: Reji Miranda  : 1975  MRN: [id number]  Referring provider: Kathryn Marte, *  Dx:   Encounter Diagnosis     ICD-10-CM    1  Foot-drop, unspecified laterality M21 [id number]    2  LUE weakness R29 898    3  Leg weakness, bilateral R29 898    4  Weakness of left lower extremity R29 898        Start Time: 1700  Stop Time: 1755  Total time in clinic (min): 55 minutes  The patient was treated by ELVIA Fleming under direct supervision of Anum Menard DPT    Subjective: Pt reports that she feels stronger after doing the exercises prescribed  Pt also reports that she hasn't had any feelings of loss of balance or that she was going to fall  Objective: See treatment diary below      Assessment: Pt demonstrated increases in strength in all areas of weakness since initial visit as well as demonstrated no foot slap/limp  Pt demonstrated good form with most exercises after verbal cues/physical demonstration  Pt still had trouble with proper mini-squat form having minor anterior tibial translation over her toes  PT created and went through a comprehensive strengthening program which the pt performed during session and received a written copy of  Pt is a good candidate for continued therapy in order to increase strength and endurance, and also to ensure appropriate exercise performance for safety and maximum benefit  Plan: Continue per plan of care   Pt is to be seen in one month for a follow up     Precautions: Lupus anticoagulant disorder, Obese, hypothyriodism        78           Manuals                                                                 Neuro Re-Ed             Ankle alphabets x1            Short foot  1x10, 3" hold           SLR w TrA   1x10 ea           Toe curl + PF  1x10 GTB           Clam shells  1x10 ea RTB                                     Ther Ex             Bridges 1x10 2x10 w TrA activation           Prone straight leg extension 1x10 1x10 ea           Mini squat  1x10 ea           Ankle 4 way   1x10 ea GTB           3-way standing hip  2x10 ea YTB           Seated marches  2x10 YTB                                     Ther Activity                                       Gait Training                                       Modalities                                       Assessment IE            Education Prognosis, HEP, POC            PT 1:1 [phone]

## 2020-07-08 ENCOUNTER — OFFICE VISIT (OUTPATIENT)
Dept: PHYSICAL THERAPY | Facility: CLINIC | Age: 45
End: 2020-07-08
Payer: COMMERCIAL

## 2020-07-08 DIAGNOSIS — R29.898 LEG WEAKNESS, BILATERAL: ICD-10-CM

## 2020-07-08 DIAGNOSIS — R29.898 WEAKNESS OF LEFT LOWER EXTREMITY: ICD-10-CM

## 2020-07-08 DIAGNOSIS — R29.898 LUE WEAKNESS: ICD-10-CM

## 2020-07-08 DIAGNOSIS — M21.379 FOOT-DROP, UNSPECIFIED LATERALITY: Primary | ICD-10-CM

## 2020-07-08 PROCEDURE — 97110 THERAPEUTIC EXERCISES: CPT | Performed by: PHYSICAL THERAPIST

## 2020-07-08 PROCEDURE — 97112 NEUROMUSCULAR REEDUCATION: CPT | Performed by: PHYSICAL THERAPIST

## 2020-07-11 DIAGNOSIS — E11.69 DIABETES MELLITUS TYPE 2 IN OBESE (HCC): ICD-10-CM

## 2020-07-11 DIAGNOSIS — E66.9 DIABETES MELLITUS TYPE 2 IN OBESE (HCC): ICD-10-CM

## 2020-07-13 ENCOUNTER — TELEPHONE (OUTPATIENT)
Dept: NEUROLOGY | Facility: CLINIC | Age: 45
End: 2020-07-13

## 2020-07-13 DIAGNOSIS — E11.9 TYPE 2 DIABETES MELLITUS WITHOUT COMPLICATION, WITHOUT LONG-TERM CURRENT USE OF INSULIN (HCC): ICD-10-CM

## 2020-07-13 RX ORDER — ATORVASTATIN CALCIUM 10 MG/1
10 TABLET, FILM COATED ORAL DAILY
Qty: 90 TABLET | Refills: 0 | Status: SHIPPED | OUTPATIENT
Start: 2020-07-13 | End: 2020-10-18 | Stop reason: SDUPTHER

## 2020-07-13 NOTE — TELEPHONE ENCOUNTER
My chart cancled [id number] called Les Huizar rescheduled to [id number] 1am as she is taking care of her mom pre and post surgery

## 2020-07-15 ENCOUNTER — APPOINTMENT (OUTPATIENT)
Dept: PHYSICAL THERAPY | Facility: CLINIC | Age: 45
End: 2020-07-15
Payer: COMMERCIAL

## 2020-07-30 ENCOUNTER — APPOINTMENT (OUTPATIENT)
Dept: LAB | Facility: MEDICAL CENTER | Age: 45
End: 2020-07-30
Payer: COMMERCIAL

## 2020-07-30 ENCOUNTER — TRANSCRIBE ORDERS (OUTPATIENT)
Dept: ADMINISTRATIVE | Facility: HOSPITAL | Age: 45
End: 2020-07-30

## 2020-07-30 DIAGNOSIS — E11.69 DIABETES MELLITUS TYPE 2 IN OBESE (HCC): ICD-10-CM

## 2020-07-30 DIAGNOSIS — Z48.815 ENCOUNTER FOR SURGICAL AFTERCARE FOLLOWING SURGERY OF DIGESTIVE SYSTEM: ICD-10-CM

## 2020-07-30 DIAGNOSIS — E66.9 OBESITY, CLASS I, BMI 30-34.9: ICD-10-CM

## 2020-07-30 DIAGNOSIS — E03.9 ACQUIRED HYPOTHYROIDISM: ICD-10-CM

## 2020-07-30 DIAGNOSIS — M79.7 SCAPULOHUMERAL FIBROSITIS: Primary | ICD-10-CM

## 2020-07-30 DIAGNOSIS — Z79.899 ENCOUNTER FOR LONG-TERM (CURRENT) USE OF OTHER MEDICATIONS: ICD-10-CM

## 2020-07-30 DIAGNOSIS — Z11.4 ENCOUNTER FOR HIV (HUMAN IMMUNODEFICIENCY VIRUS) TEST: ICD-10-CM

## 2020-07-30 DIAGNOSIS — R29.898 LUE WEAKNESS: ICD-10-CM

## 2020-07-30 DIAGNOSIS — K91.2 POSTSURGICAL MALABSORPTION: Chronic | ICD-10-CM

## 2020-07-30 DIAGNOSIS — E55.9 VITAMIN D DEFICIENCY: ICD-10-CM

## 2020-07-30 DIAGNOSIS — E66.9 DIABETES MELLITUS TYPE 2 IN OBESE (HCC): ICD-10-CM

## 2020-07-30 DIAGNOSIS — M21.372 LEFT FOOT DROP: ICD-10-CM

## 2020-07-30 LAB
25(OH)D3 SERPL-MCNC: 39.8 NG/ML (ref 30–100)
ALBUMIN SERPL BCP-MCNC: 3.5 G/DL (ref 3.5–5)
ALP SERPL-CCNC: 86 U/L (ref 46–116)
ALT SERPL W P-5'-P-CCNC: 48 U/L (ref 12–78)
ANION GAP SERPL CALCULATED.3IONS-SCNC: 5 MMOL/L (ref 4–13)
AST SERPL W P-5'-P-CCNC: 17 U/L (ref 5–45)
BASOPHILS # BLD AUTO: 0.06 THOUSANDS/ΜL (ref 0–0.1)
BASOPHILS NFR BLD AUTO: 1 % (ref 0–1)
BILIRUB SERPL-MCNC: 0.46 MG/DL (ref 0.2–1)
BUN SERPL-MCNC: 12 MG/DL (ref 5–25)
CALCIUM SERPL-MCNC: 9.6 MG/DL (ref 8.3–10.1)
CHLORIDE SERPL-SCNC: 107 MMOL/L (ref 100–108)
CO2 SERPL-SCNC: 28 MMOL/L (ref 21–32)
CREAT SERPL-MCNC: 0.86 MG/DL (ref 0.6–1.3)
EOSINOPHIL # BLD AUTO: 0.36 THOUSAND/ΜL (ref 0–0.61)
EOSINOPHIL NFR BLD AUTO: 5 % (ref 0–6)
ERYTHROCYTE [DISTWIDTH] IN BLOOD BY AUTOMATED COUNT: 14 % (ref 11.6–15.1)
ERYTHROCYTE [SEDIMENTATION RATE] IN BLOOD: 25 MM/HOUR (ref 0–19)
EST. AVERAGE GLUCOSE BLD GHB EST-MCNC: 111 MG/DL
FOLATE SERPL-MCNC: 17.7 NG/ML (ref 3.1–17.5)
GFR SERPL CREATININE-BSD FRML MDRD: 82 ML/MIN/1.73SQ M
GLUCOSE P FAST SERPL-MCNC: 86 MG/DL (ref 65–99)
HBA1C MFR BLD: 5.5 %
HCT VFR BLD AUTO: 42.8 % (ref 34.8–46.1)
HGB BLD-MCNC: 13.5 G/DL (ref 11.5–15.4)
IMM GRANULOCYTES # BLD AUTO: 0.02 THOUSAND/UL (ref 0–0.2)
IMM GRANULOCYTES NFR BLD AUTO: 0 % (ref 0–2)
LYMPHOCYTES # BLD AUTO: 1.73 THOUSANDS/ΜL (ref 0.6–4.47)
LYMPHOCYTES NFR BLD AUTO: 26 % (ref 14–44)
MCH RBC QN AUTO: 28.8 PG (ref 26.8–34.3)
MCHC RBC AUTO-ENTMCNC: 31.5 G/DL (ref 31.4–37.4)
MCV RBC AUTO: 91 FL (ref 82–98)
MONOCYTES # BLD AUTO: 0.59 THOUSAND/ΜL (ref 0.17–1.22)
MONOCYTES NFR BLD AUTO: 9 % (ref 4–12)
NEUTROPHILS # BLD AUTO: 3.94 THOUSANDS/ΜL (ref 1.85–7.62)
NEUTS SEG NFR BLD AUTO: 59 % (ref 43–75)
NRBC BLD AUTO-RTO: 0 /100 WBCS
PLATELET # BLD AUTO: 223 THOUSANDS/UL (ref 149–390)
PMV BLD AUTO: 11.7 FL (ref 8.9–12.7)
POTASSIUM SERPL-SCNC: 4.3 MMOL/L (ref 3.5–5.3)
PROT SERPL-MCNC: 7.3 G/DL (ref 6.4–8.2)
PTH-INTACT SERPL-MCNC: 28.6 PG/ML (ref 18.4–80.1)
RBC # BLD AUTO: 4.69 MILLION/UL (ref 3.81–5.12)
SODIUM SERPL-SCNC: 140 MMOL/L (ref 136–145)
WBC # BLD AUTO: 6.7 THOUSAND/UL (ref 4.31–10.16)

## 2020-07-30 PROCEDURE — 36415 COLL VENOUS BLD VENIPUNCTURE: CPT | Performed by: FAMILY MEDICINE

## 2020-07-30 PROCEDURE — 80053 COMPREHEN METABOLIC PANEL: CPT | Performed by: PHYSICIAN ASSISTANT

## 2020-07-30 PROCEDURE — 83036 HEMOGLOBIN GLYCOSYLATED A1C: CPT | Performed by: FAMILY MEDICINE

## 2020-07-30 PROCEDURE — 87389 HIV-1 AG W/HIV-1&-2 AB AG IA: CPT

## 2020-07-30 PROCEDURE — 82525 ASSAY OF COPPER: CPT

## 2020-07-30 PROCEDURE — 84590 ASSAY OF VITAMIN A: CPT

## 2020-07-30 PROCEDURE — 85025 COMPLETE CBC W/AUTO DIFF WBC: CPT | Performed by: PHYSICIAN ASSISTANT

## 2020-07-30 PROCEDURE — 84630 ASSAY OF ZINC: CPT

## 2020-07-30 PROCEDURE — 82746 ASSAY OF FOLIC ACID SERUM: CPT

## 2020-07-30 PROCEDURE — 84425 ASSAY OF VITAMIN B-1: CPT

## 2020-07-30 PROCEDURE — 82306 VITAMIN D 25 HYDROXY: CPT | Performed by: PHYSICIAN ASSISTANT

## 2020-07-30 PROCEDURE — 83970 ASSAY OF PARATHORMONE: CPT

## 2020-07-30 PROCEDURE — 85652 RBC SED RATE AUTOMATED: CPT | Performed by: PHYSICIAN ASSISTANT

## 2020-07-31 LAB — HIV 1+2 AB+HIV1 P24 AG SERPL QL IA: NORMAL

## 2020-08-01 LAB — COPPER SERPL-MCNC: 119 UG/DL (ref 72–166)

## 2020-08-02 LAB
VIT A SERPL-MCNC: 57.6 UG/DL (ref 20.1–62)
VIT B1 BLD-SCNC: 136 NMOL/L (ref 66.5–200)

## 2020-08-03 ENCOUNTER — OFFICE VISIT (OUTPATIENT)
Dept: FAMILY MEDICINE CLINIC | Facility: CLINIC | Age: 45
End: 2020-08-03
Payer: COMMERCIAL

## 2020-08-03 VITALS
HEART RATE: 74 BPM | SYSTOLIC BLOOD PRESSURE: 110 MMHG | RESPIRATION RATE: 14 BRPM | WEIGHT: 186.6 LBS | DIASTOLIC BLOOD PRESSURE: 70 MMHG | OXYGEN SATURATION: 95 % | TEMPERATURE: 98.1 F | BODY MASS INDEX: 31.09 KG/M2 | HEIGHT: 65 IN

## 2020-08-03 DIAGNOSIS — E03.9 ACQUIRED HYPOTHYROIDISM: ICD-10-CM

## 2020-08-03 DIAGNOSIS — E66.9 OBESITY, CLASS I, BMI 30-34.9: ICD-10-CM

## 2020-08-03 DIAGNOSIS — Z23 ENCOUNTER FOR ADMINISTRATION OF VACCINE: ICD-10-CM

## 2020-08-03 DIAGNOSIS — G47.33 OBSTRUCTIVE SLEEP APNEA TREATED WITH CONTINUOUS POSITIVE AIRWAY PRESSURE (CPAP): ICD-10-CM

## 2020-08-03 DIAGNOSIS — Z99.89 OBSTRUCTIVE SLEEP APNEA TREATED WITH CONTINUOUS POSITIVE AIRWAY PRESSURE (CPAP): ICD-10-CM

## 2020-08-03 DIAGNOSIS — E11.69 DIABETES MELLITUS TYPE 2 IN OBESE (HCC): Primary | ICD-10-CM

## 2020-08-03 DIAGNOSIS — I10 BENIGN HYPERTENSION: ICD-10-CM

## 2020-08-03 DIAGNOSIS — E66.9 DIABETES MELLITUS TYPE 2 IN OBESE (HCC): Primary | ICD-10-CM

## 2020-08-03 DIAGNOSIS — D68.62 LUPUS ANTICOAGULANT DISORDER (HCC): ICD-10-CM

## 2020-08-03 DIAGNOSIS — F51.01 PRIMARY INSOMNIA: ICD-10-CM

## 2020-08-03 DIAGNOSIS — E78.49 OTHER HYPERLIPIDEMIA: ICD-10-CM

## 2020-08-03 DIAGNOSIS — F32.A ANXIETY AND DEPRESSION: ICD-10-CM

## 2020-08-03 DIAGNOSIS — K91.2 POSTSURGICAL MALABSORPTION: Chronic | ICD-10-CM

## 2020-08-03 DIAGNOSIS — F41.9 ANXIETY AND DEPRESSION: ICD-10-CM

## 2020-08-03 PROBLEM — Z90.710 STATUS POST LAPAROSCOPIC HYSTERECTOMY: Status: RESOLVED | Noted: 2017-11-20 | Resolved: 2020-08-03

## 2020-08-03 PROCEDURE — 3044F HG A1C LEVEL LT 7.0%: CPT | Performed by: FAMILY MEDICINE

## 2020-08-03 PROCEDURE — 3074F SYST BP LT 130 MM HG: CPT | Performed by: FAMILY MEDICINE

## 2020-08-03 PROCEDURE — 90670 PCV13 VACCINE IM: CPT

## 2020-08-03 PROCEDURE — 2022F DILAT RTA XM EVC RTNOPTHY: CPT | Performed by: FAMILY MEDICINE

## 2020-08-03 PROCEDURE — 1036F TOBACCO NON-USER: CPT | Performed by: FAMILY MEDICINE

## 2020-08-03 PROCEDURE — 90471 IMMUNIZATION ADMIN: CPT

## 2020-08-03 PROCEDURE — 3008F BODY MASS INDEX DOCD: CPT | Performed by: FAMILY MEDICINE

## 2020-08-03 PROCEDURE — 99214 OFFICE O/P EST MOD 30 MIN: CPT | Performed by: FAMILY MEDICINE

## 2020-08-03 PROCEDURE — 3078F DIAST BP <80 MM HG: CPT | Performed by: FAMILY MEDICINE

## 2020-08-03 RX ORDER — ALPRAZOLAM 0.5 MG/1
0.5 TABLET ORAL 2 TIMES DAILY PRN
Qty: 60 TABLET | Refills: 0 | Status: SHIPPED | OUTPATIENT
Start: 2020-08-03 | End: 2020-10-06 | Stop reason: SDUPTHER

## 2020-08-03 RX ORDER — ASPIRIN 81 MG/1
81 TABLET, CHEWABLE ORAL
COMMUNITY
End: 2021-10-18

## 2020-08-03 NOTE — PROGRESS NOTES
Assessment/Plan:    No problem-specific Assessment & Plan notes found for this encounter  Diagnoses and all orders for this visit:    Diabetes mellitus type 2 in obese [facility])  Comments:  recent HBA1c 5 5%  continue Victoza as directed  Orders:  -     Hemoglobin A1C    Postsurgical malabsorption  Comments:  vitamin as directed    Acquired hypothyroidism  Comments:  Levothryoxine as directed  Orders:  -     TSH, 3rd generation with Free T4 reflex; Future    Obstructive sleep apnea treated with continuous positive airway pressure (CPAP)  Comments:  off of CPAP    Benign hypertension  Comments:  stable  continue current meds     Anxiety and depression  Comments:  Wellbutrin is helping  Orders:  -     ALPRAZolam (XANAX) 0 5 mg tablet; Take 1 tablet (0 5 mg total) by mouth 2 (two) times a day as needed for anxiety    Other hyperlipidemia  Comments:  Lipitor as directed    Obesity, Class I, BMI 30-34 9  Comments:  stable  diet and exercise encouraged today    [facility]  Comments:  baby aspirin every other day  care per rheumatologist   Orders:  -     aspirin 81 mg chewable tablet; Chew 81 mg daily    Encounter for administration of vaccine  -     PNEUMOCOCCAL CONJUGATE VACCINE 13-VALENT GREATER THAN 6 MONTHS    Primary insomnia  -     ALPRAZolam (XANAX) 0 5 mg tablet; Take 1 tablet (0 5 mg total) by mouth 2 (two) times a day as needed for anxiety    Anxiety and depression  -     ALPRAZolam (XANAX) 0 5 mg tablet; Take 1 tablet (0 5 mg total) by mouth 2 (two) times a day as needed for anxiety          Subjective:      Patient ID: Sujata Claros is a 39 y o  female  Here for follow up  Gained 4 pounds since last visit due to eating more sweets  Cannot tolerate eating bread since the weight loss surgery    Diabetes   She presents for her follow-up diabetic visit  She has type 2 diabetes mellitus  Her disease course has been stable  There are no hypoglycemic associated symptoms   There are no diabetic associated symptoms  There are no hypoglycemic complications  Symptoms are stable  There are no diabetic complications  Risk factors for coronary artery disease include obesity  She is compliant with treatment all of the time  The following portions of the patient's history were reviewed and updated as appropriate: allergies, current medications, past family history, past medical history, past social history, past surgical history and problem list     Review of Systems   Constitutional: Negative  HENT: Negative  Eyes: Negative  Respiratory: Negative  Cardiovascular: Negative  Gastrointestinal: Negative  Endocrine: Negative  Genitourinary: Negative  Musculoskeletal: Negative  Skin: Negative  Allergic/Immunologic: Negative  Neurological: Negative  Hematological: Negative  Psychiatric/Behavioral: Negative  Objective:      /70   Pulse 74   Temp 98 1 °F (36 7 °C)   Resp 14   Ht 5' 5 32"   Wt 84 6 kg (186 lb 9 6 oz)   LMP 11/18/2018   SpO2 95%   BMI 30 75 kg/m²          Physical Exam   Constitutional: She does not appear ill  No distress  HENT:   Head: Normocephalic and atraumatic  Cardiovascular: Normal rate  Pulses are no weak pulses  No murmur heard  Pulses:       Dorsalis pedis pulses are 2+ on the right side and 2+ on the left side  Posterior tibial pulses are 2+ on the right side and 2+ on the left side  Pulmonary/Chest: Effort normal  No respiratory distress  Feet:   Right Foot:   Skin Integrity: Negative for ulcer, skin breakdown, erythema, warmth, callus or dry skin  Left Foot:   Skin Integrity: Negative for ulcer, skin breakdown, erythema, warmth, callus or dry skin  Neurological: She is alert  Skin: Skin is warm  No bruising noted  No jaundice  Patient's shoes and socks removed  Right Foot/Ankle   Right Foot Inspection  Skin Exam: skin normal and skin intact no dry skin, no warmth, no callus, no erythema, no maceration, no abnormal color, no pre-ulcer, no ulcer and no callus                          Toe Exam: ROM and strength within normal limits  Sensory   Vibration: intact  Proprioception: intact   Monofilament testing: intact  Vascular  Capillary refills: < 3 seconds  The right DP pulse is 2+  The right PT pulse is 2+  Left Foot/Ankle  Left Foot Inspection  Skin Exam: skin normal and skin intactno dry skin, no warmth, no erythema, no maceration, normal color, no pre-ulcer, no ulcer and no callus                         Toe Exam: ROM and strength within normal limits                   Sensory   Vibration: intact  Proprioception: intact  Monofilament: intact  Vascular  Capillary refills: < 3 seconds  The left DP pulse is 2+  The left PT pulse is 2+  Assign Risk Category:  No deformity present; No loss of protective sensation; No weak pulses       Risk: 0  BMI Counseling: Body mass index is 30 75 kg/m²  The BMI is above normal  Nutrition recommendations include decreasing portion sizes and encouraging healthy choices of fruits and vegetables  Exercise recommendations include moderate physical activity 150 minutes/week  No pharmacotherapy was ordered

## 2020-08-05 ENCOUNTER — APPOINTMENT (OUTPATIENT)
Dept: PHYSICAL THERAPY | Facility: CLINIC | Age: 45
End: 2020-08-05
Payer: COMMERCIAL

## 2020-08-05 LAB — ZINC SERPL-MCNC: 69 UG/DL (ref 56–134)

## 2020-08-11 ENCOUNTER — OFFICE VISIT (OUTPATIENT)
Dept: NEUROLOGY | Facility: CLINIC | Age: 45
End: 2020-08-11
Payer: COMMERCIAL

## 2020-08-11 VITALS
HEART RATE: 65 BPM | HEIGHT: 65 IN | SYSTOLIC BLOOD PRESSURE: 101 MMHG | WEIGHT: 188 LBS | BODY MASS INDEX: 31.32 KG/M2 | TEMPERATURE: 95.8 F | DIASTOLIC BLOOD PRESSURE: 61 MMHG

## 2020-08-11 DIAGNOSIS — G57.02 COMMON PERONEAL NEUROPATHY, LEFT: Primary | ICD-10-CM

## 2020-08-11 PROCEDURE — 2022F DILAT RTA XM EVC RTNOPTHY: CPT | Performed by: PHYSICIAN ASSISTANT

## 2020-08-11 PROCEDURE — 1036F TOBACCO NON-USER: CPT | Performed by: PHYSICIAN ASSISTANT

## 2020-08-11 PROCEDURE — 3074F SYST BP LT 130 MM HG: CPT | Performed by: PHYSICIAN ASSISTANT

## 2020-08-11 PROCEDURE — 3008F BODY MASS INDEX DOCD: CPT | Performed by: PHYSICIAN ASSISTANT

## 2020-08-11 PROCEDURE — 99214 OFFICE O/P EST MOD 30 MIN: CPT | Performed by: PHYSICIAN ASSISTANT

## 2020-08-11 PROCEDURE — 3078F DIAST BP <80 MM HG: CPT | Performed by: PHYSICIAN ASSISTANT

## 2020-08-11 PROCEDURE — 3044F HG A1C LEVEL LT 7.0%: CPT | Performed by: PHYSICIAN ASSISTANT

## 2020-08-11 NOTE — PROGRESS NOTES
Patient ID: Thais Hawkins is a 39 y o  female  Assessment/Plan:    Patient had left foot drop following a fall in November 2019  She had persistent ambulatory dysfunction with several falls as well  EMG of the left lower extremity showed damage to the left common peroneal nerve with active denervation as well as reinnervation findings were observed in the tibialis anterior, peroneus longus and EDB  She has been in physical therapy and making great progress  She has not had any falls recently and is walking much better  She is working on home exercises as well  She does have some right hip pain, likely due to compensating  I asked her to see if the PT can give her some exercises for her right hip  We also reviewed CT lumbar spine and MRI C-spine  She does have multilevel degenerative spondylosis in both areas, as well as moderate to severe bilateral foraminal stenosis at L4/L5 and small right-sided disc protrusion at C6/C7  Normal cord in the C-spine  She is asymptomatic at this time and will just monitor  She should continue with physical therapy  Would like to see her back in 3-4 months to check on her progress  She was advised to call for any new or worsening symptoms  Diagnoses and all orders for this visit:    Common peroneal neuropathy, left           Subjective:    HPI    Thais Hawkins is a 39 y o  female who presents today for neurologic follow up  Patient presented for initial neurologic consultation in May 2020 for left footdrop  This occurred after a fall in November 2019  Since that time, she was noted to have left foot drop and ambulatory dysfunction  She had several more falls after this  She denied any other focal neurologic symptoms  Patient had seen her PCP who ordered MRI of the brain which was completed on 04/23/2020  This showed mild white matter changes in the cerebral hemispheres and brainstem suggestive of precocious chronic microangiopathy    Patient has a history of gastric bypass surgery and lost about 160 lb, ovarian cancer, diabetes, hypothyroidism, obstructive sleep apnea, hypertension, hyperlipidemia, fibromyalgia, anxiety/depression and lupus anticoagulant disorder  She takes aspirin 81 mg daily  Today, patient reports she is doing very well  She has been in physical therapy and has made significant improvement with her left foot drop  She denies any new neurologic symptoms at this time  She had a left lower extremity EMG completed on 06/18/2020 which showed left common peroneal demyelinating and axonal neuropathy with conduction block at or above the fibular head  Active denervation with reinnervation findings were observed in the tibialis anterior, peroneus longus and EDB  She denies any falls recently  Also, she had CT lumbar spine completed 5/31/2020 shoulder multilevel degenerative spondylosis as well as moderate to severe bilateral foraminal stenosis at L4/L5  She had partially calcified left foraminal disc protrusion, moderate left foraminal stenosis at L2/L3, L3/L4  MRI cervical spine was also ordered due to ambulatory dysfunction and falls and her reports of intermittent upper extremity weakness with tremors  This demonstrated mild multilevel degenerative spondylosis and annular bulges, with small right-sided disc protrusion at C6/C7  Normal appearance of the cervical spinal cord  The following portions of the patient's history were reviewed and updated as appropriate: current medications, past family history, past medical history, past social history, past surgical history and problem list          Objective:    Blood pressure 101/61, pulse 65, temperature (!) 95 8 °F (35 4 °C), height 5' 5 32" (1 659 m), weight 85 3 kg (188 lb)    Physical Exam  Constitutional:       Appearance: Normal appearance  She is well-developed  HENT:      Head: Normocephalic and atraumatic     Eyes:      Extraocular Movements: EOM normal       Pupils: Pupils are equal, round, and reactive to light  Skin:     General: Skin is warm and dry  Neurological:      Mental Status: She is alert  Coordination: Coordination is intact  Deep Tendon Reflexes: Reflexes are normal and symmetric  Psychiatric:         Mood and Affect: Mood normal          Speech: Speech normal          Behavior: Behavior normal          Neurological Exam  Mental Status  Alert  Oriented to person, place, time and situation  Recent and remote memory are intact  Speech is normal  Language is fluent with no aphasia  Attention and concentration are normal     Cranial Nerves  CN II: Visual fields full to confrontation  CN III, IV, VI: Extraocular movements intact bilaterally  Pupils equal round and reactive to light bilaterally  CN V: Facial sensation is normal   CN VII: Full and symmetric facial movement  CN VIII: Hearing is normal   CN IX, X: Palate elevates symmetrically  CN XI: Shoulder shrug strength is normal   CN XII: Tongue midline without atrophy or fasciculations  Motor   Normal muscle tone  Strength is 5/5 in all four extremities except as noted  Left foot DF 4+/5, eversion 5-/5  Sensory  Sensation is intact to light touch, pinprick, vibration and proprioception in all four extremities  Reflexes  Deep tendon reflexes are 2+ and symmetric in all four extremities with downgoing toes bilaterally  Coordination  Finger-to-nose, rapid alternating movements and heel-to-shin normal bilaterally without dysmetria  Gait  Casual gait is normal including stance, stride, and arm swing  ROS:    Review of Systems   Constitutional: Negative  Negative for appetite change and fever  HENT: Negative  Negative for hearing loss, tinnitus, trouble swallowing and voice change  Eyes: Negative  Negative for photophobia and pain  Respiratory: Negative  Negative for shortness of breath  Cardiovascular: Negative  Negative for palpitations     Gastrointestinal: Negative  Negative for nausea and vomiting  Endocrine: Negative  Negative for cold intolerance  Genitourinary: Negative  Negative for dysuria, frequency and urgency  Musculoskeletal: Positive for arthralgias (Hip pain)  Negative for myalgias and neck pain  Skin: Negative  Negative for rash  Neurological: Negative  Negative for dizziness, tremors, seizures, syncope, facial asymmetry, speech difficulty, weakness, light-headedness, numbness and headaches  Hematological: Negative  Does not bruise/bleed easily  Psychiatric/Behavioral: Negative  Negative for confusion, hallucinations and sleep disturbance       I personally reviewed and updated the ROS as appropriate

## 2020-08-11 NOTE — PROGRESS NOTES
PT Re-evaluation     Today's date: 2020  Patient name: Deepak Berg  : 1975  MRN: [id number]  Referring provider: Edgar Ames, *  Dx:   Encounter Diagnosis     ICD-10-CM    1  Foot drop, left  M21 372    2  Weakness of left lower extremity  R29 [id number]        Start Time: 1650  Stop Time: 1720  Total time in clinic (min): 30 minutes  The patient was treated by ELVIA Fleming under direct supervision of Suma Oconnor DPT    Subjective: No new complaints today  The patient reports improvement in symptoms since previous session  The patient reports 0/10 pain at it's worst over the past 24 hours, and reports 90% improvement in overall condition since beginning formal PT care  The patient's chief remaining concern is AM stiffness in her ankle  Objective: See treatment diary below    Active Range of Motion     Lumbar   Normal active range of motion    Joint Play   Lumbar spine WNL    Strength/Myotome Testing     Lumbar   Left   Heel walk: abnormal  Toe walk: abnormal    Right   Heel walk: normal  Toe walk: normal    Left Hip   Planes of Motion   Flexion: 4  Extension: 4  Abduction: 5    Right Hip   Planes of Motion   Flexion: 4+  Extension: 4  Abduction: 5    Left Knee   Flexion: 4+  Extension: 5    Right Knee   Flexion: 5  Extension: 5    Left Ankle/Foot   Dorsiflexion: 4  Plantar flexion: 5  Eversion: 5  Great toe extension: 5    Right Ankle/Foot   Dorsiflexion: 5  Plantar flexion: 5  Great toe extension: 5      Assessment: Deepak Berg is a pleasant 39 y o  female who has been receiving PT intervention for Foot drop and general LE weakness  The patient has demonstrated decreased pain, increased strength, increased ROM, increased joint mobility, improved sensation, improved body mechanics, improved gait mechanics , improved posture  and increased activity tolerance since beginning treatment   Pt's gait has drastically improved with no noticeable limp or foot drop and pt reports that she no longer has to think/worry about tripping or picking up her leg  Pt hit all goals except achieving a 4+/5 for L hip flexion and DF which was addressed with education on continuing/modifying her HEP with more resistance and adaptations for continued progression  Primary remaining impairments include:    1)  L ankle Am stiffness  2) minor L LE weakness    Goals  Short Term Goals: to be achieved by 4 weeks  1) Patient to be independent with basic HEP - MET  2) Increase LE strength by 1/2 MMT grade in all deficient planes - MET  3) Pt will report a decrease in tripping episodes - MET    Long Term Goals: to be achieved by discharge  1) FOTO equal to or greater than 72 - MET (84)  2) Patient to be independent with comprehensive HEP - MET  3) Increase LE strength to 4+/5 MMT grade in all planes to improve a/iadls  - Partially MET  4) Improve sit to stand transfers to maximal level of function - MET    Plan: Despite not meeting 100% of all of her goals, pt has a good prognosis for meeting them with continued compliance with HEP as she has only had one full treatment session and has made large gains in all impairments          Precautions: Lupus anticoagulant disorder, Obese, hypothyriodism       6/18 7/8 8/12          Manuals                                                                 Neuro Re-Ed             Ankle alphabets x1            Short foot  1x10, 3" hold           SLR w TrA   1x10 ea           Toe curl + PF  1x10 GTB           Clam shells  1x10 ea RTB                                     Ther Ex             Bridges 1x10 2x10 w TrA activation           Prone straight leg extension 1x10 1x10 ea           Mini squat  1x10 ea           Ankle 4 way   1x10 ea GTB           3-way standing hip  2x10 ea YTB           Seated marches  2x10 YTB                                     Ther Activity                                       Gait Training                                       Modalities Assessment IE  Re-evaluation          Education Prognosis, HEP, POC            PT 1:1 from 2830 - 1154

## 2020-08-11 NOTE — PATIENT INSTRUCTIONS
Continue physical therapy  Follow up in 3-4 months or sooner if needed  Call with any new or worsening symptoms

## 2020-08-12 ENCOUNTER — EVALUATION (OUTPATIENT)
Dept: PHYSICAL THERAPY | Facility: CLINIC | Age: 45
End: 2020-08-12
Payer: COMMERCIAL

## 2020-08-12 DIAGNOSIS — R29.898 WEAKNESS OF LEFT LOWER EXTREMITY: ICD-10-CM

## 2020-08-12 DIAGNOSIS — M21.372 FOOT DROP, LEFT: Primary | ICD-10-CM

## 2020-08-12 PROCEDURE — 97112 NEUROMUSCULAR REEDUCATION: CPT | Performed by: PHYSICAL THERAPIST

## 2020-08-12 PROCEDURE — 97164 PT RE-EVAL EST PLAN CARE: CPT | Performed by: PHYSICAL THERAPIST

## 2020-08-28 ENCOUNTER — TELEMEDICINE (OUTPATIENT)
Dept: FAMILY MEDICINE CLINIC | Facility: CLINIC | Age: 45
End: 2020-08-28
Payer: COMMERCIAL

## 2020-08-28 VITALS — HEIGHT: 65 IN | BODY MASS INDEX: 31.32 KG/M2 | WEIGHT: 188 LBS | TEMPERATURE: 98 F

## 2020-08-28 DIAGNOSIS — J01.00 ACUTE NON-RECURRENT MAXILLARY SINUSITIS: Primary | ICD-10-CM

## 2020-08-28 PROCEDURE — 3078F DIAST BP <80 MM HG: CPT | Performed by: FAMILY MEDICINE

## 2020-08-28 PROCEDURE — 99213 OFFICE O/P EST LOW 20 MIN: CPT | Performed by: FAMILY MEDICINE

## 2020-08-28 PROCEDURE — 1036F TOBACCO NON-USER: CPT | Performed by: FAMILY MEDICINE

## 2020-08-28 PROCEDURE — 3074F SYST BP LT 130 MM HG: CPT | Performed by: FAMILY MEDICINE

## 2020-08-28 PROCEDURE — 3044F HG A1C LEVEL LT 7.0%: CPT | Performed by: FAMILY MEDICINE

## 2020-08-28 PROCEDURE — 3008F BODY MASS INDEX DOCD: CPT | Performed by: FAMILY MEDICINE

## 2020-08-28 PROCEDURE — 2022F DILAT RTA XM EVC RTNOPTHY: CPT | Performed by: FAMILY MEDICINE

## 2020-08-28 RX ORDER — AMOXICILLIN AND CLAVULANATE POTASSIUM 875; 125 MG/1; MG/1
1 TABLET, FILM COATED ORAL EVERY 12 HOURS SCHEDULED
Qty: 14 TABLET | Refills: 0 | Status: SHIPPED | OUTPATIENT
Start: 2020-08-28 | End: 2020-09-04

## 2020-08-28 NOTE — PROGRESS NOTES
Virtual Regular Visit      Assessment/Plan:    Problem List Items Addressed This Visit     None      Visit Diagnoses     Acute non-recurrent maxillary sinusitis    -  Primary    flonase daily   sinus rinses     Relevant Medications    amoxicillin-clavulanate (AUGMENTIN) 875-125 mg per tablet               Reason for visit is   Chief Complaint   Patient presents with    Virtual Regular Visit        Encounter provider Germania Frankel MD    Provider located at [address] [id number]      Recent Visits  No visits were found meeting these conditions  Showing recent visits within past 7 days and meeting all other requirements     Today's Visits  Date Type Provider Dept   08/28/20 Telemedicine Germania Frankel MD [address] [facility] today's visits and meeting all other requirements     Future Appointments  No visits were found meeting these conditions  Showing future appointments within next 150 days and meeting all other requirements        The patient was identified by name and date of birth  Va Wright was informed that this is a telemedicine visit and that the visit is being conducted through [facility] and patient was informed that this is a secure, HIPAA-compliant platform  She agrees to proceed     My office door was closed  No one else was in the room  She acknowledged consent and understanding of privacy and security of the video platform  The patient has agreed to participate and understands they can discontinue the visit at any time  Patient is aware this is a billable service  Yamilka Arana is a 39 y o  female for sinus symptoms for 1 week         HPI   Runny nose and sinus pressure x 1 week   Worsening sinus pain and pressure on both side of her face for the last 2 days   No fever/chills/bodyaches   + green to yellow discharge  + Post nasal drip   Past Medical History:   Diagnosis Date    Anxiety     Arthritis     Bariatric surgery status     CPAP (continuous positive airway pressure) dependence     Depression     Diabetes mellitus (HCC)     Eczema     Fibromyalgia, primary     Hyperlipidemia     Hypertension     Hypothyroidism     Lupus anticoagulant disorder (HCC)     Migraine     Night sweats     Obesity     Postsurgical malabsorption     Psoriasis     Sleep apnea     Status post laparoscopic hysterectomy 11/20/2017       Past Surgical History:   Procedure Laterality Date    COLONOSCOPY      HYSTERECTOMY Bilateral 11/17/2018    OOPHORECTOMY Bilateral 11/17/2018    SC COLONOSCOPY FLX DX W/COLLJ SPEC WHEN PFRMD N/A 6/13/2017    Procedure: COLONOSCOPY;  Surgeon: Demetrius Peralta DO;  [facility]: [facility]; Service: Gastroenterology    SC EGD TRANSORAL BIOPSY SINGLE/MULTIPLE N/A 1/30/2019    Procedure: ESOPHAGOGASTRODUODENOSCOPY (EGD) with bx;  Surgeon: Lou Serrano MD;  Location: [facility];   Service: Bariatrics    SC LAP GASTRIC BYPASS/LEESA-EN-Y N/A 3/25/2019    Procedure: LAPAROSCOPIC LEESA-EN-Y GASTRIC BYPASS AND INTRAOPERATIVE EGD;  Surgeon: Lou Serrano MD;  Location: [facility];  Service: [facility] SC LAPAROSCOPY W TOT HYSTERECTUTERUS <=250 GRAM  W TUBE/OVARY N/A 11/20/2017    Procedure: ROBOTIC TOTAL LAPAROSCOPIC HYSTERECTOMY/ BSO;  Surgeon: Denise Segovia MD;  Location: [facility];  Service: Gynecology Oncology    WISDOM TOOTH EXTRACTION         Current Outpatient Medications   Medication Sig Dispense Refill    acetaminophen (TYLENOL) 500 mg tablet Take 500 mg by mouth every 6 (six) hours as needed for mild pain      ALPRAZolam (XANAX) 0 5 mg tablet Take 1 tablet (0 5 mg total) by mouth 2 (two) times a day as needed for anxiety 60 tablet 0    amoxicillin-clavulanate (AUGMENTIN) 875-125 mg per tablet Take 1 tablet by mouth every 12 (twelve) hours for 7 days 14 tablet 0    aspirin 81 mg chewable tablet Chew 81 mg Every other 2 days      atorvastatin (LIPITOR) 10 mg tablet Take 1 tablet (10 mg total) by mouth daily 90 tablet 0    BIOTIN PO Take 600 mg by mouth every other day       buPROPion (WELLBUTRIN SR) 150 mg 12 hr tablet Take 1 tablet (150 mg total) by mouth 2 (two) times a day 180 tablet 2    Calcium Citrate-Vitamin D (MINE-CITRATE PLUS VITAMIN D PO) Take by mouth      Cholecalciferol (VITAMIN D3) 2000 units capsule Take 2,000 Units by mouth daily       Cyanocobalamin (B-12) 1000 MCG CAPS Take 1 capsule by mouth daily       DULoxetine (CYMBALTA) 60 mg delayed release capsule       gabapentin (NEURONTIN) 300 mg capsule  5 times a day   3    levothyroxine 50 mcg tablet Take 1 tablet (50 mcg total) by mouth daily 90 tablet 2    liraglutide (Victoza) injection Inject 0 3 mL (1 8 mg total) under the skin daily 15 pen 3    lisinopril (ZESTRIL) 10 mg tablet 5 mg every other day 30 tablet 5    Multiple Vitamin (MULTIVITAMIN) tablet Take 1 tablet by mouth daily      nystatin (MYCOSTATIN) cream Apply topically 1-2 times a day as needed to abdomen rash 60 g 6    omeprazole (PriLOSEC) 20 mg delayed release capsule Take 1 capsule (20 mg total) by mouth daily 90 capsule 0    triamcinolone (KENALOG) 0 1 % ointment Apply topically to affected area 1-2 times a day as needed to itchy rash 80 g 1     No current facility-administered medications for this visit  Allergies   Allergen Reactions    Metformin Diarrhea       Review of Systems   Constitutional: Positive for fatigue  HENT: Positive for congestion, facial swelling, postnasal drip, rhinorrhea, sinus pressure and sinus pain  Eyes: Negative  Respiratory: Positive for cough  Negative for shortness of breath and stridor  Cardiovascular: Negative  Video Exam    Vitals:    08/28/20 1145   Temp: 98 °F (36 7 °C)   TempSrc: Oral   Weight: 85 3 kg (188 lb)   Height: 5' 5 32" (1 659 m)       Physical Exam  Constitutional:       Appearance: Normal appearance     HENT:      Nose:      Right Sinus: Maxillary sinus tenderness present  Left Sinus: Maxillary sinus tenderness present  Comments: Per patient's self exam     Mouth/Throat:      Pharynx: No oropharyngeal exudate or posterior oropharyngeal erythema  Pulmonary:      Effort: Pulmonary effort is normal    Neurological:      Mental Status: She is alert  I spent 15 minutes directly with the patient during this visit      [address] acknowledges that she has consented to an online visit or consultation  She understands that the online visit is based solely on information provided by her, and that, in the absence of a face-to-face physical evaluation by the physician, the diagnosis she receives is both limited and provisional in terms of accuracy and completeness  This is not intended to replace a full medical face-to-face evaluation by the physician  Gabino Brito understands and accepts these terms

## 2020-08-30 DIAGNOSIS — Z98.84 BARIATRIC SURGERY STATUS: ICD-10-CM

## 2020-08-30 DIAGNOSIS — I10 ESSENTIAL HYPERTENSION: ICD-10-CM

## 2020-08-31 RX ORDER — LISINOPRIL 10 MG/1
TABLET ORAL
Qty: 30 TABLET | Refills: 0
Start: 2020-08-31 | End: 2020-09-02 | Stop reason: SDUPTHER

## 2020-08-31 RX ORDER — OMEPRAZOLE 20 MG/1
20 CAPSULE, DELAYED RELEASE ORAL DAILY
Qty: 90 CAPSULE | Refills: 0 | Status: SHIPPED | OUTPATIENT
Start: 2020-08-31 | End: 2020-12-14

## 2020-09-02 DIAGNOSIS — I10 ESSENTIAL HYPERTENSION: ICD-10-CM

## 2020-09-02 RX ORDER — LISINOPRIL 10 MG/1
TABLET ORAL
Qty: 90 TABLET | Refills: 0 | Status: SHIPPED | OUTPATIENT
Start: 2020-09-02 | End: 2021-05-03 | Stop reason: SDUPTHER

## 2020-09-02 RX ORDER — LISINOPRIL 10 MG/1
TABLET ORAL
Qty: 15 TABLET | Refills: 0 | OUTPATIENT
Start: 2020-09-02

## 2020-10-05 NOTE — PROGRESS NOTES
ADULT ANNUAL PHYSICAL  [facility] - [place] [facility]    NAME: Kolton Taylor  AGE: 40 y o  SEX: female  : 1975     DATE: 2019     Assessment and Plan:     Problem List Items Addressed This Visit        Endocrine    Diabetes mellitus type 2 in obese ([address] )    Relevant Orders    Hemoglobin A1C      Other Visit Diagnoses     Annual physical exam    -  Primary          Health maintenance and preventative care screenings were discussed with patient today  Appropriate education was printed on patient's after visit summary  · Discussed risks/benefits of screening for breast cancer, cervical cancer, colorectal cancer, high cholesterol and diabetes  Patient is up-to-date with their preventive screenings  · Immunizations were reviewed: patient is up-to-date with her immunizations  Counseling:  Dental Health: discussed importance of regular tooth brushing, flossing, and dental visits  BMI Counseling: Body mass index is 55 41 kg/m²  Discussed the patient's BMI with her  The BMI is above average  BMI counseling and education was provided to the patient  Nutrition recommendations include reducing portion sizes, decreasing overall calorie intake, 3-5 servings of fruits/vegetables daily, reducing fast food intake, decreasing soda and/or juice intake, moderation in carbohydrate intake, increasing intake of lean protein, reducing intake of saturated fat and trans fat and reducing intake of cholesterol  Exercise recommendations include moderate aerobic physical activity for 150 minutes/week  Sexual health: discussed sexually transmitted diseases, partner selection, use of condoms, avoidance of unintended pregnancy, and contraceptive alternatives  · Alcohol/drug use: discussed moderation in alcohol intake and avoidance of illicit drug use  Return in about 3 months (around 2019) for Recheck       Chief Complaint:     Chief Complaint   Patient presents with   [facility] Annual Exam History of Present Illness:     Adult Annual Physical   Patient here for a comprehensive physical exam  The patient reports no problems  Diet and Physical Activity  · Diet/Nutrition: well balanced diet  · Weight concerns: patient has class 3 obesity (BMI >40)  · Exercise: walking  Depression Screening  PHQ-9 Depression Screening    PHQ-9:    Frequency of the following problems over the past two weeks:            General Health  · Sleep: sleeps well  · Hearing: normal - bilateral   · Vision: most recent eye exam >1 year ago  · Dental: brushes teeth twice daily  /GYN Health  · Patient is: postmenopausal  · Last menstrual period: none  · Contraceptive method: none  · Menopausal symptoms: none  Review of Systems:     Review of Systems   Constitutional: Negative  HENT: Negative  Eyes: Negative  Respiratory: Negative  Cardiovascular: Negative  Gastrointestinal: Negative  Endocrine: Negative  Genitourinary: Negative  Musculoskeletal: Negative  Skin: Negative  Allergic/Immunologic: Negative  Neurological: Negative  Hematological: Negative  Psychiatric/Behavioral: Negative         Past Medical History:     Past Medical History:   Diagnosis Date    Ankle swelling     Anxiety     Arthritis     Depression     Diabetes mellitus ([address] )     Disease of thyroid gland     Fibromyalgia     GERD (gastroesophageal reflux disease)     Hx of colonoscopy 2017    Hypertension     Morbid obesity ([address] )     Night sweats     Obesity     Other ovarian cyst, left side     Last assessed 12/05/17    Ovarian neoplasm     Last assessed 10/24/17    Rectal bleeding       Past Surgical History:     Past Surgical History:   Procedure Laterality Date    COLONOSCOPY      HYSTERECTOMY Bilateral 11/17/2018    OOPHORECTOMY Bilateral 11/17/2018    WI COLONOSCOPY FLX DX W/COLLJ SPEC WHEN PFRMD N/A 6/13/2017    Procedure: COLONOSCOPY;  Surgeon: Molina Frias DO;  [facility]: [facility]; Service: Gastroenterology    VT EGD TRANSORAL BIOPSY SINGLE/MULTIPLE N/A 1/30/2019    Procedure: ESOPHAGOGASTRODUODENOSCOPY (EGD) with bx;  Surgeon: Albina Gallagher MD;  Location: [facility];   Service: Bariatrics    VT LAPAROSCOPY W TOT HYSTERECTUTERUS <=250 [address] TUBE/OVARY N/A 11/20/2017    Procedure: ROBOTIC TOTAL LAPAROSCOPIC HYSTERECTOMY/ BSO;  Surgeon: Rodrigo Anderson MD;  Location:  MAIN OR;  Service: Gynecology Oncology      Social History:     Social History     Social History    Marital status: /Civil Union     Spouse name: N/A    Number of children: N/A    Years of education: N/A     Occupational History    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      Social History Main Topics    Smoking status: Former Smoker     Packs/day: 0 75     Years: 15 00     Quit date: 12/4/2018    Smokeless tobacco: Never Used    Alcohol use Yes      Comment: social    Drug use: No    Sexual activity: Yes     Partners: Male     Other Topics Concern    None     Social History Narrative    None      Family History:     Family History   Problem Relation Age of Onset    Heart disease Mother         Cardiac disorder    Diabetes Mother     Hypertension Mother     Hyperthyroidism Mother     Diabetes Father     Hypertension Father     Lung cancer Maternal Grandfather     Colon cancer Cousin 79    Colon cancer Maternal Aunt 52    Lung cancer Family     Diabetes Sister     No Known Problems Brother       Current Medications:     Current Outpatient Prescriptions   Medication Sig Dispense Refill    ALPRAZolam (XANAX) 0 5 mg tablet Take 1 tablet (0 5 mg total) by mouth 2 (two) times a day as needed for anxiety 60 tablet 0    aspirin (ECOTRIN LOW STRENGTH) 81 mg EC tablet Take 81 mg by mouth daily      atorvastatin (LIPITOR) 10 mg tablet Take 1 tablet (10 mg total) by mouth daily for 90 days 90 tablet 3    buPROPion (WELLBUTRIN XL) 300 mg 24 hr tablet Take 1 tablet (300 mg total) by mouth every morning 90 tablet 3    Cholecalciferol (VITAMIN D3) 2000 units capsule Take by mouth      Cyanocobalamin (B-12) 1000 MCG CAPS Take by mouth      Dapagliflozin Propanediol (FARXIGA) 5 MG TABS Take 1 tablet (5 mg total) by mouth daily 30 tablet 5    DULoxetine (CYMBALTA) 60 mg delayed release capsule Take 60 mg by mouth daily      IkNiu-P13-TL-C-DSS-SuccAc-Zn (FERIVA 21/7) 75-1 MG TABS       gabapentin (NEURONTIN) 300 mg capsule Take 500 mg by mouth 3 (three) times a day       levothyroxine 50 mcg tablet TAKE ONE TABLET BY MOUTH ONCE DAILY AS DIRECTED 90 tablet 11    liraglutide (VICTOZA) injection Inject 0 3 mL (1 8 mg total) under the skin daily 5 pen 3    lisinopril (ZESTRIL) 10 mg tablet Take 1 tablet (10 mg total) by mouth daily 30 tablet 5     No current facility-administered medications for this visit  Allergies: Allergies   Allergen Reactions    Metformin Diarrhea      Objective:     /86 (BP Location: Left arm, Patient Position: Sitting, Cuff Size: Standard)   Pulse 88   Temp 98 °F (36 7 °C)   Resp 16   Ht 5' 5" (1 651 m)   Wt (!) 151 kg (333 lb)   LMP 11/18/2018   SpO2 97%   BMI 55 41 kg/m²     Physical Exam   Constitutional: She is oriented to person, place, and time  She appears well-developed and well-nourished  HENT:   Head: Normocephalic and atraumatic  Eyes: Pupils are equal, round, and reactive to light  EOM are normal    Neck: Normal range of motion  Neck supple  Cardiovascular: Normal rate and regular rhythm  Pulses are no weak pulses  Pulses:       Dorsalis pedis pulses are 2+ on the right side, and 2+ on the left side  Posterior tibial pulses are 2+ on the right side, and 2+ on the left side  Pulmonary/Chest: Effort normal and breath sounds normal  No respiratory distress  She has no wheezes  Abdominal: Soft  Bowel sounds are normal    Musculoskeletal: Normal range of motion  She exhibits no tenderness or deformity     Feet:   Right Foot:   Skin Integrity: Negative for ulcer, skin breakdown, erythema, warmth, callus or dry skin  Left Foot:   Skin Integrity: Negative for ulcer, skin breakdown, erythema, warmth, callus or dry skin  Neurological: She is alert and oriented to person, place, and time  Skin: Skin is warm  Psychiatric: She has a normal mood and affect  Her behavior is normal         Health Maintenance:     Health Maintenance   Topic Date Due    DTaP,Tdap,and Td Vaccines (1 - Tdap) 01/23/1996    DM Eye Exam  08/31/2018    Diabetic Foot Exam  12/19/2018    URINE MICROALBUMIN  03/26/2019    HEMOGLOBIN A1C  07/26/2019    MAMMOGRAM  12/31/2020    INFLUENZA VACCINE  Excluded    PAP SMEAR  Excluded    Pneumococcal PPSV23 Medium Risk Adult  Excluded     There is no immunization history for the selected administration types on file for this patient  Nikolay Diane MD  [id number]     Patient's shoes and socks removed  Right Foot/Ankle   Right Foot Inspection  Skin Exam: skin normal and skin intact no dry skin, no warmth, no callus, no erythema, no maceration, no abnormal color, no pre-ulcer, no ulcer and no callus                          Toe Exam: ROM and strength within normal limits  Sensory   Vibration: intact  Proprioception: intact   Monofilament testing: intact  Vascular  Capillary refills: < 3 seconds  The right DP pulse is 2+  The right PT pulse is 2+  Left Foot/Ankle  Left Foot Inspection  Skin Exam: skin normal and skin intactno dry skin, no warmth, no erythema, no maceration, normal color, no pre-ulcer, no ulcer and no callus                         Toe Exam: ROM and strength within normal limits                   Sensory   Vibration: intact  Proprioception: intact  Monofilament: intact  Vascular  Capillary refills: < 3 seconds  The left DP pulse is 2+  The left PT pulse is 2+  Assign Risk Category:  No deformity present; No loss of protective sensation;  No weak pulses       Risk: 0 12.3   9.70  )-----------( 329      ( 04 Oct 2020 21:00 )             38.6     10-04    140  |  96<L>  |  23<H>  ----------------------------<  142<H>  4.0   |  33<H>  |  0.9    Ca    10.2<H>      04 Oct 2020 21:00    TPro  7.1  /  Alb  4.4  /  TBili  <0.2  /  DBili  x   /  AST  19  /  ALT  15  /  AlkPhos  84  10-04    < from: CT Angio Chest w/ IV Cont (10.04.20 @ 22:28) >    IMPRESSION:    No evidence of pulmonary emboli or acute intrathoracic pathology.    < end of copied text >

## 2020-10-06 DIAGNOSIS — F41.9 ANXIETY AND DEPRESSION: ICD-10-CM

## 2020-10-06 DIAGNOSIS — F32.A ANXIETY AND DEPRESSION: ICD-10-CM

## 2020-10-06 DIAGNOSIS — F51.01 PRIMARY INSOMNIA: ICD-10-CM

## 2020-10-06 RX ORDER — ALPRAZOLAM 0.5 MG/1
0.5 TABLET ORAL 2 TIMES DAILY PRN
Qty: 60 TABLET | Refills: 0 | Status: SHIPPED | OUTPATIENT
Start: 2020-10-06 | End: 2020-11-30 | Stop reason: SDUPTHER

## 2020-10-18 DIAGNOSIS — E66.9 DIABETES MELLITUS TYPE 2 IN OBESE (HCC): ICD-10-CM

## 2020-10-18 DIAGNOSIS — E11.69 DIABETES MELLITUS TYPE 2 IN OBESE (HCC): ICD-10-CM

## 2020-10-19 RX ORDER — ATORVASTATIN CALCIUM 10 MG/1
TABLET, FILM COATED ORAL
Qty: 90 TABLET | Refills: 0 | OUTPATIENT
Start: 2020-10-19

## 2020-10-19 RX ORDER — ATORVASTATIN CALCIUM 10 MG/1
10 TABLET, FILM COATED ORAL DAILY
Qty: 90 TABLET | Refills: 1 | Status: SHIPPED | OUTPATIENT
Start: 2020-10-19 | End: 2021-04-30

## 2020-11-30 DIAGNOSIS — F41.9 ANXIETY AND DEPRESSION: ICD-10-CM

## 2020-11-30 DIAGNOSIS — F32.A ANXIETY AND DEPRESSION: ICD-10-CM

## 2020-11-30 DIAGNOSIS — F51.01 PRIMARY INSOMNIA: ICD-10-CM

## 2020-11-30 RX ORDER — ALPRAZOLAM 0.5 MG/1
0.5 TABLET ORAL 2 TIMES DAILY PRN
Qty: 60 TABLET | Refills: 0 | Status: SHIPPED | OUTPATIENT
Start: 2020-11-30 | End: 2021-01-22 | Stop reason: SDUPTHER

## 2020-11-30 RX ORDER — ALPRAZOLAM 0.5 MG/1
0.5 TABLET ORAL 2 TIMES DAILY PRN
Qty: 60 TABLET | Refills: 0 | OUTPATIENT
Start: 2020-11-30

## 2020-12-14 DIAGNOSIS — Z98.84 BARIATRIC SURGERY STATUS: ICD-10-CM

## 2020-12-14 RX ORDER — OMEPRAZOLE 20 MG/1
CAPSULE, DELAYED RELEASE ORAL
Qty: 90 CAPSULE | Refills: 0 | Status: SHIPPED | OUTPATIENT
Start: 2020-12-14 | End: 2021-03-22

## 2021-01-04 ENCOUNTER — OFFICE VISIT (OUTPATIENT)
Dept: NEUROLOGY | Facility: CLINIC | Age: 46
End: 2021-01-04
Payer: COMMERCIAL

## 2021-01-04 ENCOUNTER — OFFICE VISIT (OUTPATIENT)
Dept: BARIATRICS | Facility: CLINIC | Age: 46
End: 2021-01-04
Payer: COMMERCIAL

## 2021-01-04 VITALS
BODY MASS INDEX: 31.58 KG/M2 | WEIGHT: 189.8 LBS | HEART RATE: 76 BPM | SYSTOLIC BLOOD PRESSURE: 108 MMHG | DIASTOLIC BLOOD PRESSURE: 60 MMHG

## 2021-01-04 VITALS
TEMPERATURE: 97.8 F | WEIGHT: 189 LBS | DIASTOLIC BLOOD PRESSURE: 72 MMHG | BODY MASS INDEX: 31.49 KG/M2 | SYSTOLIC BLOOD PRESSURE: 120 MMHG | RESPIRATION RATE: 20 BRPM | HEART RATE: 82 BPM | HEIGHT: 65 IN

## 2021-01-04 DIAGNOSIS — E66.9 DIABETES MELLITUS TYPE 2 IN OBESE (HCC): ICD-10-CM

## 2021-01-04 DIAGNOSIS — K91.2 POSTSURGICAL MALABSORPTION: Chronic | ICD-10-CM

## 2021-01-04 DIAGNOSIS — G57.02 COMMON PERONEAL NEUROPATHY, LEFT: Primary | ICD-10-CM

## 2021-01-04 DIAGNOSIS — E03.9 ACQUIRED HYPOTHYROIDISM: ICD-10-CM

## 2021-01-04 DIAGNOSIS — E78.49 OTHER HYPERLIPIDEMIA: ICD-10-CM

## 2021-01-04 DIAGNOSIS — E11.69 DIABETES MELLITUS TYPE 2 IN OBESE (HCC): ICD-10-CM

## 2021-01-04 DIAGNOSIS — I10 BENIGN HYPERTENSION: ICD-10-CM

## 2021-01-04 DIAGNOSIS — E66.9 OBESITY, CLASS I, BMI 30-34.9: Primary | ICD-10-CM

## 2021-01-04 DIAGNOSIS — D68.62 LUPUS ANTICOAGULANT DISORDER (HCC): ICD-10-CM

## 2021-01-04 DIAGNOSIS — Z98.84 BARIATRIC SURGERY STATUS: ICD-10-CM

## 2021-01-04 DIAGNOSIS — Z48.815 ENCOUNTER FOR SURGICAL AFTERCARE FOLLOWING SURGERY OF DIGESTIVE SYSTEM: ICD-10-CM

## 2021-01-04 PROBLEM — G47.33 OBSTRUCTIVE SLEEP APNEA TREATED WITH CONTINUOUS POSITIVE AIRWAY PRESSURE (CPAP): Status: RESOLVED | Noted: 2019-02-28 | Resolved: 2021-01-04

## 2021-01-04 PROBLEM — Z99.89 OBSTRUCTIVE SLEEP APNEA TREATED WITH CONTINUOUS POSITIVE AIRWAY PRESSURE (CPAP): Status: RESOLVED | Noted: 2019-02-28 | Resolved: 2021-01-04

## 2021-01-04 PROCEDURE — 99213 OFFICE O/P EST LOW 20 MIN: CPT | Performed by: PHYSICIAN ASSISTANT

## 2021-01-04 PROCEDURE — 99214 OFFICE O/P EST MOD 30 MIN: CPT | Performed by: PHYSICIAN ASSISTANT

## 2021-01-04 NOTE — ASSESSMENT & PLAN NOTE
Malabsorption- patient is at risk for malabsorption of vitamins/minerals secondary to malabsorption from her procedure and restriction of intakes  Reviewed current supplements and advised on same    She is taking one [employer] mvi with 18 mg of iron and taking 1000 Iu vitamin D3  Not taking calcium -advised on taking 2-500 mg calcium citrate with D-she is due for routine labs

## 2021-01-04 NOTE — PROGRESS NOTES
Patient ID: Jose Luis Hernandez is a 39 y o  female  Assessment/Plan:    Patient had left foot drop following a fall in November 2019  She had persistent ambulatory dysfunction due to the foot drop  EMG of the left lower extremity June 2020 showed damage to the left common peroneal nerve with active denervation as well as reinnervation findings were observed in the tibialis anterior, peroneus longus and EDB  She completed a course of PT with excellent recovery, feels essentially back to baseline  Reviewed she likely had nerve damage due to the fall, likely isolated event  At this time since she has recovered an no ongoing neurologic issues, no further routine neurologic follow up is needed  We would be more than happy to see her back should the need arise  Patient comfortable with plan  No problem-specific Assessment & Plan notes found for this encounter  Diagnoses and all orders for this visit:    Common peroneal neuropathy, left           Subjective:    HPI    Jose Luis Hernandez is a 39 y o  female who presents today for neurologic follow up  She was last seen in August 2020  Patient presented for initial neurologic consultation in May 2020 for left footdrop  This occurred after a fall in November 2019  Since that time, she was noted to have left foot drop and ambulatory dysfunction  She had several more falls after this  She denied any other focal neurologic symptoms  Patient had seen her PCP who ordered MRI of the brain which was completed on 04/23/2020  This showed mild white matter changes in the cerebral hemispheres and brainstem suggestive of precocious chronic microangiopathy  Patient has a history of gastric bypass surgery and lost about 160 lb, ovarian cancer, diabetes, hypothyroidism, obstructive sleep apnea, hypertension, hyperlipidemia, fibromyalgia, anxiety/depression and lupus anticoagulant disorder  She takes aspirin 81 mg daily       She had a left lower extremity EMG completed on 06/18/2020 which showed left common peroneal demyelinating and axonal neuropathy with conduction block at or above the fibular head  Active denervation with reinnervation findings were observed in the tibialis anterior, peroneus longus and EDB  CT lumbar spine completed 5/31/2020 shoulder multilevel degenerative spondylosis as well as moderate to severe bilateral foraminal stenosis at L4/L5  She had partially calcified left foraminal disc protrusion, moderate left foraminal stenosis at L2/L3, L3/L4  MRI cervical spine was also ordered due to ambulatory dysfunction and falls and her reports of intermittent upper extremity weakness with tremors  This demonstrated mild multilevel degenerative spondylosis and annular bulges, with small right-sided disc protrusion at C6/C7  Normal appearance of the cervical spinal cord  Today, patient continues to do well  She completed a course of PT with improvement in the left foot drop  She currently feels essentially back to baseline  Denies any new neurologic symptoms at this time  The following portions of the patient's history were reviewed and updated as appropriate: current medications, past family history, past medical history, past social history, past surgical history and problem list          Objective:    Blood pressure 108/60, pulse 76, weight 86 1 kg (189 lb 12 8 oz), last menstrual period 11/18/2018  Physical Exam  Constitutional:       Appearance: Normal appearance  She is well-developed  HENT:      Head: Normocephalic and atraumatic  Eyes:      Extraocular Movements: EOM normal       Pupils: Pupils are equal, round, and reactive to light  Skin:     General: Skin is warm and dry  Neurological:      Mental Status: She is alert  Coordination: Coordination is intact  Deep Tendon Reflexes: Reflexes are normal and symmetric     Psychiatric:         Mood and Affect: Mood normal          Speech: Speech normal          Behavior: Behavior normal          Neurological Exam  Mental Status  Alert  Oriented to person, place, time and situation  Speech is normal  Language is fluent with no aphasia  Attention and concentration are normal     Cranial Nerves  CN II: Visual fields full to confrontation  CN III, IV, VI: Extraocular movements intact bilaterally  Pupils equal round and reactive to light bilaterally  CN V: Facial sensation is normal   CN VII: Full and symmetric facial movement  CN VIII: Hearing is normal   CN IX, X: Palate elevates symmetrically  CN XI: Shoulder shrug strength is normal   CN XII: Tongue midline without atrophy or fasciculations  Motor   Normal muscle tone  Strength is 5/5 in all four extremities except as noted  Very subtle give-way weakness in left foot DF, but then able to provide full resistance   Sensory  Sensation is intact to light touch, pinprick, vibration and proprioception in all four extremities  Reflexes  Deep tendon reflexes are 2+ and symmetric in all four extremities with downgoing toes bilaterally  Coordination  Finger-to-nose, rapid alternating movements and heel-to-shin normal bilaterally without dysmetria  Gait  Casual gait is normal including stance, stride, and arm swing  ROS:    Review of Systems   Constitutional: Negative  Negative for appetite change and fever  HENT: Negative  Negative for hearing loss, tinnitus, trouble swallowing and voice change  Eyes: Negative  Negative for photophobia and pain  Respiratory: Negative  Negative for shortness of breath  Cardiovascular: Negative  Negative for palpitations  Gastrointestinal: Negative  Negative for nausea and vomiting  Endocrine: Negative  Negative for cold intolerance  Genitourinary: Negative  Negative for dysuria, frequency and urgency  Musculoskeletal: Negative  Negative for myalgias and neck pain  Skin: Negative  Negative for rash  Neurological: Negative    Negative for dizziness, tremors, seizures, syncope, facial asymmetry, speech difficulty, weakness, light-headedness, numbness and headaches  Hematological: Negative  Does not bruise/bleed easily  Psychiatric/Behavioral: Negative  Negative for confusion, hallucinations and sleep disturbance       I personally reviewed and updated the ROS as appropriate

## 2021-01-04 NOTE — ASSESSMENT & PLAN NOTE
-s/p Jorge-En-Y Gastric Bypass with Dr Hairston July on 3/25/2019  Overall doing Fairly well    Initial:348 lb  Current:189 lb  EWL: 80%  Yonathan:182 lb-May 2020  Current BMI is Body mass index is 31 45 kg/m²      Tolerating a regular diet-yes  Eating at least 60 grams of protein per day-yes  Following 30/60 minute rule with liquids-yes  Drinking at least 64 ounces of fluid per day-yes  Drinking carbonated beverages-no  Sufficient exercise-no-but is increasing this now/advised on importance of same  Using NSAIDs regularly-no  Using nicotine-no/she has some second-hand smoke exposure  Using alcohol-no

## 2021-01-04 NOTE — ASSESSMENT & PLAN NOTE
Lab Results   Component Value Date    HGBA1C 5 5 07/30/2020   last hgA1c was controlled/on victoza-followed by PCP-notes she has planned recheck soon

## 2021-01-04 NOTE — ASSESSMENT & PLAN NOTE
Low hdl by last April's lipid panel/followed by PCP and she is on a statin    Encouraged regular exercise which may help

## 2021-01-04 NOTE — ASSESSMENT & PLAN NOTE
She notes she takes baby ASA twice weekly because of this-she will continue on daily ppi (also has second-hand smoke exposure) so want her to continue daily ppi

## 2021-01-04 NOTE — ASSESSMENT & PLAN NOTE
Worsened from bmi of 30 3 at her last visit in May 2020-now bmi of 31 5  She is up a net 7 lb from last year   Eating more sweets over the holidays-notes she is back on track with diet and is exercising    She doesn't feel she needs help with diet now-advised if she wants help between visits she can make initial follow-up with RD/ then our medical weight management providers

## 2021-01-04 NOTE — PATIENT INSTRUCTIONS
It was great to see you today  If you find you need help with diet/weight loss between visits then call to make appointment with RD/ first between visits  Follow-up in 6 months  We kindly ask that your arrive 15 minutes before your scheduled appointment time with your provider to allow our staff to room you, get your vital signs and update your chart  We thank you for your patience at your visit  Your appointment time is reserved only for you  If you are unable to make your scheduled visit, we would request that you call to cancel and reschedule it at your earliest convenience  Follow diet as discussed  Get lab work done prior to next office visit  It is recommended to check with your insurance BEFORE getting labs done to make sure they are covered by your policy  Make sure to HOLD any multivitamins that may contain biotin and any biotin supplements FOR 5 DAYS before any labs since it can affect the results  IMPORTANT if you have a [facility] "[employer]" account, you will receive a letter of your vitamin/mineral results through the computer  Please watch for an update to your chart-since recommendations for supplement adjustments will be sent to you this way  Follow vitamin  and mineral recommendations as reviewed with you  Bariatric vitamins are highly recommended  It is important to take vitamins for a life-time  Low levels can lead to deficiencies which can lead to other medical problems    Exercise as tolerated    Call our office if you have any problems with abdominal pain especially associated with fever, chills, nausea, vomiting or any other concerns  All  Post-bariatric surgery patients should be aware that very small quantities of any alcohol  can cause impairment and it is very possible not to feel the effect  The effect can be in the system for several hours  It is also a stomach irritant       It is advised to AVOID alcohol, Nonsteroidal antiinflammatory drugs (NSAIDS) and nicotine of all forms   Any of these can cause stomach irritation/pain

## 2021-01-04 NOTE — ASSESSMENT & PLAN NOTE
She remains on lower dose of antihypertensive -appears controlled in the office today-followed by pcp

## 2021-01-04 NOTE — PROGRESS NOTES
Assessment/Plan:    Obesity, Class I, BMI 30-34 9  Worsened from bmi of 30 3 at her last visit in May 2020-now bmi of 31 5  She is up a net 7 lb from last year  Eating more sweets over the holidays-notes she is back on track with diet and is exercising    She doesn't feel she needs help with diet now-advised if she wants help between visits she can make initial follow-up with RD/ then our medical weight management providers    Encounter for surgical aftercare following surgery of digestive system  -s/p Jorge-En-Y Gastric Bypass with Dr Zeb Cazares on 3/25/2019  Overall doing Fairly well    Initial:348 lb  Current:189 lb  EWL: 80%  Yonathan:182 lb-May 2020  Current BMI is Body mass index is 31 45 kg/m²      Tolerating a regular diet-yes  Eating at least 60 grams of protein per day-yes  Following 30/60 minute rule with liquids-yes  Drinking at least 64 ounces of fluid per day-yes  Drinking carbonated beverages-no  Sufficient exercise-no-but is increasing this now/advised on importance of same  Using NSAIDs regularly-no  Using nicotine-no/she has some second-hand smoke exposure  Using alcohol-no      Diabetes mellitus type 2 in obese [facility])    Lab Results   Component Value Date    HGBA1C 5 5 07/30/2020   last hgA1c was controlled/on victoza-followed by PCP-notes she has planned recheck soon      Postsurgical malabsorption  Malabsorption- patient is at risk for malabsorption of vitamins/minerals secondary to malabsorption from her procedure and restriction of intakes  Reviewed current supplements and advised on same    She is taking one [facility] mvi with 18 mg of iron and taking 1000 Iu vitamin D3  Not taking calcium -advised on taking 2-500 mg calcium citrate with D-she is due for routine labs    Benign hypertension  She remains on lower dose of antihypertensive -appears controlled in the office today-followed by pcp    Hyperlipidemia  Low hdl by last April's lipid panel/followed by PCP and she is on a statin  Encouraged regular exercise which may help    Hypothyroidism  On medication/followed by pcp    Lupus anticoagulant disorder ([address] )  She notes she takes baby ASA twice weekly because of this-she will continue on daily ppi (also has second-hand smoke exposure) so want her to continue daily ppi       Diagnoses and all orders for this visit:    Obesity, Class I, BMI 30-34 9  -     CBC and Platelet; Future  -     Comprehensive metabolic panel; Future  -     Ferritin; Future  -     Folate; Future  -     Iron Saturation %; Future  -     PTH, intact; Future  -     Vitamin A; Future  -     Vitamin B1, whole blood; Future  -     Vitamin B12; Future  -     Vitamin D 25 hydroxy; Future  -     Zinc; Future    Encounter for surgical aftercare following surgery of digestive system  -     CBC and Platelet; Future  -     Comprehensive metabolic panel; Future  -     Ferritin; Future  -     Folate; Future  -     Iron Saturation %; Future  -     PTH, intact; Future  -     Vitamin A; Future  -     Vitamin B1, whole blood; Future  -     Vitamin B12; Future  -     Vitamin D 25 hydroxy; Future  -     Zinc; Future    Diabetes mellitus type 2 in obese (HCC)  -     CBC and Platelet; Future  -     Comprehensive metabolic panel; Future  -     Ferritin; Future  -     Folate; Future  -     Iron Saturation %; Future  -     PTH, intact; Future  -     Vitamin A; Future  -     Vitamin B1, whole blood; Future  -     Vitamin B12; Future  -     Vitamin D 25 hydroxy; Future  -     Zinc; Future    Benign hypertension  -     CBC and Platelet; Future  -     Comprehensive metabolic panel; Future  -     Ferritin; Future  -     Folate; Future  -     Iron Saturation %; Future  -     PTH, intact; Future  -     Vitamin A; Future  -     Vitamin B1, whole blood; Future  -     Vitamin B12; Future  -     Vitamin D 25 hydroxy; Future  -     Zinc; Future    Other hyperlipidemia  -     CBC and Platelet; Future  -     Comprehensive metabolic panel;  Future  - Ferritin; Future  -     Folate; Future  -     Iron Saturation %; Future  -     PTH, intact; Future  -     Vitamin A; Future  -     Vitamin B1, whole blood; Future  -     Vitamin B12; Future  -     Vitamin D 25 hydroxy; Future  -     Zinc; Future    Postsurgical malabsorption  -     CBC and Platelet; Future  -     Comprehensive metabolic panel; Future  -     Ferritin; Future  -     Folate; Future  -     Iron Saturation %; Future  -     PTH, intact; Future  -     Vitamin A; Future  -     Vitamin B1, whole blood; Future  -     Vitamin B12; Future  -     Vitamin D 25 hydroxy; Future  -     Zinc; Future    Acquired hypothyroidism  -     CBC and Platelet; Future  -     Comprehensive metabolic panel; Future  -     Ferritin; Future  -     Folate; Future  -     Iron Saturation %; Future  -     PTH, intact; Future  -     Vitamin A; Future  -     Vitamin B1, whole blood; Future  -     Vitamin B12; Future  -     Vitamin D 25 hydroxy; Future  -     Zinc; Future    Lupus anticoagulant disorder (HCC)  -     CBC and Platelet; Future  -     Comprehensive metabolic panel; Future  -     Ferritin; Future  -     Folate; Future  -     Iron Saturation %; Future  -     PTH, intact; Future  -     Vitamin A; Future  -     Vitamin B1, whole blood; Future  -     Vitamin B12; Future  -     Vitamin D 25 hydroxy; Future  -     Zinc; Future    Bariatric surgery status  -     CBC and Platelet; Future  -     Comprehensive metabolic panel; Future  -     Ferritin; Future  -     Folate; Future  -     Iron Saturation %; Future  -     PTH, intact; Future  -     Vitamin A; Future  -     Vitamin B1, whole blood; Future  -     Vitamin B12; Future  -     Vitamin D 25 hydroxy; Future  -     Zinc; Future          Subjective:      Patient ID: Jose Luis Hernandez is a 39 y o  female  She is here in routine late follow-up-now around 9 months post-op to assess diet, weight and vitamin/mineral intakes  She is tolerating a regular diet   Notes she got off track with diet and exercise over the holidays-eating some sweets with some weight gain  She notes she is following diet better now-but has not restarted exercise yet-plans to do so now  Taking vitamins  She doesn't feel she needs help with diet and weight loss now      The following portions of the patient's history were reviewed and updated as appropriate: allergies, current medications, past family history, past medical history, past social history, past surgical history and problem list     Review of Systems   Constitutional: Negative for chills, fever and unexpected weight change (a few pound weight regain)  Respiratory: Negative for cough, shortness of breath and wheezing  Cardiovascular: Negative for chest pain and palpitations  Gastrointestinal: Negative for abdominal pain, constipation, diarrhea, nausea and vomiting  Psychiatric/Behavioral: Negative for suicidal ideas (no complait of anxiety or depression)  Objective:      /72 (BP Location: Right arm, Patient Position: Sitting, Cuff Size: Standard)   Pulse 82   Temp 97 8 °F (36 6 °C) (Temporal)   Resp 20   Ht 5' 5" (1 651 m)   Wt 85 7 kg (189 lb)   LMP 11/18/2018   BMI 31 45 kg/m²          Physical Exam  Constitutional:       Appearance: She is obese  Pulmonary:      Effort: Pulmonary effort is normal    Neurological:      Mental Status: She is alert and oriented to person, place, and time     Psychiatric:         Mood and Affect: Mood normal

## 2021-01-08 ENCOUNTER — OFFICE VISIT (OUTPATIENT)
Dept: FAMILY MEDICINE CLINIC | Facility: CLINIC | Age: 46
End: 2021-01-08
Payer: COMMERCIAL

## 2021-01-08 VITALS
SYSTOLIC BLOOD PRESSURE: 112 MMHG | HEIGHT: 66 IN | DIASTOLIC BLOOD PRESSURE: 70 MMHG | WEIGHT: 190.4 LBS | TEMPERATURE: 97.8 F | BODY MASS INDEX: 30.6 KG/M2 | OXYGEN SATURATION: 98 % | HEART RATE: 60 BPM

## 2021-01-08 DIAGNOSIS — I10 BENIGN HYPERTENSION: ICD-10-CM

## 2021-01-08 DIAGNOSIS — E66.9 DIABETES MELLITUS TYPE 2 IN OBESE (HCC): Primary | ICD-10-CM

## 2021-01-08 DIAGNOSIS — Z12.31 VISIT FOR SCREENING MAMMOGRAM: ICD-10-CM

## 2021-01-08 DIAGNOSIS — E11.69 DIABETES MELLITUS TYPE 2 IN OBESE (HCC): Primary | ICD-10-CM

## 2021-01-08 DIAGNOSIS — D68.62 LUPUS ANTICOAGULANT DISORDER (HCC): ICD-10-CM

## 2021-01-08 DIAGNOSIS — M79.7 FIBROMYALGIA: ICD-10-CM

## 2021-01-08 DIAGNOSIS — E78.49 OTHER HYPERLIPIDEMIA: ICD-10-CM

## 2021-01-08 DIAGNOSIS — E66.09 CLASS 1 OBESITY DUE TO EXCESS CALORIES WITH SERIOUS COMORBIDITY AND BODY MASS INDEX (BMI) OF 30.0 TO 30.9 IN ADULT: ICD-10-CM

## 2021-01-08 LAB — SL AMB POCT HEMOGLOBIN AIC: 5.4 (ref ?–6.5)

## 2021-01-08 PROCEDURE — 83036 HEMOGLOBIN GLYCOSYLATED A1C: CPT | Performed by: FAMILY MEDICINE

## 2021-01-08 PROCEDURE — 99214 OFFICE O/P EST MOD 30 MIN: CPT | Performed by: FAMILY MEDICINE

## 2021-01-08 NOTE — PROGRESS NOTES
Assessment/Plan:    No problem-specific Assessment & Plan notes found for this encounter  Diagnoses and all orders for this visit:    Diabetes mellitus type 2 in obese ([address] )  -     Microalbumin / creatinine urine ratio  -     Lipid Panel with Direct LDL reflex; Future  -     Comprehensive metabolic panel  -     POCT hemoglobin A1c    Benign hypertension  -     Comprehensive metabolic panel    Lupus anticoagulant disorder (HCC)  Comments:  Gabapentin is helping  care per rheumatologist dr Sheldon Khanna 1 obesity due to excess calories with serious comorbidity and body mass index (BMI) of 30 0 to 30 9 in adult  Comments:  diet and exercise encouraged     Visit for screening mammogram  -     Mammo screening bilateral w cad; Future    Fibromyalgia  Comments:  duloxetine is helping     Other hyperlipidemia  -     Lipid Panel with Direct LDL reflex; Future          Subjective:      Patient ID: Gabrielle Farmer is a 39 y o  female  Here for follow up  Had her tooth moved from her left molar and there is a piece of tissue hanging out currently   Maintaining her weight   Eats reaves in the am, lunch: salad or crackers or yogurts, dinner: meat, vegetables  Feeling well overall    Diabetes  She presents for her follow-up diabetic visit  She has type 2 diabetes mellitus  Her disease course has been stable  There are no hypoglycemic associated symptoms  There are no diabetic associated symptoms  There are no hypoglycemic complications  Symptoms are improving  There are no diabetic complications  Risk factors for coronary artery disease include obesity  She is compliant with treatment all of the time  Her weight is stable  She is following a diabetic diet  Meal planning includes calorie counting and avoidance of concentrated sweets  She has not had a previous visit with a dietitian  She participates in exercise three times a week  An ACE inhibitor/angiotensin II receptor blocker is being taken   She does not see a podiatrist Eye exam is not current  The following portions of the patient's history were reviewed and updated as appropriate: allergies, current medications, past family history, past medical history, past social history, past surgical history and problem list     Review of Systems   Constitutional: Negative  HENT: Negative  Respiratory: Negative  Cardiovascular: Negative  Gastrointestinal: Negative  Endocrine: Negative  Genitourinary: Negative  Musculoskeletal: Negative  Allergic/Immunologic: Negative  Neurological: Negative  Hematological: Negative  Psychiatric/Behavioral: Negative  Objective:      /70 (BP Location: Left arm, Patient Position: Sitting, Cuff Size: Adult)   Pulse 60   Temp 97 8 °F (36 6 °C) (Tympanic)   Ht 5' 5 83" (1 672 m)   Wt 86 4 kg (190 lb 6 4 oz)   LMP 11/18/2018   SpO2 98%   BMI 30 89 kg/m²          Physical Exam  Constitutional:       Appearance: She is well-developed  HENT:      Head: Normocephalic and atraumatic  Nose: Nose normal    Eyes:      Pupils: Pupils are equal, round, and reactive to light  Neck:      Musculoskeletal: Normal range of motion and neck supple  Thyroid: No thyromegaly  Cardiovascular:      Rate and Rhythm: Normal rate and regular rhythm  Heart sounds: No murmur  Pulmonary:      Effort: Pulmonary effort is normal       Breath sounds: Normal breath sounds  Abdominal:      General: Bowel sounds are normal       Palpations: Abdomen is soft  Musculoskeletal: Normal range of motion  General: No deformity  Skin:     General: Skin is warm  Findings: No bruising, erythema or rash  Neurological:      Mental Status: She is alert and oriented to person, place, and time  Deep Tendon Reflexes: Reflexes are normal and symmetric     Psychiatric:         Behavior: Behavior normal

## 2021-01-11 DIAGNOSIS — L30.0 NUMMULAR ECZEMA: ICD-10-CM

## 2021-01-22 DIAGNOSIS — F32.A ANXIETY AND DEPRESSION: ICD-10-CM

## 2021-01-22 DIAGNOSIS — F41.9 ANXIETY AND DEPRESSION: ICD-10-CM

## 2021-01-22 DIAGNOSIS — F51.01 PRIMARY INSOMNIA: ICD-10-CM

## 2021-01-22 RX ORDER — ALPRAZOLAM 0.5 MG/1
0.5 TABLET ORAL 2 TIMES DAILY PRN
Qty: 60 TABLET | Refills: 0 | Status: SHIPPED | OUTPATIENT
Start: 2021-01-22 | End: 2021-03-24 | Stop reason: SDUPTHER

## 2021-01-22 NOTE — TELEPHONE ENCOUNTER
Medication:  PDMP   12/01/2020  1  11/30/2020  ALPRAZOLAM 0 5 MG TABLET  60 0  30  [facility]        Active agreement on file -No

## 2021-01-30 ENCOUNTER — LAB (OUTPATIENT)
Dept: LAB | Facility: MEDICAL CENTER | Age: 46
End: 2021-01-30
Payer: COMMERCIAL

## 2021-01-30 ENCOUNTER — TRANSCRIBE ORDERS (OUTPATIENT)
Dept: ADMINISTRATIVE | Facility: HOSPITAL | Age: 46
End: 2021-01-30

## 2021-01-30 DIAGNOSIS — E78.49 OTHER HYPERLIPIDEMIA: ICD-10-CM

## 2021-01-30 DIAGNOSIS — M15.2 BOUCHARD'S NODES: Primary | ICD-10-CM

## 2021-01-30 DIAGNOSIS — E11.69 DIABETES MELLITUS TYPE 2 IN OBESE (HCC): ICD-10-CM

## 2021-01-30 DIAGNOSIS — Z79.899 ENCOUNTER FOR LONG-TERM (CURRENT) USE OF OTHER MEDICATIONS: ICD-10-CM

## 2021-01-30 DIAGNOSIS — E03.9 ACQUIRED HYPOTHYROIDISM: ICD-10-CM

## 2021-01-30 DIAGNOSIS — E66.9 DIABETES MELLITUS TYPE 2 IN OBESE (HCC): ICD-10-CM

## 2021-01-30 DIAGNOSIS — M15.2 BOUCHARD'S NODES: ICD-10-CM

## 2021-01-30 LAB
ALBUMIN SERPL BCP-MCNC: 3.6 G/DL (ref 3.5–5)
ALP SERPL-CCNC: 76 U/L (ref 46–116)
ALT SERPL W P-5'-P-CCNC: 40 U/L (ref 12–78)
ANION GAP SERPL CALCULATED.3IONS-SCNC: 4 MMOL/L (ref 4–13)
AST SERPL W P-5'-P-CCNC: 27 U/L (ref 5–45)
BASOPHILS # BLD AUTO: 0.07 THOUSANDS/ΜL (ref 0–0.1)
BASOPHILS NFR BLD AUTO: 1 % (ref 0–1)
BILIRUB SERPL-MCNC: 0.41 MG/DL (ref 0.2–1)
BUN SERPL-MCNC: 7 MG/DL (ref 5–25)
CALCIUM SERPL-MCNC: 9.3 MG/DL (ref 8.3–10.1)
CHLORIDE SERPL-SCNC: 107 MMOL/L (ref 100–108)
CHOLEST SERPL-MCNC: 150 MG/DL (ref 50–200)
CO2 SERPL-SCNC: 32 MMOL/L (ref 21–32)
CREAT SERPL-MCNC: 0.7 MG/DL (ref 0.6–1.3)
CREAT UR-MCNC: 132 MG/DL
EOSINOPHIL # BLD AUTO: 0.51 THOUSAND/ΜL (ref 0–0.61)
EOSINOPHIL NFR BLD AUTO: 7 % (ref 0–6)
ERYTHROCYTE [DISTWIDTH] IN BLOOD BY AUTOMATED COUNT: 13.6 % (ref 11.6–15.1)
ERYTHROCYTE [SEDIMENTATION RATE] IN BLOOD: 21 MM/HOUR (ref 0–19)
EST. AVERAGE GLUCOSE BLD GHB EST-MCNC: 105 MG/DL
GFR SERPL CREATININE-BSD FRML MDRD: 104 ML/MIN/1.73SQ M
GLUCOSE P FAST SERPL-MCNC: 84 MG/DL (ref 65–99)
HBA1C MFR BLD: 5.3 %
HCT VFR BLD AUTO: 43.5 % (ref 34.8–46.1)
HDLC SERPL-MCNC: 50 MG/DL
HGB BLD-MCNC: 13.8 G/DL (ref 11.5–15.4)
IMM GRANULOCYTES # BLD AUTO: 0.01 THOUSAND/UL (ref 0–0.2)
IMM GRANULOCYTES NFR BLD AUTO: 0 % (ref 0–2)
LDLC SERPL CALC-MCNC: 78 MG/DL (ref 0–100)
LYMPHOCYTES # BLD AUTO: 1.96 THOUSANDS/ΜL (ref 0.6–4.47)
LYMPHOCYTES NFR BLD AUTO: 27 % (ref 14–44)
MCH RBC QN AUTO: 29.2 PG (ref 26.8–34.3)
MCHC RBC AUTO-ENTMCNC: 31.7 G/DL (ref 31.4–37.4)
MCV RBC AUTO: 92 FL (ref 82–98)
MICROALBUMIN UR-MCNC: <5 MG/L (ref 0–20)
MICROALBUMIN/CREAT 24H UR: <4 MG/G CREATININE (ref 0–30)
MONOCYTES # BLD AUTO: 0.65 THOUSAND/ΜL (ref 0.17–1.22)
MONOCYTES NFR BLD AUTO: 9 % (ref 4–12)
NEUTROPHILS # BLD AUTO: 4.02 THOUSANDS/ΜL (ref 1.85–7.62)
NEUTS SEG NFR BLD AUTO: 56 % (ref 43–75)
NRBC BLD AUTO-RTO: 0 /100 WBCS
PLATELET # BLD AUTO: 206 THOUSANDS/UL (ref 149–390)
PMV BLD AUTO: 11.8 FL (ref 8.9–12.7)
POTASSIUM SERPL-SCNC: 4.4 MMOL/L (ref 3.5–5.3)
PROT SERPL-MCNC: 7.1 G/DL (ref 6.4–8.2)
RBC # BLD AUTO: 4.72 MILLION/UL (ref 3.81–5.12)
SODIUM SERPL-SCNC: 143 MMOL/L (ref 136–145)
TRIGL SERPL-MCNC: 109 MG/DL
TSH SERPL DL<=0.05 MIU/L-ACNC: 1.73 UIU/ML (ref 0.36–3.74)
WBC # BLD AUTO: 7.22 THOUSAND/UL (ref 4.31–10.16)

## 2021-01-30 PROCEDURE — 82043 UR ALBUMIN QUANTITATIVE: CPT | Performed by: FAMILY MEDICINE

## 2021-01-30 PROCEDURE — 85652 RBC SED RATE AUTOMATED: CPT

## 2021-01-30 PROCEDURE — 85025 COMPLETE CBC W/AUTO DIFF WBC: CPT

## 2021-01-30 PROCEDURE — 80053 COMPREHEN METABOLIC PANEL: CPT | Performed by: FAMILY MEDICINE

## 2021-01-30 PROCEDURE — 82570 ASSAY OF URINE CREATININE: CPT | Performed by: FAMILY MEDICINE

## 2021-01-30 PROCEDURE — 36415 COLL VENOUS BLD VENIPUNCTURE: CPT | Performed by: FAMILY MEDICINE

## 2021-01-30 PROCEDURE — 83036 HEMOGLOBIN GLYCOSYLATED A1C: CPT | Performed by: FAMILY MEDICINE

## 2021-01-30 PROCEDURE — 84443 ASSAY THYROID STIM HORMONE: CPT

## 2021-01-30 PROCEDURE — 80061 LIPID PANEL: CPT

## 2021-02-17 DIAGNOSIS — L30.0 NUMMULAR ECZEMA: ICD-10-CM

## 2021-03-21 DIAGNOSIS — F41.9 ANXIETY AND DEPRESSION: ICD-10-CM

## 2021-03-21 DIAGNOSIS — Z98.84 BARIATRIC SURGERY STATUS: ICD-10-CM

## 2021-03-21 DIAGNOSIS — F32.A ANXIETY AND DEPRESSION: ICD-10-CM

## 2021-03-21 DIAGNOSIS — E03.9 HYPOTHYROIDISM, UNSPECIFIED TYPE: ICD-10-CM

## 2021-03-22 RX ORDER — BUPROPION HYDROCHLORIDE 150 MG/1
TABLET, EXTENDED RELEASE ORAL
Qty: 180 TABLET | Refills: 0 | Status: SHIPPED | OUTPATIENT
Start: 2021-03-22 | End: 2021-06-24

## 2021-03-22 RX ORDER — OMEPRAZOLE 20 MG/1
CAPSULE, DELAYED RELEASE ORAL
Qty: 90 CAPSULE | Refills: 0 | Status: SHIPPED | OUTPATIENT
Start: 2021-03-22 | End: 2021-06-25

## 2021-03-22 RX ORDER — LEVOTHYROXINE SODIUM 0.05 MG/1
TABLET ORAL
Qty: 90 TABLET | Refills: 0 | Status: SHIPPED | OUTPATIENT
Start: 2021-03-22 | End: 2021-06-24

## 2021-03-24 DIAGNOSIS — F41.9 ANXIETY AND DEPRESSION: ICD-10-CM

## 2021-03-24 DIAGNOSIS — F51.01 PRIMARY INSOMNIA: ICD-10-CM

## 2021-03-24 DIAGNOSIS — F32.A ANXIETY AND DEPRESSION: ICD-10-CM

## 2021-03-24 RX ORDER — ALPRAZOLAM 0.5 MG/1
0.5 TABLET ORAL 2 TIMES DAILY PRN
Qty: 60 TABLET | Refills: 0 | Status: SHIPPED | OUTPATIENT
Start: 2021-03-24 | End: 2021-05-09 | Stop reason: SDUPTHER

## 2021-03-24 NOTE — TELEPHONE ENCOUNTER
Medication: Alprazolam 0 5 mg  PDMP 01/22/2021 1 01/22/2021 ALPRAZOLAM 0 5 MG TABLET 60 0 30 [facility]    Active agreement on file -No

## 2021-03-24 NOTE — TELEPHONE ENCOUNTER
Medication:  PDMP   01/22/2021  1  01/22/2021  ALPRAZOLAM 0 5 MG TABLET  60 0  30  [facility]        Active agreement on file -No Weight Change:  ,  5% loss past month, 11.2% in 5 months. · Ideal Body Wt: 160 lb (72.6 kg), % Ideal Body 59%  · BMI Classification: BMI <18.5 Underweight    Nutrition Interventions:   Continue current diet, Start ONS  Continued Inpatient Monitoring    Nutrition Evaluation:   · Evaluation: Goals set   · Goals: PO 50% or more meals and ONS, wt stable or gain.     · Monitoring: Meal Intake, Supplement Intake, Weight, Pertinent Labs      Electronically signed by Dre Monzon MS, RD, LD on 1/16/20 at 10:20 AM    Contact Number: [id number]

## 2021-04-01 DIAGNOSIS — E03.9 ACQUIRED HYPOTHYROIDISM: ICD-10-CM

## 2021-04-01 DIAGNOSIS — E11.69 DIABETES MELLITUS TYPE 2 IN OBESE (HCC): Primary | ICD-10-CM

## 2021-04-01 DIAGNOSIS — E66.9 DIABETES MELLITUS TYPE 2 IN OBESE (HCC): Primary | ICD-10-CM

## 2021-04-12 ENCOUNTER — APPOINTMENT (OUTPATIENT)
Dept: LAB | Facility: CLINIC | Age: 46
End: 2021-04-12
Payer: COMMERCIAL

## 2021-04-12 ENCOUNTER — TRANSCRIBE ORDERS (OUTPATIENT)
Dept: LAB | Facility: CLINIC | Age: 46
End: 2021-04-12

## 2021-04-12 DIAGNOSIS — E03.9 ACQUIRED HYPOTHYROIDISM: ICD-10-CM

## 2021-04-12 LAB
ALBUMIN SERPL BCP-MCNC: 3.5 G/DL (ref 3.5–5)
ALP SERPL-CCNC: 126 U/L (ref 46–116)
ALT SERPL W P-5'-P-CCNC: 75 U/L (ref 12–78)
ANION GAP SERPL CALCULATED.3IONS-SCNC: 10 MMOL/L (ref 4–13)
AST SERPL W P-5'-P-CCNC: 39 U/L (ref 5–45)
BILIRUB SERPL-MCNC: 0.46 MG/DL (ref 0.2–1)
BUN SERPL-MCNC: 8 MG/DL (ref 5–25)
CALCIUM SERPL-MCNC: 8.5 MG/DL (ref 8.3–10.1)
CHLORIDE SERPL-SCNC: 107 MMOL/L (ref 100–108)
CO2 SERPL-SCNC: 29 MMOL/L (ref 21–32)
CREAT SERPL-MCNC: 0.79 MG/DL (ref 0.6–1.3)
EST. AVERAGE GLUCOSE BLD GHB EST-MCNC: 111 MG/DL
GFR SERPL CREATININE-BSD FRML MDRD: 90 ML/MIN/1.73SQ M
GLUCOSE P FAST SERPL-MCNC: 74 MG/DL (ref 65–99)
HBA1C MFR BLD: 5.5 %
POTASSIUM SERPL-SCNC: 3.9 MMOL/L (ref 3.5–5.3)
PROT SERPL-MCNC: 7 G/DL (ref 6.4–8.2)
SODIUM SERPL-SCNC: 146 MMOL/L (ref 136–145)
TSH SERPL DL<=0.05 MIU/L-ACNC: 3.44 UIU/ML (ref 0.36–3.74)

## 2021-04-12 PROCEDURE — 80053 COMPREHEN METABOLIC PANEL: CPT | Performed by: FAMILY MEDICINE

## 2021-04-12 PROCEDURE — 84443 ASSAY THYROID STIM HORMONE: CPT

## 2021-04-12 PROCEDURE — 83036 HEMOGLOBIN GLYCOSYLATED A1C: CPT | Performed by: FAMILY MEDICINE

## 2021-04-12 PROCEDURE — 36415 COLL VENOUS BLD VENIPUNCTURE: CPT | Performed by: FAMILY MEDICINE

## 2021-04-15 ENCOUNTER — OFFICE VISIT (OUTPATIENT)
Dept: FAMILY MEDICINE CLINIC | Facility: CLINIC | Age: 46
End: 2021-04-15
Payer: COMMERCIAL

## 2021-04-15 VITALS
HEART RATE: 59 BPM | HEIGHT: 66 IN | WEIGHT: 195.8 LBS | BODY MASS INDEX: 31.47 KG/M2 | OXYGEN SATURATION: 97 % | SYSTOLIC BLOOD PRESSURE: 114 MMHG | RESPIRATION RATE: 16 BRPM | TEMPERATURE: 96.8 F | DIASTOLIC BLOOD PRESSURE: 72 MMHG

## 2021-04-15 DIAGNOSIS — E11.69 DIABETES MELLITUS TYPE 2 IN OBESE (HCC): ICD-10-CM

## 2021-04-15 DIAGNOSIS — F32.A ANXIETY AND DEPRESSION: ICD-10-CM

## 2021-04-15 DIAGNOSIS — I10 BENIGN HYPERTENSION: Primary | ICD-10-CM

## 2021-04-15 DIAGNOSIS — D68.62 LUPUS ANTICOAGULANT DISORDER (HCC): ICD-10-CM

## 2021-04-15 DIAGNOSIS — E03.9 ACQUIRED HYPOTHYROIDISM: ICD-10-CM

## 2021-04-15 DIAGNOSIS — F41.9 ANXIETY AND DEPRESSION: ICD-10-CM

## 2021-04-15 DIAGNOSIS — E66.9 DIABETES MELLITUS TYPE 2 IN OBESE (HCC): ICD-10-CM

## 2021-04-15 PROCEDURE — 99214 OFFICE O/P EST MOD 30 MIN: CPT | Performed by: FAMILY MEDICINE

## 2021-04-15 RX ORDER — SEMAGLUTIDE 1.34 MG/ML
INJECTION, SOLUTION SUBCUTANEOUS
Qty: 1 PEN | Refills: 0 | Status: SHIPPED | OUTPATIENT
Start: 2021-04-15 | End: 2021-06-13 | Stop reason: SDUPTHER

## 2021-04-15 NOTE — PROGRESS NOTES
Assessment/Plan:    No problem-specific Assessment & Plan notes found for this encounter  Diagnoses and all orders for this visit:    Benign hypertension  Comments:  stable  continue current meds     Lupus anticoagulant disorder [facility])  Comments:  care per dr Topher Kramer and depression  Comments:  stable on current meds    Diabetes mellitus type 2 in obese ([address] )  -     Semaglutide,0 25 or 0 5MG/DOS, (Ozempic, 0 25 or 0 5 MG/DOSE,) 2 MG/1 5ML SOPN; Take 0 25 mg weekly x 2 weeks, go up to 0 5 mg weekly  -     Comprehensive metabolic panel  -     Hemoglobin A1C    Acquired hypothyroidism  -     TSH, 3rd generation with Free T4 reflex; Future          Subjective:      Patient ID: Kolton Taylor is a 55 y o  female  Here for follow up  Retaining more water for 2 months     Diabetes  She presents for her follow-up diabetic visit  She has type 2 diabetes mellitus  Her disease course has been stable  There are no hypoglycemic associated symptoms  There are no diabetic associated symptoms  There are no hypoglycemic complications  Symptoms are stable  There are no diabetic complications  Risk factors for coronary artery disease include obesity  She is compliant with treatment all of the time  She is following a diabetic and generally healthy diet  She has not had a previous visit with a dietitian  She participates in exercise three times a week  There is no change in her home blood glucose trend  An ACE inhibitor/angiotensin II receptor blocker is being taken  She does not see a podiatrist Eye exam is current  The following portions of the patient's history were reviewed and updated as appropriate: allergies, current medications, past family history, past medical history, past social history, past surgical history and problem list     Review of Systems   Constitutional: Negative  HENT: Negative  Respiratory: Negative  Cardiovascular: Negative  Gastrointestinal: Negative      Genitourinary: Negative  Musculoskeletal: Negative  Allergic/Immunologic: Negative  Neurological: Negative  Hematological: Negative  Psychiatric/Behavioral: Negative  Objective:      /72 (BP Location: Left arm, Patient Position: Sitting, Cuff Size: Adult)   Pulse 59   Temp (!) 96 8 °F (36 °C) (Tympanic)   Resp 16   Ht 5' 5 83" (1 672 m)   Wt 88 8 kg (195 lb 12 8 oz)   LMP 11/18/2018   SpO2 97%   BMI 31 77 kg/m²          Physical Exam  Constitutional:       Appearance: Normal appearance  HENT:      Mouth/Throat:      Mouth: Mucous membranes are moist       Pharynx: No oropharyngeal exudate  Cardiovascular:      Rate and Rhythm: Normal rate and regular rhythm  Pulses: Normal pulses  Heart sounds: Normal heart sounds  Neurological:      General: No focal deficit present  Mental Status: She is alert and oriented to person, place, and time     Psychiatric:         Mood and Affect: Mood normal          Behavior: Behavior normal

## 2021-04-29 ENCOUNTER — IMMUNIZATIONS (OUTPATIENT)
Dept: FAMILY MEDICINE CLINIC | Facility: HOSPITAL | Age: 46
End: 2021-04-29

## 2021-04-29 DIAGNOSIS — Z23 ENCOUNTER FOR IMMUNIZATION: Primary | ICD-10-CM

## 2021-04-29 PROCEDURE — 0001A SARS-COV-2 / COVID-19 MRNA VACCINE (PFIZER-BIONTECH) 30 MCG: CPT

## 2021-04-29 PROCEDURE — 91300 SARS-COV-2 / COVID-19 MRNA VACCINE (PFIZER-BIONTECH) 30 MCG: CPT

## 2021-04-30 DIAGNOSIS — E66.9 DIABETES MELLITUS TYPE 2 IN OBESE (HCC): ICD-10-CM

## 2021-04-30 DIAGNOSIS — E11.69 DIABETES MELLITUS TYPE 2 IN OBESE (HCC): ICD-10-CM

## 2021-04-30 RX ORDER — ATORVASTATIN CALCIUM 10 MG/1
TABLET, FILM COATED ORAL
Qty: 90 TABLET | Refills: 0 | Status: SHIPPED | OUTPATIENT
Start: 2021-04-30 | End: 2021-09-15

## 2021-05-03 DIAGNOSIS — I10 ESSENTIAL HYPERTENSION: ICD-10-CM

## 2021-05-03 RX ORDER — LISINOPRIL 10 MG/1
TABLET ORAL
Qty: 90 TABLET | Refills: 0 | Status: SHIPPED | OUTPATIENT
Start: 2021-05-03 | End: 2021-12-03 | Stop reason: SDUPTHER

## 2021-05-09 DIAGNOSIS — F41.9 ANXIETY AND DEPRESSION: ICD-10-CM

## 2021-05-09 DIAGNOSIS — F32.A ANXIETY AND DEPRESSION: ICD-10-CM

## 2021-05-09 DIAGNOSIS — F51.01 PRIMARY INSOMNIA: ICD-10-CM

## 2021-05-10 RX ORDER — ALPRAZOLAM 0.5 MG/1
0.5 TABLET ORAL 2 TIMES DAILY PRN
Qty: 60 TABLET | Refills: 0 | Status: SHIPPED | OUTPATIENT
Start: 2021-05-10 | End: 2021-07-05 | Stop reason: SDUPTHER

## 2021-05-10 NOTE — TELEPHONE ENCOUNTER
Medication:  PDMP   03/24/2021  1  03/24/2021  ALPRAZOLAM 0 5 MG TABLET  60 0  30  [facility]        Active agreement on file -No

## 2021-05-13 ENCOUNTER — HOSPITAL ENCOUNTER (OUTPATIENT)
Dept: RADIOLOGY | Facility: MEDICAL CENTER | Age: 46
Discharge: HOME | End: 2021-05-13
Payer: COMMERCIAL

## 2021-05-13 VITALS — BODY MASS INDEX: 32.49 KG/M2 | WEIGHT: 195 LBS | HEIGHT: 65 IN

## 2021-05-13 DIAGNOSIS — Z12.31 VISIT FOR SCREENING MAMMOGRAM: ICD-10-CM

## 2021-05-13 PROCEDURE — 77063 BREAST TOMOSYNTHESIS BI: CPT

## 2021-05-13 PROCEDURE — 77067 SCR MAMMO BI INCL CAD: CPT

## 2021-05-20 ENCOUNTER — IMMUNIZATIONS (OUTPATIENT)
Dept: FAMILY MEDICINE CLINIC | Facility: HOSPITAL | Age: 46
End: 2021-05-20

## 2021-05-20 DIAGNOSIS — Z23 ENCOUNTER FOR IMMUNIZATION: Primary | ICD-10-CM

## 2021-05-20 PROCEDURE — 91300 SARS-COV-2 / COVID-19 MRNA VACCINE (PFIZER-BIONTECH) 30 MCG: CPT

## 2021-05-20 PROCEDURE — 0002A SARS-COV-2 / COVID-19 MRNA VACCINE (PFIZER-BIONTECH) 30 MCG: CPT

## 2021-06-11 ENCOUNTER — VBI (OUTPATIENT)
Dept: ADMINISTRATIVE | Facility: OTHER | Age: 46
End: 2021-06-11

## 2021-06-13 DIAGNOSIS — E66.9 DIABETES MELLITUS TYPE 2 IN OBESE (HCC): ICD-10-CM

## 2021-06-13 DIAGNOSIS — E11.69 DIABETES MELLITUS TYPE 2 IN OBESE (HCC): ICD-10-CM

## 2021-06-14 RX ORDER — SEMAGLUTIDE 1.34 MG/ML
INJECTION, SOLUTION SUBCUTANEOUS
Qty: 1.5 ML | Refills: 0 | Status: SHIPPED | OUTPATIENT
Start: 2021-06-14 | End: 2021-07-15 | Stop reason: ALTCHOICE

## 2021-06-24 DIAGNOSIS — F32.A ANXIETY AND DEPRESSION: ICD-10-CM

## 2021-06-24 DIAGNOSIS — E03.9 HYPOTHYROIDISM, UNSPECIFIED TYPE: ICD-10-CM

## 2021-06-24 DIAGNOSIS — F41.9 ANXIETY AND DEPRESSION: ICD-10-CM

## 2021-06-24 DIAGNOSIS — Z98.84 BARIATRIC SURGERY STATUS: ICD-10-CM

## 2021-06-24 RX ORDER — BUPROPION HYDROCHLORIDE 150 MG/1
TABLET, EXTENDED RELEASE ORAL
Qty: 180 TABLET | Refills: 0 | Status: SHIPPED | OUTPATIENT
Start: 2021-06-24 | End: 2021-11-16 | Stop reason: SDUPTHER

## 2021-06-24 RX ORDER — LEVOTHYROXINE SODIUM 50 UG/1
TABLET ORAL
Qty: 90 TABLET | Refills: 0 | Status: SHIPPED | OUTPATIENT
Start: 2021-06-24 | End: 2021-10-01 | Stop reason: SDUPTHER

## 2021-06-25 RX ORDER — OMEPRAZOLE 20 MG/1
CAPSULE, DELAYED RELEASE ORAL
Qty: 90 CAPSULE | Refills: 0 | Status: SHIPPED | OUTPATIENT
Start: 2021-06-25 | End: 2021-10-01 | Stop reason: SDUPTHER

## 2021-07-05 DIAGNOSIS — F51.01 PRIMARY INSOMNIA: ICD-10-CM

## 2021-07-05 DIAGNOSIS — F32.A ANXIETY AND DEPRESSION: ICD-10-CM

## 2021-07-05 DIAGNOSIS — F41.9 ANXIETY AND DEPRESSION: ICD-10-CM

## 2021-07-06 RX ORDER — ALPRAZOLAM 0.5 MG/1
0.5 TABLET ORAL 2 TIMES DAILY PRN
Qty: 60 TABLET | Refills: 0 | Status: SHIPPED | OUTPATIENT
Start: 2021-07-06 | End: 2021-08-26 | Stop reason: SDUPTHER

## 2021-07-06 NOTE — TELEPHONE ENCOUNTER
Medication:  PDMP   05/10/2021  1  05/10/2021  ALPRAZOLAM 0 5 MG TABLET  60 0  30  [facility]     Active agreement on file -No

## 2021-07-09 ENCOUNTER — RA CDI HCC (OUTPATIENT)
Dept: OTHER | Facility: HOSPITAL | Age: 46
End: 2021-07-09

## 2021-07-09 NOTE — PROGRESS NOTES
[facility]  coding opportunities          Chart reviewed, no opportunity found: CHART REVIEWED, NO OPPORTUNITY FOUND                     Patients insurance company: [employer] (Medicare Advantage and Commercial)

## 2021-07-10 ENCOUNTER — APPOINTMENT (OUTPATIENT)
Dept: LAB | Facility: MEDICAL CENTER | Age: 46
End: 2021-07-10
Payer: COMMERCIAL

## 2021-07-10 DIAGNOSIS — E03.9 ACQUIRED HYPOTHYROIDISM: ICD-10-CM

## 2021-07-10 LAB
ALBUMIN SERPL BCP-MCNC: 3.3 G/DL (ref 3.5–5)
ALP SERPL-CCNC: 95 U/L (ref 46–116)
ALT SERPL W P-5'-P-CCNC: 38 U/L (ref 12–78)
ANION GAP SERPL CALCULATED.3IONS-SCNC: 2 MMOL/L (ref 4–13)
AST SERPL W P-5'-P-CCNC: 19 U/L (ref 5–45)
BILIRUB SERPL-MCNC: 0.33 MG/DL (ref 0.2–1)
BUN SERPL-MCNC: 7 MG/DL (ref 5–25)
CALCIUM ALBUM COR SERPL-MCNC: 10 MG/DL (ref 8.3–10.1)
CALCIUM SERPL-MCNC: 9.4 MG/DL (ref 8.3–10.1)
CHLORIDE SERPL-SCNC: 108 MMOL/L (ref 100–108)
CO2 SERPL-SCNC: 30 MMOL/L (ref 21–32)
CREAT SERPL-MCNC: 0.67 MG/DL (ref 0.6–1.3)
EST. AVERAGE GLUCOSE BLD GHB EST-MCNC: 108 MG/DL
GFR SERPL CREATININE-BSD FRML MDRD: 106 ML/MIN/1.73SQ M
GLUCOSE P FAST SERPL-MCNC: 84 MG/DL (ref 65–99)
HBA1C MFR BLD: 5.4 %
POTASSIUM SERPL-SCNC: 4.1 MMOL/L (ref 3.5–5.3)
PROT SERPL-MCNC: 6.9 G/DL (ref 6.4–8.2)
SODIUM SERPL-SCNC: 140 MMOL/L (ref 136–145)
TSH SERPL DL<=0.05 MIU/L-ACNC: 0.98 UIU/ML (ref 0.36–3.74)

## 2021-07-10 PROCEDURE — 36415 COLL VENOUS BLD VENIPUNCTURE: CPT | Performed by: FAMILY MEDICINE

## 2021-07-10 PROCEDURE — 83036 HEMOGLOBIN GLYCOSYLATED A1C: CPT | Performed by: FAMILY MEDICINE

## 2021-07-10 PROCEDURE — 84443 ASSAY THYROID STIM HORMONE: CPT

## 2021-07-10 PROCEDURE — 80053 COMPREHEN METABOLIC PANEL: CPT | Performed by: FAMILY MEDICINE

## 2021-07-15 ENCOUNTER — OFFICE VISIT (OUTPATIENT)
Dept: FAMILY MEDICINE CLINIC | Facility: CLINIC | Age: 46
End: 2021-07-15
Payer: COMMERCIAL

## 2021-07-15 VITALS
DIASTOLIC BLOOD PRESSURE: 72 MMHG | HEART RATE: 65 BPM | TEMPERATURE: 96.6 F | SYSTOLIC BLOOD PRESSURE: 108 MMHG | BODY MASS INDEX: 33.36 KG/M2 | WEIGHT: 200.2 LBS | HEIGHT: 65 IN | OXYGEN SATURATION: 98 % | RESPIRATION RATE: 16 BRPM

## 2021-07-15 DIAGNOSIS — Z23 NEED FOR PNEUMOCOCCAL VACCINE: ICD-10-CM

## 2021-07-15 DIAGNOSIS — E03.9 ACQUIRED HYPOTHYROIDISM: ICD-10-CM

## 2021-07-15 DIAGNOSIS — E66.09 CLASS 1 OBESITY DUE TO EXCESS CALORIES WITH SERIOUS COMORBIDITY AND BODY MASS INDEX (BMI) OF 33.0 TO 33.9 IN ADULT: ICD-10-CM

## 2021-07-15 DIAGNOSIS — E11.69 DIABETES MELLITUS TYPE 2 IN OBESE (HCC): ICD-10-CM

## 2021-07-15 DIAGNOSIS — Z00.00 ANNUAL PHYSICAL EXAM: Primary | ICD-10-CM

## 2021-07-15 DIAGNOSIS — I10 BENIGN HYPERTENSION: ICD-10-CM

## 2021-07-15 DIAGNOSIS — E66.9 DIABETES MELLITUS TYPE 2 IN OBESE (HCC): ICD-10-CM

## 2021-07-15 PROCEDURE — 90732 PPSV23 VACC 2 YRS+ SUBQ/IM: CPT

## 2021-07-15 PROCEDURE — 99396 PREV VISIT EST AGE 40-64: CPT | Performed by: FAMILY MEDICINE

## 2021-07-15 PROCEDURE — 90471 IMMUNIZATION ADMIN: CPT

## 2021-07-15 NOTE — PATIENT INSTRUCTIONS

## 2021-07-15 NOTE — PROGRESS NOTES
[address]    NAME: Roger Castellon  AGE: 55 y o  SEX: female  : 1975     DATE: 7/15/2021     Assessment and Plan:     Problem List Items Addressed This Visit        Endocrine    Hypothyroidism    Relevant Orders    TSH, 3rd generation with Free T4 reflex    Diabetes mellitus type 2 in obese ([address] )    Relevant Orders    Comprehensive metabolic panel    Lipid Panel with Direct LDL reflex    CBC and differential    Hemoglobin A1C       Cardiovascular and Mediastinum    Benign hypertension       Other    Class 1 obesity due to excess calories with serious comorbidity in adult      Other Visit Diagnoses     Annual physical exam    -  Primary    Need for pneumococcal vaccine        Relevant Orders    PNEUMOCOCCAL POLYSACCHARIDE VACCINE 23-VALENT =>3YO SQ IM          Immunizations and preventive care screenings were discussed with patient today  Appropriate education was printed on patient's after visit summary  Counseling:  Alcohol/drug use: discussed moderation in alcohol intake, the recommendations for healthy alcohol use, and avoidance of illicit drug use  Dental Health: discussed importance of regular tooth brushing, flossing, and dental visits  Injury prevention: discussed safety/seat belts, safety helmets, smoke detectors, carbon dioxide detectors, and smoking near bedding or upholstery  Sexual health: discussed sexually transmitted diseases, partner selection, use of condoms, avoidance of unintended pregnancy, and contraceptive alternatives  · Exercise: the importance of regular exercise/physical activity was discussed  Recommend exercise 3-5 times per week for at least 30 minutes  No follow-ups on file       Chief Complaint:     Chief Complaint   Patient presents with    Physical Exam     Patient is here today for an annual exam       History of Present Illness:     Adult Annual Physical   Patient here for a comprehensive physical exam  The patient reports no problems  Diet and Physical Activity  · Diet/Nutrition: well balanced diet and consuming 3-5 servings of fruits/vegetables daily  · Exercise: walking  Depression Screening  PHQ-9 Depression Screening    PHQ-9:   Frequency of the following problems over the past two weeks:           General Health  · Sleep: sleeps well and gets 7-8 hours of sleep on average  · Hearing: normal - bilateral   · Vision: goes for regular eye exams  · Dental: regular dental visits and brushes teeth twice daily  /GYN Health  · Patient is: premenopausal  · Last menstrual period: Oct 2017  · Contraceptive method: none  Review of Systems:     Review of Systems   Constitutional: Negative  HENT: Negative  Eyes: Negative  Respiratory: Negative  Cardiovascular: Negative  Gastrointestinal: Negative  Endocrine: Negative  Genitourinary: Negative  Musculoskeletal: Negative  Skin: Negative  Allergic/Immunologic: Negative  Neurological: Negative  Hematological: Negative  Psychiatric/Behavioral: Negative         Past Medical History:     Past Medical History:   Diagnosis Date    Anxiety     Arthritis     Bariatric surgery status     CPAP (continuous positive airway pressure) dependence     Depression     Diabetes mellitus (HCC)     Eczema     Fibromyalgia, primary     Hyperlipidemia     Hypertension     Hypothyroidism     Lupus anticoagulant disorder (HCC)     Migraine     Night sweats     Obesity     Postsurgical malabsorption     Psoriasis     Sleep apnea     Status post laparoscopic hysterectomy 11/20/2017      Past Surgical History:     Past Surgical History:   Procedure Laterality Date    COLONOSCOPY      HYSTERECTOMY Bilateral 11/17/2018    OOPHORECTOMY Bilateral 11/17/2018    LA COLONOSCOPY FLX DX W/COLLJ SPEC WHEN PFRMD N/A 6/13/2017    Procedure: COLONOSCOPY;  Surgeon: Ryder Hwang DO;  [facility];  Service: Gastroenterology    NE EGD TRANSORAL BIOPSY SINGLE/MULTIPLE N/A 2019    Procedure: ESOPHAGOGASTRODUODENOSCOPY (EGD) with bx;  Surgeon: Darshan Rosas MD;  [facility]; Service: Bariatrics    NE LAP GASTRIC BYPASS/LEESA-EN-Y N/A 3/25/2019    Procedure: LAPAROSCOPIC LEESA-EN-Y GASTRIC BYPASS AND INTRAOPERATIVE EGD;  Surgeon: Darshan Rosas MD;  [facility];  Service: Hermelindo Potts <=250 GRAM  W TUBE/OVARY N/A 2017    Procedure: ROBOTIC TOTAL LAPAROSCOPIC HYSTERECTOMY/ BSO;  Surgeon: Keeley Breaux MD;  [facility];  Service: Gynecology Oncology    WISDOM TOOTH EXTRACTION        Social History:     Social History     Socioeconomic History    Marital status: /Civil Union     Spouse name: None    Number of children: None    Years of education: None    Highest education level: None   Occupational History    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     Employer: [employer]   Tobacco Use    Smoking status: Former Smoker     Packs/day: 0 75     Years: 15 00     Pack years: 11 25     Quit date: 2018     Years since quittin 6    Smokeless tobacco: Never Used   Substance and Sexual Activity    Alcohol use: Not Currently    Drug use: No    Sexual activity: Yes     Partners: Male   Other Topics Concern    None   Social History Narrative    None     Social Determinants of Health     Financial Resource Strain:     Difficulty of Paying Living Expenses:    Food Insecurity:     Worried About Running Out of Food in the Last Year:     Ran Out of Food in the Last Year:    Transportation Needs:     Lack of Transportation (Medical):      Lack of Transportation (Non-Medical):    Physical Activity:     Days of Exercise per Week:     Minutes of Exercise per Session:    Stress:     Feeling of Stress :    Social Connections:     Frequency of Communication with Friends and Family:     Frequency of Social Gatherings with Friends and Family:     Attends Advent Services:     Active Member of Clubs or Organizations:     Attends Club or Organization Meetings:     Marital Status:    Intimate Partner Violence:     Fear of Current or Ex-Partner:     Emotionally Abused:     Physically Abused:     Sexually Abused:       Family History:     Family History   Problem Relation Age of Onset    Heart disease Mother         Cardiac disorder    Diabetes Mother     Hypertension Mother     Hyperthyroidism Mother     Diabetes Father     Hypertension Father     Lung cancer Maternal Grandfather     Colon cancer Cousin 79    Colon cancer Maternal Aunt 52    Lung cancer Family     Diabetes Sister     No Known Problems Brother     No Known Problems Maternal Grandmother     No Known Problems Paternal Grandmother     No Known Problems Paternal Grandfather     No Known Problems Sister       Current Medications:     Current Outpatient Medications   Medication Sig Dispense Refill    acetaminophen (TYLENOL) 500 mg tablet Take 500 mg by mouth every 6 (six) hours as needed for mild pain      ALPRAZolam (XANAX) 0 5 mg tablet Take 1 tablet (0 5 mg total) by mouth 2 (two) times a day as needed for anxiety 60 tablet 0    aspirin 81 mg chewable tablet Chew 81 mg Every other 2 days      atorvastatin (LIPITOR) 10 mg tablet Take 1 tablet by mouth once daily 90 tablet 0    BIOTIN PO Take 600 mg by mouth every other day       buPROPion (WELLBUTRIN SR) 150 mg 12 hr tablet Take 1 tablet by mouth twice daily 180 tablet 0    Calcium Citrate-Vitamin D (MINE-CITRATE PLUS VITAMIN D PO) Take by mouth      Cholecalciferol (VITAMIN D3) 2000 units capsule Take 2,000 Units by mouth daily       Cyanocobalamin (B-12) 1000 MCG CAPS Take 1 capsule by mouth daily       DULoxetine (CYMBALTA) 60 mg delayed release capsule       Euthyrox 50 MCG tablet Take 1 tablet by mouth once daily 90 tablet 0    gabapentin (NEURONTIN) 300 mg capsule 3 (three) times a day  3    lisinopril (ZESTRIL) 10 mg tablet 5 mg every other day 90 tablet 0    Multiple Vitamin (MULTIVITAMIN) tablet Take 1 tablet by mouth daily      nystatin (MYCOSTATIN) cream Apply topically 1-2 times a day as needed to abdomen rash 60 g 6    omeprazole (PriLOSEC) 20 mg delayed release capsule Take 1 capsule by mouth once daily 90 capsule 0    triamcinolone (KENALOG) 0 1 % ointment APPLY TOPICALLY TO AFFECTED AREA 1-2 TIMES A DAY AS NEEDED TO ITCHY RASH 80 g 0     No current facility-administered medications for this visit  Allergies: Allergies   Allergen Reactions    Metformin Diarrhea      Physical Exam:     /72 (BP Location: Left arm, Patient Position: Sitting, Cuff Size: Large)   Pulse 65   Temp (!) 96 6 °F (35 9 °C) (Tympanic)   Resp 16   Ht 5' 5" (1 651 m)   Wt 90 8 kg (200 lb 3 2 oz)   LMP 11/18/2018   SpO2 98%   BMI 33 32 kg/m²     Physical Exam  Constitutional:       Appearance: She is well-developed  HENT:      Head: Normocephalic and atraumatic  Right Ear: Tympanic membrane normal       Left Ear: Tympanic membrane normal       Nose: Nose normal       Mouth/Throat:      Mouth: Mucous membranes are moist    Eyes:      Pupils: Pupils are equal, round, and reactive to light  Cardiovascular:      Rate and Rhythm: Normal rate and regular rhythm  Pulses: no weak pulses          Dorsalis pedis pulses are 2+ on the right side and 2+ on the left side  Posterior tibial pulses are 2+ on the right side and 2+ on the left side  Pulmonary:      Effort: Pulmonary effort is normal  No respiratory distress  Breath sounds: Normal breath sounds  No wheezing  Abdominal:      General: Bowel sounds are normal       Palpations: Abdomen is soft  Musculoskeletal:         General: No deformity  Normal range of motion  Cervical back: Normal range of motion and neck supple     Feet:      Right foot:      Skin integrity: No ulcer, skin breakdown, erythema, warmth, callus or dry skin  Left foot:      Skin integrity: No ulcer, skin breakdown, erythema, warmth, callus or dry skin  Skin:     General: Skin is warm  Neurological:      General: No focal deficit present  Mental Status: She is alert and oriented to person, place, and time  Psychiatric:         Mood and Affect: Mood normal          Behavior: Behavior normal       Patient's shoes and socks removed  Right Foot/Ankle   Right Foot Inspection  Skin Exam: skin normal and skin intact no dry skin, no warmth, no callus, no erythema, no maceration, no abnormal color, no pre-ulcer, no ulcer and no callus                          Toe Exam: ROM and strength within normal limits  Sensory   Vibration: intact  Proprioception: intact   Monofilament testing: intact  Vascular  Capillary refills: < 3 seconds  The right DP pulse is 2+  The right PT pulse is 2+  Left Foot/Ankle  Left Foot Inspection  Skin Exam: skin normal and skin intactno dry skin, no warmth, no erythema, no maceration, normal color, no pre-ulcer, no ulcer and no callus                         Toe Exam: ROM and strength within normal limitsno swelling and no erythema                   Sensory   Vibration: intact  Proprioception: intact  Monofilament: intact  Vascular  Capillary refills: < 3 seconds  The left DP pulse is 2+  The left PT pulse is 2+  Assign Risk Category:  No deformity present; No loss of protective sensation;  No weak pulses       Risk: 0      Dena Toledo MD  [address] [facility] Statement Selected

## 2021-08-06 ENCOUNTER — TELEPHONE (OUTPATIENT)
Dept: OTHER | Facility: OTHER | Age: 46
End: 2021-08-06

## 2021-08-06 NOTE — TELEPHONE ENCOUNTER
Patient called and cancel her appointment because she as to work and will call back the office to reschedule her appointment

## 2021-08-26 DIAGNOSIS — F41.9 ANXIETY AND DEPRESSION: ICD-10-CM

## 2021-08-26 DIAGNOSIS — F32.A ANXIETY AND DEPRESSION: ICD-10-CM

## 2021-08-26 DIAGNOSIS — F51.01 PRIMARY INSOMNIA: ICD-10-CM

## 2021-08-26 RX ORDER — ALPRAZOLAM 0.5 MG/1
0.5 TABLET ORAL 2 TIMES DAILY PRN
Qty: 60 TABLET | Refills: 0 | Status: SHIPPED | OUTPATIENT
Start: 2021-08-26 | End: 2021-10-18 | Stop reason: SDUPTHER

## 2021-08-26 NOTE — TELEPHONE ENCOUNTER
Medication:  PDMP   07/06/2021  1  07/06/2021  ALPRAZOLAM 0 5 MG TABLET  60 0  30  TI [facility]     Active agreement on file -No

## 2021-08-30 ENCOUNTER — VBI (OUTPATIENT)
Dept: ADMINISTRATIVE | Facility: OTHER | Age: 46
End: 2021-08-30

## 2021-09-15 DIAGNOSIS — E66.9 DIABETES MELLITUS TYPE 2 IN OBESE (HCC): ICD-10-CM

## 2021-09-15 DIAGNOSIS — E11.69 DIABETES MELLITUS TYPE 2 IN OBESE (HCC): ICD-10-CM

## 2021-09-15 RX ORDER — ATORVASTATIN CALCIUM 10 MG/1
TABLET, FILM COATED ORAL
Qty: 90 TABLET | Refills: 0 | Status: SHIPPED | OUTPATIENT
Start: 2021-09-15 | End: 2022-02-08 | Stop reason: SDUPTHER

## 2021-10-01 DIAGNOSIS — Z98.84 BARIATRIC SURGERY STATUS: ICD-10-CM

## 2021-10-01 DIAGNOSIS — E03.9 HYPOTHYROIDISM, UNSPECIFIED TYPE: ICD-10-CM

## 2021-10-01 RX ORDER — OMEPRAZOLE 20 MG/1
20 CAPSULE, DELAYED RELEASE ORAL DAILY
Qty: 30 CAPSULE | Refills: 0 | Status: SHIPPED | OUTPATIENT
Start: 2021-10-01 | End: 2022-02-04

## 2021-10-01 RX ORDER — LEVOTHYROXINE SODIUM 0.05 MG/1
50 TABLET ORAL DAILY
Qty: 90 TABLET | Refills: 0 | Status: SHIPPED | OUTPATIENT
Start: 2021-10-01 | End: 2022-01-11 | Stop reason: SDUPTHER

## 2021-10-08 ENCOUNTER — RA CDI HCC (OUTPATIENT)
Dept: OTHER | Facility: HOSPITAL | Age: 46
End: 2021-10-08

## 2021-10-18 ENCOUNTER — OFFICE VISIT (OUTPATIENT)
Dept: FAMILY MEDICINE CLINIC | Facility: CLINIC | Age: 46
End: 2021-10-18
Payer: COMMERCIAL

## 2021-10-18 VITALS
SYSTOLIC BLOOD PRESSURE: 110 MMHG | RESPIRATION RATE: 16 BRPM | WEIGHT: 216 LBS | BODY MASS INDEX: 35.99 KG/M2 | TEMPERATURE: 97 F | DIASTOLIC BLOOD PRESSURE: 72 MMHG | HEIGHT: 65 IN | OXYGEN SATURATION: 97 % | HEART RATE: 69 BPM

## 2021-10-18 DIAGNOSIS — E66.09 CLASS 1 OBESITY DUE TO EXCESS CALORIES WITH SERIOUS COMORBIDITY AND BODY MASS INDEX (BMI) OF 34.0 TO 34.9 IN ADULT: ICD-10-CM

## 2021-10-18 DIAGNOSIS — D68.62 LUPUS ANTICOAGULANT DISORDER (HCC): ICD-10-CM

## 2021-10-18 DIAGNOSIS — E03.9 ACQUIRED HYPOTHYROIDISM: Primary | ICD-10-CM

## 2021-10-18 DIAGNOSIS — F51.01 PRIMARY INSOMNIA: ICD-10-CM

## 2021-10-18 DIAGNOSIS — F32.A ANXIETY AND DEPRESSION: ICD-10-CM

## 2021-10-18 DIAGNOSIS — K91.2 POSTSURGICAL MALABSORPTION: Chronic | ICD-10-CM

## 2021-10-18 DIAGNOSIS — F41.9 ANXIETY AND DEPRESSION: ICD-10-CM

## 2021-10-18 DIAGNOSIS — E78.49 OTHER HYPERLIPIDEMIA: ICD-10-CM

## 2021-10-18 DIAGNOSIS — E66.9 DIABETES MELLITUS TYPE 2 IN OBESE (HCC): ICD-10-CM

## 2021-10-18 DIAGNOSIS — E11.69 DIABETES MELLITUS TYPE 2 IN OBESE (HCC): ICD-10-CM

## 2021-10-18 DIAGNOSIS — I10 BENIGN HYPERTENSION: ICD-10-CM

## 2021-10-18 PROBLEM — Z48.815 ENCOUNTER FOR SURGICAL AFTERCARE FOLLOWING SURGERY OF DIGESTIVE SYSTEM: Status: RESOLVED | Noted: 2021-01-04 | Resolved: 2021-10-18

## 2021-10-18 PROBLEM — M21.379 FOOT DROP: Status: RESOLVED | Noted: 2020-05-12 | Resolved: 2021-10-18

## 2021-10-18 PROCEDURE — 99214 OFFICE O/P EST MOD 30 MIN: CPT | Performed by: FAMILY MEDICINE

## 2021-10-19 RX ORDER — ALPRAZOLAM 0.5 MG/1
0.5 TABLET ORAL 2 TIMES DAILY PRN
Qty: 60 TABLET | Refills: 0 | Status: SHIPPED | OUTPATIENT
Start: 2021-10-19 | End: 2021-12-03 | Stop reason: SDUPTHER

## 2021-10-27 ENCOUNTER — OFFICE VISIT (OUTPATIENT)
Dept: BARIATRICS | Facility: CLINIC | Age: 46
End: 2021-10-27
Payer: COMMERCIAL

## 2021-10-27 VITALS
HEART RATE: 66 BPM | BODY MASS INDEX: 35.32 KG/M2 | SYSTOLIC BLOOD PRESSURE: 108 MMHG | HEIGHT: 65 IN | TEMPERATURE: 97.1 F | DIASTOLIC BLOOD PRESSURE: 74 MMHG | WEIGHT: 212 LBS

## 2021-10-27 DIAGNOSIS — E03.9 HYPOTHYROID: ICD-10-CM

## 2021-10-27 DIAGNOSIS — Z98.84 BARIATRIC SURGERY STATUS: ICD-10-CM

## 2021-10-27 DIAGNOSIS — E11.69 DIABETES MELLITUS TYPE 2 IN OBESE (HCC): ICD-10-CM

## 2021-10-27 DIAGNOSIS — F41.9 ANXIETY AND DEPRESSION: ICD-10-CM

## 2021-10-27 DIAGNOSIS — E66.9 OBESITY, CLASS II, BMI 35-39.9: ICD-10-CM

## 2021-10-27 DIAGNOSIS — E03.9 ACQUIRED HYPOTHYROIDISM: ICD-10-CM

## 2021-10-27 DIAGNOSIS — Z48.815 ENCOUNTER FOR SURGICAL AFTERCARE FOLLOWING SURGERY OF DIGESTIVE SYSTEM: Primary | ICD-10-CM

## 2021-10-27 DIAGNOSIS — F32.A ANXIETY AND DEPRESSION: ICD-10-CM

## 2021-10-27 DIAGNOSIS — I10 BENIGN HYPERTENSION: ICD-10-CM

## 2021-10-27 DIAGNOSIS — D68.62 LUPUS ANTICOAGULANT DISORDER (HCC): ICD-10-CM

## 2021-10-27 DIAGNOSIS — K91.2 POSTSURGICAL MALABSORPTION: ICD-10-CM

## 2021-10-27 DIAGNOSIS — E66.9 DIABETES MELLITUS TYPE 2 IN OBESE (HCC): ICD-10-CM

## 2021-10-27 PROBLEM — M15.0 PRIMARY GENERALIZED (OSTEO)ARTHRITIS: Status: ACTIVE | Noted: 2017-11-20

## 2021-10-27 PROCEDURE — 3008F BODY MASS INDEX DOCD: CPT | Performed by: FAMILY MEDICINE

## 2021-10-27 PROCEDURE — 99214 OFFICE O/P EST MOD 30 MIN: CPT | Performed by: PHYSICIAN ASSISTANT

## 2021-11-09 ENCOUNTER — VBI (OUTPATIENT)
Dept: ADMINISTRATIVE | Facility: OTHER | Age: 46
End: 2021-11-09

## 2021-11-16 DIAGNOSIS — F41.9 ANXIETY AND DEPRESSION: ICD-10-CM

## 2021-11-16 DIAGNOSIS — F32.A ANXIETY AND DEPRESSION: ICD-10-CM

## 2021-11-16 RX ORDER — BUPROPION HYDROCHLORIDE 150 MG/1
150 TABLET, EXTENDED RELEASE ORAL 2 TIMES DAILY
Qty: 180 TABLET | Refills: 0 | Status: SHIPPED | OUTPATIENT
Start: 2021-11-16 | End: 2022-03-09

## 2021-12-03 DIAGNOSIS — I10 ESSENTIAL HYPERTENSION: ICD-10-CM

## 2021-12-03 DIAGNOSIS — F32.A ANXIETY AND DEPRESSION: ICD-10-CM

## 2021-12-03 DIAGNOSIS — F41.9 ANXIETY AND DEPRESSION: ICD-10-CM

## 2021-12-03 DIAGNOSIS — F51.01 PRIMARY INSOMNIA: ICD-10-CM

## 2021-12-03 RX ORDER — LISINOPRIL 10 MG/1
TABLET ORAL
Qty: 90 TABLET | Refills: 0 | Status: SHIPPED | OUTPATIENT
Start: 2021-12-03 | End: 2022-01-11 | Stop reason: SDUPTHER

## 2021-12-03 RX ORDER — ALPRAZOLAM 0.5 MG/1
0.5 TABLET ORAL 2 TIMES DAILY PRN
Qty: 60 TABLET | Refills: 0 | Status: SHIPPED | OUTPATIENT
Start: 2021-12-03 | End: 2022-01-24 | Stop reason: SDUPTHER

## 2022-01-11 DIAGNOSIS — E03.9 HYPOTHYROIDISM, UNSPECIFIED TYPE: ICD-10-CM

## 2022-01-11 DIAGNOSIS — I10 ESSENTIAL HYPERTENSION: ICD-10-CM

## 2022-01-11 RX ORDER — LEVOTHYROXINE SODIUM 0.05 MG/1
50 TABLET ORAL DAILY
Qty: 90 TABLET | Refills: 0 | Status: SHIPPED | OUTPATIENT
Start: 2022-01-11 | End: 2022-04-09

## 2022-01-11 RX ORDER — LISINOPRIL 10 MG/1
TABLET ORAL
Qty: 90 TABLET | Refills: 0 | Status: SHIPPED | OUTPATIENT
Start: 2022-01-11

## 2022-01-12 ENCOUNTER — VBI (OUTPATIENT)
Dept: ADMINISTRATIVE | Facility: OTHER | Age: 47
End: 2022-01-12

## 2022-01-24 DIAGNOSIS — F41.9 ANXIETY AND DEPRESSION: ICD-10-CM

## 2022-01-24 DIAGNOSIS — F32.A ANXIETY AND DEPRESSION: ICD-10-CM

## 2022-01-24 DIAGNOSIS — F51.01 PRIMARY INSOMNIA: ICD-10-CM

## 2022-01-24 DIAGNOSIS — E11.9 TYPE 2 DIABETES MELLITUS WITHOUT COMPLICATION, WITHOUT LONG-TERM CURRENT USE OF INSULIN (HCC): ICD-10-CM

## 2022-01-24 RX ORDER — ALPRAZOLAM 0.5 MG/1
0.5 TABLET ORAL 2 TIMES DAILY PRN
Qty: 60 TABLET | Refills: 0 | Status: SHIPPED | OUTPATIENT
Start: 2022-01-24 | End: 2022-03-04 | Stop reason: SDUPTHER

## 2022-01-24 RX ORDER — LIRAGLUTIDE 6 MG/ML
1.8 INJECTION SUBCUTANEOUS DAILY
Qty: 27 ML | Refills: 0 | Status: SHIPPED | OUTPATIENT
Start: 2022-01-24 | End: 2022-04-14 | Stop reason: SDUPTHER

## 2022-01-24 NOTE — TELEPHONE ENCOUNTER
Medication:  PDMP   12/03/2021  1  12/03/2021  ALPRAZOLAM 0 5 MG TABLET  60 0  30  [facility]     Active agreement on file -No

## 2022-01-28 ENCOUNTER — RA CDI HCC (OUTPATIENT)
Dept: OTHER | Facility: HOSPITAL | Age: 47
End: 2022-01-28

## 2022-01-28 NOTE — PROGRESS NOTES
[address]  coding opportunities          Number of diagnosis code(s) already on the problem list added to FY fla     Chart Reviewed * (Number of) Inbasket suggestions sent to Provider: 1     Problem listed updated  Provider Accepted, (number of) suggestions accepted: 1         Number of suggestions used: 2      Number of suggestions NOT actually used: 1     Patients insurance company: [employer] (Medicare Advantage and Commercial)     Visit status: Patient arrived for their scheduled appointment        [address]  coding opportunities          Number of diagnosis code(s) already on the problem list added to FY fla     Chart Reviewed * (Number of) Inbasket suggestions sent to Provider: 1     Problem listed updated  Provider Accepted, (number of) suggestions accepted: 1               Patients insurance company: [employer] (Medicare Advantage and Commercial)           [address]  coding opportunities          Number of diagnosis code(s) already on the problem list added to [employer] fla     Chart Reviewed * (Number of) Inbasket suggestions sent to Provider: 1                  Patients insurance company: [employer] (Medicare Advantage and Commercial)               Appt on   Refer to 10/27/21 when last assess - review and assess for 1167 if applicable     1) H72 20: Diabetes mellitus type 2 in obese ([facility] [address] )    2) D68 62: Lupus anticoagulant disorder ([facility] [address] )  -----------------------------------------  3) Depression: - Can Depression be further classified using more specific code from any of the following ? F33 0  Major depressive disorder, recurrent, mild (HCC) OR   F33 1: Major depressive disorder, recurrent, moderate (HCC)  OR  F33 2  Major depressive disorder, recurrent, severe without psychotic features ([facility] [address] ) OR  F33 3: Major depressive disorder, recurrent, severe, with psychotic symptoms ([facility] [address] ) OR   F33 40: Major depressive disorder, recurrent, in remission, unspecified ([facility] [address] ) OR  F33 41:  Major depressive disorder, recurrent, in partial remission ([place] [address] ) OR  F33 42   Major depressive disorder, recurrent, in full remission ([place] [address] ) OR  F33 8   Other recurrent depressive disorders ([place] [address] ) OR  F33 9   Major depressive disorder, recurrent, unspecified ([place] [address] )  ---------------------------------  Provider accepted but did not select a specific code yet

## 2022-02-04 ENCOUNTER — OFFICE VISIT (OUTPATIENT)
Dept: FAMILY MEDICINE CLINIC | Facility: CLINIC | Age: 47
End: 2022-02-04
Payer: COMMERCIAL

## 2022-02-04 VITALS
TEMPERATURE: 97 F | DIASTOLIC BLOOD PRESSURE: 80 MMHG | HEART RATE: 73 BPM | BODY MASS INDEX: 35.26 KG/M2 | HEIGHT: 66 IN | SYSTOLIC BLOOD PRESSURE: 124 MMHG | WEIGHT: 219.4 LBS | RESPIRATION RATE: 16 BRPM | OXYGEN SATURATION: 98 %

## 2022-02-04 DIAGNOSIS — I10 BENIGN HYPERTENSION: ICD-10-CM

## 2022-02-04 DIAGNOSIS — M79.7 FIBROMYALGIA: ICD-10-CM

## 2022-02-04 DIAGNOSIS — E78.49 OTHER HYPERLIPIDEMIA: ICD-10-CM

## 2022-02-04 DIAGNOSIS — D68.62 LUPUS ANTICOAGULANT DISORDER (HCC): ICD-10-CM

## 2022-02-04 DIAGNOSIS — K91.2 POSTSURGICAL MALABSORPTION: ICD-10-CM

## 2022-02-04 DIAGNOSIS — E11.69 DIABETES MELLITUS TYPE 2 IN OBESE (HCC): ICD-10-CM

## 2022-02-04 DIAGNOSIS — E66.9 DIABETES MELLITUS TYPE 2 IN OBESE (HCC): ICD-10-CM

## 2022-02-04 DIAGNOSIS — E11.9 TYPE 2 DIABETES MELLITUS WITHOUT COMPLICATION, WITHOUT LONG-TERM CURRENT USE OF INSULIN (HCC): Primary | ICD-10-CM

## 2022-02-04 LAB — SL AMB POCT HEMOGLOBIN AIC: 5.5 (ref ?–6.5)

## 2022-02-04 PROCEDURE — 83036 HEMOGLOBIN GLYCOSYLATED A1C: CPT | Performed by: FAMILY MEDICINE

## 2022-02-04 PROCEDURE — 3008F BODY MASS INDEX DOCD: CPT | Performed by: FAMILY MEDICINE

## 2022-02-04 PROCEDURE — 3079F DIAST BP 80-89 MM HG: CPT | Performed by: FAMILY MEDICINE

## 2022-02-04 PROCEDURE — 3074F SYST BP LT 130 MM HG: CPT | Performed by: FAMILY MEDICINE

## 2022-02-04 PROCEDURE — 3044F HG A1C LEVEL LT 7.0%: CPT | Performed by: FAMILY MEDICINE

## 2022-02-04 PROCEDURE — 99214 OFFICE O/P EST MOD 30 MIN: CPT | Performed by: FAMILY MEDICINE

## 2022-02-04 RX ORDER — MULTIVITAMIN
1 TABLET ORAL DAILY
Start: 2022-02-04

## 2022-02-04 NOTE — RESULT NOTES
Verified Results  (Q) CLOSTRIDIUM DIFFICILE TOXIN B, QL PCR 04Apr2017 11:12AM Dena Toledo   REPORT COMMENT:  SPLIT 04/03/2017 FROM [id number]     Test Name Result Flag Reference   CLOSTRIDIUM DIFFICILE$TOXINB,QL REAL TIME PCR NOT DETECTED  NOT DETECTED   This test is for use only with liquid or soft stools;   performance characteristics of other clinical specimen   types have not been established  This assay was performed by [employer]) [employer]  The performance characteristics of this assay have  been determined by [address]   Performance  characteristics refer to the analytical performance  of the test        Discussion/Summary   stool test was negative for C  diff bacteria    - dr Ana Lord      Signatures   Electronically signed by : Guillermina Sanchez MD; Apr 5 2017  2:55PM EST                       (Author) [facility]  Progress Note - Yobani Weber 11/22/1923, 80 y o  female MRN: [id number]  Unit/Bed#: S -01 Encounter: [id number]  Primary Care Provider: No primary care provider on file  Date and time admitted to hospital: 2/1/2022  5:59 AM    New onset atrial fibrillation (HCC)  Assessment & Plan  · Early morning 2/3 patient went into A  Fib on Telemetry  New onset  She is rate controlled  [id number] is 4   · Likely secondary to hypokalemia and age  · Discussed with daughter she wants to pursue anticoagulation   · Metoprolol 25 mg BID started  · Monitor response   · Continue to correct electrolytes   · Eliquis 5 mg BID started   · Echo EF45% with mild global hypokinesis  Mild AR/severe AS/Mod MR/mod to severe MS/mild to moderate TR  Progression compared to prior Echo in 2021 when EF was 75%  Discussed with Pt's daughter about the new Echo findings  She would not pursue any further aggressive investigations and conservative measures only given her advanced age  Daughter remains agreeable to [facility]     * Ambulatory dysfunction  Assessment & Plan  · Patient was found down between her bed and nightstand at her assisted living   Likely mechanical in nature  · She lives at assisted living  · No focal neurologic deficits  · Patient has history of falls while at this facility due to dementia  · CT head: no acute intracranial abnormalities  · PT/OT consult   · Waiting for rehab  Will have bed available on Monday   · Fall Risk Precautions    Acute-on-chronic kidney injury [facility])  Assessment & Plan  Lab Results   Component Value Date    EGFR 56 02/04/2022    EGFR 53 02/03/2022    EGFR 40 02/02/2022    CREATININE 0 86 02/04/2022    CREATININE 0 90 02/03/2022    CREATININE 1 14 02/02/2022   · Baseline Cr 0 9   Elevated Cr on admission to 1 37, improved to 0 9, at baseline  · Nephrology consult appreciated   · No further need for IVF given 1 L completed  · Monitor I/O  · BMP in AM    Aortic stenosis  Assessment & Plan  Wt Readings from Last 3 Encounters:   02/04/22 71 7 kg (158 lb)   01/04/22 71 1 kg (156 lb 11 2 oz)   05/03/21 78 kg (172 lb)     · Patient with history of severe aortic stenosis  · Monitor for volume overload with fluids  · Diuretics on hold      Hypokalemia  Assessment & Plan  · Noted at 2 5 on admission>3 3  · Continue to replete       Type 2 diabetes mellitus with diabetic chronic kidney disease [facility])  Assessment & Plan  Lab Results   Component Value Date    HGBA1C 13 0 (H) 12/28/2021       Recent Labs     02/03/22  1609 02/03/22  2039 02/04/22  0632 02/04/22  1042   POCGLU 201* 180* 166* 282*       Blood Sugar Average: Last 72 hrs:  · (P) 046 [id number]   · Glucose on BMP elevated on admission at 319  BG drastically above goal and labile   · Hold home medications, sitagliptin 100 mg, repaglinide 2mg, and glipizide 10mg  · Started on Lantus 10 U QHS with sliding scale as per last Endocrinology note  · Sliding scale coverage  · Monitor sugars via accu-check  · Ultimately, given advanced age and dementia no indication for tightly controlled blood sugars     UTI (urinary tract infection)  Assessment & Plan  · UA: small amount of leukocytes and blood, 2+ protein and 250 glucose  · Urine microscopy: WBC 4-10 and clumped white blood cells  · UC final polymicrobial with low CFU  Contamination? Given that pt improved on ceftriaxone will continue but switch to cefpodoxime for improved penetration /spoke with pharmacy  Plan for 7 days total     Severe sepsis ([facility] [address] )  Assessment & Plan  · On admission WBC 17 91, tachycardia, lactic acid 2 1, and source of infection - suspected UTI  · Lactic acid was never repeated  · Leukocytosis improved   · Tachycardia resolved   · Blood cultures negative at 48 hours   · 2/2 UTI   Abx plan see above      VTE Pharmacologic Prophylaxis:   Pharmacologic: Apixaban (Eliquis)  Mechanical VTE Prophylaxis in Place:     Patient Centered Rounds:     Discussions with Specialists or Other Care Team Provider:     Education and Discussions with Family / Patient: I spoke with Pt;s daughter    Time Spent for Care: 20 minutes  More than 50% of total time spent on counseling and coordination of care as described above  Current Length of Stay: 3 day(s)    Current Patient Status: Inpatient   Certification Statement: The patient will continue to require additional inpatient hospital stay due to waiting for rehab bed    Discharge Plan: rehab when bed available    Code Status: Level 3 - DNAR and DNI      Subjective:   Pt was eating her lunch appearing comfortable  Denies palpitations or chest pain     Objective:     Vitals:   Temp (24hrs), Av 8 °F (37 1 °C), Min:98 °F (36 7 °C), Max:99 6 °F (37 6 °C)    Temp:  [98 °F (36 7 °C)-99 6 °F (37 6 °C)] 98 °F (36 7 °C)  HR:  [] 77  Resp:  [16-18] 18  BP: (108-139)/(58-68) 108/58  SpO2:  [94 %-95 %] 95 %  Body mass index is 31 38 kg/m²  Input and Output Summary (last 24 hours): Intake/Output Summary (Last 24 hours) at 2022 1554  Last data filed at 2/3/2022 2050  Gross per 24 hour   Intake --   Output 200 ml   Net -200 ml       Physical Exam:     Physical Exam  Constitutional:       General: She is not in acute distress  Appearance: She is not ill-appearing, toxic-appearing or diaphoretic  Eyes:      General:         Right eye: No discharge  Left eye: No discharge  Cardiovascular:      Rate and Rhythm: Normal rate  Rhythm irregular  Heart sounds: Murmur heard  Pulmonary:      Effort: Pulmonary effort is normal  No respiratory distress  Breath sounds: Normal breath sounds  No wheezing  Abdominal:      General: Abdomen is flat  Bowel sounds are normal  There is no distension  Palpations: Abdomen is soft  Skin:     General: Skin is warm  Neurological:      General: No focal deficit present     Psychiatric:         Mood and Affect: Mood normal          Behavior: Behavior normal  Thought Content: Thought content normal          Additional Data:     Labs:    Results from last 7 days   Lab Units 02/04/22  0515 02/03/22  0516 02/02/22  0539   WBC Thousand/uL 12 20*   < > 14 86*   HEMOGLOBIN g/dL 11 7   < > 12 1   HEMATOCRIT % 34 1*   < > 34 6*   PLATELETS Thousands/uL 268   < > 247   NEUTROS PCT %  --   --  63   LYMPHS PCT %  --   --  26   MONOS PCT %  --   --  9   EOS PCT %  --   --  0    < > = values in this interval not displayed  Results from last 7 days   Lab Units 02/04/22  0515 02/03/22  1448 02/03/22  0516   POTASSIUM mmol/L 3 2*   < > 2 4*   CHLORIDE mmol/L 100  --  98*   CO2 mmol/L 22  --  22   BUN mg/dL 12  --  16   CREATININE mg/dL 0 86  --  0 90   CALCIUM mg/dL 8 3  --  8 3   ALK PHOS U/L  --   --  87   ALT U/L  --   --  18   AST U/L  --   --  24    < > = values in this interval not displayed  Results from last 7 days   Lab Units 02/01/22  0615   INR  1 00         Recent Cultures (last 7 days):     Results from last 7 days   Lab Units 02/01/22  0801 02/01/22  0706   BLOOD CULTURE  No Growth at 72 hrs    No Growth at 72 hrs   --    URINE CULTURE   --  <10,000 cfu/ml Morganella morganii*  10,000-19,000 cfu/ml Lactobacillus species*  <10,000 cfu/ml Candida albicans*  <10,000 cfu/ml Enterococcus faecalis*  <10,000 cfu/ml Staphylococcus simulans*       Last 24 Hours Medication List:   Current Facility-Administered Medications   Medication Dose Route Frequency Provider Last Rate    acetaminophen  650 mg Oral Q6H PRN Anam Formicjeremy, PA-C      apixaban  5 mg Oral BID Hyla Cuff, CRNP      cefpodoxime  400 mg Oral BID With Meals Kofi Ortiz MD      donepezil  10 mg Oral Daily Anam Dietrich, PA-C      insulin glargine  10 Units Subcutaneous HS Ygie Kaur, PA-C      insulin lispro  1-5 Units Subcutaneous TID [facility] Sudie Formica, PA-C      insulin lispro  1-5 Units Subcutaneous HS Sudie Formica, PA-C      magnesium oxide  400 mg Oral Daily Estefani Ross LUZ Myrick      metoprolol tartrate  25 mg Oral Q12H [facility] Pan Joya PA-C      nystatin   Topical BID AME Blanchard      ondansetron  4 mg Intravenous Q6H PRN Leticia Markham PA-C      potassium chloride  20 mEq Oral Once Sophie Garcia MD      potassium chloride  40 mEq Oral Daily Pan Joya PA-C      sodium chloride 0 9 % with KCl 20 mEq/L  70 mL/hr Intravenous Continuous Sophie Garcia MD 70 mL/hr (02/04/22 7863)        Today, Patient Was Seen By: Tana Escamilla MD    ** Please Note: Dictation voice to text software may have been used in the creation of this document   **

## 2022-02-04 NOTE — PROGRESS NOTES
Assessment/Plan:    Diabetes mellitus type 2 in obese [facility])    Lab Results   Component Value Date    HGBA1C 5 5 02/04/2022   doing well on current regimen      Lupus anticoagulant disorder (HCC)  Stable  Continue to monitor    Primary generalized (osteo)arthritis  Cold weather makes it worse  Tylenol PRN   Gabapentin is helping    Benign hypertension  Stable on current meds    Hyperlipidemia  LDL goal < 70  Continue to monitor  Due for labs       Diagnoses and all orders for this visit:    Type 2 diabetes mellitus without complication, without long-term current use of insulin (HCC)  -     POCT hemoglobin A1c  -     Microalbumin / creatinine urine ratio    Diabetes mellitus type 2 in obese (HCC)    Postsurgical malabsorption  -     Multiple Vitamin (multivitamin) tablet; Take 1 tablet by mouth daily    Lupus anticoagulant disorder (HCC)    Fibromyalgia    Benign hypertension    Other hyperlipidemia          Subjective:      Patient ID: Elfego Key is a 52 y o  female  Here for follow up  Feeling well overall  Gained some weight due to stress eating time       Diabetes  She presents for her follow-up diabetic visit  She has type 2 diabetes mellitus  Her disease course has been stable  There are no hypoglycemic associated symptoms  There are no diabetic associated symptoms  There are no hypoglycemic complications  Symptoms are stable  There are no diabetic complications  Risk factors for coronary artery disease include obesity  Current diabetic treatments: victoza  She is compliant with treatment all of the time  She has not had a previous visit with a dietitian  She participates in exercise intermittently  There is no change in her home blood glucose trend         The following portions of the patient's history were reviewed and updated as appropriate: allergies, current medications, past family history, past medical history, past social history, past surgical history and problem list     Review of Systems Constitutional: Negative  HENT: Negative  Respiratory: Negative  Cardiovascular: Negative  Genitourinary: Negative  Neurological: Negative  Hematological: Negative  Psychiatric/Behavioral: Negative  Objective:      /80 (BP Location: Left arm, Patient Position: Sitting, Cuff Size: Large)   Pulse 73   Temp (!) 97 °F (36 1 °C) (Skin)   Resp 16   Ht 5' 5 5" (1 664 m)   Wt 99 5 kg (219 lb 6 4 oz)   LMP 11/18/2018   SpO2 98%   BMI 35 95 kg/m²          Physical Exam  Constitutional:       Appearance: Normal appearance  She is obese  HENT:      Right Ear: Tympanic membrane normal       Left Ear: Tympanic membrane normal    Cardiovascular:      Rate and Rhythm: Normal rate and regular rhythm  Pulses: Normal pulses  Heart sounds: Normal heart sounds  Pulmonary:      Effort: Pulmonary effort is normal       Breath sounds: Normal breath sounds  Musculoskeletal:         General: No swelling or tenderness  Normal range of motion  Neurological:      General: No focal deficit present  Mental Status: She is alert and oriented to person, place, and time     Psychiatric:         Mood and Affect: Mood normal          Behavior: Behavior normal

## 2022-02-08 DIAGNOSIS — E11.69 DIABETES MELLITUS TYPE 2 IN OBESE (HCC): ICD-10-CM

## 2022-02-08 DIAGNOSIS — E66.9 DIABETES MELLITUS TYPE 2 IN OBESE (HCC): ICD-10-CM

## 2022-02-09 RX ORDER — ATORVASTATIN CALCIUM 10 MG/1
10 TABLET, FILM COATED ORAL DAILY
Qty: 90 TABLET | Refills: 0 | Status: SHIPPED | OUTPATIENT
Start: 2022-02-09 | End: 2022-07-23 | Stop reason: SDUPTHER

## 2022-03-04 DIAGNOSIS — F41.9 ANXIETY AND DEPRESSION: ICD-10-CM

## 2022-03-04 DIAGNOSIS — F32.A ANXIETY AND DEPRESSION: ICD-10-CM

## 2022-03-04 DIAGNOSIS — F51.01 PRIMARY INSOMNIA: ICD-10-CM

## 2022-03-04 RX ORDER — ALPRAZOLAM 0.5 MG/1
0.5 TABLET ORAL 2 TIMES DAILY PRN
Qty: 60 TABLET | Refills: 0 | Status: SHIPPED | OUTPATIENT
Start: 2022-03-04 | End: 2022-04-05 | Stop reason: SDUPTHER

## 2022-03-04 NOTE — TELEPHONE ENCOUNTER
Medication:  PDMP    01/24/2022 01/24/2022 ALPRAZolam 60 0 30 0 5 MG NA KIRBY ZAPATA            Active agreement on file -No

## 2022-03-05 ENCOUNTER — APPOINTMENT (OUTPATIENT)
Dept: LAB | Facility: MEDICAL CENTER | Age: 47
End: 2022-03-05
Payer: COMMERCIAL

## 2022-03-05 DIAGNOSIS — Z48.815 ENCOUNTER FOR SURGICAL AFTERCARE FOLLOWING SURGERY OF DIGESTIVE SYSTEM: ICD-10-CM

## 2022-03-05 DIAGNOSIS — E03.9 ACQUIRED HYPOTHYROIDISM: ICD-10-CM

## 2022-03-05 DIAGNOSIS — Z98.84 BARIATRIC SURGERY STATUS: ICD-10-CM

## 2022-03-05 DIAGNOSIS — E66.9 DIABETES MELLITUS TYPE 2 IN OBESE (HCC): ICD-10-CM

## 2022-03-05 DIAGNOSIS — E11.69 DIABETES MELLITUS TYPE 2 IN OBESE (HCC): ICD-10-CM

## 2022-03-05 DIAGNOSIS — E03.9 HYPOTHYROID: ICD-10-CM

## 2022-03-05 DIAGNOSIS — M79.7 FIBROMYALGIA: ICD-10-CM

## 2022-03-05 DIAGNOSIS — E66.9 OBESITY, CLASS II, BMI 35-39.9: ICD-10-CM

## 2022-03-05 DIAGNOSIS — K91.2 POSTSURGICAL MALABSORPTION: ICD-10-CM

## 2022-03-05 DIAGNOSIS — Z79.899 ENCOUNTER FOR LONG-TERM (CURRENT) USE OF OTHER MEDICATIONS: ICD-10-CM

## 2022-03-05 LAB
25(OH)D3 SERPL-MCNC: 44.9 NG/ML (ref 30–100)
ALBUMIN SERPL BCP-MCNC: 3.4 G/DL (ref 3.5–5)
ALP SERPL-CCNC: 101 U/L (ref 46–116)
ALT SERPL W P-5'-P-CCNC: 25 U/L (ref 12–78)
ANION GAP SERPL CALCULATED.3IONS-SCNC: 6 MMOL/L (ref 4–13)
AST SERPL W P-5'-P-CCNC: 20 U/L (ref 5–45)
BASOPHILS # BLD AUTO: 0.07 THOUSANDS/ΜL (ref 0–0.1)
BASOPHILS NFR BLD AUTO: 1 % (ref 0–1)
BILIRUB SERPL-MCNC: 0.31 MG/DL (ref 0.2–1)
BUN SERPL-MCNC: 7 MG/DL (ref 5–25)
CALCIUM ALBUM COR SERPL-MCNC: 9.6 MG/DL (ref 8.3–10.1)
CALCIUM SERPL-MCNC: 9.1 MG/DL (ref 8.3–10.1)
CHLORIDE SERPL-SCNC: 106 MMOL/L (ref 100–108)
CHOLEST SERPL-MCNC: 142 MG/DL
CO2 SERPL-SCNC: 28 MMOL/L (ref 21–32)
CREAT SERPL-MCNC: 0.7 MG/DL (ref 0.6–1.3)
CREAT UR-MCNC: 131 MG/DL
EOSINOPHIL # BLD AUTO: 0.8 THOUSAND/ΜL (ref 0–0.61)
EOSINOPHIL NFR BLD AUTO: 8 % (ref 0–6)
ERYTHROCYTE [DISTWIDTH] IN BLOOD BY AUTOMATED COUNT: 16 % (ref 11.6–15.1)
ERYTHROCYTE [SEDIMENTATION RATE] IN BLOOD: 26 MM/HOUR (ref 0–19)
EST. AVERAGE GLUCOSE BLD GHB EST-MCNC: 103 MG/DL
FERRITIN SERPL-MCNC: 50 NG/ML (ref 8–388)
FOLATE SERPL-MCNC: 2.7 NG/ML (ref 3.1–17.5)
GFR SERPL CREATININE-BSD FRML MDRD: 103 ML/MIN/1.73SQ M
GLUCOSE P FAST SERPL-MCNC: 86 MG/DL (ref 65–99)
HBA1C MFR BLD: 5.2 %
HCT VFR BLD AUTO: 42.1 % (ref 34.8–46.1)
HDLC SERPL-MCNC: 52 MG/DL
HGB BLD-MCNC: 13.5 G/DL (ref 11.5–15.4)
IMM GRANULOCYTES # BLD AUTO: 0.05 THOUSAND/UL (ref 0–0.2)
IMM GRANULOCYTES NFR BLD AUTO: 1 % (ref 0–2)
IRON SATN MFR SERPL: 19 % (ref 15–50)
IRON SERPL-MCNC: 67 UG/DL (ref 50–170)
LDLC SERPL CALC-MCNC: 66 MG/DL (ref 0–100)
LYMPHOCYTES # BLD AUTO: 1.96 THOUSANDS/ΜL (ref 0.6–4.47)
LYMPHOCYTES NFR BLD AUTO: 20 % (ref 14–44)
MCH RBC QN AUTO: 30 PG (ref 26.8–34.3)
MCHC RBC AUTO-ENTMCNC: 32.1 G/DL (ref 31.4–37.4)
MCV RBC AUTO: 94 FL (ref 82–98)
MICROALBUMIN UR-MCNC: 8.2 MG/L (ref 0–20)
MICROALBUMIN/CREAT 24H UR: 6 MG/G CREATININE (ref 0–30)
MONOCYTES # BLD AUTO: 0.89 THOUSAND/ΜL (ref 0.17–1.22)
MONOCYTES NFR BLD AUTO: 9 % (ref 4–12)
NEUTROPHILS # BLD AUTO: 6.18 THOUSANDS/ΜL (ref 1.85–7.62)
NEUTS SEG NFR BLD AUTO: 61 % (ref 43–75)
NRBC BLD AUTO-RTO: 0 /100 WBCS
PLATELET # BLD AUTO: 204 THOUSANDS/UL (ref 149–390)
PMV BLD AUTO: 10.6 FL (ref 8.9–12.7)
POTASSIUM SERPL-SCNC: 4.4 MMOL/L (ref 3.5–5.3)
PROT SERPL-MCNC: 7.6 G/DL (ref 6.4–8.2)
PTH-INTACT SERPL-MCNC: 46 PG/ML (ref 18.4–80.1)
RBC # BLD AUTO: 4.5 MILLION/UL (ref 3.81–5.12)
SODIUM SERPL-SCNC: 140 MMOL/L (ref 136–145)
TIBC SERPL-MCNC: 351 UG/DL (ref 250–450)
TRIGL SERPL-MCNC: 120 MG/DL
TSH SERPL DL<=0.05 MIU/L-ACNC: 3.14 UIU/ML (ref 0.36–3.74)
VIT B12 SERPL-MCNC: 264 PG/ML (ref 100–900)
WBC # BLD AUTO: 9.95 THOUSAND/UL (ref 4.31–10.16)

## 2022-03-05 PROCEDURE — 80053 COMPREHEN METABOLIC PANEL: CPT | Performed by: FAMILY MEDICINE

## 2022-03-05 PROCEDURE — 83970 ASSAY OF PARATHORMONE: CPT

## 2022-03-05 PROCEDURE — 83540 ASSAY OF IRON: CPT

## 2022-03-05 PROCEDURE — 84443 ASSAY THYROID STIM HORMONE: CPT

## 2022-03-05 PROCEDURE — 83550 IRON BINDING TEST: CPT

## 2022-03-05 PROCEDURE — 82570 ASSAY OF URINE CREATININE: CPT | Performed by: FAMILY MEDICINE

## 2022-03-05 PROCEDURE — 85652 RBC SED RATE AUTOMATED: CPT

## 2022-03-05 PROCEDURE — 85025 COMPLETE CBC W/AUTO DIFF WBC: CPT | Performed by: FAMILY MEDICINE

## 2022-03-05 PROCEDURE — 82728 ASSAY OF FERRITIN: CPT

## 2022-03-05 PROCEDURE — 3061F NEG MICROALBUMINURIA REV: CPT | Performed by: FAMILY MEDICINE

## 2022-03-05 PROCEDURE — 36415 COLL VENOUS BLD VENIPUNCTURE: CPT | Performed by: FAMILY MEDICINE

## 2022-03-05 PROCEDURE — 82043 UR ALBUMIN QUANTITATIVE: CPT | Performed by: FAMILY MEDICINE

## 2022-03-05 PROCEDURE — 82306 VITAMIN D 25 HYDROXY: CPT

## 2022-03-05 PROCEDURE — 83036 HEMOGLOBIN GLYCOSYLATED A1C: CPT | Performed by: FAMILY MEDICINE

## 2022-03-05 PROCEDURE — 80061 LIPID PANEL: CPT

## 2022-03-05 PROCEDURE — 84630 ASSAY OF ZINC: CPT

## 2022-03-05 PROCEDURE — 3044F HG A1C LEVEL LT 7.0%: CPT | Performed by: FAMILY MEDICINE

## 2022-03-05 PROCEDURE — 84590 ASSAY OF VITAMIN A: CPT

## 2022-03-05 PROCEDURE — 84425 ASSAY OF VITAMIN B-1: CPT

## 2022-03-05 PROCEDURE — 82607 VITAMIN B-12: CPT

## 2022-03-05 PROCEDURE — 82746 ASSAY OF FOLIC ACID SERUM: CPT

## 2022-03-06 ENCOUNTER — OFFICE VISIT (OUTPATIENT)
Dept: URGENT CARE | Facility: MEDICAL CENTER | Age: 47
End: 2022-03-06
Payer: COMMERCIAL

## 2022-03-06 VITALS
TEMPERATURE: 97.8 F | WEIGHT: 224 LBS | HEIGHT: 65 IN | RESPIRATION RATE: 18 BRPM | BODY MASS INDEX: 37.32 KG/M2 | OXYGEN SATURATION: 96 % | HEART RATE: 76 BPM

## 2022-03-06 DIAGNOSIS — K04.7 DENTAL ABSCESS: Primary | ICD-10-CM

## 2022-03-06 PROCEDURE — 99213 OFFICE O/P EST LOW 20 MIN: CPT | Performed by: PHYSICIAN ASSISTANT

## 2022-03-06 RX ORDER — AMOXICILLIN 500 MG/1
500 CAPSULE ORAL EVERY 8 HOURS SCHEDULED
Qty: 21 CAPSULE | Refills: 0 | Status: SHIPPED | OUTPATIENT
Start: 2022-03-06 | End: 2022-03-13

## 2022-03-06 NOTE — PATIENT INSTRUCTIONS
Dental abscess   Amoxicillin as directed  Follow up with PCP in 3-5 days  Proceed to  ER if symptoms worsen  Dental Abscess   WHAT YOU NEED TO KNOW:   A dental abscess is a collection of pus in or around a tooth  A dental abscess is caused by bacteria  The bacteria can enter the tooth when the enamel (outer part of the tooth) is damaged by tooth decay  Bacteria can also enter the tooth through a chip in the tooth or a cut in the gum  Food particles that are stuck between the teeth for a long time may also lead to an abscess  DISCHARGE INSTRUCTIONS:   Return to the emergency department if:   · You have severe pain in your tooth or jaw  · You have trouble breathing because of pain or swelling  Call your doctor if:   · Your symptoms get worse, even after treatment  · Your mouth is bleeding  · You cannot eat or drink because of pain or swelling  · Your abscess returns  · You have an injury that causes a crack in your tooth  · You have questions or concerns about your condition or care  Medicines: You may  need any of the following:  · Antibiotics  help treat a bacterial infection  · NSAIDs , such as ibuprofen, help decrease swelling, pain, and fever  This medicine is available with or without a doctor's order  NSAIDs can cause stomach bleeding or kidney problems in certain people  If you take blood thinner medicine, always ask your healthcare provider if NSAIDs are safe for you  Always read the medicine label and follow directions  · Acetaminophen  decreases pain and fever  It is available without a doctor's order  Ask how much to take and how often to take it  Follow directions  Read the labels of all other medicines you are using to see if they also contain acetaminophen, or ask your doctor or pharmacist  Acetaminophen can cause liver damage if not taken correctly  Do not use more than 4 grams (4,000 milligrams) total of acetaminophen in one day       · Prescription pain medicine may be given  Ask your healthcare provider how to take this medicine safely  Some prescription pain medicines contain acetaminophen  Do not take other medicines that contain acetaminophen without talking to your healthcare provider  Too much acetaminophen may cause liver damage  Prescription pain medicine may cause constipation  Ask your healthcare provider how to prevent or treat constipation  · Take your medicine as directed  Contact your healthcare provider if you think your medicine is not helping or if you have side effects  Tell him of her if you are allergic to any medicine  Keep a list of the medicines, vitamins, and herbs you take  Include the amounts, and when and why you take them  Bring the list or the pill bottles to follow-up visits  Carry your medicine list with you in case of an emergency  Self-care:   · Rinse your mouth every 2 hours with salt water  This will help keep the area clean  · Gently brush your teeth twice a day with a soft tooth brush  This will help keep the area clean  · Eat soft foods as directed  Soft foods may cause less pain  Examples include applesauce, yogurt, and cooked pasta  Ask your healthcare provider how long to follow this instruction  · Apply a warm compress to your tooth or gum  Use a cotton ball or gauze soaked in warm water  Remove the compress in 10 minutes or when it becomes cool  Repeat 3 times a day  Prevent another abscess:   · Brush your teeth at least 2 times a day  with fluoride toothpaste  · Use dental floss at least once a day  to clean between your teeth  · Rinse your mouth with water or mouthwash  after meals and snacks  Chew sugarless gum  · Avoid sugary and starchy food that can stick between your teeth  Limit drinks high in sugar, such as soda or fruit juice  · See your dentist every 6 months  for dental cleanings and oral exams  Follow up with your doctor or dentist in 24 hours, or as directed:   Your healthcare provider will need to check your teeth and gums  Write down your questions so you remember to ask them during your visits  © Copyright [employer] 2022 Information is for End User's use only and may not be sold, redistributed or otherwise used for commercial purposes  All illustrations and images included in CareNotes® are the copyrighted property of [employer]  or Dennis Irene  The above information is an  only  It is not intended as medical advice for individual conditions or treatments  Talk to your doctor, nurse or pharmacist before following any medical regimen to see if it is safe and effective for you

## 2022-03-06 NOTE — PROGRESS NOTES
[facility]        NAME: James Ortiz is a 52 y o  female  : 1975    MRN: [id number]  DATE: 2022  TIME: 10:26 AM    Assessment and Plan   Dental abscess [K04 7]  1  Dental abscess           Patient Instructions     Dental abscess   Amoxicillin as directed  Follow up with PCP in 3-5 days  Proceed to  ER if symptoms worsen  Chief Complaint     Chief Complaint   Patient presents with    Edema     Pt  with facial edema that began two days ago and has gottem worse          History of Present Illness       31-year-old female who presents complaining of grinding teeth, tooth pain, and now swelling of right side of face x1 day  Patient denies fevers, chills      Review of Systems   Review of Systems   Constitutional: Negative  HENT: Positive for dental problem and facial swelling  Eyes: Negative  Respiratory: Negative  Negative for cough, chest tightness, shortness of breath, wheezing and stridor  Cardiovascular: Negative  Negative for chest pain, palpitations and leg swelling           Current Medications       Current Outpatient Medications:     ALPRAZolam (XANAX) 0 5 mg tablet, Take 1 tablet (0 5 mg total) by mouth 2 (two) times a day as needed for anxiety, Disp: 60 tablet, Rfl: 0    atorvastatin (LIPITOR) 10 mg tablet, Take 1 tablet (10 mg total) by mouth daily, Disp: 90 tablet, Rfl: 0    buPROPion (WELLBUTRIN SR) 150 mg 12 hr tablet, Take 1 tablet (150 mg total) by mouth 2 (two) times a day, Disp: 180 tablet, Rfl: 0    Cholecalciferol (VITAMIN D3) 2000 units capsule, Take 2,000 Units by mouth daily , Disp: , Rfl:     Cyanocobalamin (B-12) 1000 MCG CAPS, Take 1 capsule by mouth daily , Disp: , Rfl:     DULoxetine (CYMBALTA) 60 mg delayed release capsule, Take 1 capsule by mouth once daily, Disp: 90 capsule, Rfl: 0    gabapentin (NEURONTIN) 300 mg capsule, TAKE 3 CAPSULES BY MOUTH IN THE MORNING AND 3 CAPSULES  AT BEDTIME, Disp: 540 capsule, Rfl: 0    levothyroxine (Euthyrox) 50 mcg tablet, Take 1 tablet (50 mcg total) by mouth daily, Disp: 90 tablet, Rfl: 0    liraglutide (Victoza) injection, Inject 0 3 mL (1 8 mg total) under the skin daily, Disp: 27 mL, Rfl: 0    lisinopril (ZESTRIL) 10 mg tablet, 5 mg every other day, Disp: 90 tablet, Rfl: 0    Multiple Vitamin (multivitamin) tablet, Take 1 tablet by mouth daily, Disp: , Rfl:     BIOTIN PO, Take 600 mg by mouth every other day  (Patient not taking: Reported on 10/27/2021), Disp: , Rfl:     Calcium Citrate-Vitamin D (MINE-CITRATE PLUS VITAMIN D PO), Take by mouth (Patient not taking: Reported on 10/27/2021), Disp: , Rfl:     nystatin (MYCOSTATIN) cream, Apply topically 1-2 times a day as needed to abdomen rash (Patient not taking: Reported on 10/27/2021), Disp: 60 g, Rfl: 6    triamcinolone (KENALOG) 0 1 % ointment, APPLY TOPICALLY TO AFFECTED AREA 1-2 TIMES A DAY AS NEEDED TO ITCHY RASH (Patient not taking: Reported on 10/27/2021), Disp: 80 g, Rfl: 0    Current Allergies     Allergies as of 03/06/2022 - Reviewed 03/06/2022   Allergen Reaction Noted    Metformin Diarrhea 07/26/2018            The following portions of the patient's history were reviewed and updated as appropriate: allergies, current medications, past family history, past medical history, past social history, past surgical history and problem list      Past Medical History:   Diagnosis Date    Anxiety     Arthritis     Bariatric surgery status     CPAP (continuous positive airway pressure) dependence     Depression     Diabetes mellitus ([facility] [address] )     Eczema     Fibromyalgia, primary     Foot drop 5/12/2020    Hyperlipidemia     Hypertension     Hypothyroidism     Lupus anticoagulant disorder (HCC)     Migraine     Night sweats     Obesity     Postsurgical malabsorption     Psoriasis     Sleep apnea     Status post laparoscopic hysterectomy 11/20/2017       Past Surgical History:   Procedure Laterality Date    COLONOSCOPY      HYSTERECTOMY Bilateral 11/17/2018    OOPHORECTOMY Bilateral 11/17/2018    RI COLONOSCOPY FLX DX W/COLLJ SPEC WHEN PFRMD N/A 6/13/2017    Procedure: COLONOSCOPY;  Surgeon: Dayne Damon DO;  [facility]; Service: Gastroenterology    RI EGD TRANSORAL BIOPSY SINGLE/MULTIPLE N/A 1/30/2019    Procedure: ESOPHAGOGASTRODUODENOSCOPY (EGD) with bx;  Surgeon: Rachel Freeman MD;  [facility]; Service: Bariatrics    RI LAP GASTRIC BYPASS/LEESA-EN-Y N/A 3/25/2019    Procedure: LAPAROSCOPIC LEESA-EN-Y GASTRIC BYPASS AND INTRAOPERATIVE EGD;  Surgeon: Rachel Freeman MD;  [facility];  Service: Euel Graces <=250 GRAM  W TUBE/OVARY N/A 11/20/2017    Procedure: ROBOTIC TOTAL LAPAROSCOPIC HYSTERECTOMY/ BSO;  Surgeon: Quan Ramirez MD;  [facility];  Service: Gynecology Oncology    WISDOM TOOTH EXTRACTION         Family History   Problem Relation Age of Onset    Heart disease Mother         Cardiac disorder    Diabetes Mother     Hypertension Mother     Hyperthyroidism Mother     Diabetes Father     Hypertension Father     Lung cancer Maternal Grandfather     Colon cancer Cousin 79    Colon cancer Maternal Aunt 52    Lung cancer Family     Diabetes Sister     No Known Problems Brother     No Known Problems Maternal Grandmother     No Known Problems Paternal Grandmother     No Known Problems Paternal Grandfather     No Known Problems Sister          Medications have been verified  Objective   Pulse 76   Temp 97 8 °F (36 6 °C)   Resp 18   Ht 5' 5" (1 651 m)   Wt 102 kg (224 lb)   LMP 11/18/2018   SpO2 96%   BMI 37 28 kg/m²        Physical Exam     Physical Exam  Constitutional:       General: She is not in acute distress  Appearance: She is well-developed  She is not diaphoretic  HENT:      Head: Normocephalic and atraumatic        Right Ear: Hearing, tympanic membrane, ear canal and external ear normal       Left Ear: Hearing, tympanic membrane, ear canal and external ear normal       Mouth/Throat:      Pharynx: Uvula midline  Eyes:      Conjunctiva/sclera: Conjunctivae normal       Pupils: Pupils are equal, round, and reactive to light  Cardiovascular:      Rate and Rhythm: Normal rate and regular rhythm  Heart sounds: Normal heart sounds  Pulmonary:      Effort: Pulmonary effort is normal       Breath sounds: Normal breath sounds  Musculoskeletal:      Cervical back: Normal range of motion and neck supple

## 2022-03-09 DIAGNOSIS — F32.A ANXIETY AND DEPRESSION: ICD-10-CM

## 2022-03-09 DIAGNOSIS — F41.9 ANXIETY AND DEPRESSION: ICD-10-CM

## 2022-03-09 LAB — ZINC SERPL-MCNC: 72 UG/DL (ref 44–115)

## 2022-03-09 RX ORDER — BUPROPION HYDROCHLORIDE 150 MG/1
TABLET, EXTENDED RELEASE ORAL
Qty: 180 TABLET | Refills: 0 | Status: SHIPPED | OUTPATIENT
Start: 2022-03-09 | End: 2022-03-13 | Stop reason: SDUPTHER

## 2022-03-14 ENCOUNTER — OFFICE VISIT (OUTPATIENT)
Dept: FAMILY MEDICINE CLINIC | Facility: CLINIC | Age: 47
End: 2022-03-14
Payer: COMMERCIAL

## 2022-03-14 VITALS
RESPIRATION RATE: 16 BRPM | HEIGHT: 65 IN | SYSTOLIC BLOOD PRESSURE: 120 MMHG | HEART RATE: 81 BPM | TEMPERATURE: 96.9 F | DIASTOLIC BLOOD PRESSURE: 80 MMHG | WEIGHT: 224.2 LBS | BODY MASS INDEX: 37.36 KG/M2 | OXYGEN SATURATION: 97 %

## 2022-03-14 DIAGNOSIS — E66.9 DIABETES MELLITUS TYPE 2 IN OBESE (HCC): Primary | ICD-10-CM

## 2022-03-14 DIAGNOSIS — J02.9 SORE THROAT: ICD-10-CM

## 2022-03-14 DIAGNOSIS — E11.69 DIABETES MELLITUS TYPE 2 IN OBESE (HCC): Primary | ICD-10-CM

## 2022-03-14 DIAGNOSIS — N76.0 ACUTE VAGINITIS: ICD-10-CM

## 2022-03-14 DIAGNOSIS — I10 BENIGN HYPERTENSION: ICD-10-CM

## 2022-03-14 DIAGNOSIS — J20.9 ACUTE BRONCHITIS, UNSPECIFIED ORGANISM: ICD-10-CM

## 2022-03-14 DIAGNOSIS — E03.9 ACQUIRED HYPOTHYROIDISM: ICD-10-CM

## 2022-03-14 LAB
S PYO AG THROAT QL: NEGATIVE
VIT B1 BLD-SCNC: 118.7 NMOL/L (ref 66.5–200)

## 2022-03-14 PROCEDURE — 3008F BODY MASS INDEX DOCD: CPT | Performed by: FAMILY MEDICINE

## 2022-03-14 PROCEDURE — 99214 OFFICE O/P EST MOD 30 MIN: CPT | Performed by: FAMILY MEDICINE

## 2022-03-14 PROCEDURE — 87147 CULTURE TYPE IMMUNOLOGIC: CPT | Performed by: FAMILY MEDICINE

## 2022-03-14 PROCEDURE — 3074F SYST BP LT 130 MM HG: CPT | Performed by: FAMILY MEDICINE

## 2022-03-14 PROCEDURE — 87070 CULTURE OTHR SPECIMN AEROBIC: CPT | Performed by: FAMILY MEDICINE

## 2022-03-14 PROCEDURE — 3079F DIAST BP 80-89 MM HG: CPT | Performed by: FAMILY MEDICINE

## 2022-03-14 PROCEDURE — 87880 STREP A ASSAY W/OPTIC: CPT | Performed by: FAMILY MEDICINE

## 2022-03-14 RX ORDER — FLUCONAZOLE 150 MG/1
150 TABLET ORAL ONCE
Qty: 1 TABLET | Refills: 0 | Status: SHIPPED | OUTPATIENT
Start: 2022-03-14 | End: 2022-03-14

## 2022-03-14 RX ORDER — PREDNISONE 10 MG/1
TABLET ORAL
Qty: 20 TABLET | Refills: 0 | Status: SHIPPED | OUTPATIENT
Start: 2022-03-14 | End: 2022-05-13

## 2022-03-14 NOTE — PROGRESS NOTES
Assessment/Plan:    Diabetes mellitus type 2 in obese [facility])    Lab Results   Component Value Date    HGBA1C 5 2 03/05/2022   stable on current meds     Benign hypertension  Stable on current meds    Hypothyroidism  Doing well on current dose of levothyroxine       Diagnoses and all orders for this visit:    Diabetes mellitus type 2 in obese (HCC)    Acute bronchitis, unspecified organism  -     predniSONE 10 mg tablet; 4 tabs x 2 days, 3 tabs x 2 days, 2 tabs x 2 days, 1 tab x 2 days    Acute vaginitis  -     fluconazole (DIFLUCAN) 150 mg tablet; Take 1 tablet (150 mg total) by mouth once for 1 dose    Benign hypertension    Acquired hypothyroidism          Subjective:      Patient ID: James Ortiz is a 52 y o  female  HPI  Verdis Eisenmenger to urgent care on 3/6/22 for tooth abscess and given amoxicillin for 7 days   Developed vaginal itching x few days since antibiotics  Sore throat started the next day   Worsening cough x 1 week  No fever  Home covid test was negative today     The following portions of the patient's history were reviewed and updated as appropriate: allergies, current medications, past family history, past medical history, past social history, past surgical history and problem list     Review of Systems   Constitutional: Negative  HENT: Positive for congestion and sore throat  Negative for trouble swallowing  Respiratory: Positive for cough  Negative for shortness of breath  Cardiovascular: Negative  Genitourinary: Positive for vaginal discharge  Musculoskeletal: Negative  Objective:      /80 (BP Location: Left arm, Patient Position: Sitting, Cuff Size: Large)   Pulse 81   Temp (!) 96 9 °F (36 1 °C) (Tympanic)   Resp 16   Ht 5' 5" (1 651 m)   Wt 102 kg (224 lb 3 2 oz)   LMP 11/18/2018   SpO2 97%   BMI 37 31 kg/m²          Physical Exam  Constitutional:       Appearance: She is well-developed  HENT:      Right Ear: Tympanic membrane normal  No drainage        Left Ear: Tympanic membrane normal  No drainage  Nose: Congestion present  Cardiovascular:      Rate and Rhythm: Normal rate and regular rhythm  Pulmonary:      Effort: Pulmonary effort is normal  No respiratory distress  Breath sounds: No stridor  Neurological:      General: No focal deficit present  Mental Status: She is alert and oriented to person, place, and time     Psychiatric:         Mood and Affect: Mood normal          Behavior: Behavior normal

## 2022-03-17 LAB — BACTERIA THROAT CULT: ABNORMAL

## 2022-03-21 LAB — VIT A SERPL-MCNC: 43.6 UG/DL (ref 20.1–62)

## 2022-04-05 DIAGNOSIS — F51.01 PRIMARY INSOMNIA: ICD-10-CM

## 2022-04-05 DIAGNOSIS — F41.9 ANXIETY AND DEPRESSION: ICD-10-CM

## 2022-04-05 DIAGNOSIS — F32.A ANXIETY AND DEPRESSION: ICD-10-CM

## 2022-04-06 RX ORDER — ALPRAZOLAM 0.5 MG/1
0.5 TABLET ORAL 2 TIMES DAILY PRN
Qty: 60 TABLET | Refills: 0 | Status: SHIPPED | OUTPATIENT
Start: 2022-04-06 | End: 2022-05-13 | Stop reason: SDUPTHER

## 2022-04-06 NOTE — TELEPHONE ENCOUNTER
Medication:  PDMP   03/04/2022 03/04/2022 ALPRAZolam (Tablet)  60 0 30 0 5 MG DESMOND ZAPATA       Active agreement on file -No

## 2022-04-09 ENCOUNTER — IMMUNIZATIONS (OUTPATIENT)
Dept: FAMILY MEDICINE CLINIC | Facility: HOSPITAL | Age: 47
End: 2022-04-09

## 2022-04-09 DIAGNOSIS — E03.9 HYPOTHYROIDISM, UNSPECIFIED TYPE: ICD-10-CM

## 2022-04-09 PROCEDURE — 91305 COVID-19 PFIZER VACC TRIS-SUCROSE GRAY CAP 0.3 ML: CPT

## 2022-04-09 PROCEDURE — 0054A COVID-19 PFIZER VACC TRIS-SUCROSE GRAY CAP 0.3 ML: CPT

## 2022-04-09 RX ORDER — LEVOTHYROXINE SODIUM 0.05 MG/1
TABLET ORAL
Qty: 90 TABLET | Refills: 0 | Status: SHIPPED | OUTPATIENT
Start: 2022-04-09 | End: 2022-08-07 | Stop reason: SDUPTHER

## 2022-04-14 DIAGNOSIS — E11.9 TYPE 2 DIABETES MELLITUS WITHOUT COMPLICATION, WITHOUT LONG-TERM CURRENT USE OF INSULIN (HCC): ICD-10-CM

## 2022-04-14 RX ORDER — LIRAGLUTIDE 6 MG/ML
1.8 INJECTION SUBCUTANEOUS DAILY
Qty: 27 ML | Refills: 0 | Status: SHIPPED | OUTPATIENT
Start: 2022-04-14 | End: 2022-06-30 | Stop reason: SDUPTHER

## 2022-05-06 ENCOUNTER — RA CDI HCC (OUTPATIENT)
Dept: OTHER | Facility: HOSPITAL | Age: 47
End: 2022-05-06

## 2022-05-06 NOTE — PROGRESS NOTES
[facility]  coding opportunities       Chart Reviewed number of suggestions sent to Provider: 1     Patients Insurance     Commercial Insurance: [employer] on 5/13/22    Depression: - Can Depression be further classified using more specific code from any of the following ? F33 0  Major depressive disorder, recurrent, mild (HCC) OR   F33 1: Major depressive disorder, recurrent, moderate (HCC)  OR  F33 2  Major depressive disorder, recurrent, severe without psychotic features ([facility] [address] ) OR  F33 3: Major depressive disorder, recurrent, severe, with psychotic symptoms ([facility] [address] ) OR   F33 40: Major depressive disorder, recurrent, in remission, unspecified ([facility] [address] ) OR  F33 41:  Major depressive disorder, recurrent, in partial remission (HCC) OR  F33 42   Major depressive disorder, recurrent, in full remission ([facility] [address] )    OR    F33 8   Other recurrent depressive disorders ([facility] [address] ) OR  F33 9   Major depressive disorder, recurrent, unspecified ([facility] )

## 2022-05-13 ENCOUNTER — OFFICE VISIT (OUTPATIENT)
Dept: FAMILY MEDICINE CLINIC | Facility: CLINIC | Age: 47
End: 2022-05-13
Payer: COMMERCIAL

## 2022-05-13 VITALS
RESPIRATION RATE: 16 BRPM | WEIGHT: 225.6 LBS | HEIGHT: 65 IN | BODY MASS INDEX: 37.59 KG/M2 | TEMPERATURE: 97.1 F | HEART RATE: 74 BPM | DIASTOLIC BLOOD PRESSURE: 78 MMHG | SYSTOLIC BLOOD PRESSURE: 120 MMHG | OXYGEN SATURATION: 95 %

## 2022-05-13 DIAGNOSIS — F32.A ANXIETY AND DEPRESSION: ICD-10-CM

## 2022-05-13 DIAGNOSIS — F51.01 PRIMARY INSOMNIA: ICD-10-CM

## 2022-05-13 DIAGNOSIS — E03.9 ACQUIRED HYPOTHYROIDISM: ICD-10-CM

## 2022-05-13 DIAGNOSIS — E11.69 DIABETES MELLITUS TYPE 2 IN OBESE (HCC): Primary | ICD-10-CM

## 2022-05-13 DIAGNOSIS — K91.2 POSTSURGICAL MALABSORPTION: Chronic | ICD-10-CM

## 2022-05-13 DIAGNOSIS — D68.62 LUPUS ANTICOAGULANT DISORDER (HCC): ICD-10-CM

## 2022-05-13 DIAGNOSIS — E66.9 DIABETES MELLITUS TYPE 2 IN OBESE (HCC): Primary | ICD-10-CM

## 2022-05-13 DIAGNOSIS — F41.9 ANXIETY AND DEPRESSION: ICD-10-CM

## 2022-05-13 DIAGNOSIS — Z12.31 VISIT FOR SCREENING MAMMOGRAM: ICD-10-CM

## 2022-05-13 DIAGNOSIS — M15.0 PRIMARY GENERALIZED (OSTEO)ARTHRITIS: ICD-10-CM

## 2022-05-13 DIAGNOSIS — E66.9 OBESITY, CLASS I, BMI 30-34.9: ICD-10-CM

## 2022-05-13 PROBLEM — M21.372 LEFT FOOT DROP: Status: RESOLVED | Noted: 2020-05-05 | Resolved: 2022-05-13

## 2022-05-13 PROCEDURE — 99214 OFFICE O/P EST MOD 30 MIN: CPT | Performed by: FAMILY MEDICINE

## 2022-05-13 PROCEDURE — 3008F BODY MASS INDEX DOCD: CPT | Performed by: FAMILY MEDICINE

## 2022-05-13 PROCEDURE — 3074F SYST BP LT 130 MM HG: CPT | Performed by: FAMILY MEDICINE

## 2022-05-13 PROCEDURE — 3078F DIAST BP <80 MM HG: CPT | Performed by: FAMILY MEDICINE

## 2022-05-13 RX ORDER — ALPRAZOLAM 0.5 MG/1
0.5 TABLET ORAL 2 TIMES DAILY PRN
Qty: 60 TABLET | Refills: 0 | Status: SHIPPED | OUTPATIENT
Start: 2022-05-13 | End: 2022-06-27 | Stop reason: SDUPTHER

## 2022-05-13 NOTE — PROGRESS NOTES
Assessment/Plan:    Diabetes mellitus type 2 in obese [facility])    Lab Results   Component Value Date    HGBA1C 5 2 03/05/2022   stable on current meds    Hypothyroidism  Doing well on current meds    Lupus anticoagulant disorder ([facility] [address] )  Stable on current meds  Care per Rheumatology every 6 months     Primary generalized (osteo)arthritis  Stable on current meds     Obesity, Class I, BMI 30-34 9  Diet and exercise encouraged     Postsurgical malabsorption  Multi-vitamin as directed   Sees weight loss management        Diagnoses and all orders for this visit:    Diabetes mellitus type 2 in obese ([facility] [address] )  -     CBC and differential  -     Comprehensive metabolic panel  -     Microalbumin / creatinine urine ratio  -     Lipid Panel with Direct LDL reflex; Future  -     Hemoglobin A1C    Primary insomnia  -     ALPRAZolam (XANAX) 0 5 mg tablet; Take 1 tablet (0 5 mg total) by mouth as needed in the morning and 1 tablet (0 5 mg total) as needed in the evening for anxiety  Anxiety and depression  -     ALPRAZolam (XANAX) 0 5 mg tablet; Take 1 tablet (0 5 mg total) by mouth as needed in the morning and 1 tablet (0 5 mg total) as needed in the evening for anxiety  Acquired hypothyroidism  -     TSH, 3rd generation with Free T4 reflex; Future    Lupus anticoagulant disorder (HCC)    Primary generalized (osteo)arthritis    Obesity, Class I, BMI 30-34 9    Postsurgical malabsorption    Visit for screening mammogram  -     Mammo screening bilateral w 3d & cad; Future        Subjective:      Patient ID: Stormy Ramey is a 52 y o  female      HPI  Here for follow up  Feeling well overall  Started last week with cutting back on carbs and portion control  Walking 2 x a week       The following portions of the patient's history were reviewed and updated as appropriate: allergies, current medications, past family history, past medical history, past social history, past surgical history and problem list     Review of Systems Constitutional: Negative  HENT: Negative  Respiratory: Negative  Genitourinary: Negative  Musculoskeletal: Negative  Hematological: Negative  Psychiatric/Behavioral: Negative  Objective:      /78 (BP Location: Left arm, Patient Position: Sitting, Cuff Size: Large)   Pulse 74   Temp (!) 97 1 °F (36 2 °C) (Tympanic)   Resp 16   Ht 5' 5" (1 651 m)   Wt 102 kg (225 lb 9 6 oz)   LMP 11/18/2018   SpO2 95%   BMI 37 54 kg/m²          Physical Exam  Constitutional:       Appearance: Normal appearance  Cardiovascular:      Rate and Rhythm: Normal rate and regular rhythm  Pulses: Normal pulses  no weak pulses          Dorsalis pedis pulses are 2+ on the right side and 2+ on the left side  Posterior tibial pulses are 2+ on the right side and 2+ on the left side  Heart sounds: Normal heart sounds  Pulmonary:      Effort: Pulmonary effort is normal       Breath sounds: Normal breath sounds  Feet:      Right foot:      Skin integrity: No ulcer, skin breakdown, erythema, warmth, callus or dry skin  Left foot:      Skin integrity: No ulcer, skin breakdown, erythema, warmth, callus or dry skin  Neurological:      General: No focal deficit present  Mental Status: She is alert and oriented to person, place, and time  Psychiatric:         Mood and Affect: Mood normal          Behavior: Behavior normal        Patient's shoes and socks removed  Right Foot/Ankle   Right Foot Inspection  Skin Exam: skin normal and skin intact  No dry skin, no warmth, no callus, no erythema, no maceration, no abnormal color, no pre-ulcer, no ulcer and no callus  Toe Exam: ROM and strength within normal limits  Sensory   Vibration: intact  Proprioception: intact  Monofilament testing: intact    Vascular  Capillary refills: < 3 seconds  The right DP pulse is 2+  The right PT pulse is 2+  Left Foot/Ankle  Left Foot Inspection  Skin Exam: skin normal and skin intact  No dry skin, no warmth, no erythema, no maceration, normal color, no pre-ulcer, no ulcer and no callus  Toe Exam: ROM and strength within normal limits  Sensory   Vibration: intact  Proprioception: intact  Monofilament testing: intact    Vascular  Capillary refills: < 3 seconds  The left DP pulse is 2+  The left PT pulse is 2+       Assign Risk Category  No deformity present  No loss of protective sensation  No weak pulses  Risk: 0

## 2022-06-27 DIAGNOSIS — F32.A ANXIETY AND DEPRESSION: ICD-10-CM

## 2022-06-27 DIAGNOSIS — F51.01 PRIMARY INSOMNIA: ICD-10-CM

## 2022-06-27 DIAGNOSIS — F41.9 ANXIETY AND DEPRESSION: ICD-10-CM

## 2022-06-27 RX ORDER — ALPRAZOLAM 0.5 MG/1
0.5 TABLET ORAL 2 TIMES DAILY PRN
Qty: 60 TABLET | Refills: 0 | Status: SHIPPED | OUTPATIENT
Start: 2022-06-27 | End: 2022-08-07 | Stop reason: SDUPTHER

## 2022-06-27 NOTE — TELEPHONE ENCOUNTER
Medication: Xanax 0 5 mg Tablet  PDMP   1  [id number] 05/13/2022 05/13/2022 ALPRAZolam (Tablet)  60 0 30 0 5 MG DESMOND ZAPATA            Active agreement on file -No

## 2022-06-30 DIAGNOSIS — E11.9 TYPE 2 DIABETES MELLITUS WITHOUT COMPLICATION, WITHOUT LONG-TERM CURRENT USE OF INSULIN (HCC): ICD-10-CM

## 2022-06-30 RX ORDER — LIRAGLUTIDE 6 MG/ML
1.8 INJECTION SUBCUTANEOUS DAILY
Qty: 27 ML | Refills: 3 | Status: SHIPPED | OUTPATIENT
Start: 2022-06-30 | End: 2022-07-30

## 2022-07-23 DIAGNOSIS — E66.9 DIABETES MELLITUS TYPE 2 IN OBESE (HCC): ICD-10-CM

## 2022-07-23 DIAGNOSIS — E11.69 DIABETES MELLITUS TYPE 2 IN OBESE (HCC): ICD-10-CM

## 2022-07-25 RX ORDER — ATORVASTATIN CALCIUM 10 MG/1
10 TABLET, FILM COATED ORAL DAILY
Qty: 90 TABLET | Refills: 0 | Status: SHIPPED | OUTPATIENT
Start: 2022-07-25

## 2022-08-07 DIAGNOSIS — F41.9 ANXIETY AND DEPRESSION: ICD-10-CM

## 2022-08-07 DIAGNOSIS — E03.9 HYPOTHYROIDISM, UNSPECIFIED TYPE: ICD-10-CM

## 2022-08-07 DIAGNOSIS — F51.01 PRIMARY INSOMNIA: ICD-10-CM

## 2022-08-07 DIAGNOSIS — F32.A ANXIETY AND DEPRESSION: ICD-10-CM

## 2022-08-08 RX ORDER — ALPRAZOLAM 0.5 MG/1
0.5 TABLET ORAL 2 TIMES DAILY PRN
Qty: 60 TABLET | Refills: 0 | Status: SHIPPED | OUTPATIENT
Start: 2022-08-08 | End: 2022-09-21 | Stop reason: SDUPTHER

## 2022-08-08 RX ORDER — LEVOTHYROXINE SODIUM 0.05 MG/1
50 TABLET ORAL DAILY
Qty: 90 TABLET | Refills: 0 | Status: SHIPPED | OUTPATIENT
Start: 2022-08-08 | End: 2022-10-15

## 2022-08-08 NOTE — TELEPHONE ENCOUNTER
Medication:  PDMP   06/27/2022 06/27/2022 ALPRAZolam (Tablet)  60 0 30 0 5 MG DESMOND ZAPATA     Active agreement on file -No

## 2022-08-27 ENCOUNTER — APPOINTMENT (OUTPATIENT)
Dept: LAB | Facility: MEDICAL CENTER | Age: 47
End: 2022-08-27
Payer: COMMERCIAL

## 2022-08-27 DIAGNOSIS — E11.69 DIABETES MELLITUS TYPE 2 IN OBESE (HCC): ICD-10-CM

## 2022-08-27 DIAGNOSIS — M79.7 FIBROMYALGIA: ICD-10-CM

## 2022-08-27 DIAGNOSIS — Z79.899 ENCOUNTER FOR LONG-TERM (CURRENT) USE OF OTHER MEDICATIONS: ICD-10-CM

## 2022-08-27 DIAGNOSIS — E66.9 DIABETES MELLITUS TYPE 2 IN OBESE (HCC): ICD-10-CM

## 2022-08-27 DIAGNOSIS — E03.9 ACQUIRED HYPOTHYROIDISM: ICD-10-CM

## 2022-08-27 LAB
ALBUMIN SERPL BCP-MCNC: 2.9 G/DL (ref 3.5–5)
ALP SERPL-CCNC: 132 U/L (ref 46–116)
ALT SERPL W P-5'-P-CCNC: 45 U/L (ref 12–78)
ANION GAP SERPL CALCULATED.3IONS-SCNC: 2 MMOL/L (ref 4–13)
AST SERPL W P-5'-P-CCNC: 38 U/L (ref 5–45)
BASOPHILS # BLD AUTO: 0.05 THOUSANDS/ΜL (ref 0–0.1)
BASOPHILS NFR BLD AUTO: 1 % (ref 0–1)
BILIRUB SERPL-MCNC: 0.4 MG/DL (ref 0.2–1)
BUN SERPL-MCNC: 5 MG/DL (ref 5–25)
CALCIUM ALBUM COR SERPL-MCNC: 9.5 MG/DL (ref 8.3–10.1)
CALCIUM SERPL-MCNC: 8.6 MG/DL (ref 8.3–10.1)
CHLORIDE SERPL-SCNC: 106 MMOL/L (ref 96–108)
CHOLEST SERPL-MCNC: 129 MG/DL
CO2 SERPL-SCNC: 32 MMOL/L (ref 21–32)
CREAT SERPL-MCNC: 0.74 MG/DL (ref 0.6–1.3)
CREAT UR-MCNC: 35.4 MG/DL
EOSINOPHIL # BLD AUTO: 0.73 THOUSAND/ΜL (ref 0–0.61)
EOSINOPHIL NFR BLD AUTO: 8 % (ref 0–6)
ERYTHROCYTE [DISTWIDTH] IN BLOOD BY AUTOMATED COUNT: 13.9 % (ref 11.6–15.1)
EST. AVERAGE GLUCOSE BLD GHB EST-MCNC: 117 MG/DL
GFR SERPL CREATININE-BSD FRML MDRD: 96 ML/MIN/1.73SQ M
GLUCOSE P FAST SERPL-MCNC: 93 MG/DL (ref 65–99)
HBA1C MFR BLD: 5.7 %
HCT VFR BLD AUTO: 41.6 % (ref 34.8–46.1)
HDLC SERPL-MCNC: 45 MG/DL
HGB BLD-MCNC: 12.8 G/DL (ref 11.5–15.4)
IMM GRANULOCYTES # BLD AUTO: 0.04 THOUSAND/UL (ref 0–0.2)
IMM GRANULOCYTES NFR BLD AUTO: 0 % (ref 0–2)
LDLC SERPL CALC-MCNC: 59 MG/DL (ref 0–100)
LYMPHOCYTES # BLD AUTO: 1.18 THOUSANDS/ΜL (ref 0.6–4.47)
LYMPHOCYTES NFR BLD AUTO: 13 % (ref 14–44)
MCH RBC QN AUTO: 30 PG (ref 26.8–34.3)
MCHC RBC AUTO-ENTMCNC: 30.8 G/DL (ref 31.4–37.4)
MCV RBC AUTO: 98 FL (ref 82–98)
MICROALBUMIN UR-MCNC: <5 MG/L (ref 0–20)
MICROALBUMIN/CREAT 24H UR: <14 MG/G CREATININE (ref 0–30)
MONOCYTES # BLD AUTO: 0.77 THOUSAND/ΜL (ref 0.17–1.22)
MONOCYTES NFR BLD AUTO: 9 % (ref 4–12)
NEUTROPHILS # BLD AUTO: 6.16 THOUSANDS/ΜL (ref 1.85–7.62)
NEUTS SEG NFR BLD AUTO: 69 % (ref 43–75)
NRBC BLD AUTO-RTO: 0 /100 WBCS
PLATELET # BLD AUTO: 267 THOUSANDS/UL (ref 149–390)
PMV BLD AUTO: 11.3 FL (ref 8.9–12.7)
POTASSIUM SERPL-SCNC: 4.2 MMOL/L (ref 3.5–5.3)
PROT SERPL-MCNC: 7.2 G/DL (ref 6.4–8.4)
RBC # BLD AUTO: 4.26 MILLION/UL (ref 3.81–5.12)
SODIUM SERPL-SCNC: 140 MMOL/L (ref 135–147)
TRIGL SERPL-MCNC: 126 MG/DL
TSH SERPL DL<=0.05 MIU/L-ACNC: 2.71 UIU/ML (ref 0.45–4.5)
WBC # BLD AUTO: 8.93 THOUSAND/UL (ref 4.31–10.16)

## 2022-08-27 PROCEDURE — 80061 LIPID PANEL: CPT

## 2022-08-27 PROCEDURE — 84443 ASSAY THYROID STIM HORMONE: CPT

## 2022-08-31 DIAGNOSIS — R74.8 ELEVATED ALKALINE PHOSPHATASE LEVEL: Primary | ICD-10-CM

## 2022-09-01 ENCOUNTER — HOSPITAL ENCOUNTER (OUTPATIENT)
Dept: ULTRASOUND IMAGING | Facility: HOSPITAL | Age: 47
Discharge: HOME | End: 2022-09-01
Payer: COMMERCIAL

## 2022-09-01 DIAGNOSIS — R74.8 ELEVATED ALKALINE PHOSPHATASE LEVEL: ICD-10-CM

## 2022-09-01 PROCEDURE — 76700 US EXAM ABDOM COMPLETE: CPT

## 2022-09-07 ENCOUNTER — TELEPHONE (OUTPATIENT)
Dept: FAMILY MEDICINE CLINIC | Facility: CLINIC | Age: 47
End: 2022-09-07

## 2022-09-07 DIAGNOSIS — K76.9 LIVER LESION: Primary | ICD-10-CM

## 2022-09-07 NOTE — TELEPHONE ENCOUNTER
[facility] radiology called with significant findings for the [address]  Requests Provider to review

## 2022-09-14 ENCOUNTER — HOSPITAL ENCOUNTER (OUTPATIENT)
Dept: CT IMAGING | Facility: HOSPITAL | Age: 47
Discharge: HOME | End: 2022-09-14
Payer: COMMERCIAL

## 2022-09-14 DIAGNOSIS — K76.9 LIVER LESION: ICD-10-CM

## 2022-09-14 PROCEDURE — 74177 CT ABD & PELVIS W/CONTRAST: CPT

## 2022-09-14 PROCEDURE — G1004 CDSM NDSC: HCPCS

## 2022-09-14 RX ADMIN — IOHEXOL 100 ML: 350 INJECTION, SOLUTION INTRAVENOUS at 08:16

## 2022-09-21 DIAGNOSIS — F32.A ANXIETY AND DEPRESSION: ICD-10-CM

## 2022-09-21 DIAGNOSIS — F51.01 PRIMARY INSOMNIA: ICD-10-CM

## 2022-09-21 DIAGNOSIS — F41.9 ANXIETY AND DEPRESSION: ICD-10-CM

## 2022-09-22 RX ORDER — ALPRAZOLAM 0.5 MG/1
0.5 TABLET ORAL 2 TIMES DAILY PRN
Qty: 60 TABLET | Refills: 0 | Status: SHIPPED | OUTPATIENT
Start: 2022-09-22 | End: 2022-10-25 | Stop reason: SDUPTHER

## 2022-09-22 NOTE — TELEPHONE ENCOUNTER
Medication:  PDMP   08/08/2022 08/08/2022 ALPRAZolam (Tablet)  60 0 30 0 5 MG DESMOND ZAPATA     Active agreement on file -No

## 2022-10-15 DIAGNOSIS — E03.9 HYPOTHYROIDISM, UNSPECIFIED TYPE: ICD-10-CM

## 2022-10-15 RX ORDER — LEVOTHYROXINE SODIUM 0.05 MG/1
TABLET ORAL
Qty: 90 TABLET | Refills: 0 | Status: SHIPPED | OUTPATIENT
Start: 2022-10-15

## 2022-10-21 ENCOUNTER — TELEMEDICINE (OUTPATIENT)
Dept: FAMILY MEDICINE CLINIC | Facility: CLINIC | Age: 47
End: 2022-10-21
Payer: COMMERCIAL

## 2022-10-21 VITALS
HEART RATE: 96 BPM | DIASTOLIC BLOOD PRESSURE: 78 MMHG | BODY MASS INDEX: 36.32 KG/M2 | SYSTOLIC BLOOD PRESSURE: 122 MMHG | TEMPERATURE: 99.1 F | WEIGHT: 218 LBS | HEIGHT: 65 IN

## 2022-10-21 DIAGNOSIS — E03.9 ACQUIRED HYPOTHYROIDISM: ICD-10-CM

## 2022-10-21 DIAGNOSIS — E66.9 DIABETES MELLITUS TYPE 2 IN OBESE (HCC): ICD-10-CM

## 2022-10-21 DIAGNOSIS — Z11.59 NEED FOR HEPATITIS C SCREENING TEST: ICD-10-CM

## 2022-10-21 DIAGNOSIS — E66.09 CLASS 1 OBESITY DUE TO EXCESS CALORIES WITH SERIOUS COMORBIDITY AND BODY MASS INDEX (BMI) OF 34.0 TO 34.9 IN ADULT: ICD-10-CM

## 2022-10-21 DIAGNOSIS — E11.69 DIABETES MELLITUS TYPE 2 IN OBESE (HCC): ICD-10-CM

## 2022-10-21 DIAGNOSIS — J01.00 ACUTE NON-RECURRENT MAXILLARY SINUSITIS: Primary | ICD-10-CM

## 2022-10-21 PROCEDURE — 99214 OFFICE O/P EST MOD 30 MIN: CPT | Performed by: FAMILY MEDICINE

## 2022-10-21 RX ORDER — BROMPHENIRAMINE MALEATE, PSEUDOEPHEDRINE HYDROCHLORIDE, AND DEXTROMETHORPHAN HYDROBROMIDE 2; 30; 10 MG/5ML; MG/5ML; MG/5ML
5 SYRUP ORAL 4 TIMES DAILY PRN
Qty: 200 ML | Refills: 0 | Status: SHIPPED | OUTPATIENT
Start: 2022-10-21

## 2022-10-21 RX ORDER — AMOXICILLIN AND CLAVULANATE POTASSIUM 875; 125 MG/1; MG/1
1 TABLET, FILM COATED ORAL EVERY 12 HOURS SCHEDULED
Qty: 20 TABLET | Refills: 0 | Status: SHIPPED | OUTPATIENT
Start: 2022-10-21 | End: 2022-10-31

## 2022-10-21 NOTE — ASSESSMENT & PLAN NOTE
Lab Results   Component Value Date    HGBA1C 5 7 (H) 08/27/2022   stable on current meds  Ordered labs for next visit

## 2022-10-21 NOTE — PROGRESS NOTES
COVID-19 Outpatient Progress Note    Assessment/Plan:    Problem List Items Addressed This Visit        Endocrine    Hypothyroidism    Relevant Orders    TSH, 3rd generation with Free T4 reflex    Diabetes mellitus type 2 in obese [facility])       Lab Results   Component Value Date    HGBA1C 5 7 (H) 08/27/2022   stable on current meds  Ordered labs for next visit         Relevant Orders    Hemoglobin A1C    Comprehensive metabolic panel    CBC and differential    Lipid panel       Other    Class 1 obesity due to excess calories with serious comorbidity in adult     Diet ane exercise discussed           Other Visit Diagnoses     Acute non-recurrent maxillary sinusitis    -  Primary    Relevant Medications    amoxicillin-clavulanate (AUGMENTIN) 875-125 mg per tablet    brompheniramine-pseudoephedrine-DM 30-2-10 MG/5ML syrup    Need for hepatitis C screening test        Relevant Orders    Hepatitis C antibody         Disposition:     After clarifying the patient's history, my suspicion for COVID-19 infection is very low  I have spent 15 minutes directly with the patient  Encounter provider: Ilda Oscar MD     Provider located at: [address] [address]     Recent Visits  No visits were found meeting these conditions  Showing recent visits within past 7 days and meeting all other requirements  Today's Visits  Date Type Provider Dept   10/21/22 Telemedicine Ilda Oscar MD [facility] today's visits and meeting all other requirements  Future Appointments  No visits were found meeting these conditions  Showing future appointments within next 150 days and meeting all other requirements     This virtual check-in was done via [address] and patient was informed that this is a secure, HIPAA-compliant platform  She agrees to proceed      Patient agrees to participate in a virtual check in via telephone or video visit instead of presenting to the office to address urgent/immediate medical needs  Patient is aware this is a billable service  She acknowledged consent and understanding of privacy and security of the video platform  The patient has agreed to participate and understands they can discontinue the visit at any time  After connecting through [facility], the patient was identified by name and date of birth  Sujata Claros was informed that this was a telemedicine visit and that the exam was being conducted confidentially over secure lines  My office door was closed  No one else was in the room  Sujata Claros acknowledged consent and understanding of privacy and security of the telemedicine visit  I informed the patient that I have reviewed her record in Epic and presented the opportunity for her to ask any questions regarding the visit today  The patient agreed to participate  Verification of patient location:  Patient is located in the following state in which I hold an active license: [place]    Subjective:   Sujata Claros is a 52 y o  female who is concerned about COVID-19  Patient's symptoms include fatigue, nasal congestion, rhinorrhea and cough  Patient denies fever, chills, malaise, sore throat, anosmia, loss of taste, shortness of breath, chest tightness, abdominal pain, nausea, vomiting, diarrhea, myalgias and headaches       - Date of symptom onset: 10/7/2022      COVID-19 vaccination status: Fully vaccinated with booster    Exposure:   Contact with a person who is under investigation (PUI) for or who is positive for COVID-19 within the last 14 days?: No    Hospitalized recently for fever and/or lower respiratory symptoms?: No      Currently a healthcare worker that is involved in direct patient care?: No      Works in a special setting where the risk of COVID-19 transmission may be high? (this may include long-term care, correctional and residential facilities; homeless shelters; assisted-living facilities and group homes ): No Resident in a special setting where the risk of COVID-19 transmission may be high? (this may include long-term care, correctional and senior care facilities; homeless shelters; assisted-living facilities and group homes ): No      covid test negative x 4 at home     No results found for: SARSCOV2, [address] [facility], [address] [facility], CORONAVIRUSR, [facility], [address] [facility]    Review of Systems   Constitutional: Positive for fatigue  Negative for chills and fever  HENT: Positive for congestion and rhinorrhea  Negative for sore throat  Respiratory: Positive for cough  Negative for chest tightness and shortness of breath  Gastrointestinal: Negative for abdominal pain, diarrhea, nausea and vomiting  Musculoskeletal: Negative for myalgias  Neurological: Negative for headaches       Current Outpatient Medications on File Prior to Visit   Medication Sig   • ALPRAZolam (XANAX) 0 5 mg tablet Take 1 tablet (0 5 mg total) by mouth 2 (two) times a day as needed for anxiety   • atorvastatin (LIPITOR) 10 mg tablet Take 1 tablet (10 mg total) by mouth daily   • BIOTIN PO Take 600 mg by mouth every other day   • buPROPion (WELLBUTRIN SR) 150 mg 12 hr tablet Take 1 tablet (150 mg total) by mouth 2 (two) times a day   • Calcium Citrate-Vitamin D (MINE-CITRATE PLUS VITAMIN D PO) Take by mouth in the morning   • Cholecalciferol (VITAMIN D3) 2000 units capsule Take 2,000 Units by mouth daily    • Cyanocobalamin (B-12) 1000 MCG CAPS Take 1 capsule by mouth daily    • DULoxetine (CYMBALTA) 60 mg delayed release capsule Take 1 capsule (60 mg total) by mouth daily   • gabapentin (NEURONTIN) 300 mg capsule Take 1 capsule (300 mg total) by mouth 3 (three) times a day   • levothyroxine 50 mcg tablet Take 1 tablet by mouth once daily   • lisinopril (ZESTRIL) 10 mg tablet 5 mg every other day   • Multiple Vitamin (multivitamin) tablet Take 1 tablet by mouth daily   • nystatin (MYCOSTATIN) cream Apply topically 1-2 times a day as needed to abdomen rash   • triamcinolone (KENALOG) 0 1 % ointment APPLY TOPICALLY TO AFFECTED AREA 1-2 TIMES A DAY AS NEEDED TO ITCHY RASH   • Victoza injection Inject 0 3 mL (1 8 mg total) under the skin daily       Objective:    /78   Pulse 96   Temp 99 1 °F (37 3 °C)   Ht 5' 5 25" (1 657 m)   Wt 98 9 kg (218 lb)   LMP 11/18/2018   BMI 36 00 kg/m²      Physical Exam  Constitutional:       Appearance: Normal appearance  Pulmonary:      Effort: Pulmonary effort is normal  No respiratory distress  Neurological:      Mental Status: She is alert     Psychiatric:         Mood and Affect: Mood normal        Kezia Holloway MD

## 2022-10-25 DIAGNOSIS — F51.01 PRIMARY INSOMNIA: ICD-10-CM

## 2022-10-25 DIAGNOSIS — F41.9 ANXIETY AND DEPRESSION: ICD-10-CM

## 2022-10-25 DIAGNOSIS — F32.A ANXIETY AND DEPRESSION: ICD-10-CM

## 2022-10-25 RX ORDER — ALPRAZOLAM 0.5 MG/1
0.5 TABLET ORAL 2 TIMES DAILY PRN
Qty: 60 TABLET | Refills: 0 | Status: SHIPPED | OUTPATIENT
Start: 2022-10-25

## 2022-10-25 NOTE — TELEPHONE ENCOUNTER
Medication:  PDMP   09/22/2022 09/22/2022 ALPRAZolam (Tablet)  60 0 30 0 5 MG DESMOND ZAPATA           Active agreement on file -No

## 2022-11-21 NOTE — PATIENT INSTRUCTIONS
[facility] HOSPITALIST PROGRESS NOTE    Subjective     History:   Tre Kinney is a 89 y.o. female admitted on 10/5/2022 secondary to <principal problem not specified>     Procedures: None    CC: Follow up debility    Patient seen and examined with SONAM Kurtz. Awake and alert. Sitting in bedside chair during my encounter. Reports some RLE pain/weakness when getting up to bedside chair today. No reported CP, dyspnea or palpitations. No reported nausea or vomiting.  No acute events overnight per RN.     History taken from: patient, chart, and RN.      Objective     Vital Signs  Temp:  [97.7 °F (36.5 °C)-98.1 °F (36.7 °C)] 97.7 °F (36.5 °C)  Heart Rate:  [77-82] 79  Resp:  [16-18] 16  BP: (109-162)/(58-78) 141/64    Intake/Output Summary (Last 24 hours) at 11/21/2022 1755  Last data filed at 11/21/2022 1629  Gross per 24 hour   Intake 960 ml   Output 2050 ml   Net -1090 ml         Physical Exam:  General:    Awake, alert, in no acute distress, chronically ill appearing   Heart:      Normal S1 and S2. Regular rate and rhythm. No significant murmur, rubs or gallops appreciated.   Lungs:     Respirations regular, even and unlabored. Lungs clear to auscultation B/L. No wheezes, rales or rhonchi.   Abdomen:   Soft and nontender. No guarding, rebound tenderness or  organomegaly noted. Bowel sounds present x 4.   Extremities:  No lower extremity edema. Moves UE and LE equally B/L with generalized weakness.     Results Review:    Results from last 7 days   Lab Units 11/20/22  1218 11/17/22  0208   WBC 10*3/mm3 7.44 7.56   HEMOGLOBIN g/dL 10.7* 9.7*   PLATELETS 10*3/mm3 250 236     Results from last 7 days   Lab Units 11/20/22  1039 11/17/22  0208   SODIUM mmol/L 136 139   POTASSIUM mmol/L 4.1 3.8   CHLORIDE mmol/L 102 101   CO2 mmol/L 18.4* 26.9   BUN mg/dL 29* 25*   CREATININE mg/dL 1.94* 1.86*   CALCIUM mg/dL 8.7 8.4*   GLUCOSE mg/dL 120* 102*                       Imaging Results (Last 24 Hours)     ** No  Wellness Visit for Adults   AMBULATORY CARE:   A wellness visit  is when you see your healthcare provider to get screened for health problems  Your healthcare provider will also give you advice on how to stay healthy  Write down your questions so you remember to ask them  Ask your healthcare provider how often you should have a wellness visit  What happens at a wellness visit:  Your healthcare provider will ask about your health, and your family history of health problems  This includes high blood pressure, heart disease, and cancer  He or she will ask if you have symptoms that concern you, if you smoke, and about your mood  You may also be asked about your intake of medicines, supplements, food, and alcohol  Any of the following may be done:  · Your weight  will be checked  Your height may also be checked so your body mass index (BMI) can be calculated  Your BMI shows if you are at a healthy weight  · Your blood pressure  and heart rate will be checked  Your temperature may also be checked  · Blood and urine tests  may be done  Blood tests may be done to check your cholesterol levels  Abnormal cholesterol levels increase your risk for heart disease and stroke  You may also need a blood or urine test to check for diabetes if you are at increased risk  Urine tests may be done to look for signs of an infection or kidney disease  · A physical exam  includes checking your heartbeat and lungs with a stethoscope  Your healthcare provider may also check your skin to look for sun damage  · Screening tests  may be recommended  A screening test is done to check for diseases that may not cause symptoms  The screening tests you may need depend on your age, gender, family history, and lifestyle habits  For example, colorectal screening may be recommended if you are 48years old or older  Screening tests you need if you are a woman:   · A Pap smear  is used to screen for cervical cancer   Pap smears are usually done every 3 to 5 years depending on your age  You may need them more often if you have had abnormal Pap smear test results in the past  Ask your healthcare provider how often you should have a Pap smear  · A mammogram  is an x-ray of your breasts to screen for breast cancer  Experts recommend mammograms every 2 years starting at age 48 years  You may need a mammogram at age 52 years or younger if you have an increased risk for breast cancer  Talk to your healthcare provider about when you should start having mammograms and how often you need them  Vaccines you may need:   · Get an influenza vaccine  every year  The influenza vaccine protects you from the flu  Several types of viruses cause the flu  The viruses change over time, so new vaccines are made each year  · Get a tetanus-diphtheria (Td) booster vaccine  every 10 years  This vaccine protects you against tetanus and diphtheria  Tetanus is a severe infection that may cause painful muscle spasms and lockjaw  Diphtheria is a severe bacterial infection that causes a thick covering in the back of your mouth and throat  · Get a human papillomavirus (HPV) vaccine  if you are female and aged 23 to 32 or male 23 to 24 and never received it  This vaccine protects you from HPV infection  HPV is the most common infection spread by sexual contact  HPV may also cause vaginal, penile, and anal cancers  · Get a pneumococcal vaccine  if you are aged 72 years or older  The pneumococcal vaccine is an injection given to protect you from pneumococcal disease  Pneumococcal disease is an infection caused by pneumococcal bacteria  The infection may cause pneumonia, meningitis, or an ear infection  · Get a shingles vaccine  if you are 60 or older, even if you have had shingles before  The shingles vaccine is an injection to protect you from the varicella-zoster virus  This is the same virus that causes chickenpox   Shingles is a painful rash that develops in people results found for the last 24 hours. **            Medications:  apixaban, 5 mg, Oral, Q12H  escitalopram, 10 mg, Oral, Daily  furosemide, 40 mg, Oral, BID  gabapentin, 300 mg, Oral, Nightly  hydrALAZINE, 5 mg, Oral, Q8H  melatonin, 10 mg, Oral, Nightly  metoprolol tartrate, 25 mg, Oral, Q12H  multivitamin, 1 tablet, Oral, Daily  pantoprazole, 40 mg, Oral, Q AM  sodium chloride, 10 mL, Intravenous, Q12H  sodium chloride, 10 mL, Intravenous, Q12H  tamsulosin, 0.4 mg, Oral, Daily      Pharmacy Consult,             Assessment & Plan   Generalized weakness/debility  Diffuse LLE DVT  DIANA on CKD IV  Acute blood loss anemia  Traumatic right gluteal hematoma  Mild hypokalemia   Paroxysmal Afib  Hx of SSS s/p pacemaker placement  Essential HTN  Reported hx of DVT's   Anxiety/depression  Advanced age    Plan  BP has fluctuated today but remains overall stable. Cont current regimen including hydralazine, metoprolol and lasix today. Remains weak but clinically stable. Tolerating Eliquis with no reported signs of bleeding. Labs from 11/20 reviewed revealing stable H&H and renal function.  Discussed with patient today who states she plans to return home at the time of discharge. Currently approved for swing bed through 11/28. Cont PT/OT as tolerated.     Disposition: Patient currently approved for swing bed through 11/28 with further review at that time. Pt planning to return home at time of discharge.          Mustapha Loredo, DO  11/21/22  17:55 EST   who had chickenpox or have been exposed to the virus  How to eat healthy:  My Plate is a model for planning healthy meals  It shows the types and amounts of foods that should go on your plate  Fruits and vegetables make up about half of your plate, and grains and protein make up the other half  A serving of dairy is included on the side of your plate  The amount of calories and serving sizes you need depends on your age, gender, weight, and height  Examples of healthy foods are listed below:  · Eat a variety of vegetables  such as dark green, red, and orange vegetables  You can also include canned vegetables low in sodium (salt) and frozen vegetables without added butter or sauces  · Eat a variety of fresh fruits , canned fruit in 100% juice, frozen fruit, and dried fruit  · Include whole grains  At least half of the grains you eat should be whole grains  Examples include whole-wheat bread, wheat pasta, brown rice, and whole-grain cereals such as oatmeal     · Eat a variety of protein foods such as seafood (fish and shellfish), lean meat, and poultry without skin (turkey and chicken)  Examples of lean meats include pork leg, shoulder, or tenderloin, and beef round, sirloin, tenderloin, and extra lean ground beef  Other protein foods include eggs and egg substitutes, beans, peas, soy products, nuts, and seeds  · Choose low-fat dairy products such as skim or 1% milk or low-fat yogurt, cheese, and cottage cheese  · Limit unhealthy fats  such as butter, hard margarine, and shortening  Exercise:  Exercise at least 30 minutes per day on most days of the week  Some examples of exercise include walking, biking, dancing, and swimming  You can also fit in more physical activity by taking the stairs instead of the elevator or parking farther away from stores  Include muscle strengthening activities 2 days each week  Regular exercise provides many health benefits   It helps you manage your weight, and decreases your risk for type 2 diabetes, heart disease, stroke, and high blood pressure  Exercise can also help improve your mood  Ask your healthcare provider about the best exercise plan for you  General health and safety guidelines:   · Do not smoke  Nicotine and other chemicals in cigarettes and cigars can cause lung damage  Ask your healthcare provider for information if you currently smoke and need help to quit  E-cigarettes or smokeless tobacco still contain nicotine  Talk to your healthcare provider before you use these products  · Limit alcohol  A drink of alcohol is 12 ounces of beer, 5 ounces of wine, or 1½ ounces of liquor  · Lose weight, if needed  Being overweight increases your risk of certain health conditions  These include heart disease, high blood pressure, type 2 diabetes, and certain types of cancer  · Protect your skin  Do not sunbathe or use tanning beds  Use sunscreen with a SPF 15 or higher  Apply sunscreen at least 15 minutes before you go outside  Reapply sunscreen every 2 hours  Wear protective clothing, hats, and sunglasses when you are outside  · Drive safely  Always wear your seatbelt  Make sure everyone in your car wears a seatbelt  A seatbelt can save your life if you are in an accident  Do not use your cell phone when you are driving  This could distract you and cause an accident  Pull over if you need to make a call or send a text message  · Practice safe sex  Use latex condoms if are sexually active and have more than one partner  Your healthcare provider may recommend screening tests for sexually transmitted infections (STIs)  · Wear helmets, lifejackets, and protective gear  Always wear a helmet when you ride a bike or motorcycle, go skiing, or play sports that could cause a head injury  Wear protective equipment when you play sports  Wear a lifejacket when you are on a boat or doing water sports      © Copyright [employer] 2022 Information is for End User's use only and may not be sold, redistributed or otherwise used for commercial purposes  All illustrations and images included in CareNotes® are the copyrighted property of A D A M , Inc  or [employer]  The above information is an  only  It is not intended as medical advice for individual conditions or treatments  Talk to your doctor, nurse or pharmacist before following any medical regimen to see if it is safe and effective for you

## 2022-11-27 DIAGNOSIS — F51.01 PRIMARY INSOMNIA: ICD-10-CM

## 2022-11-27 DIAGNOSIS — F32.A ANXIETY AND DEPRESSION: ICD-10-CM

## 2022-11-27 DIAGNOSIS — F41.9 ANXIETY AND DEPRESSION: ICD-10-CM

## 2022-11-28 NOTE — TELEPHONE ENCOUNTER
Medication:  PDMP   10/25/2022  10/25/2022 ALPRAZolam (Tablet)  60 0 30 0 5 MG DESMOND ZAPATA       Active agreement on file -No

## 2022-11-29 DIAGNOSIS — F51.01 PRIMARY INSOMNIA: ICD-10-CM

## 2022-11-29 DIAGNOSIS — F32.A ANXIETY AND DEPRESSION: ICD-10-CM

## 2022-11-29 DIAGNOSIS — F41.9 ANXIETY AND DEPRESSION: ICD-10-CM

## 2022-11-30 DIAGNOSIS — F41.9 ANXIETY AND DEPRESSION: ICD-10-CM

## 2022-11-30 DIAGNOSIS — F32.A ANXIETY AND DEPRESSION: ICD-10-CM

## 2022-11-30 DIAGNOSIS — F51.01 PRIMARY INSOMNIA: ICD-10-CM

## 2022-11-30 RX ORDER — ALPRAZOLAM 0.5 MG/1
0.5 TABLET ORAL 2 TIMES DAILY PRN
Qty: 60 TABLET | Refills: 0 | Status: SHIPPED | OUTPATIENT
Start: 2022-11-30

## 2022-12-15 ENCOUNTER — RA CDI HCC (OUTPATIENT)
Dept: OTHER | Facility: HOSPITAL | Age: 47
End: 2022-12-15

## 2022-12-15 NOTE — PROGRESS NOTES
[facility]  coding opportunities       Chart reviewed, no opportunity found: CHART REVIEWED, NO OPPORTUNITY FOUND        Patients Insurance        Commercial Insurance: [address] [place] [address]

## 2022-12-23 ENCOUNTER — OFFICE VISIT (OUTPATIENT)
Dept: FAMILY MEDICINE CLINIC | Facility: CLINIC | Age: 47
End: 2022-12-23

## 2022-12-23 VITALS
BODY MASS INDEX: 39.55 KG/M2 | SYSTOLIC BLOOD PRESSURE: 120 MMHG | HEART RATE: 79 BPM | OXYGEN SATURATION: 97 % | DIASTOLIC BLOOD PRESSURE: 80 MMHG | RESPIRATION RATE: 16 BRPM | WEIGHT: 237.4 LBS | TEMPERATURE: 97.2 F | HEIGHT: 65 IN

## 2022-12-23 DIAGNOSIS — E11.69 DIABETES MELLITUS TYPE 2 IN OBESE (HCC): ICD-10-CM

## 2022-12-23 DIAGNOSIS — E11.9 TYPE 2 DIABETES MELLITUS WITHOUT COMPLICATION, WITHOUT LONG-TERM CURRENT USE OF INSULIN (HCC): Primary | ICD-10-CM

## 2022-12-23 DIAGNOSIS — I10 BENIGN HYPERTENSION: ICD-10-CM

## 2022-12-23 DIAGNOSIS — E66.9 DIABETES MELLITUS TYPE 2 IN OBESE (HCC): ICD-10-CM

## 2022-12-23 DIAGNOSIS — Z00.00 ANNUAL PHYSICAL EXAM: ICD-10-CM

## 2022-12-23 PROBLEM — E66.811 OBESITY, CLASS I, BMI 30-34.9: Status: RESOLVED | Noted: 2020-05-05 | Resolved: 2022-12-23

## 2022-12-23 LAB — SL AMB POCT HEMOGLOBIN AIC: 5.5 (ref ?–6.5)

## 2022-12-23 NOTE — ASSESSMENT & PLAN NOTE
S/p bypass 2018  Pre surgical weight was 350lbs--> lowest post surgery was 179lbs 2 years ago   Ups and down since then   Started back on diet 12/2/2022  Diet and exercise encouraged

## 2022-12-23 NOTE — PATIENT INSTRUCTIONS

## 2022-12-23 NOTE — PROGRESS NOTES
[address]    NAME: Caroline Diaz  AGE: 52 y o  SEX: female  : 1975     DATE: 2022     Assessment and Plan:     Problem List Items Addressed This Visit        Endocrine    Diabetes mellitus type 2 in obese [facility])       Lab Results   Component Value Date    HGBA1C 5 5 2022   stable on current meds             Cardiovascular and Mediastinum    Benign hypertension     Stable on current meds         Other Visit Diagnoses     Type 2 diabetes mellitus without complication, without long-term current use of insulin ([facility] [address] )    -  Primary    Relevant Orders    POCT hemoglobin A1c (Completed)    Annual physical exam              Immunizations and preventive care screenings were discussed with patient today  Appropriate education was printed on patient's after visit summary  Counseling:  Alcohol/drug use: discussed moderation in alcohol intake, the recommendations for healthy alcohol use, and avoidance of illicit drug use  Dental Health: discussed importance of regular tooth brushing, flossing, and dental visits  Injury prevention: discussed safety/seat belts, safety helmets, smoke detectors, carbon dioxide detectors, and smoking near bedding or upholstery  Sexual health: discussed sexually transmitted diseases, partner selection, use of condoms, avoidance of unintended pregnancy, and contraceptive alternatives  Exercise: the importance of regular exercise/physical activity was discussed  Recommend exercise 3-5 times per week for at least 30 minutes  BMI Counseling: Body mass index is 39 41 kg/m²  The BMI is above normal  Nutrition recommendations include decreasing portion sizes and encouraging healthy choices of fruits and vegetables  Exercise recommendations include moderate physical activity 150 minutes/week  No pharmacotherapy was ordered  Rationale for BMI follow-up plan is due to patient being overweight or obese  No follow-ups on file  Chief Complaint:     Chief Complaint   Patient presents with   • Physical Exam     Patient is here today for an annual exam       History of Present Illness:     Adult Annual Physical   Patient here for a comprehensive physical exam  The patient reports no problems  Diet and Physical Activity  Diet/Nutrition: well balanced diet and consuming 3-5 servings of fruits/vegetables daily  Exercise: walking  Depression Screening  PHQ-2/9 Depression Screening         General Health  Sleep: sleeps well and gets 7-8 hours of sleep on average  Hearing: normal - bilateral   Vision: goes for regular eye exams  Dental: regular dental visits and brushes teeth twice daily  /GYN Health  Patient is: post menopausal   Last menstrual period: hysterectomy 2019  Contraceptive method: none  Review of Systems:     Review of Systems   Constitutional: Negative for chills and fever  HENT: Negative for ear pain and sore throat  Eyes: Negative for pain and visual disturbance  Respiratory: Negative  Negative for cough and shortness of breath  Cardiovascular: Negative for chest pain and palpitations  Gastrointestinal: Negative for abdominal pain and vomiting  Endocrine: Negative  Genitourinary: Negative  Negative for dysuria and hematuria  Musculoskeletal: Negative  Negative for arthralgias and back pain  Skin: Negative for color change and rash  Neurological: Negative for seizures and syncope  Hematological: Negative  Psychiatric/Behavioral: Negative  All other systems reviewed and are negative       Past Medical History:     Past Medical History:   Diagnosis Date   • Anxiety    • Arthritis    • Bariatric surgery status    • CPAP (continuous positive airway pressure) dependence    • Depression    • Diabetes mellitus (HCC)    • Eczema    • Fibromyalgia, primary    • Foot drop 5/12/2020   • Hyperlipidemia    • Hypertension    • Hypothyroidism    • Left foot drop 2020   • Lupus anticoagulant disorder (HCC)    • Migraine    • Night sweats    • Obesity    • Postsurgical malabsorption    • Psoriasis    • Sleep apnea    • Status post laparoscopic hysterectomy 2017      Past Surgical History:     Past Surgical History:   Procedure Laterality Date   • COLONOSCOPY     • HYSTERECTOMY Bilateral 2018   • OOPHORECTOMY Bilateral 2018   • NE COLONOSCOPY FLX DX W/COLLJ SPEC WHEN PFRMD N/A 2017    Procedure: COLONOSCOPY;  Surgeon: Gretchen Evans DO;  [facility]; Service: Gastroenterology   • NE EGD TRANSORAL BIOPSY SINGLE/MULTIPLE N/A 2019    Procedure: ESOPHAGOGASTRODUODENOSCOPY (EGD) with bx;  Surgeon: Elizabeth Hester MD;  [facility];   Service: Bariatrics   • NE LAPAROSCOPY W TOT HYSTERECTUTERUS <=250 GRAM  W TUBE/OVARY N/A 2017    Procedure: ROBOTIC TOTAL LAPAROSCOPIC HYSTERECTOMY/ BSO;  Surgeon: Pedro Azul MD;  [facility];  Service: Gynecology Oncology   • NE LAPS GSTR RSTCV [address] W/BYP LEESA-EN-Y LIMB <150 CM N/A 3/25/2019    Procedure: LAPAROSCOPIC LEESA-EN-Y GASTRIC BYPASS AND INTRAOPERATIVE EGD;  Surgeon: Elizabeth Hester MD;  [facility];  Service: Bariatrics   • WISDOM TOOTH EXTRACTION        Social History:     Social History     Socioeconomic History   • Marital status: /Civil Union     Spouse name: None   • Number of children: None   • Years of education: None   • Highest education level: None   Occupational History   • 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     Employer: [employer]   Tobacco Use   • Smoking status: Former     Packs/day: 0 75     Years: 15 00     Pack years: 11 25     Types: Cigarettes     Quit date: 2018     Years since quittin 0   • Smokeless tobacco: Never   Substance and Sexual Activity   • Alcohol use: Not Currently   • Drug use: No   • Sexual activity: Yes     Partners: Male   Other Topics Concern   • None   Social History Narrative   • None     Social Determinants of Health     Financial Resource Strain: Not on file   Food Insecurity: Not on file   Transportation Needs: Not on file   Physical Activity: Not on file   Stress: Not on file   Social Connections: Not on file   Intimate Partner Violence: Not on file   Housing Stability: Not on file      Family History:     Family History   Problem Relation Age of Onset   • Heart disease Mother         Cardiac disorder   • Diabetes Mother    • Hypertension Mother    • Hyperthyroidism Mother    • Diabetes Father    • Hypertension Father    • Lung cancer Maternal Grandfather    • Colon cancer Cousin 79   • Colon cancer Maternal Aunt 52   • Lung cancer Family    • Diabetes Sister    • No Known Problems Brother    • No Known Problems Maternal Grandmother    • No Known Problems Paternal Grandmother    • No Known Problems Paternal Grandfather    • No Known Problems Sister       Current Medications:     Current Outpatient Medications   Medication Sig Dispense Refill   • ALPRAZolam (XANAX) 0 5 mg tablet Take 1 tablet (0 5 mg total) by mouth 2 (two) times a day as needed for anxiety 60 tablet 0   • atorvastatin (LIPITOR) 10 mg tablet Take 1 tablet (10 mg total) by mouth daily 90 tablet 0   • BIOTIN PO Take 600 mg by mouth every other day     • buPROPion (WELLBUTRIN SR) 150 mg 12 hr tablet Take 1 tablet (150 mg total) by mouth 2 (two) times a day 180 tablet 3   • Calcium Citrate-Vitamin D (MINE-CITRATE PLUS VITAMIN D PO) Take by mouth in the morning     • Cholecalciferol (VITAMIN D3) 2000 units capsule Take 2,000 Units by mouth daily      • Cyanocobalamin (B-12) 1000 MCG CAPS Take 1 capsule by mouth daily      • DULoxetine (CYMBALTA) 60 mg delayed release capsule Take 1 capsule (60 mg total) by mouth daily 90 capsule 1   • gabapentin (NEURONTIN) 300 mg capsule Take 1 capsule (300 mg total) by mouth 3 (three) times a day 270 capsule 1   • levothyroxine 50 mcg tablet Take 1 tablet by mouth once daily 90 tablet 0   • lisinopril (ZESTRIL) 10 mg tablet 5 mg every other day 90 tablet 0   • Multiple Vitamin (multivitamin) tablet Take 1 tablet by mouth daily     • nystatin (MYCOSTATIN) cream Apply topically 1-2 times a day as needed to abdomen rash 60 g 6   • triamcinolone (KENALOG) 0 1 % ointment APPLY TOPICALLY TO AFFECTED AREA 1-2 TIMES A DAY AS NEEDED TO ITCHY RASH 80 g 0   • Victoza injection Inject 0 3 mL (1 8 mg total) under the skin daily 27 mL 3     No current facility-administered medications for this visit  Allergies: Allergies   Allergen Reactions   • Metformin Diarrhea      Physical Exam:     /80 (BP Location: Left arm, Patient Position: Sitting, Cuff Size: Large)   Pulse 79   Temp (!) 97 2 °F (36 2 °C) (Tympanic)   Resp 16   Ht 5' 5 08" (1 653 m)   Wt 108 kg (237 lb 6 4 oz)   LMP 11/18/2018   SpO2 97%   BMI 39 41 kg/m²     Physical Exam  Constitutional:       Appearance: Normal appearance  HENT:      Head: Normocephalic and atraumatic  Right Ear: Tympanic membrane normal       Left Ear: Tympanic membrane normal       Mouth/Throat:      Mouth: Mucous membranes are moist    Eyes:      Extraocular Movements: Extraocular movements intact  Pupils: Pupils are equal, round, and reactive to light  Cardiovascular:      Rate and Rhythm: Normal rate and regular rhythm  Pulses: Normal pulses  Heart sounds: Normal heart sounds  Pulmonary:      Effort: Pulmonary effort is normal       Breath sounds: Normal breath sounds  Musculoskeletal:         General: Normal range of motion  Cervical back: Normal range of motion and neck supple  Skin:     General: Skin is warm  Capillary Refill: Capillary refill takes less than 2 seconds  Neurological:      General: No focal deficit present  Mental Status: She is alert and oriented to person, place, and time  Sensory: No sensory deficit     Psychiatric:         Mood and Affect: Mood normal          Behavior: Behavior normal  Vitor Shaw MD  [address] [facility]

## 2023-01-03 DIAGNOSIS — E66.9 DIABETES MELLITUS TYPE 2 IN OBESE (HCC): ICD-10-CM

## 2023-01-03 DIAGNOSIS — E11.69 DIABETES MELLITUS TYPE 2 IN OBESE (HCC): ICD-10-CM

## 2023-01-03 RX ORDER — ATORVASTATIN CALCIUM 10 MG/1
10 TABLET, FILM COATED ORAL DAILY
Qty: 90 TABLET | Refills: 0 | Status: SHIPPED | OUTPATIENT
Start: 2023-01-03

## 2023-01-11 DIAGNOSIS — F51.01 PRIMARY INSOMNIA: ICD-10-CM

## 2023-01-11 DIAGNOSIS — F41.9 ANXIETY AND DEPRESSION: ICD-10-CM

## 2023-01-11 DIAGNOSIS — F32.A ANXIETY AND DEPRESSION: ICD-10-CM

## 2023-01-12 RX ORDER — ALPRAZOLAM 0.5 MG/1
0.5 TABLET ORAL 2 TIMES DAILY PRN
Qty: 60 TABLET | Refills: 0 | Status: SHIPPED | OUTPATIENT
Start: 2023-01-12

## 2023-02-21 DIAGNOSIS — F32.A ANXIETY AND DEPRESSION: ICD-10-CM

## 2023-02-21 DIAGNOSIS — F41.9 ANXIETY AND DEPRESSION: ICD-10-CM

## 2023-02-21 DIAGNOSIS — F51.01 PRIMARY INSOMNIA: ICD-10-CM

## 2023-02-21 DIAGNOSIS — I10 ESSENTIAL HYPERTENSION: ICD-10-CM

## 2023-02-22 RX ORDER — ALPRAZOLAM 0.5 MG/1
0.5 TABLET ORAL 2 TIMES DAILY PRN
Qty: 60 TABLET | Refills: 0 | Status: SHIPPED | OUTPATIENT
Start: 2023-02-22

## 2023-02-22 RX ORDER — LISINOPRIL 10 MG/1
TABLET ORAL
Qty: 90 TABLET | Refills: 0 | Status: SHIPPED | OUTPATIENT
Start: 2023-02-22

## 2023-02-22 NOTE — TELEPHONE ENCOUNTER
Medication:  PDMP   01/12/2023 11/30/2022 ALPRAZolam (Tablet)  60 0 30 0 5 MG DESMOND MAZARIEGOS      Active agreement on file -No

## 2023-03-06 DIAGNOSIS — E03.9 HYPOTHYROIDISM, UNSPECIFIED TYPE: ICD-10-CM

## 2023-03-06 RX ORDER — LEVOTHYROXINE SODIUM 0.05 MG/1
50 TABLET ORAL DAILY
Qty: 90 TABLET | Refills: 0 | Status: SHIPPED | OUTPATIENT
Start: 2023-03-06

## 2023-03-21 ENCOUNTER — OFFICE VISIT (OUTPATIENT)
Dept: URGENT CARE | Facility: MEDICAL CENTER | Age: 48
End: 2023-03-21

## 2023-03-21 VITALS
TEMPERATURE: 98 F | OXYGEN SATURATION: 100 % | DIASTOLIC BLOOD PRESSURE: 80 MMHG | RESPIRATION RATE: 18 BRPM | BODY MASS INDEX: 39.49 KG/M2 | SYSTOLIC BLOOD PRESSURE: 124 MMHG | HEIGHT: 65 IN | WEIGHT: 237 LBS | HEART RATE: 80 BPM

## 2023-03-21 DIAGNOSIS — S43.402A SPRAIN OF LEFT SHOULDER, UNSPECIFIED SHOULDER SPRAIN TYPE, INITIAL ENCOUNTER: Primary | ICD-10-CM

## 2023-03-21 RX ORDER — METHOCARBAMOL 500 MG/1
500 TABLET, FILM COATED ORAL 4 TIMES DAILY
Qty: 20 TABLET | Refills: 0 | Status: SHIPPED | OUTPATIENT
Start: 2023-03-21 | End: 2023-03-26

## 2023-03-21 NOTE — PROGRESS NOTES
[facility]        NAME: Reji Miranda is a 50 y o  female  : 1975    MRN: [id number]  DATE: 2023  TIME: 6:21 PM    Assessment and Plan   Sprain of left shoulder, unspecified shoulder sprain type, initial encounter [S43 402A]  1  Sprain of left shoulder, unspecified shoulder sprain type, initial encounter  methocarbamol (ROBAXIN) 500 mg tablet            Patient Instructions     Sprain shoulder  Robaxin as directed-may become drowsy  Follow up with PCP in 3-5 days  Proceed to  ER if symptoms worsen  Chief Complaint     Chief Complaint   Patient presents with   • Shoulder Pain     Patient states she has had progressively worse left shoulder pain since  after lifting her step daughter who is a full care          History of Present Illness       70-year-old female who presents complaining of left shoulder pain  Patient states that the pain began after lifting her handicapped daughter  States that as she was trying to move her she felt a pull to the left shoulder  States that she has applied ice and has taken over-the-counter medications with minimal relief and as time passed by the pain became more intense  Declined x-rays at this time  Denies fall, direct trauma, chest pain, shortness of breath, palpitations  Review of Systems   Review of Systems   Constitutional: Negative  HENT: Negative  Eyes: Negative  Respiratory: Negative  Negative for cough, chest tightness, shortness of breath, wheezing and stridor  Cardiovascular: Negative  Negative for chest pain, palpitations and leg swelling  Musculoskeletal: Positive for arthralgias           Current Medications       Current Outpatient Medications:   •  methocarbamol (ROBAXIN) 500 mg tablet, Take 1 tablet (500 mg total) by mouth 4 (four) times a day for 5 days, Disp: 20 tablet, Rfl: 0  •  ALPRAZolam (XANAX) 0 5 mg tablet, Take 1 tablet (0 5 mg total) by mouth 2 (two) times a day as needed for anxiety, Disp: 60 tablet, Rfl: 0  •  atorvastatin (LIPITOR) 10 mg tablet, Take 1 tablet (10 mg total) by mouth daily, Disp: 90 tablet, Rfl: 0  •  BIOTIN PO, Take 600 mg by mouth every other day, Disp: , Rfl:   •  buPROPion (WELLBUTRIN SR) 150 mg 12 hr tablet, Take 1 tablet (150 mg total) by mouth 2 (two) times a day, Disp: 180 tablet, Rfl: 3  •  Calcium Citrate-Vitamin D (MNIE-CITRATE PLUS VITAMIN D PO), Take by mouth in the morning, Disp: , Rfl:   •  Cholecalciferol (VITAMIN D3) 2000 units capsule, Take 2,000 Units by mouth daily , Disp: , Rfl:   •  Cyanocobalamin (B-12) 1000 MCG CAPS, Take 1 capsule by mouth daily , Disp: , Rfl:   •  DULoxetine (CYMBALTA) 60 mg delayed release capsule, Take 1 capsule by mouth once daily, Disp: 90 capsule, Rfl: 0  •  gabapentin (NEURONTIN) 300 mg capsule, TAKE 1 CAPSULE BY MOUTH THREE TIMES DAILY, Disp: 270 capsule, Rfl: 0  •  levothyroxine 50 mcg tablet, Take 1 tablet (50 mcg total) by mouth daily, Disp: 90 tablet, Rfl: 0  •  lisinopril (ZESTRIL) 10 mg tablet, 5 mg every other day, Disp: 90 tablet, Rfl: 0  •  Multiple Vitamin (multivitamin) tablet, Take 1 tablet by mouth daily, Disp: , Rfl:   •  nystatin (MYCOSTATIN) cream, Apply topically 1-2 times a day as needed to abdomen rash, Disp: 60 g, Rfl: 6  •  triamcinolone (KENALOG) 0 1 % ointment, APPLY TOPICALLY TO AFFECTED AREA 1-2 TIMES A DAY AS NEEDED TO ITCHY RASH, Disp: 80 g, Rfl: 0  •  Victoza injection, Inject 0 3 mL (1 8 mg total) under the skin daily, Disp: 27 mL, Rfl: 3    Current Allergies     Allergies as of 03/21/2023 - Reviewed 03/21/2023   Allergen Reaction Noted   • Metformin Diarrhea 07/26/2018            The following portions of the patient's history were reviewed and updated as appropriate: allergies, current medications, past family history, past medical history, past social history, past surgical history and problem list      Past Medical History:   Diagnosis Date   • Anxiety    • Arthritis    • Bariatric surgery status    • CPAP (continuous positive airway pressure) dependence    • Depression    • Diabetes mellitus ([facility] [address] )    • Eczema    • Fibromyalgia, primary    • Foot drop 5/12/2020   • Hyperlipidemia    • Hypertension    • Hypothyroidism    • Left foot drop 5/5/2020   • Lupus anticoagulant disorder (HCC)    • Migraine    • Night sweats    • Obesity    • Postsurgical malabsorption    • Psoriasis    • Sleep apnea    • Status post laparoscopic hysterectomy 11/20/2017       Past Surgical History:   Procedure Laterality Date   • COLONOSCOPY     • HYSTERECTOMY Bilateral 11/17/2018   • OOPHORECTOMY Bilateral 11/17/2018   • DE COLONOSCOPY FLX DX W/COLLJ SPEC WHEN PFRMD N/A 6/13/2017    Procedure: COLONOSCOPY;  Surgeon: Tiffani Drummond DO;  [facility]; Service: Gastroenterology   • DE EGD TRANSORAL BIOPSY SINGLE/MULTIPLE N/A 1/30/2019    Procedure: ESOPHAGOGASTRODUODENOSCOPY (EGD) with bx;  Surgeon: Sofia Mendez MD;  [facility];   Service: Bariatrics   • DE LAPS GSTR RSTCV PX W/BYP LEESA-EN-Y LIMB <150 CM N/A 3/25/2019    Procedure: LAPAROSCOPIC LEESA-EN-Y GASTRIC BYPASS AND INTRAOPERATIVE EGD;  Surgeon: Sofia Mendez MD;  [facility];  Service: Bariatrics   • DE LAPS TOTAL HYSTERECT 250 GM/< W/RMVL TUBE/OVARY N/A 11/20/2017    Procedure: ROBOTIC TOTAL LAPAROSCOPIC HYSTERECTOMY/ BSO;  Surgeon: Jody Ronquillo MD;  [facility];  Service: Gynecology Oncology   • WISDOM TOOTH EXTRACTION         Family History   Problem Relation Age of Onset   • Heart disease Mother         Cardiac disorder   • Diabetes Mother    • Hypertension Mother    • Hyperthyroidism Mother    • Diabetes Father    • Hypertension Father    • Lung cancer Maternal Grandfather    • Colon cancer Cousin 79   • Colon cancer Maternal Aunt 52   • Lung cancer Family    • Diabetes Sister    • No Known Problems Brother    • No Known Problems Maternal Grandmother    • No Known Problems Paternal Grandmother    • No Known Problems Paternal Grandfather • No Known Problems Sister          Medications have been verified  Objective   /80   Pulse 80   Temp 98 °F (36 7 °C) (Temporal)   Resp 18   Ht 5' 5" (1 651 m)   Wt 108 kg (237 lb)   LMP 11/18/2018   SpO2 100%   BMI 39 44 kg/m²        Physical Exam     Physical Exam  Constitutional:       Appearance: She is well-developed  HENT:      Head: Normocephalic and atraumatic  Right Ear: External ear normal       Left Ear: External ear normal       Nose: Nose normal       Mouth/Throat:      Pharynx: No oropharyngeal exudate  Cardiovascular:      Rate and Rhythm: Normal rate and regular rhythm  Heart sounds: Normal heart sounds  Pulmonary:      Effort: Pulmonary effort is normal  No respiratory distress  Breath sounds: Normal breath sounds  No wheezing or rales  Chest:      Chest wall: No tenderness  Musculoskeletal:      Right shoulder: Normal       Left shoulder: Tenderness present  No swelling, deformity, effusion, laceration, bony tenderness or crepitus  Decreased range of motion  Normal strength  Normal pulse  Arms:       Cervical back: Normal range of motion and neck supple  Lymphadenopathy:      Cervical: No cervical adenopathy

## 2023-03-21 NOTE — PATIENT INSTRUCTIONS
Sprain shoulder  Robaxin as directed-may become drowsy  Follow up with PCP in 3-5 days  Proceed to  ER if symptoms worsen

## 2023-03-23 DIAGNOSIS — F51.01 PRIMARY INSOMNIA: ICD-10-CM

## 2023-03-23 DIAGNOSIS — F32.A ANXIETY AND DEPRESSION: ICD-10-CM

## 2023-03-23 DIAGNOSIS — F41.9 ANXIETY AND DEPRESSION: ICD-10-CM

## 2023-03-23 RX ORDER — ALPRAZOLAM 0.5 MG/1
0.5 TABLET ORAL 2 TIMES DAILY PRN
Qty: 60 TABLET | Refills: 0 | Status: SHIPPED | OUTPATIENT
Start: 2023-03-23

## 2023-03-23 NOTE — TELEPHONE ENCOUNTER
Medication:Alprazolam  PDMP  02/22/2023 02/22/2023 ALPRAZolam (Tablet)  60 0 30 0 5 MG DESMOND ZAPATA      Active agreement on file -No

## 2023-04-05 DIAGNOSIS — F32.A ANXIETY AND DEPRESSION: ICD-10-CM

## 2023-04-05 DIAGNOSIS — F41.9 ANXIETY AND DEPRESSION: ICD-10-CM

## 2023-04-05 RX ORDER — BUPROPION HYDROCHLORIDE 150 MG/1
150 TABLET, EXTENDED RELEASE ORAL 2 TIMES DAILY
Qty: 180 TABLET | Refills: 0 | Status: SHIPPED | OUTPATIENT
Start: 2023-04-05

## 2023-04-07 ENCOUNTER — RA CDI HCC (OUTPATIENT)
Dept: OTHER | Facility: HOSPITAL | Age: 48
End: 2023-04-07

## 2023-04-07 NOTE — PROGRESS NOTES
[facility]       Chart reviewed, no opportunity found: CHART REVIEWED, NO OPPORTUNITY FOUND     Patients Insurance     Commercial Insurance: [address] [place] [address]

## 2023-04-23 DIAGNOSIS — E11.69 DIABETES MELLITUS TYPE 2 IN OBESE: ICD-10-CM

## 2023-04-23 DIAGNOSIS — E66.9 DIABETES MELLITUS TYPE 2 IN OBESE: ICD-10-CM

## 2023-04-26 DIAGNOSIS — F51.01 PRIMARY INSOMNIA: ICD-10-CM

## 2023-04-26 DIAGNOSIS — F32.A ANXIETY AND DEPRESSION: ICD-10-CM

## 2023-04-26 DIAGNOSIS — F41.9 ANXIETY AND DEPRESSION: ICD-10-CM

## 2023-04-26 RX ORDER — ALPRAZOLAM 0.5 MG/1
0.5 TABLET ORAL 2 TIMES DAILY PRN
Qty: 60 TABLET | Refills: 0 | Status: SHIPPED | OUTPATIENT
Start: 2023-04-26

## 2023-05-26 DIAGNOSIS — F51.01 PRIMARY INSOMNIA: ICD-10-CM

## 2023-05-26 DIAGNOSIS — F32.A ANXIETY AND DEPRESSION: ICD-10-CM

## 2023-05-26 DIAGNOSIS — F41.9 ANXIETY AND DEPRESSION: ICD-10-CM

## 2023-05-26 RX ORDER — ALPRAZOLAM 0.5 MG/1
0.5 TABLET ORAL 2 TIMES DAILY PRN
Qty: 60 TABLET | Refills: 0 | Status: SHIPPED | OUTPATIENT
Start: 2023-05-26

## 2023-05-26 NOTE — TELEPHONE ENCOUNTER
Medication:  PDMP    04/26/2023 04/26/2023 ALPRAZolam (Tablet) 60 0 30 0 5 MG DESMOND ZAPATA  Active agreement on file -No

## 2023-06-02 DIAGNOSIS — E03.9 HYPOTHYROIDISM, UNSPECIFIED TYPE: ICD-10-CM

## 2023-06-02 DIAGNOSIS — E66.9 DIABETES MELLITUS TYPE 2 IN OBESE (HCC): ICD-10-CM

## 2023-06-02 DIAGNOSIS — E11.69 DIABETES MELLITUS TYPE 2 IN OBESE (HCC): ICD-10-CM

## 2023-06-02 RX ORDER — LEVOTHYROXINE SODIUM 0.05 MG/1
50 TABLET ORAL DAILY
Qty: 90 TABLET | Refills: 0 | Status: SHIPPED | OUTPATIENT
Start: 2023-06-02

## 2023-06-02 RX ORDER — ATORVASTATIN CALCIUM 10 MG/1
10 TABLET, FILM COATED ORAL DAILY
Qty: 90 TABLET | Refills: 0 | Status: SHIPPED | OUTPATIENT
Start: 2023-06-02

## 2023-06-05 ENCOUNTER — APPOINTMENT (OUTPATIENT)
Dept: LAB | Facility: MEDICAL CENTER | Age: 48
End: 2023-06-05
Payer: COMMERCIAL

## 2023-06-05 DIAGNOSIS — Z11.59 NEED FOR HEPATITIS C SCREENING TEST: ICD-10-CM

## 2023-06-05 DIAGNOSIS — E66.9 DIABETES MELLITUS TYPE 2 IN OBESE (HCC): ICD-10-CM

## 2023-06-05 DIAGNOSIS — E11.69 DIABETES MELLITUS TYPE 2 IN OBESE (HCC): ICD-10-CM

## 2023-06-05 DIAGNOSIS — E03.9 ACQUIRED HYPOTHYROIDISM: ICD-10-CM

## 2023-06-05 LAB
ALBUMIN SERPL BCP-MCNC: 3.2 G/DL (ref 3.5–5)
ALP SERPL-CCNC: 107 U/L (ref 46–116)
ALT SERPL W P-5'-P-CCNC: 34 U/L (ref 12–78)
ANION GAP SERPL CALCULATED.3IONS-SCNC: 7 MMOL/L (ref 4–13)
AST SERPL W P-5'-P-CCNC: 35 U/L (ref 5–45)
BASOPHILS # BLD AUTO: 0.04 THOUSANDS/ÂΜL (ref 0–0.1)
BASOPHILS NFR BLD AUTO: 1 % (ref 0–1)
BILIRUB SERPL-MCNC: 0.62 MG/DL (ref 0.2–1)
BUN SERPL-MCNC: 8 MG/DL (ref 5–25)
CALCIUM ALBUM COR SERPL-MCNC: 9.6 MG/DL (ref 8.3–10.1)
CALCIUM SERPL-MCNC: 9 MG/DL (ref 8.3–10.1)
CHLORIDE SERPL-SCNC: 104 MMOL/L (ref 96–108)
CHOLEST SERPL-MCNC: 207 MG/DL
CO2 SERPL-SCNC: 25 MMOL/L (ref 21–32)
CREAT SERPL-MCNC: 0.79 MG/DL (ref 0.6–1.3)
EOSINOPHIL # BLD AUTO: 0.53 THOUSAND/ÂΜL (ref 0–0.61)
EOSINOPHIL NFR BLD AUTO: 6 % (ref 0–6)
ERYTHROCYTE [DISTWIDTH] IN BLOOD BY AUTOMATED COUNT: 15.8 % (ref 11.6–15.1)
EST. AVERAGE GLUCOSE BLD GHB EST-MCNC: 103 MG/DL
GFR SERPL CREATININE-BSD FRML MDRD: 88 ML/MIN/1.73SQ M
GLUCOSE P FAST SERPL-MCNC: 86 MG/DL (ref 65–99)
HBA1C MFR BLD: 5.2 %
HCT VFR BLD AUTO: 43.8 % (ref 34.8–46.1)
HCV AB SER QL: NORMAL
HDLC SERPL-MCNC: 53 MG/DL
HGB BLD-MCNC: 13.6 G/DL (ref 11.5–15.4)
IMM GRANULOCYTES # BLD AUTO: 0.02 THOUSAND/UL (ref 0–0.2)
IMM GRANULOCYTES NFR BLD AUTO: 0 % (ref 0–2)
LDLC SERPL CALC-MCNC: 120 MG/DL (ref 0–100)
LYMPHOCYTES # BLD AUTO: 1.9 THOUSANDS/ÂΜL (ref 0.6–4.47)
LYMPHOCYTES NFR BLD AUTO: 22 % (ref 14–44)
MCH RBC QN AUTO: 31 PG (ref 26.8–34.3)
MCHC RBC AUTO-ENTMCNC: 31.1 G/DL (ref 31.4–37.4)
MCV RBC AUTO: 100 FL (ref 82–98)
MONOCYTES # BLD AUTO: 0.77 THOUSAND/ÂΜL (ref 0.17–1.22)
MONOCYTES NFR BLD AUTO: 9 % (ref 4–12)
NEUTROPHILS # BLD AUTO: 5.27 THOUSANDS/ÂΜL (ref 1.85–7.62)
NEUTS SEG NFR BLD AUTO: 62 % (ref 43–75)
NONHDLC SERPL-MCNC: 154 MG/DL
NRBC BLD AUTO-RTO: 0 /100 WBCS
PLATELET # BLD AUTO: 243 THOUSANDS/UL (ref 149–390)
PMV BLD AUTO: 10.6 FL (ref 8.9–12.7)
POTASSIUM SERPL-SCNC: 4.1 MMOL/L (ref 3.5–5.3)
PROT SERPL-MCNC: 7.5 G/DL (ref 6.4–8.4)
RBC # BLD AUTO: 4.39 MILLION/UL (ref 3.81–5.12)
SODIUM SERPL-SCNC: 136 MMOL/L (ref 135–147)
TRIGL SERPL-MCNC: 170 MG/DL
TSH SERPL DL<=0.05 MIU/L-ACNC: 4.36 UIU/ML (ref 0.45–4.5)
WBC # BLD AUTO: 8.53 THOUSAND/UL (ref 4.31–10.16)

## 2023-06-05 PROCEDURE — 36415 COLL VENOUS BLD VENIPUNCTURE: CPT

## 2023-06-05 PROCEDURE — 84443 ASSAY THYROID STIM HORMONE: CPT

## 2023-06-05 PROCEDURE — 86803 HEPATITIS C AB TEST: CPT

## 2023-06-23 NOTE — ASSESSMENT & PLAN NOTE
Morbid obesity status post laparoscopic Jorge-en-Y gastric bypass  Communicate Risk of Fall with Harm to all staff/Reinforce activity limits and safety measures with patient and family/Tailored Fall Risk Interventions/Visual Cue: Yellow wristband and red socks/Bed in lowest position, wheels locked, appropriate side rails in place/Call bell, personal items and telephone in reach/Instruct patient to call for assistance before getting out of bed or chair/Non-slip footwear when patient is out of bed/[place] to call system/Physically safe environment - no spills, clutter or unnecessary equipment/Purposeful Proactive Rounding/Room/bathroom lighting operational, light cord in reach

## 2023-06-26 DIAGNOSIS — F41.9 ANXIETY AND DEPRESSION: ICD-10-CM

## 2023-06-26 DIAGNOSIS — F32.A ANXIETY AND DEPRESSION: ICD-10-CM

## 2023-06-26 DIAGNOSIS — F51.01 PRIMARY INSOMNIA: ICD-10-CM

## 2023-06-26 RX ORDER — ALPRAZOLAM 0.5 MG/1
0.5 TABLET ORAL 2 TIMES DAILY PRN
Qty: 60 TABLET | Refills: 0 | Status: SHIPPED | OUTPATIENT
Start: 2023-06-26

## 2023-06-26 NOTE — TELEPHONE ENCOUNTER
Medication:  PDMP 05/26/2023 05/26/2023 ALPRAZolam (Tablet) 60 0 30 0 5 MG NA K[facility]    Active agreement on file -No

## 2023-07-02 DIAGNOSIS — F41.9 ANXIETY AND DEPRESSION: ICD-10-CM

## 2023-07-02 DIAGNOSIS — F32.A ANXIETY AND DEPRESSION: ICD-10-CM

## 2023-07-02 RX ORDER — BUPROPION HYDROCHLORIDE 150 MG/1
TABLET, EXTENDED RELEASE ORAL
Qty: 180 TABLET | Refills: 0 | Status: SHIPPED | OUTPATIENT
Start: 2023-07-02

## 2023-07-06 DIAGNOSIS — E11.69 DIABETES MELLITUS TYPE 2 IN OBESE: Primary | ICD-10-CM

## 2023-07-06 DIAGNOSIS — E66.9 DIABETES MELLITUS TYPE 2 IN OBESE: Primary | ICD-10-CM

## 2023-07-14 ENCOUNTER — VBI (OUTPATIENT)
Dept: ADMINISTRATIVE | Facility: OTHER | Age: 48
End: 2023-07-14

## 2023-07-27 DIAGNOSIS — F41.9 ANXIETY AND DEPRESSION: ICD-10-CM

## 2023-07-27 DIAGNOSIS — F51.01 PRIMARY INSOMNIA: ICD-10-CM

## 2023-07-27 DIAGNOSIS — F32.A ANXIETY AND DEPRESSION: ICD-10-CM

## 2023-07-27 RX ORDER — ALPRAZOLAM 0.5 MG/1
0.5 TABLET ORAL 2 TIMES DAILY PRN
Qty: 60 TABLET | Refills: 0 | Status: SHIPPED | OUTPATIENT
Start: 2023-07-27

## 2023-07-27 NOTE — TELEPHONE ENCOUNTER
Medication:  PDMP 06/26/2023 06/26/2023 ALPRAZolam (Tablet) 60.0 30 0.5 MG NA KIRBY ZAPATA  Active agreement on file -No

## 2023-08-05 DIAGNOSIS — E11.69 DIABETES MELLITUS TYPE 2 IN OBESE: ICD-10-CM

## 2023-08-05 DIAGNOSIS — E66.9 DIABETES MELLITUS TYPE 2 IN OBESE: ICD-10-CM

## 2023-08-24 ENCOUNTER — RA CDI HCC (OUTPATIENT)
Dept: OTHER | Facility: HOSPITAL | Age: 48
End: 2023-08-24

## 2023-08-24 DIAGNOSIS — F51.01 PRIMARY INSOMNIA: ICD-10-CM

## 2023-08-24 DIAGNOSIS — F41.9 ANXIETY AND DEPRESSION: ICD-10-CM

## 2023-08-24 DIAGNOSIS — F32.A ANXIETY AND DEPRESSION: ICD-10-CM

## 2023-08-24 RX ORDER — ALPRAZOLAM 0.5 MG/1
0.5 TABLET ORAL 2 TIMES DAILY PRN
Qty: 60 TABLET | Refills: 0 | Status: SHIPPED | OUTPATIENT
Start: 2023-08-24

## 2023-08-24 NOTE — TELEPHONE ENCOUNTER
Medication:  PDMP 07/27/2023 07/27/2023 ALPRAZolam (Tablet) 60.0 30 0.5 MG NA KIRBY ZAPATA  Active agreement on file -No

## 2023-08-24 NOTE — PROGRESS NOTES
[address] [facility] coding opportunities       Chart reviewed, no opportunity found: CHART REVIEWED, NO OPPORTUNITY FOUND     Patients Insurance     Commercial Insurance: Milton Mcghee

## 2023-09-01 ENCOUNTER — OFFICE VISIT (OUTPATIENT)
Dept: FAMILY MEDICINE CLINIC | Facility: CLINIC | Age: 48
End: 2023-09-01
Payer: COMMERCIAL

## 2023-09-01 VITALS
HEART RATE: 90 BPM | TEMPERATURE: 96.8 F | WEIGHT: 241.8 LBS | OXYGEN SATURATION: 98 % | BODY MASS INDEX: 40.24 KG/M2 | DIASTOLIC BLOOD PRESSURE: 70 MMHG | SYSTOLIC BLOOD PRESSURE: 130 MMHG | RESPIRATION RATE: 17 BRPM

## 2023-09-01 DIAGNOSIS — M79.7 FIBROMYALGIA: ICD-10-CM

## 2023-09-01 DIAGNOSIS — M15.0 PRIMARY GENERALIZED (OSTEO)ARTHRITIS: ICD-10-CM

## 2023-09-01 DIAGNOSIS — E03.9 ACQUIRED HYPOTHYROIDISM: Primary | ICD-10-CM

## 2023-09-01 DIAGNOSIS — D68.62 LUPUS ANTICOAGULANT DISORDER (HCC): ICD-10-CM

## 2023-09-01 DIAGNOSIS — E11.69 DIABETES MELLITUS TYPE 2 IN OBESE (HCC): ICD-10-CM

## 2023-09-01 DIAGNOSIS — G56.03 BILATERAL CARPAL TUNNEL SYNDROME: ICD-10-CM

## 2023-09-01 DIAGNOSIS — I10 BENIGN HYPERTENSION: ICD-10-CM

## 2023-09-01 DIAGNOSIS — K91.2 POSTSURGICAL MALABSORPTION: Chronic | ICD-10-CM

## 2023-09-01 DIAGNOSIS — E66.01 CLASS 2 SEVERE OBESITY DUE TO EXCESS CALORIES WITH SERIOUS COMORBIDITY AND BODY MASS INDEX (BMI) OF 39.0 TO 39.9 IN ADULT (HCC): ICD-10-CM

## 2023-09-01 DIAGNOSIS — G57.02 COMMON PERONEAL NEUROPATHY, LEFT: ICD-10-CM

## 2023-09-01 DIAGNOSIS — F32.A ANXIETY AND DEPRESSION: ICD-10-CM

## 2023-09-01 DIAGNOSIS — F41.9 ANXIETY AND DEPRESSION: ICD-10-CM

## 2023-09-01 DIAGNOSIS — E78.49 OTHER HYPERLIPIDEMIA: ICD-10-CM

## 2023-09-01 DIAGNOSIS — E66.9 DIABETES MELLITUS TYPE 2 IN OBESE (HCC): ICD-10-CM

## 2023-09-01 DIAGNOSIS — Z12.31 ENCOUNTER FOR SCREENING MAMMOGRAM FOR BREAST CANCER: ICD-10-CM

## 2023-09-01 PROBLEM — G47.00 INSOMNIA: Status: RESOLVED | Noted: 2017-03-21 | Resolved: 2023-09-01

## 2023-09-01 PROBLEM — G25.0 BENIGN HEAD TREMOR: Status: RESOLVED | Noted: 2020-05-12 | Resolved: 2023-09-01

## 2023-09-01 PROBLEM — D49.59 OVARIAN NEOPLASM: Status: RESOLVED | Noted: 2017-11-20 | Resolved: 2023-09-01

## 2023-09-01 PROBLEM — R25.1 TREMORS OF NERVOUS SYSTEM: Status: RESOLVED | Noted: 2020-05-12 | Resolved: 2023-09-01

## 2023-09-01 LAB
CREAT UR-MCNC: 40.3 MG/DL
MICROALBUMIN UR-MCNC: <7 MG/L
MICROALBUMIN/CREAT 24H UR: <17 MG/G CREATININE (ref 0–30)

## 2023-09-01 PROCEDURE — 82043 UR ALBUMIN QUANTITATIVE: CPT | Performed by: FAMILY MEDICINE

## 2023-09-01 PROCEDURE — 99214 OFFICE O/P EST MOD 30 MIN: CPT | Performed by: FAMILY MEDICINE

## 2023-09-01 PROCEDURE — 82570 ASSAY OF URINE CREATININE: CPT | Performed by: FAMILY MEDICINE

## 2023-09-01 NOTE — PROGRESS NOTES
Name: Brooke Faulkner      : 1975      MRN: [id number]  Encounter Provider: Tammy Quintanilla MD  Encounter Date: 2023   Encounter department: [facility]     1. Acquired hypothyroidism  Assessment & Plan:  Stable on current dose of levothyroxine    Orders:  -     TSH, 3rd generation with Free T4 reflex; Future; Expected date: 2023    2. Encounter for screening mammogram for breast cancer  -     Mammo screening bilateral w 3d & cad; Future; Expected date: 2023    3. Diabetes mellitus type 2 in obese [facility])  Assessment & Plan:    Lab Results   Component Value Date    HGBA1C 5.2 2023   stable on current meds    Orders:  -     Albumin / creatinine urine ratio; Future; Expected date: 2023  -     Comprehensive metabolic panel; Future; Expected date: 2023  -     Hemoglobin A1C; Future; Expected date: 2023  -     Lipid Panel with Direct LDL reflex; Future; Expected date: 2023    4. Benign hypertension  Assessment & Plan: On lisinopril      5. Lupus anticoagulant disorder (HCC)  Assessment & Plan:  Stable  Continue to monitor      6. Primary generalized (osteo)arthritis  Assessment & Plan:  Gabapentin as directed      7. Class 2 severe obesity due to excess calories with serious comorbidity and body mass index (BMI) of 39.0 to 39.9 in adult [facility])  Assessment & Plan:  Lost 8 pounds since last visit   Diet and exercise      8. Fibromyalgia  Assessment & Plan: On cymbalta      9. Other hyperlipidemia  Assessment & Plan:  lipitor as directed      10. Bilateral carpal tunnel syndrome  Assessment & Plan:  No issues recently         11. Common peroneal neuropathy, left  Assessment & Plan:  Exercises 2 x a week is helping       12. Anxiety and depression  Assessment & Plan:  Stable on current meds      13.  Postsurgical malabsorption  Assessment & Plan:  Stable on current meds             Subjective     Diabetes  She presents for her follow-up diabetic visit. She has type 2 diabetes mellitus. Her disease course has been stable. There are no hypoglycemic associated symptoms. There are no diabetic associated symptoms. There are no hypoglycemic complications. Symptoms are stable. There are no known risk factors for coronary artery disease. Review of Systems   Constitutional: Negative. HENT: Negative. Eyes: Negative. Respiratory: Negative. Cardiovascular: Negative. Gastrointestinal: Negative. Genitourinary: Negative. Musculoskeletal: Negative. Psychiatric/Behavioral: Negative. Past Medical History:   Diagnosis Date   • Anxiety    • Arthritis    • Bariatric surgery status    • CPAP (continuous positive airway pressure) dependence    • Depression    • Diabetes mellitus ([address])    • Eczema    • Fibromyalgia, primary    • Foot drop 5/12/2020   • Hyperlipidemia    • Hypertension    • Hypothyroidism    • Left foot drop 5/5/2020   • Lupus anticoagulant disorder ([address])    • Migraine    • Night sweats    • Obesity    • Ovarian neoplasm 11/20/2017   • Postsurgical malabsorption    • Psoriasis    • Sleep apnea    • Status post laparoscopic hysterectomy 11/20/2017   • Tremors of nervous system 5/12/2020     Past Surgical History:   Procedure Laterality Date   • COLONOSCOPY     • HYSTERECTOMY Bilateral 11/17/2018   • OOPHORECTOMY Bilateral 11/17/2018   • CA COLONOSCOPY FLX DX W/COLLJ SPEC WHEN PFRMD N/A 6/13/2017    Procedure: COLONOSCOPY;  Surgeon: Rubi Echeverria DO;  Location: [facility]; Service: Gastroenterology   • CA EGD TRANSORAL BIOPSY SINGLE/MULTIPLE N/A 1/30/2019    Procedure: ESOPHAGOGASTRODUODENOSCOPY (EGD) with bx;  Surgeon: Nikko Jacob MD;  Location: [facility];   Service: Bariatrics   • CA LAPS GSTR RSTCV PX W/BYP LEESA-EN-Y LIMB <150 CM N/A 3/25/2019    Procedure: LAPAROSCOPIC LEESA-EN-Y GASTRIC BYPASS AND INTRAOPERATIVE EGD;  Surgeon: Nikko Jacob MD;  Location: [facility];  Service: Bariatrics   • CA LAPS TOTAL HYSTERECT 250 GM/< W/RMVL TUBE/OVARY N/A 2017    Procedure: ROBOTIC TOTAL LAPAROSCOPIC HYSTERECTOMY/ BSO;  Surgeon: Joanne Mcgee MD;  Location: [facility];  Service: Gynecology Oncology   • WISDOM TOOTH EXTRACTION       Family History   Problem Relation Age of Onset   • Heart disease Mother         Cardiac disorder   • Diabetes Mother    • Hypertension Mother    • Hyperthyroidism Mother    • Diabetes Father    • Hypertension Father    • Lung cancer Maternal Grandfather    • Colon cancer Cousin 79   • Colon cancer Maternal Aunt 52   • Lung cancer Family    • Diabetes Sister    • No Known Problems Brother    • No Known Problems Maternal Grandmother    • No Known Problems Paternal Grandmother    • No Known Problems Paternal Grandfather    • No Known Problems Sister      Social History     Socioeconomic History   • Marital status: /Civil Union     Spouse name: None   • Number of children: None   • Years of education: None   • Highest education level: None   Occupational History   • 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     Employer: [employer]   Tobacco Use   • Smoking status: Former     Packs/day: 0.75     Years: 15.00     Total pack years: 11.25     Types: Cigarettes     Quit date: 2018     Years since quittin.7   • Smokeless tobacco: Never   Vaping Use   • Vaping Use: Never used   Substance and Sexual Activity   • Alcohol use: Not Currently   • Drug use: No   • Sexual activity: Yes     Partners: Male   Other Topics Concern   • None   Social History Narrative   • None     Social Determinants of Health     Financial Resource Strain: Not on file   Food Insecurity: Not on file   Transportation Needs: Not on file   Physical Activity: Not on file   Stress: Not on file   Social Connections: Not on file   Intimate Partner Violence: Not on file   Housing Stability: Not on file     Current Outpatient Medications on File Prior to Visit   Medication Sig   • ALPRAZolam (XANAX) 0.5 mg tablet Take 1 tablet (0.5 mg total) by mouth 2 (two) times a day as needed for anxiety   • atorvastatin (LIPITOR) 10 mg tablet Take 1 tablet (10 mg total) by mouth daily   • BIOTIN PO Take 600 mg by mouth every other day   • buPROPion (WELLBUTRIN SR) 150 mg 12 hr tablet Take 1 tablet by mouth twice daily   • Calcium Citrate-Vitamin D (MINE-CITRATE PLUS VITAMIN D PO) Take by mouth in the morning   • Cholecalciferol (VITAMIN D3) 2000 units capsule Take 2,000 Units by mouth daily    • Cyanocobalamin (B-12) 1000 MCG CAPS Take 1 capsule by mouth daily    • DULoxetine (CYMBALTA) 60 mg delayed release capsule Take 1 capsule (60 mg total) by mouth daily   • gabapentin (NEURONTIN) 300 mg capsule TAKE 1 CAPSULE BY MOUTH THREE TIMES DAILY   • levothyroxine 50 mcg tablet Take 1 tablet (50 mcg total) by mouth daily   • lisinopril (ZESTRIL) 10 mg tablet 5 mg every other day   • methocarbamol (ROBAXIN) 500 mg tablet Take 1 tablet (500 mg total) by mouth 4 (four) times a day for 5 days   • Multiple Vitamin (multivitamin) tablet Take 1 tablet by mouth daily   • nystatin (MYCOSTATIN) cream Apply topically 1-2 times a day as needed to abdomen rash   • tirzepatide 10 MG/0.5ML Inject 0.5 mL (10 mg total) under the skin every 7 days   • triamcinolone (KENALOG) 0.1 % ointment APPLY TOPICALLY TO AFFECTED AREA 1-2 TIMES A DAY AS NEEDED TO ITCHY RASH     Allergies   Allergen Reactions   • Metformin Diarrhea     Immunization History   Administered Date(s) Administered   • COVID-19 PFIZER VACCINE 0.3 ML IM 04/29/2021, 05/20/2021   • COVID-19 Pfizer vac (Julian-sucrose, gray cap) 12 yr+ IM 04/09/2022   • Pneumococcal Conjugate 13-Valent 08/03/2020   • Pneumococcal Polysaccharide PPV23 07/15/2021   • Tdap 05/23/2019       Objective     /70 (BP Location: Left arm, Patient Position: Sitting, Cuff Size: Large)   Pulse 90   Temp (!) 96.8 °F (36 °C) (Tympanic)   Resp 17   Wt 110 kg (241 lb 12.8 oz)   LMP 11/18/2018   SpO2 98%   BMI 40.24 kg/m² Physical Exam  Vitals and nursing note reviewed. Constitutional:       Appearance: Normal appearance. She is well-developed. HENT:      Head: Normocephalic and atraumatic. Right Ear: Tympanic membrane normal.      Left Ear: Tympanic membrane normal.      Nose: Nose normal.   Eyes:      Pupils: Pupils are equal, round, and reactive to light. Neck:      Thyroid: No thyromegaly. Cardiovascular:      Rate and Rhythm: Normal rate and regular rhythm. Pulses: no weak pulses          Dorsalis pedis pulses are 2+ on the right side and 2+ on the left side. Posterior tibial pulses are 2+ on the right side and 2+ on the left side. Heart sounds: No murmur heard. Pulmonary:      Effort: Pulmonary effort is normal.      Breath sounds: Normal breath sounds. Abdominal:      General: Bowel sounds are normal.      Palpations: Abdomen is soft. Musculoskeletal:         General: No deformity. Normal range of motion. Cervical back: Normal range of motion and neck supple. Feet:      Right foot:      Skin integrity: No ulcer, skin breakdown, erythema, warmth, callus or dry skin. Left foot:      Skin integrity: No ulcer, skin breakdown, erythema, warmth, callus or dry skin. Skin:     General: Skin is warm. Findings: No erythema or rash. Neurological:      General: No focal deficit present. Mental Status: She is alert and oriented to person, place, and time. Deep Tendon Reflexes: Reflexes are normal and symmetric. Psychiatric:         Mood and Affect: Mood normal.         Behavior: Behavior normal.       Joseluis Harris MD                          Diabetic Foot Exam    Patient's shoes and socks removed. Right Foot/Ankle   Right Foot Inspection  Skin Exam: skin normal and skin intact. No dry skin, no warmth, no callus, no erythema, no maceration, no abnormal color, no pre-ulcer, no ulcer and no callus. Toe Exam: ROM and strength within normal limits.      Sensory Vibration: intact  Proprioception: intact  Monofilament testing: intact    Vascular  Capillary refills: < 3 seconds  The right DP pulse is 2+. The right PT pulse is 2+. Left Foot/Ankle  Left Foot Inspection  Skin Exam: skin normal and skin intact. No dry skin, no warmth, no erythema, no maceration, normal color, no pre-ulcer, no ulcer and no callus. Toe Exam: ROM and strength within normal limits. Sensory   Vibration: intact  Proprioception: intact  Monofilament testing: intact    Vascular  Capillary refills: < 3 seconds  The left DP pulse is 2+. The left PT pulse is 2+.      Assign Risk Category  No deformity present  No loss of protective sensation  No weak pulses  Risk: 0

## 2023-09-21 DIAGNOSIS — E03.9 HYPOTHYROIDISM, UNSPECIFIED TYPE: ICD-10-CM

## 2023-09-21 RX ORDER — LEVOTHYROXINE SODIUM 0.05 MG/1
50 TABLET ORAL DAILY
Qty: 90 TABLET | Refills: 1 | Status: SHIPPED | OUTPATIENT
Start: 2023-09-21

## 2023-09-24 DIAGNOSIS — E11.69 DIABETES MELLITUS TYPE 2 IN OBESE: ICD-10-CM

## 2023-09-24 DIAGNOSIS — E66.9 DIABETES MELLITUS TYPE 2 IN OBESE: ICD-10-CM

## 2023-09-24 DIAGNOSIS — F41.9 ANXIETY AND DEPRESSION: ICD-10-CM

## 2023-09-24 DIAGNOSIS — F32.A ANXIETY AND DEPRESSION: ICD-10-CM

## 2023-09-24 DIAGNOSIS — F51.01 PRIMARY INSOMNIA: ICD-10-CM

## 2023-09-24 RX ORDER — ALPRAZOLAM 0.5 MG/1
0.5 TABLET ORAL 2 TIMES DAILY PRN
Qty: 60 TABLET | Refills: 0 | Status: CANCELLED | OUTPATIENT
Start: 2023-09-24

## 2023-09-26 DIAGNOSIS — F51.01 PRIMARY INSOMNIA: ICD-10-CM

## 2023-09-26 DIAGNOSIS — F41.9 ANXIETY AND DEPRESSION: ICD-10-CM

## 2023-09-26 DIAGNOSIS — F32.A ANXIETY AND DEPRESSION: ICD-10-CM

## 2023-09-26 RX ORDER — ATORVASTATIN CALCIUM 10 MG/1
10 TABLET, FILM COATED ORAL DAILY
Qty: 90 TABLET | Refills: 0 | Status: SHIPPED | OUTPATIENT
Start: 2023-09-26

## 2023-09-26 RX ORDER — ALPRAZOLAM 0.5 MG/1
0.5 TABLET ORAL 2 TIMES DAILY PRN
Qty: 60 TABLET | Refills: 0 | Status: SHIPPED | OUTPATIENT
Start: 2023-09-26

## 2023-10-27 DIAGNOSIS — F41.9 ANXIETY AND DEPRESSION: ICD-10-CM

## 2023-10-27 DIAGNOSIS — F51.01 PRIMARY INSOMNIA: ICD-10-CM

## 2023-10-27 DIAGNOSIS — F32.A ANXIETY AND DEPRESSION: ICD-10-CM

## 2023-10-29 DIAGNOSIS — F32.A ANXIETY AND DEPRESSION: ICD-10-CM

## 2023-10-29 DIAGNOSIS — F51.01 PRIMARY INSOMNIA: ICD-10-CM

## 2023-10-29 DIAGNOSIS — F41.9 ANXIETY AND DEPRESSION: ICD-10-CM

## 2023-10-30 RX ORDER — ALPRAZOLAM 0.5 MG/1
0.5 TABLET ORAL 2 TIMES DAILY PRN
Qty: 60 TABLET | Refills: 0 | Status: SHIPPED | OUTPATIENT
Start: 2023-10-30

## 2023-10-30 NOTE — TELEPHONE ENCOUNTER
Medication:  [facility] [id number] 09/26/2023 09/26/2023 ALPRAZolam (Tablet) 60.0 30 0.5 MG NA KIRBY ZAPATA  Active agreement on file -No

## 2023-10-31 RX ORDER — ALPRAZOLAM 0.5 MG/1
0.5 TABLET ORAL 2 TIMES DAILY PRN
Qty: 60 TABLET | Refills: 0 | Status: SHIPPED | OUTPATIENT
Start: 2023-10-31

## 2023-11-19 DIAGNOSIS — F32.A ANXIETY AND DEPRESSION: ICD-10-CM

## 2023-11-19 DIAGNOSIS — F41.9 ANXIETY AND DEPRESSION: ICD-10-CM

## 2023-11-20 RX ORDER — BUPROPION HYDROCHLORIDE 150 MG/1
150 TABLET, EXTENDED RELEASE ORAL 2 TIMES DAILY
Qty: 180 TABLET | Refills: 0 | Status: SHIPPED | OUTPATIENT
Start: 2023-11-20

## 2023-11-26 DIAGNOSIS — F51.01 PRIMARY INSOMNIA: ICD-10-CM

## 2023-11-26 DIAGNOSIS — F32.A ANXIETY AND DEPRESSION: ICD-10-CM

## 2023-11-26 DIAGNOSIS — F41.9 ANXIETY AND DEPRESSION: ICD-10-CM

## 2023-11-27 RX ORDER — ALPRAZOLAM 0.5 MG/1
0.5 TABLET ORAL 2 TIMES DAILY PRN
Qty: 60 TABLET | Refills: 0 | Status: SHIPPED | OUTPATIENT
Start: 2023-11-27

## 2023-11-27 NOTE — TELEPHONE ENCOUNTER
Medication:  PDMP     10/30/2023 10/30/2023 ALPRAZolam (Tablet) 60.0 30 0.5 MG NA KIRBY ZAPATA     Active agreement on file -No

## 2023-12-22 DIAGNOSIS — F51.01 PRIMARY INSOMNIA: ICD-10-CM

## 2023-12-22 DIAGNOSIS — F32.A ANXIETY AND DEPRESSION: ICD-10-CM

## 2023-12-22 DIAGNOSIS — F41.9 ANXIETY AND DEPRESSION: ICD-10-CM

## 2023-12-22 RX ORDER — ALPRAZOLAM 0.5 MG/1
0.5 TABLET ORAL 2 TIMES DAILY PRN
Qty: 60 TABLET | Refills: 0 | Status: SHIPPED | OUTPATIENT
Start: 2023-12-22

## 2023-12-22 NOTE — TELEPHONE ENCOUNTER
Medication:  PDMP   11/27/2023 11/27/2023 ALPRAZolam (Tablet) 60.0 30 0.5 MG NA KIRBY ZAPATA     Active agreement on file -No       Please review pt note

## 2024-01-03 DIAGNOSIS — I10 ESSENTIAL HYPERTENSION: ICD-10-CM

## 2024-01-03 RX ORDER — LISINOPRIL 10 MG/1
TABLET ORAL
Qty: 90 TABLET | Refills: 0 | Status: SHIPPED | OUTPATIENT
Start: 2024-01-03

## 2024-01-09 DIAGNOSIS — E11.69 DIABETES MELLITUS TYPE 2 IN OBESE: ICD-10-CM

## 2024-01-09 DIAGNOSIS — E66.9 DIABETES MELLITUS TYPE 2 IN OBESE: ICD-10-CM

## 2024-01-22 DIAGNOSIS — F41.9 ANXIETY AND DEPRESSION: ICD-10-CM

## 2024-01-22 DIAGNOSIS — F51.01 PRIMARY INSOMNIA: ICD-10-CM

## 2024-01-22 DIAGNOSIS — F32.A ANXIETY AND DEPRESSION: ICD-10-CM

## 2024-01-22 DIAGNOSIS — E11.69 DIABETES MELLITUS TYPE 2 IN OBESE: ICD-10-CM

## 2024-01-22 DIAGNOSIS — E66.9 DIABETES MELLITUS TYPE 2 IN OBESE: ICD-10-CM

## 2024-01-22 RX ORDER — ATORVASTATIN CALCIUM 10 MG/1
10 TABLET, FILM COATED ORAL DAILY
Qty: 90 TABLET | Refills: 1 | Status: SHIPPED | OUTPATIENT
Start: 2024-01-22

## 2024-01-22 RX ORDER — ALPRAZOLAM 0.5 MG/1
0.5 TABLET ORAL 2 TIMES DAILY PRN
Qty: 60 TABLET | Refills: 0 | Status: SHIPPED | OUTPATIENT
Start: 2024-01-22

## 2024-01-22 NOTE — TELEPHONE ENCOUNTER
Medication:  PDMP [id number] 12/22/2023 12/22/2023 ALPRAZolam (Tablet) 60.0 30 0.5 MG NA KIRBY ZAPATA  Active agreement on file -No

## 2024-02-06 ENCOUNTER — RA CDI HCC (OUTPATIENT)
Dept: OTHER | Facility: HOSPITAL | Age: 49
End: 2024-02-06

## 2024-02-23 DIAGNOSIS — F41.9 ANXIETY AND DEPRESSION: ICD-10-CM

## 2024-02-23 DIAGNOSIS — F51.01 PRIMARY INSOMNIA: ICD-10-CM

## 2024-02-23 DIAGNOSIS — F32.A ANXIETY AND DEPRESSION: ICD-10-CM

## 2024-02-23 RX ORDER — BUPROPION HYDROCHLORIDE 150 MG/1
150 TABLET, EXTENDED RELEASE ORAL 2 TIMES DAILY
Qty: 180 TABLET | Refills: 1 | Status: SHIPPED | OUTPATIENT
Start: 2024-02-23

## 2024-02-23 RX ORDER — ALPRAZOLAM 0.5 MG/1
0.5 TABLET ORAL 2 TIMES DAILY PRN
Qty: 60 TABLET | Refills: 0 | Status: SHIPPED | OUTPATIENT
Start: 2024-02-23

## 2024-02-23 NOTE — TELEPHONE ENCOUNTER
Medication:  PDMP  01/22/2024 01/22/2024 ALPRAZolam (Tablet) 60.0 30 0.5 MG NA KIRBY ZAPATA  Active agreement on file -No

## 2024-03-07 ENCOUNTER — OFFICE VISIT (OUTPATIENT)
Age: 49
End: 2024-03-07
Payer: COMMERCIAL

## 2024-03-07 VITALS
TEMPERATURE: 97.1 F | WEIGHT: 228.4 LBS | HEART RATE: 91 BPM | HEIGHT: 65 IN | DIASTOLIC BLOOD PRESSURE: 64 MMHG | OXYGEN SATURATION: 96 % | BODY MASS INDEX: 38.05 KG/M2 | SYSTOLIC BLOOD PRESSURE: 122 MMHG

## 2024-03-07 DIAGNOSIS — M79.7 FIBROMYALGIA: ICD-10-CM

## 2024-03-07 DIAGNOSIS — M15.0 PRIMARY GENERALIZED (OSTEO)ARTHRITIS: ICD-10-CM

## 2024-03-07 DIAGNOSIS — Z79.899 ENCOUNTER FOR LONG-TERM (CURRENT) USE OF OTHER MEDICATIONS: Primary | ICD-10-CM

## 2024-03-07 DIAGNOSIS — E55.9 VITAMIN D DEFICIENCY: ICD-10-CM

## 2024-03-07 PROCEDURE — 99213 OFFICE O/P EST LOW 20 MIN: CPT | Performed by: PHYSICIAN ASSISTANT

## 2024-03-07 RX ORDER — GABAPENTIN 300 MG/1
300 CAPSULE ORAL 3 TIMES DAILY
Qty: 270 CAPSULE | Refills: 1 | Status: SHIPPED | OUTPATIENT
Start: 2024-03-07 | End: 2024-09-03

## 2024-03-07 NOTE — PROGRESS NOTES
Assessment/Plan:    Fibromyalgia  Her myofascial symptoms are stable with gabapentin and Cymbalta.  Encouraged home exercise program as well.  Labs as ordered to monitor for medication side effects and toxicity.  Follow-up in 6 months or sooner if needed.    Primary generalized (osteo)arthritis  Osteoarthritis symptoms are stable.  Encouraged home exercise program.       Diagnoses and all orders for this visit:    Encounter for long-term (current) use of other medications  -     CBC and differential; Future  -     Comprehensive metabolic panel; Future  -     Sedimentation rate, automated; Future  -     C-reactive protein; Future  -     UA (URINE) with reflex to Scope    Fibromyalgia  -     gabapentin (NEURONTIN) 300 mg capsule; Take 1 capsule (300 mg total) by mouth 3 (three) times a day  -     CBC and differential; Future  -     Comprehensive metabolic panel; Future  -     Sedimentation rate, automated; Future  -     C-reactive protein; Future  -     UA (URINE) with reflex to Scope    Vitamin D deficiency  -     Vitamin D 25 hydroxy; Future    Primary generalized (osteo)arthritis          Subjective:      Patient ID: Marti Bacon is a 49 y.o. female.    She is here for follow-up of her osteoarthritis and fibromyalgia.  For her fibromyalgia she is taking gabapentin and Cymbalta.  Her symptoms are generally stable at this time.  She has minimal stiffness in the morning.  She has no swelling in her joints.  Her osteoarthritis symptoms are stable as well.    She has a history of hypercoagulable state and has been evaluated by Hematology in the past.  They recommended she take a baby aspirin twice weekly.  She has no history of DVT or pulmonary embolism.            The following portions of the patient's history were reviewed and updated as appropriate: allergies, current medications, past family history, past medical history, past social history, past surgical history, and problem list.    Review of Systems  "  Constitutional:  Negative for chills, fatigue and fever.   HENT:  Negative for hearing loss, sore throat and tinnitus.    Eyes:  Negative for pain and visual disturbance.   Respiratory:  Negative for cough and shortness of breath.    Cardiovascular:  Negative for chest pain and palpitations.   Gastrointestinal:  Negative for abdominal pain, nausea and vomiting.   Genitourinary:  Negative for difficulty urinating.   Musculoskeletal:  Positive for arthralgias and myalgias. Negative for back pain, gait problem, joint swelling, neck pain and neck stiffness.   Skin:  Negative for rash.   Neurological:  Negative for dizziness, seizures, weakness, numbness and headaches.   Psychiatric/Behavioral:  Negative for confusion, decreased concentration and sleep disturbance.          Objective:      /64 (BP Location: Right arm, Patient Position: Sitting, Cuff Size: Adult)   Pulse 91   Temp (!) 97.1 °F (36.2 °C) (Tympanic)   Ht 5' 5.25\" (1.657 m)   Wt 104 kg (228 lb 6.4 oz)   LMP 11/18/2018   SpO2 96%   BMI 37.72 kg/m²          Physical Exam  Constitutional:       Appearance: Normal appearance.   Cardiovascular:      Rate and Rhythm: Normal rate and regular rhythm.   Pulmonary:      Breath sounds: Normal breath sounds.   Musculoskeletal:      Comments: Full range of motion neck with triggers posterior cervical and shoulder girdle muscles.  Full range of motion bilateral shoulders, elbows, wrists.  Triggers lateral and medial epicondyles bilaterally.  Mild interphalangeal OA changes, early Heberden's nodes bilateral hands.  No synovitis noted.  Full range of motion bilateral hips with tenderness trochanteric bursa.  Full range of motion bilateral knees with patellofemoral crepitus and tenderness pes anserine bursa bilaterally.  Full range of motion bilateral ankles.  Hyperpronation bilateral feet.   Skin:     General: Skin is warm and dry.   Neurological:      General: No focal deficit present.      Mental Status: She " is alert.         Dragon Dictation software was used to dictate this note. It may contain errors with dictating incorrect words/spelling. Please contact provider directly for any questions.

## 2024-03-07 NOTE — ASSESSMENT & PLAN NOTE
Her myofascial symptoms are stable with gabapentin and Cymbalta.  Encouraged home exercise program as well.  Labs as ordered to monitor for medication side effects and toxicity.  Follow-up in 6 months or sooner if needed.

## 2024-03-26 ENCOUNTER — OFFICE VISIT (OUTPATIENT)
Dept: FAMILY MEDICINE CLINIC | Facility: CLINIC | Age: 49
End: 2024-03-26
Payer: COMMERCIAL

## 2024-03-26 VITALS
OXYGEN SATURATION: 98 % | TEMPERATURE: 97.6 F | HEIGHT: 65 IN | DIASTOLIC BLOOD PRESSURE: 86 MMHG | RESPIRATION RATE: 16 BRPM | WEIGHT: 225.8 LBS | BODY MASS INDEX: 37.62 KG/M2 | SYSTOLIC BLOOD PRESSURE: 130 MMHG | HEART RATE: 86 BPM

## 2024-03-26 DIAGNOSIS — E66.9 DIABETES MELLITUS TYPE 2 IN OBESE: ICD-10-CM

## 2024-03-26 DIAGNOSIS — M79.7 FIBROMYALGIA: ICD-10-CM

## 2024-03-26 DIAGNOSIS — E11.9 TYPE 2 DIABETES MELLITUS WITHOUT COMPLICATION, WITHOUT LONG-TERM CURRENT USE OF INSULIN (HCC): ICD-10-CM

## 2024-03-26 DIAGNOSIS — E78.2 MIXED HYPERLIPIDEMIA: ICD-10-CM

## 2024-03-26 DIAGNOSIS — Z12.31 BREAST CANCER SCREENING BY MAMMOGRAM: ICD-10-CM

## 2024-03-26 DIAGNOSIS — F32.A ANXIETY AND DEPRESSION: ICD-10-CM

## 2024-03-26 DIAGNOSIS — Z00.00 ANNUAL PHYSICAL EXAM: Primary | ICD-10-CM

## 2024-03-26 DIAGNOSIS — F41.9 ANXIETY AND DEPRESSION: ICD-10-CM

## 2024-03-26 DIAGNOSIS — I10 BENIGN HYPERTENSION: ICD-10-CM

## 2024-03-26 DIAGNOSIS — F51.01 PRIMARY INSOMNIA: ICD-10-CM

## 2024-03-26 DIAGNOSIS — E11.69 DIABETES MELLITUS TYPE 2 IN OBESE: ICD-10-CM

## 2024-03-26 DIAGNOSIS — E66.01 CLASS 2 SEVERE OBESITY DUE TO EXCESS CALORIES WITH SERIOUS COMORBIDITY AND BODY MASS INDEX (BMI) OF 37.0 TO 37.9 IN ADULT (HCC): ICD-10-CM

## 2024-03-26 DIAGNOSIS — D68.62 LUPUS ANTICOAGULANT DISORDER (HCC): ICD-10-CM

## 2024-03-26 DIAGNOSIS — E03.8 OTHER SPECIFIED HYPOTHYROIDISM: ICD-10-CM

## 2024-03-26 LAB — SL AMB POCT HEMOGLOBIN AIC: 4.9 (ref ?–6.5)

## 2024-03-26 PROCEDURE — 83036 HEMOGLOBIN GLYCOSYLATED A1C: CPT | Performed by: FAMILY MEDICINE

## 2024-03-26 PROCEDURE — 99396 PREV VISIT EST AGE 40-64: CPT | Performed by: FAMILY MEDICINE

## 2024-03-26 PROCEDURE — 99214 OFFICE O/P EST MOD 30 MIN: CPT | Performed by: FAMILY MEDICINE

## 2024-03-26 RX ORDER — ALPRAZOLAM 0.5 MG/1
0.5 TABLET ORAL 2 TIMES DAILY PRN
Qty: 60 TABLET | Refills: 0 | Status: SHIPPED | OUTPATIENT
Start: 2024-03-26

## 2024-03-26 NOTE — PROGRESS NOTES
ADULT ANNUAL PHYSICAL  [facility] PRACTICE    NAME: Marti Bacon  AGE: 49 y.o. SEX: female  : 1975     DATE: 3/26/2024     Assessment and Plan:     Problem List Items Addressed This Visit          Cardiovascular and Mediastinum    Benign hypertension     Stable on lisinopril            Endocrine    Hypothyroidism    Relevant Orders    TSH, 3rd generation with Free T4 reflex    Diabetes mellitus type 2 in obese        Lab Results   Component Value Date    HGBA1C 5.2 2023   Stable             Hematopoietic and Hemostatic    Lupus anticoagulant disorder (HCC)     Stable   Was told by oncologist that its okay not to be on ASA due to gastric bypass            Behavioral Health    Anxiety and depression    Relevant Medications    ALPRAZolam (XANAX) 0.5 mg tablet       Surgery/Wound/Pain    Fibromyalgia     Doing well on gabapentin   Sees rheumatology            Other    Class 2 severe obesity due to excess calories with serious comorbidity in adult (HCC)     Improving   Lost 20 pounds on mounjaro  Continue diet, exercise, portion control          Hyperlipidemia     On lipitor  Due for labs          Other Visit Diagnoses       Annual physical exam    -  Primary    Type 2 diabetes mellitus without complication, without long-term current use of insulin (HCC)        Relevant Orders    POCT hemoglobin A1c (Completed)    Albumin / creatinine urine ratio    Comprehensive metabolic panel    Hemoglobin A1C    Lipid Panel with Direct LDL reflex    Breast cancer screening by mammogram        Relevant Orders    Mammo screening bilateral w 3d & cad    Primary insomnia        Relevant Medications    ALPRAZolam (XANAX) 0.5 mg tablet            Immunizations and preventive care screenings were discussed with patient today. Appropriate education was printed on patient's after visit summary.    Counseling:  Alcohol/drug use: discussed moderation in alcohol intake, the  recommendations for healthy alcohol use, and avoidance of illicit drug use.  Dental Health: discussed importance of regular tooth brushing, flossing, and dental visits.  Injury prevention: discussed safety/seat belts, safety helmets, smoke detectors, carbon dioxide detectors, and smoking near bedding or upholstery.  Sexual health: discussed sexually transmitted diseases, partner selection, use of condoms, avoidance of unintended pregnancy, and contraceptive alternatives.  Exercise: the importance of regular exercise/physical activity was discussed. Recommend exercise 3-5 times per week for at least 30 minutes.       Depression Screening and Follow-up Plan: Patient's depression screening was positive with a PHQ-9 score of 8. Patient assessed for underlying major depression. Brief counseling provided and recommend additional follow-up/re-evaluation next office visit.         No follow-ups on file.     Chief Complaint:     Chief Complaint   Patient presents with    Follow-up     Patient being seen for 3 month follow up-Type 2 diabetes mellitus without complication, without long-term current use of insulin       History of Present Illness:     Adult Annual Physical   Patient here for a comprehensive physical exam. The patient reports no problems.    Diet and Physical Activity  Diet/Nutrition: well balanced diet and consuming 3-5 servings of fruits/vegetables daily.   Exercise: walking.      Depression Screening  PHQ-2/9 Depression Screening    Little interest or pleasure in doing things: 0 - not at all  Feeling down, depressed, or hopeless: 2 - more than half the days  Trouble falling or staying asleep, or sleeping too much: 3 - nearly every day  Feeling tired or having little energy: 2 - more than half the days  Poor appetite or overeatin - not at all  Feeling bad about yourself - or that you are a failure or have let yourself or your family down: 0 - not at all  Trouble concentrating on things, such as reading the  newspaper or watching television: 1 - several days  Moving or speaking so slowly that other people could have noticed. Or the opposite - being so fidgety or restless that you have been moving around a lot more than usual: 0 - not at all  Thoughts that you would be better off dead, or of hurting yourself in some way: 0 - not at all  PHQ-9 Score: 8  PHQ-9 Interpretation: Mild depression       General Health  Sleep: sleeps well.   Hearing: normal - bilateral.  Vision: goes for regular eye exams.   Dental: regular dental visits and brushes teeth twice daily.       /GYN Health  Follows with gynecology? yes   Patient is: perimenopausal  Last menstrual period: hysterectomy and ovaries removal 2017  Contraceptive method:  none .    Advanced Care Planning  Do you have an advanced directive? no  Do you have a durable medical power of ? no  ACP document given to the patient? yes     Review of Systems:     Review of Systems   Constitutional: Negative.  Negative for chills and fever.   HENT: Negative.  Negative for ear pain and sore throat.    Eyes: Negative.  Negative for pain and visual disturbance.   Respiratory: Negative.  Negative for cough and shortness of breath.    Cardiovascular: Negative.  Negative for chest pain and palpitations.   Gastrointestinal: Negative.  Negative for abdominal pain and vomiting.   Endocrine: Negative.    Genitourinary: Negative.  Negative for dysuria and hematuria.   Musculoskeletal: Negative.  Negative for arthralgias and back pain.   Skin:  Negative for color change and rash.   Allergic/Immunologic: Negative.    Neurological: Negative.  Negative for seizures and syncope.   Hematological: Negative.    Psychiatric/Behavioral: Negative.     All other systems reviewed and are negative.     Past Medical History:     Past Medical History:   Diagnosis Date    Anxiety     Arthritis     Bariatric surgery status     CPAP (continuous positive airway pressure) dependence     Depression      Diabetes mellitus (HCC)     Eczema     Fibromyalgia, primary     Foot drop 5/12/2020    Hyperlipidemia     Hypertension     Hypothyroidism     Left foot drop 5/5/2020    Lupus anticoagulant disorder (HCC)     Migraine     Night sweats     Obesity     Ovarian neoplasm 11/20/2017    Postsurgical malabsorption     Psoriasis     Sleep apnea     Status post laparoscopic hysterectomy 11/20/2017    Tremors of nervous system 5/12/2020      Past Surgical History:     Past Surgical History:   Procedure Laterality Date    COLONOSCOPY      HYSTERECTOMY Bilateral 11/17/2018    OOPHORECTOMY Bilateral 11/17/2018    MI COLONOSCOPY FLX DX W/COLLJ SPEC WHEN PFRMD N/A 6/13/2017    Procedure: COLONOSCOPY;  Surgeon: Cherelle Polanco DO;  [facility];  Service: Gastroenterology    MI EGD TRANSORAL BIOPSY SINGLE/MULTIPLE N/A 1/30/2019    Procedure: ESOPHAGOGASTRODUODENOSCOPY (EGD) with bx;  Surgeon: Dallas Rueda MD;  [facility];  Service: Bariatrics    MI LAPS GSTR RSTCV PX W/BYP LEESA-EN-Y LIMB <150 CM N/A 3/25/2019    Procedure: LAPAROSCOPIC LEESA-EN-Y GASTRIC BYPASS AND INTRAOPERATIVE EGD;  Surgeon: Dallas Rueda MD;  [facility];  Service: Bariatrics    MI LAPS TOTAL HYSTERECT 250 GM/< W/RMVL TUBE/OVARY N/A 11/20/2017    Procedure: ROBOTIC TOTAL LAPAROSCOPIC HYSTERECTOMY/ BSO;  Surgeon: Chavo Aviles MD;  [facility];  Service: Gynecology Oncology    WISDOM TOOTH EXTRACTION        Social History:     Social History     Socioeconomic History    Marital status: /Civil Union     Spouse name: None    Number of children: None    Years of education: None    Highest education level: None   Occupational History    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     Employer: [employer]   Tobacco Use    Smoking status: Former     Current packs/day: 0.00     Average packs/day: 0.8 packs/day for 15.0 years (11.3 ttl pk-yrs)     Types: Cigarettes     Start date: 12/4/2003     Quit date: 12/4/2018     Years since  quittin.3    Smokeless tobacco: Never   Vaping Use    Vaping status: Never Used   Substance and Sexual Activity    Alcohol use: Not Currently    Drug use: No    Sexual activity: Yes     Partners: Male   Other Topics Concern    None   Social History Narrative    None     Social Determinants of Health     Financial Resource Strain: Not on file   Food Insecurity: Not on file   Transportation Needs: Not on file   Physical Activity: Not on file   Stress: Not on file   Social Connections: Not on file   Intimate Partner Violence: Not on file   Housing Stability: Not on file      Family History:     Family History   Problem Relation Age of Onset    Heart disease Mother         Cardiac disorder    Diabetes Mother     Hypertension Mother     Hyperthyroidism Mother     Diabetes Father     Hypertension Father     Lung cancer Maternal Grandfather     Colon cancer Cousin 67    Colon cancer Maternal Aunt 49    Lung cancer Family     Diabetes Sister     No Known Problems Brother     No Known Problems Maternal Grandmother     No Known Problems Paternal Grandmother     No Known Problems Paternal Grandfather     No Known Problems Sister       Current Medications:     Current Outpatient Medications   Medication Sig Dispense Refill    ALPRAZolam (XANAX) 0.5 mg tablet Take 1 tablet (0.5 mg total) by mouth 2 (two) times a day as needed for anxiety 60 tablet 0    atorvastatin (LIPITOR) 10 mg tablet Take 1 tablet (10 mg total) by mouth daily 90 tablet 1    BIOTIN PO Take 600 mg by mouth every other day      buPROPion (WELLBUTRIN SR) 150 mg 12 hr tablet Take 1 tablet (150 mg total) by mouth 2 (two) times a day 180 tablet 1    Calcium Citrate-Vitamin D (MINE-CITRATE PLUS VITAMIN D PO) Take by mouth in the morning      Cholecalciferol (VITAMIN D3) 2000 units capsule Take 2,000 Units by mouth daily       Cyanocobalamin (B-12) 1000 MCG CAPS Take 1 capsule by mouth daily       DULoxetine (CYMBALTA) 60 mg delayed release capsule Take 1 capsule  "by mouth once daily 90 capsule 0    gabapentin (NEURONTIN) 300 mg capsule Take 1 capsule (300 mg total) by mouth 3 (three) times a day 270 capsule 1    levothyroxine 50 mcg tablet Take 1 tablet (50 mcg total) by mouth daily 90 tablet 1    lisinopril (ZESTRIL) 10 mg tablet 5 mg every other day 90 tablet 0    Multiple Vitamin (multivitamin) tablet Take 1 tablet by mouth daily      nystatin (MYCOSTATIN) cream Apply topically 1-2 times a day as needed to abdomen rash 60 g 6    tirzepatide (Mounjaro) 12.5 MG/0.5ML Inject 0.5 mL (12.5 mg total) under the skin every 7 days 6 mL 0    triamcinolone (KENALOG) 0.1 % ointment APPLY TOPICALLY TO AFFECTED AREA 1-2 TIMES A DAY AS NEEDED TO ITCHY RASH 80 g 0    methocarbamol (ROBAXIN) 500 mg tablet Take 1 tablet (500 mg total) by mouth 4 (four) times a day for 5 days (Patient not taking: Reported on 3/26/2024) 20 tablet 0     No current facility-administered medications for this visit.      Allergies:     Allergies   Allergen Reactions    Metformin Diarrhea      Physical Exam:     /86 (BP Location: Left arm, Patient Position: Sitting, Cuff Size: Standard)   Pulse 86   Temp 97.6 °F (36.4 °C) (Tympanic)   Resp 16   Ht 5' 5.25\" (1.657 m)   Wt 102 kg (225 lb 12.8 oz)   LMP 11/18/2018   SpO2 98%   BMI 37.29 kg/m²     Physical Exam  Vitals and nursing note reviewed.   Constitutional:       Appearance: Normal appearance.   HENT:      Right Ear: Tympanic membrane normal.      Left Ear: Tympanic membrane normal.      Mouth/Throat:      Mouth: Mucous membranes are moist.   Eyes:      Extraocular Movements: Extraocular movements intact.      Pupils: Pupils are equal, round, and reactive to light.   Cardiovascular:      Rate and Rhythm: Normal rate and regular rhythm.      Pulses: Normal pulses.      Heart sounds: Normal heart sounds.   Pulmonary:      Effort: Pulmonary effort is normal.      Breath sounds: Normal breath sounds.   Musculoskeletal:         General: Normal range of " motion.      Cervical back: Normal range of motion.   Skin:     General: Skin is warm.   Neurological:      General: No focal deficit present.      Mental Status: She is alert and oriented to person, place, and time.          Dena Toledo MD  [facility]  Depression Screening Follow-up Plan: Patient's depression screening was positive with a PHQ-2 score of . Their PHQ-9 score was 8. Patient assessed for underlying major depression. They have no active suicidal ideations. Brief counseling provided and recommend additional follow-up/re-evaluation next office visit.

## 2024-03-26 NOTE — PROGRESS NOTES
Name: Marti Bacon      : 1975      MRN: [id number]  Encounter Provider: Dena Toledo MD  Encounter Date: 3/26/2024   Encounter department: [facility]    Assessment & Plan     1. Type 2 diabetes mellitus without complication, without long-term current use of insulin (HCC)  -     POCT hemoglobin A1c           Subjective     HPI  Review of Systems    Past Medical History:   Diagnosis Date   • Anxiety    • Arthritis    • Bariatric surgery status    • CPAP (continuous positive airway pressure) dependence    • Depression    • Diabetes mellitus (HCC)    • Eczema    • Fibromyalgia, primary    • Foot drop 2020   • Hyperlipidemia    • Hypertension    • Hypothyroidism    • Left foot drop 2020   • Lupus anticoagulant disorder (HCC)    • Migraine    • Night sweats    • Obesity    • Ovarian neoplasm 2017   • Postsurgical malabsorption    • Psoriasis    • Sleep apnea    • Status post laparoscopic hysterectomy 2017   • Tremors of nervous system 2020     Past Surgical History:   Procedure Laterality Date   • COLONOSCOPY     • HYSTERECTOMY Bilateral 2018   • OOPHORECTOMY Bilateral 2018   • FL COLONOSCOPY FLX DX W/COLLJ SPEC WHEN PFRMD N/A 2017    Procedure: COLONOSCOPY;  Surgeon: Cherelle Polanco DO;  [facility];  Service: Gastroenterology   • FL EGD TRANSORAL BIOPSY SINGLE/MULTIPLE N/A 2019    Procedure: ESOPHAGOGASTRODUODENOSCOPY (EGD) with bx;  Surgeon: Dallas Rueda MD;  [facility];  Service: Bariatrics   • FL LAPS GSTR RSTCV PX W/BYP LEESA-EN-Y LIMB <150 CM N/A 3/25/2019    Procedure: LAPAROSCOPIC LEESA-EN-Y GASTRIC BYPASS AND INTRAOPERATIVE EGD;  Surgeon: Dallas Rueda MD;  [facility];  Service: Bariatrics   • FL LAPS TOTAL HYSTERECT 250 GM/< W/RMVL TUBE/OVARY N/A 2017    Procedure: ROBOTIC TOTAL LAPAROSCOPIC HYSTERECTOMY/ BSO;  Surgeon: Chavo Aviles MD;  [facility];  Service: Gynecology Oncology    • WISDOM TOOTH EXTRACTION       Family History   Problem Relation Age of Onset   • Heart disease Mother         Cardiac disorder   • Diabetes Mother    • Hypertension Mother    • Hyperthyroidism Mother    • Diabetes Father    • Hypertension Father    • Lung cancer Maternal Grandfather    • Colon cancer Cousin 67   • Colon cancer Maternal Aunt 49   • Lung cancer Family    • Diabetes Sister    • No Known Problems Brother    • No Known Problems Maternal Grandmother    • No Known Problems Paternal Grandmother    • No Known Problems Paternal Grandfather    • No Known Problems Sister      Social History     Socioeconomic History   • Marital status: /Civil Union     Spouse name: None   • Number of children: None   • Years of education: None   • Highest education level: None   Occupational History   • 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     Employer: [employer]   Tobacco Use   • Smoking status: Former     Current packs/day: 0.00     Average packs/day: 0.8 packs/day for 15.0 years (11.3 ttl pk-yrs)     Types: Cigarettes     Start date: 2003     Quit date: 2018     Years since quittin.3   • Smokeless tobacco: Never   Vaping Use   • Vaping status: Never Used   Substance and Sexual Activity   • Alcohol use: Not Currently   • Drug use: No   • Sexual activity: Yes     Partners: Male   Other Topics Concern   • None   Social History Narrative   • None     Social Determinants of Health     Financial Resource Strain: Not on file   Food Insecurity: Not on file   Transportation Needs: Not on file   Physical Activity: Not on file   Stress: Not on file   Social Connections: Not on file   Intimate Partner Violence: Not on file   Housing Stability: Not on file     Current Outpatient Medications on File Prior to Visit   Medication Sig   • ALPRAZolam (XANAX) 0.5 mg tablet Take 1 tablet (0.5 mg total) by mouth 2 (two) times a day as needed for anxiety   • atorvastatin (LIPITOR) 10 mg tablet Take 1 tablet (10 mg total) by  mouth daily   • BIOTIN PO Take 600 mg by mouth every other day   • buPROPion (WELLBUTRIN SR) 150 mg 12 hr tablet Take 1 tablet (150 mg total) by mouth 2 (two) times a day   • Calcium Citrate-Vitamin D (MINE-CITRATE PLUS VITAMIN D PO) Take by mouth in the morning   • Cholecalciferol (VITAMIN D3) 2000 units capsule Take 2,000 Units by mouth daily    • Cyanocobalamin (B-12) 1000 MCG CAPS Take 1 capsule by mouth daily    • DULoxetine (CYMBALTA) 60 mg delayed release capsule Take 1 capsule by mouth once daily   • gabapentin (NEURONTIN) 300 mg capsule Take 1 capsule (300 mg total) by mouth 3 (three) times a day   • levothyroxine 50 mcg tablet Take 1 tablet (50 mcg total) by mouth daily   • lisinopril (ZESTRIL) 10 mg tablet 5 mg every other day   • Multiple Vitamin (multivitamin) tablet Take 1 tablet by mouth daily   • nystatin (MYCOSTATIN) cream Apply topically 1-2 times a day as needed to abdomen rash   • tirzepatide (Mounjaro) 12.5 MG/0.5ML Inject 0.5 mL (12.5 mg total) under the skin every 7 days   • triamcinolone (KENALOG) 0.1 % ointment APPLY TOPICALLY TO AFFECTED AREA 1-2 TIMES A DAY AS NEEDED TO ITCHY RASH   • ALPRAZolam (XANAX) 0.5 mg tablet Take 1 tablet (0.5 mg total) by mouth 2 (two) times a day as needed for anxiety (Patient not taking: Reported on 3/26/2024)   • methocarbamol (ROBAXIN) 500 mg tablet Take 1 tablet (500 mg total) by mouth 4 (four) times a day for 5 days (Patient not taking: Reported on 3/26/2024)     Allergies   Allergen Reactions   • Metformin Diarrhea     Immunization History   Administered Date(s) Administered   • COVID-19 PFIZER VACCINE 0.3 ML IM 04/29/2021, 05/20/2021   • COVID-19 Pfizer vac (Julian-sucrose, gray cap) 12 yr+ IM 04/09/2022   • Pneumococcal Conjugate 13-Valent 08/03/2020   • Pneumococcal Polysaccharide PPV23 07/15/2021   • Tdap 05/23/2019       Objective     LMP 11/18/2018     Physical Exam  Dena Toledo MD

## 2024-03-27 ENCOUNTER — TELEPHONE (OUTPATIENT)
Dept: ADMINISTRATIVE | Facility: OTHER | Age: 49
End: 2024-03-27

## 2024-03-27 NOTE — LETTER
Diabetic Eye Exam Form    Date Requested: 24  Patient: Marti Bacon  Patient : 1975   Referring Provider: Dena Toledo MD      DIABETIC Eye Exam Date _______________________________      Type of Exam MUST be documented for Diabetic Eye Exams. Please CHECK ONE.     Retinal Exam       Dilated Retinal Exam       OCT       Optomap-Iris Exam      Fundus Photography       Left Eye - Please check Retinopathy or No Retinopathy        Exam did show retinopathy    Exam did not show retinopathy       Right Eye - Please check Retinopathy or No Retinopathy       Exam did show retinopathy    Exam did not show retinopathy       Comments __________________________________________________________    Practice Providing Exam ______________________________________________    Exam Performed By (print name) _______________________________________      Provider Signature ___________________________________________________      These reports are needed for  compliance.  Please fax this completed form and a copy of the Diabetic Eye Exam report to our office located at [address] [place] [address] as soon as possible via Fax [phone] attention Gina: Phone [phone]  We thank you for your assistance in treating our mutual patient.

## 2024-03-27 NOTE — LETTER
2nd Request      Diabetic Eye Exam Form    Date Requested: 24  Patient: Marti Bacon  Patient : 1975   Referring Provider: Dena Toledo MD      DIABETIC Eye Exam Date _______________________________      Type of Exam MUST be documented for Diabetic Eye Exams. Please CHECK ONE.     Retinal Exam       Dilated Retinal Exam       OCT       Optomap-Iris Exam      Fundus Photography       Left Eye - Please check Retinopathy or No Retinopathy        Exam did show retinopathy    Exam did not show retinopathy       Right Eye - Please check Retinopathy or No Retinopathy       Exam did show retinopathy    Exam did not show retinopathy       Comments __________________________________________________________    Practice Providing Exam ______________________________________________    Exam Performed By (print name) _______________________________________      Provider Signature ___________________________________________________      These reports are needed for  compliance.  Please fax this completed form and a copy of the Diabetic Eye Exam report to our office located at [address] [place] [address] as soon as possible via Fax [phone] attention Gina: Phone [phone]  We thank you for your assistance in treating our mutual patient.

## 2024-03-27 NOTE — TELEPHONE ENCOUNTER
----- Message from Lucita Romeo sent at 3/26/2024  2:49 PM EDT -----  Regarding: Care Gap Request  03/26/24 2:49 PM    Hello, our patient above has had Diabetic Eye Exam completed/performed. Please assist in updating the patient chart by making an External outreach to [facility] facility located in [facility] . The date of service is 09/2023.    Thank you,  Lucita ANDERSON

## 2024-03-28 NOTE — TELEPHONE ENCOUNTER
Upon review of the In Basket request and the patient's chart, initial outreach has been made via fax to facility. Please see Contacts section for details.     Thank you  Gina Kurtz MA

## 2024-04-02 NOTE — TELEPHONE ENCOUNTER
As a follow-up, a second attempt has been made for outreach via fax to facility. Please see Contacts section for details.    Thank you  Gina Kurtz MA

## 2024-04-05 NOTE — TELEPHONE ENCOUNTER
As a final attempt, a third outreach has been made via telephone call to facility. Please see Contacts section for details. This encounter will be closed and completed by end of day. Should we receive the requested information because of previous outreach attempts, the requested patient's chart will be updated appropriately.     Thank you  Gina Kurtz MA

## 2024-04-16 DIAGNOSIS — E03.9 HYPOTHYROIDISM, UNSPECIFIED TYPE: ICD-10-CM

## 2024-04-16 RX ORDER — LEVOTHYROXINE SODIUM 0.05 MG/1
50 TABLET ORAL DAILY
Qty: 90 TABLET | Refills: 1 | Status: SHIPPED | OUTPATIENT
Start: 2024-04-16

## 2024-04-19 ENCOUNTER — NURSE TRIAGE (OUTPATIENT)
Age: 49
End: 2024-04-19

## 2024-04-19 ENCOUNTER — TELEMEDICINE (OUTPATIENT)
Dept: FAMILY MEDICINE CLINIC | Facility: CLINIC | Age: 49
End: 2024-04-19
Payer: COMMERCIAL

## 2024-04-19 ENCOUNTER — TELEPHONE (OUTPATIENT)
Dept: FAMILY MEDICINE CLINIC | Facility: CLINIC | Age: 49
End: 2024-04-19

## 2024-04-19 VITALS
BODY MASS INDEX: 36.82 KG/M2 | DIASTOLIC BLOOD PRESSURE: 78 MMHG | SYSTOLIC BLOOD PRESSURE: 128 MMHG | HEART RATE: 68 BPM | HEIGHT: 65 IN | WEIGHT: 221 LBS

## 2024-04-19 DIAGNOSIS — L98.499 SKIN ULCER, UNSPECIFIED ULCER STAGE (HCC): Primary | ICD-10-CM

## 2024-04-19 PROCEDURE — 99214 OFFICE O/P EST MOD 30 MIN: CPT | Performed by: NURSE PRACTITIONER

## 2024-04-19 RX ORDER — GEL DRESSING
GEL (GRAM) TOPICAL AS NEEDED
Qty: 85 G | Refills: 0 | Status: SHIPPED | OUTPATIENT
Start: 2024-04-19

## 2024-04-19 RX ORDER — SULFAMETHOXAZOLE AND TRIMETHOPRIM 800; 160 MG/1; MG/1
1 TABLET ORAL EVERY 12 HOURS SCHEDULED
Qty: 10 TABLET | Refills: 0 | Status: SHIPPED | OUTPATIENT
Start: 2024-04-19 | End: 2024-04-23 | Stop reason: SDUPTHER

## 2024-04-19 NOTE — TELEPHONE ENCOUNTER
"Pt sent [facility] message with photos.  Pt has a wound on her panus that started about 2 weeks ago. It started as a boil that opened and now is about the size of a quarter. Last week there was pus coming out of it.  Pt denies any fever or red streaks coming from the wound.  Pt has been using hydrogen peroxide and A&D ointment on wound.  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  Virtual visit made.  Pt is located in [place], phone number verified.  Pictures of wound are located in pt's [facility] message from today.       Reason for Disposition   After 14 days and wound isn't healed    Answer Assessment - Initial Assessment Questions  1. APPEARANCE of INJURY: \"What does the injury look like?\"      Size of a quarter, 1/4 inch deep  2. SIZE: \"How large is the cut?\"       Quarter sized   3. BLEEDING: \"Is it bleeding now?\" If Yes, ask: \"Is it difficult to stop?\"       no  4. LOCATION: \"Where is the injury located?\"       Panus of abdomen  5. ONSET: \"How long ago did the injury occur?\"       It started as a boil  6. MECHANISM: \"Tell me how it happened.\"       Started as a boil  7. TETANUS: \"When was the last tetanus booster?\"      N/a  8. PREGNANCY: \"Is there any chance you are pregnant?\" \"When was your last menstrual period?\"      no    Protocols used: Skin Injury-ADULT-[place]    "

## 2024-04-19 NOTE — TELEPHONE ENCOUNTER
Patient calling stating [facility] just called her to let her know they do not have the hyrdogel wound dressings, is there something else that can be called in, please review.

## 2024-04-19 NOTE — TELEPHONE ENCOUNTER
Spoke to patient about virtual visit today for wound.  Providers recommend she is seen in person in the office or urgent care.  Patient will try to go to urgent care today or Saturday morning and will keep us updated.

## 2024-04-19 NOTE — ASSESSMENT & PLAN NOTE
Ulcerated wound; photos taken by patient in media tab.  No active drainage.  Pt reports that wound seems to be showing some signs of healing.  Prescription sent for hydrogel and she should loosely cover with non-stick gauze.  Because of inability to rule out cellulitis via video and pt's history of diabetes will start her on bactrim as well.  She is advised to wash the wound with saline wound wash daily.  Will follow up next Tuesday or Wednesday.  She is advised to go to urgent care on the weekend if something worsens.

## 2024-04-19 NOTE — TELEPHONE ENCOUNTER
Regarding: Open wound.  ----- Message from Yamilka Casarez sent at 4/19/2024  7:30 AM EDT -----  Elias Toledo     I have an open sore now for over a week.  I've been trying to take care of it myself with peroxide and A&D ointment but it's not getting any better and when I put the peroxide on it burns.  This is located on the skin that overlaps in the crotch area because of my stomach surgery I have all the extra skin.       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       I have attached pictures and if needed I can do a telehealth call but I can't come to office.     I'm hoping you can give me some instructions to take care of it better because it's very painful.     Pictures are attached and I can be reached at anytime on my cellphone which is phone on file.  Thank you  Marti   Attachments   [id number]     [id number]     [id number]  This encounter is not signed. The conversation may still be ongoing.  Open sore issue    From  Marti Bacon To

## 2024-04-19 NOTE — PROGRESS NOTES
Virtual Regular Visit    Verification of patient location:    Patient is located at Home in the following state in which I hold an active license [place]      Assessment/Plan:    Problem List Items Addressed This Visit          Musculoskeletal and Integument    Skin ulcer (HCC) - Primary     Ulcerated wound; photos taken by patient in media tab.  No active drainage.  Pt reports that wound seems to be showing some signs of healing.  Prescription sent for hydrogel and she should loosely cover with non-stick gauze.  Because of inability to rule out cellulitis via video and pt's history of diabetes will start her on bactrim as well.  She is advised to wash the wound with saline wound wash daily.  Will follow up next Tuesday or Wednesday.  She is advised to go to urgent care on the weekend if something worsens.         Relevant Medications    Wound Dressings (allantoin) gel    sulfamethoxazole-trimethoprim (BACTRIM DS) 800-160 mg per tablet            Reason for visit is   Chief Complaint   Patient presents with    Wound Check     Patient is being seen for a wound check over her R leg above pelvis area x 2 weeks. She notes having gastric bypass done and has an overlap of sin where her wound is located. She notes pain, redness with white discharge. She notes green puss last week. Using peroxide and alcohol and AD ointment. She denies foul smell.     Virtual Regular Visit          Encounter provider MIREYA Ho    [facility] [address] [facility] [address] [place] [address]      Recent Visits  No visits were found meeting these conditions.  Showing recent visits within past 7 days and meeting all other requirements  Today's Visits  Date Type Provider Dept   04/19/24 Telemedicine MIREYA Ho Pg   04/19/24 Telephone Maryjane Zuleta   Showing today's visits and meeting all other requirements  Future Appointments  No visits  "were found meeting these conditions.  Showing future appointments within next 150 days and meeting all other requirements       The patient was identified by name and date of birth. Marti Bacon was informed that this is a telemedicine visit and that the visit is being conducted through the Epic Embedded platform. She agrees to proceed..  My office door was closed. No one else was in the room.  She acknowledged consent and understanding of privacy and security of the video platform. The patient has agreed to participate and understands they can discontinue the visit at any time.    Patient is aware this is a billable service.     Subjective  Marti Bacon is a 49 y.o. female seen via video .      Pt is a 50 yo female seen via video today for evaluation of a wound.  She was unable to come to the office for in person evaluation unfortunately.  She has history of diabetes, bariatric surgery, HTN, hypothyroidism, peroneal neuropathy, osteoarthritis, lupus anticoagulant disorder, fibromyalgia, obesity, hyperlipidemia.  She has had this wound for about two weeks below excess skin on the low abdomen.  She noticed green discharge last week with redness and pain.  She has been using peroxide and tried alcohol and neosporin.  Last night she put A&D ointment on it and that gave her more comfort.  She estimates that it is about the size of a quarter and now is about 1/8\" deep.  She says it was deeper initially.  Today it seems less red, no longer having drainage.  The surrounding skin is not red, warm or indurated.  She says below the wound it does feel a bit firm.  No fever, chills; states last week she had a GI bug, is now tolerating PO>           Past Medical History:   Diagnosis Date    Anxiety     Arthritis     Bariatric surgery status     CPAP (continuous positive airway pressure) dependence     Depression     Diabetes mellitus (HCC)     Eczema     Fibromyalgia, primary     Foot drop 5/12/2020    Hyperlipidemia     " Hypertension     Hypothyroidism     Left foot drop 5/5/2020    Lupus anticoagulant disorder (HCC)     Migraine     Night sweats     Obesity     Ovarian neoplasm 11/20/2017    Postsurgical malabsorption     Psoriasis     Sleep apnea     Status post laparoscopic hysterectomy 11/20/2017    Tremors of nervous system 5/12/2020       Past Surgical History:   Procedure Laterality Date    COLONOSCOPY      HYSTERECTOMY Bilateral 11/17/2018    OOPHORECTOMY Bilateral 11/17/2018    MO COLONOSCOPY FLX DX W/COLLJ SPEC WHEN PFRMD N/A 6/13/2017    Procedure: COLONOSCOPY;  Surgeon: Cherelle Polanco DO;  Location: [facility];  Service: Gastroenterology    MO EGD TRANSORAL BIOPSY SINGLE/MULTIPLE N/A 1/30/2019    Procedure: ESOPHAGOGASTRODUODENOSCOPY (EGD) with bx;  Surgeon: Dallas uReda MD;  Location: [facility];  Service: Bariatrics    MO LAPS GSTR RSTCV PX W/BYP LEESA-EN-Y LIMB <150 CM N/A 3/25/2019    Procedure: LAPAROSCOPIC LEESA-EN-Y GASTRIC BYPASS AND INTRAOPERATIVE EGD;  Surgeon: Dallas Rueda MD;  Location: [facility];  Service: Bariatrics    MO LAPS TOTAL HYSTERECT 250 GM/< W/RMVL TUBE/OVARY N/A 11/20/2017    Procedure: ROBOTIC TOTAL LAPAROSCOPIC HYSTERECTOMY/ BSO;  Surgeon: Chavo Aviles MD;  Location: [facility];  Service: Gynecology Oncology    WISDOM TOOTH EXTRACTION         Current Outpatient Medications   Medication Sig Dispense Refill    ALPRAZolam (XANAX) 0.5 mg tablet Take 1 tablet (0.5 mg total) by mouth 2 (two) times a day as needed for anxiety 60 tablet 0    atorvastatin (LIPITOR) 10 mg tablet Take 1 tablet (10 mg total) by mouth daily 90 tablet 1    BIOTIN PO Take 600 mg by mouth every other day      buPROPion (WELLBUTRIN SR) 150 mg 12 hr tablet Take 1 tablet (150 mg total) by mouth 2 (two) times a day 180 tablet 1    Calcium Citrate-Vitamin D (MINE-CITRATE PLUS VITAMIN D PO) Take by mouth in the morning      Cholecalciferol (VITAMIN D3) 2000 units capsule Take 2,000 Units by mouth daily        "Cyanocobalamin (B-12) 1000 MCG CAPS Take 1 capsule by mouth daily       DULoxetine (CYMBALTA) 60 mg delayed release capsule Take 1 capsule by mouth once daily 90 capsule 0    gabapentin (NEURONTIN) 300 mg capsule Take 1 capsule (300 mg total) by mouth 3 (three) times a day 270 capsule 1    levothyroxine 50 mcg tablet Take 1 tablet (50 mcg total) by mouth daily 90 tablet 1    lisinopril (ZESTRIL) 10 mg tablet 5 mg every other day 90 tablet 0    Multiple Vitamin (multivitamin) tablet Take 1 tablet by mouth daily      nystatin (MYCOSTATIN) cream Apply topically 1-2 times a day as needed to abdomen rash 60 g 6    sulfamethoxazole-trimethoprim (BACTRIM DS) 800-160 mg per tablet Take 1 tablet by mouth every 12 (twelve) hours for 5 days 10 tablet 0    tirzepatide (Mounjaro) 12.5 MG/0.5ML Inject 0.5 mL (12.5 mg total) under the skin every 7 days 6 mL 0    triamcinolone (KENALOG) 0.1 % ointment APPLY TOPICALLY TO AFFECTED AREA 1-2 TIMES A DAY AS NEEDED TO ITCHY RASH 80 g 0    Wound Dressings (allantoin) gel Apply topically as needed for wound care 85 g 0    methocarbamol (ROBAXIN) 500 mg tablet Take 1 tablet (500 mg total) by mouth 4 (four) times a day for 5 days (Patient not taking: Reported on 3/26/2024) 20 tablet 0     No current facility-administered medications for this visit.        Allergies   Allergen Reactions    Metformin Diarrhea       Review of Systems   Constitutional:  Negative for appetite change, chills and fever.   Skin:  Positive for wound.       Video Exam    Vitals:    04/19/24 1313   BP: 128/78   Pulse: 68   Weight: 100 kg (221 lb)   Height: 5' 5\" (1.651 m)       Physical Exam  Vitals reviewed.   Constitutional:       General: She is awake. She is not in acute distress.     Appearance: Normal appearance. She is well-developed and well-groomed. She is not ill-appearing or diaphoretic.   Eyes:      General: Lids are normal.      Conjunctiva/sclera: Conjunctivae normal.   Pulmonary:      Effort: Pulmonary " effort is normal.   Skin:     General: Skin is dry.      Findings: Wound (round wound on lower abdomen visualization limited quality on video; round with raised perimeter with a white interior rim and a red wound bed; no visiualized drainage) present.   Neurological:      Mental Status: She is alert and oriented to person, place, and time.   Psychiatric:         Attention and Perception: Attention normal.         Mood and Affect: Mood normal.         Speech: Speech normal.         Behavior: Behavior normal. Behavior is cooperative.         Thought Content: Thought content normal.         Judgment: Judgment normal.          Visit Time  Total Visit Duration: 30

## 2024-04-22 NOTE — TELEPHONE ENCOUNTER
Patient comes to clinic for follow up anticoagulation visit.   Diagnosis: PE, DVT  Goal is 2.0-3.0    Last INR was: 3.0 on 6/12/18  and dose was maintained  Today's INR is 3.0. Dose maintained per protocol.   Next INR 1 weeks.    See Ambulatory Anticoagulation Flow Sheet.    Teaching: dose , return date, conditions requiring contact with [facility].   AVS provided to patient.           Patient verbalized understanding of instructions and is aware of need to call with any questions or concerns and to report any changes in medications, health, diet and / or lifestyle.      Dr. Price  is in office today supervising treatment. Note forwarded to physician for review.      Spoke with pt. And stated that the A&D ointment is working and she'll wait until she sees June tomorrow.

## 2024-04-23 ENCOUNTER — OFFICE VISIT (OUTPATIENT)
Dept: FAMILY MEDICINE CLINIC | Facility: CLINIC | Age: 49
End: 2024-04-23
Payer: COMMERCIAL

## 2024-04-23 VITALS
SYSTOLIC BLOOD PRESSURE: 128 MMHG | WEIGHT: 222.2 LBS | OXYGEN SATURATION: 97 % | HEART RATE: 85 BPM | BODY MASS INDEX: 37.02 KG/M2 | DIASTOLIC BLOOD PRESSURE: 82 MMHG | TEMPERATURE: 97.1 F | HEIGHT: 65 IN | RESPIRATION RATE: 16 BRPM

## 2024-04-23 DIAGNOSIS — L98.499 SKIN ULCER, UNSPECIFIED ULCER STAGE (HCC): Primary | ICD-10-CM

## 2024-04-23 DIAGNOSIS — E66.9 DIABETES MELLITUS TYPE 2 IN OBESE: ICD-10-CM

## 2024-04-23 DIAGNOSIS — E11.69 TYPE 2 DIABETES MELLITUS WITH OBESITY  (HCC): ICD-10-CM

## 2024-04-23 DIAGNOSIS — I10 BENIGN HYPERTENSION: ICD-10-CM

## 2024-04-23 DIAGNOSIS — F51.01 PRIMARY INSOMNIA: ICD-10-CM

## 2024-04-23 DIAGNOSIS — E11.69 DIABETES MELLITUS TYPE 2 IN OBESE: ICD-10-CM

## 2024-04-23 DIAGNOSIS — L98.499 SKIN ULCER, UNSPECIFIED ULCER STAGE (HCC): ICD-10-CM

## 2024-04-23 DIAGNOSIS — F32.A ANXIETY AND DEPRESSION: ICD-10-CM

## 2024-04-23 DIAGNOSIS — F41.9 ANXIETY AND DEPRESSION: ICD-10-CM

## 2024-04-23 DIAGNOSIS — E66.9 TYPE 2 DIABETES MELLITUS WITH OBESITY  (HCC): ICD-10-CM

## 2024-04-23 PROCEDURE — 99214 OFFICE O/P EST MOD 30 MIN: CPT | Performed by: NURSE PRACTITIONER

## 2024-04-23 RX ORDER — SULFAMETHOXAZOLE AND TRIMETHOPRIM 800; 160 MG/1; MG/1
1 TABLET ORAL EVERY 12 HOURS SCHEDULED
Qty: 4 TABLET | Refills: 0 | Status: SHIPPED | OUTPATIENT
Start: 2024-04-23 | End: 2024-04-25

## 2024-04-23 NOTE — ASSESSMENT & PLAN NOTE
Stable with current management., not on medication.  Continue to monitor.  Lab Results   Component Value Date    HGBA1C 4.9 03/26/2024

## 2024-04-23 NOTE — PROGRESS NOTES
Name: Marti Bacon      : 1975      MRN: [id number]  Encounter Provider: MIREYA Ho  Encounter Date: 2024   Encounter department: [facility]    Assessment & Plan     1. Skin ulcer, unspecified ulcer stage (HCC)  Assessment & Plan:  Formation of ulcer on R abdomen; area susceptible due to constant pressure/rubbing of excess skin from weight loss surgery.  No evidence of active infection; will extend bactrim to 7 days.  Prescription sent for collagenase ointment to help debride the wound.  Continue with daily cleansing, cover loosely with non-adherent dressing.  Follow up next week, call sooner with concerns.     Orders:  -     collagenase (SANTYL) ointment; Apply topically daily  -     sulfamethoxazole-trimethoprim (BACTRIM DS) 800-160 mg per tablet; Take 1 tablet by mouth every 12 (twelve) hours for 2 days    2. Benign hypertension  Assessment & Plan:  Stable with current management., continue lisinopril.      3. Type 2 diabetes mellitus with obesity  (HCC)  Assessment & Plan:  Stable with current management., not on medication.  Continue to monitor.  Lab Results   Component Value Date    HGBA1C 4.9 2024                Subjective      Pt is a 49 y.o. y/o female who is seen today for follow up evaluation of abdominal ulceration/wound.  Seen virtually on  because pt was unable to get to the office.  She had an open wound on her low abdominal in the fold under her abdominal skin.  Exam was limited by virtual modality but no active drainage noted; she denied surrounding warmth, induration, fever, chills.  She was started on bactrim and given prescription for hydrogel to apply to the wound site.  She was to clean the wound daily with saline wound wash and loosely cover with non-adherent gauze.  She was unable to fill the hydrogel so she has been just taking the antibiotic and using the A&D ointment.  She has no pain at this point, she feels it is looking a bit  "better.  There is slight \"seeping\" of clear yellow drainage, no pus.  She denies fever, chills.      Review of Systems   Constitutional:  Negative for chills and fever.   Respiratory:  Negative for shortness of breath.    Cardiovascular:  Negative for chest pain and palpitations.   Skin:  Positive for wound.   Neurological:  Negative for dizziness, light-headedness and headaches.       Current Outpatient Medications on File Prior to Visit   Medication Sig    ALPRAZolam (XANAX) 0.5 mg tablet Take 1 tablet (0.5 mg total) by mouth 2 (two) times a day as needed for anxiety    atorvastatin (LIPITOR) 10 mg tablet Take 1 tablet (10 mg total) by mouth daily    BIOTIN PO Take 600 mg by mouth every other day    buPROPion (WELLBUTRIN SR) 150 mg 12 hr tablet Take 1 tablet (150 mg total) by mouth 2 (two) times a day    Calcium Citrate-Vitamin D (MINE-CITRATE PLUS VITAMIN D PO) Take by mouth in the morning    Cholecalciferol (VITAMIN D3) 2000 units capsule Take 2,000 Units by mouth daily     Cyanocobalamin (B-12) 1000 MCG CAPS Take 1 capsule by mouth daily     DULoxetine (CYMBALTA) 60 mg delayed release capsule Take 1 capsule by mouth once daily    gabapentin (NEURONTIN) 300 mg capsule Take 1 capsule (300 mg total) by mouth 3 (three) times a day    levothyroxine 50 mcg tablet Take 1 tablet (50 mcg total) by mouth daily    lisinopril (ZESTRIL) 10 mg tablet 5 mg every other day    Multiple Vitamin (multivitamin) tablet Take 1 tablet by mouth daily    nystatin (MYCOSTATIN) cream Apply topically 1-2 times a day as needed to abdomen rash    tirzepatide (Mounjaro) 12.5 MG/0.5ML Inject 0.5 mL (12.5 mg total) under the skin every 7 days    triamcinolone (KENALOG) 0.1 % ointment APPLY TOPICALLY TO AFFECTED AREA 1-2 TIMES A DAY AS NEEDED TO ITCHY RASH    Wound Dressings (allantoin) gel Apply topically as needed for wound care    [DISCONTINUED] sulfamethoxazole-trimethoprim (BACTRIM DS) 800-160 mg per tablet Take 1 tablet by mouth every 12 " "(twelve) hours for 5 days    methocarbamol (ROBAXIN) 500 mg tablet Take 1 tablet (500 mg total) by mouth 4 (four) times a day for 5 days (Patient not taking: Reported on 3/26/2024)       Objective     /82 (BP Location: Left arm, Patient Position: Sitting, Cuff Size: Large)   Pulse 85   Temp (!) 97.1 °F (36.2 °C) (Tympanic)   Resp 16   Ht 5' 5\" (1.651 m)   Wt 101 kg (222 lb 3.2 oz)   LMP 11/18/2018   SpO2 97%   BMI 36.98 kg/m²     Physical Exam  Vitals reviewed.   Constitutional:       Appearance: Normal appearance. She is well-developed.   Neck:      Vascular: No carotid bruit or JVD.   Cardiovascular:      Rate and Rhythm: Normal rate and regular rhythm.      Pulses: Normal pulses.      Heart sounds: Normal heart sounds. No murmur heard.  Pulmonary:      Effort: Pulmonary effort is normal.      Breath sounds: Examination of the right-middle field reveals wheezing. Examination of the right-lower field reveals wheezing. Examination of the left-lower field reveals wheezing. Wheezing present. No rhonchi or rales.   Skin:     General: Skin is warm and dry.      Findings: Wound present.      Comments: 15x18 mm ulcerated wound R lower abdomen; wound bed is pink/pale, dry with white tissue along upper rim of wound.  Hyperpigmented and raised skin surrounding.  No induration, warmth, surrounding erythema.     Neurological:      Mental Status: She is alert and oriented to person, place, and time.      Motor: Tremor present.   Psychiatric:         Attention and Perception: Attention normal.         Mood and Affect: Mood normal.         Speech: Speech normal.         Behavior: Behavior normal. Behavior is cooperative.         Thought Content: Thought content normal.         Cognition and Memory: Cognition normal.         Judgment: Judgment normal.       MIREYA Ho    "

## 2024-04-23 NOTE — ASSESSMENT & PLAN NOTE
Formation of ulcer on R abdomen; area susceptible due to constant pressure/rubbing of excess skin from weight loss surgery.  No evidence of active infection; will extend bactrim to 7 days.  Prescription sent for collagenase ointment to help debride the wound.  Continue with daily cleansing, cover loosely with non-adherent dressing.  Follow up next week, call sooner with concerns.

## 2024-04-24 RX ORDER — ALPRAZOLAM 0.5 MG/1
0.5 TABLET ORAL 2 TIMES DAILY PRN
Qty: 60 TABLET | Refills: 0 | Status: SHIPPED | OUTPATIENT
Start: 2024-04-24

## 2024-04-24 RX ORDER — COLLAGENASE SANTYL 250 [ARB'U]/G
1 OINTMENT TOPICAL DAILY
Qty: 30 G | Refills: 0 | Status: SHIPPED | OUTPATIENT
Start: 2024-04-24 | End: 2024-05-01

## 2024-04-24 NOTE — TELEPHONE ENCOUNTER
Medication:  PDMP     03/26/2024 03/26/2024 ALPRAZolam (Tablet) 60.0 30 0.5 MG NA KIRBY ZAPATA     Active agreement on file -No

## 2024-04-24 NOTE — TELEPHONE ENCOUNTER
Pharmacy comment: need length, width of wound. need treatment duration (days)     Please add to pharmacy comment on script.

## 2024-04-29 ENCOUNTER — TELEPHONE (OUTPATIENT)
Age: 49
End: 2024-04-29

## 2024-04-29 NOTE — TELEPHONE ENCOUNTER
PA for Mounjaro    Submitted via    []CMM-KEY   [x]Jessierilisha-Case ID # [id number]  []Faxed to plan   []Other website   []Phone call Case ID #     Office notes sent, clinical questions answered. Awaiting determination    Turnaround time for your insurance to make a decision on your Prior Authorization can take 7-21 business days.

## 2024-04-29 NOTE — PROGRESS NOTES
Name: Marti Bacon      : 1975      MRN: [id number]  Encounter Provider: MIREYA Ho  Encounter Date: 2024   Encounter department: [facility]    Assessment & Plan     1. Bronchitis  Assessment & Plan:  Symptoms for about 2 weeks.  Wheezing and rhonchi on exam, subjective tightness as well.  Cough is productive.  Start advair bid, azithromycin, probiotic since she is just completing antibiotic for her wound infection.  Can continue dayquil prn.  Pt instructed to call for reevaluation if sx worsen or persist.      Orders:  -     Fluticasone-Salmeterol (Advair) 250-50 mcg/dose inhaler; Inhale 1 puff 2 (two) times a day Rinse mouth after use.  -     azithromycin (Zithromax) 250 mg tablet; Take 2 tablets (500 mg total) by mouth daily for 1 day, THEN 1 tablet (250 mg total) daily for 4 days.  -     saccharomyces boulardii (FLORASTOR) 250 mg capsule; Take 1 capsule (250 mg total) by mouth daily    2. Tremor  Assessment & Plan:  Per pt this is progressively worsening.  Some medications may be contributing but timeline is not clear.  Referral to neuro for evaluation.    Orders:  -     Ambulatory Referral to Neurology; Future    3. Skin ulcer, unspecified ulcer stage (HCC)  Assessment & Plan:  Significantly improved, wound is now mostly dried and was well debrided.  Continue collagenase for a few more days, has one more day of antibiotic.  Pt instructed to call for reevaluation if sx worsen or persist.               Subjective      Pt is a 48 yo female here for a 1 week follow up to management of a skin ulceration on her lower abdomen.  Seen virtually  initially, she was started on bactrim and hydrogel ordered but the pharmacy could not provide the hydrogel.  She was using a&d ointment instead.  Seen in the office the following week and pt reported subjective improvement, no drainage and pain much better.  Bactrim was extended for an additional 2 days for a full 7 day  "course.  Collagenase ordered to help debride some of the tissues to help promote continued healing.  She has noted subjective improvement in the healing, no pain or drainage.  She has had cold symptoms for about 2 weeks, she has a cough and chest tightness, productive of white and yellow sputum.  She has post-nasal drip and rhinorrhea.  She denies fever, chills, body aches.  She is taking dayquil as needed.  Noted tremor on exam.  When patient asked if this is her baseline she says she \"thinks she has a problem.\"  She says it started just in one hand now its both, her head as well.  It does interfere with eating sometimes as well.      Review of Systems   Constitutional:  Negative for appetite change, chills, fatigue and fever.   HENT:  Positive for postnasal drip and rhinorrhea. Negative for congestion, ear pain, sinus pressure and sore throat.    Respiratory:  Positive for cough and chest tightness. Negative for shortness of breath and wheezing.    Cardiovascular:  Negative for chest pain, palpitations and leg swelling.   Gastrointestinal:  Negative for diarrhea, nausea and vomiting.   Musculoskeletal:  Negative for myalgias.   Skin:  Positive for wound.   Neurological:  Positive for tremors (noted worsening over a few years). Negative for dizziness, light-headedness and headaches.   Hematological:  Negative for adenopathy.   Psychiatric/Behavioral:  Positive for sleep disturbance (from cough).        Current Outpatient Medications on File Prior to Visit   Medication Sig    ALPRAZolam (XANAX) 0.5 mg tablet Take 1 tablet (0.5 mg total) by mouth 2 (two) times a day as needed for anxiety    atorvastatin (LIPITOR) 10 mg tablet Take 1 tablet (10 mg total) by mouth daily    BIOTIN PO Take 600 mg by mouth every other day    buPROPion (WELLBUTRIN SR) 150 mg 12 hr tablet Take 1 tablet (150 mg total) by mouth 2 (two) times a day    Calcium Citrate-Vitamin D (MINE-CITRATE PLUS VITAMIN D PO) Take by mouth in the morning    " "Cholecalciferol (VITAMIN D3) 2000 units capsule Take 2,000 Units by mouth daily     collagenase (Santyl) ointment Apply 1 Application topically daily for 7 days Wound on abdomen measuring 15x18 mm    Cyanocobalamin (B-12) 1000 MCG CAPS Take 1 capsule by mouth daily     DULoxetine (CYMBALTA) 60 mg delayed release capsule Take 1 capsule by mouth once daily    gabapentin (NEURONTIN) 300 mg capsule Take 1 capsule (300 mg total) by mouth 3 (three) times a day    levothyroxine 50 mcg tablet Take 1 tablet (50 mcg total) by mouth daily    lisinopril (ZESTRIL) 10 mg tablet 5 mg every other day    Multiple Vitamin (multivitamin) tablet Take 1 tablet by mouth daily    nystatin (MYCOSTATIN) cream Apply topically 1-2 times a day as needed to abdomen rash    tirzepatide (Mounjaro) 12.5 MG/0.5ML Inject 0.5 mL (12.5 mg total) under the skin every 7 days    triamcinolone (KENALOG) 0.1 % ointment APPLY TOPICALLY TO AFFECTED AREA 1-2 TIMES A DAY AS NEEDED TO ITCHY RASH    Wound Dressings (allantoin) gel Apply topically as needed for wound care    [DISCONTINUED] DULoxetine (CYMBALTA) 60 mg delayed release capsule Take 1 capsule by mouth once daily    methocarbamol (ROBAXIN) 500 mg tablet Take 1 tablet (500 mg total) by mouth 4 (four) times a day for 5 days (Patient not taking: Reported on 3/26/2024)       Objective     /80 (BP Location: Left arm, Patient Position: Sitting, Cuff Size: Large)   Pulse 92   Temp 97.6 °F (36.4 °C) (Tympanic)   Resp 16   Ht 5' 5\" (1.651 m)   Wt 99 kg (218 lb 3.2 oz)   LMP 11/18/2018   SpO2 99%   BMI 36.31 kg/m²     Physical Exam  Vitals reviewed.   Constitutional:       General: She is awake. She is not in acute distress.     Appearance: Normal appearance. She is well-developed and well-groomed. She is not ill-appearing.   HENT:      Head: Normocephalic.      Right Ear: Hearing, tympanic membrane, ear canal and external ear normal. No middle ear effusion. Tympanic membrane is not bulging.      " Left Ear: Hearing, tympanic membrane, ear canal and external ear normal.  No middle ear effusion. Tympanic membrane is not bulging.      Nose: Congestion present. No mucosal edema or rhinorrhea.      Mouth/Throat:      Lips: Pink.      Mouth: Mucous membranes are not dry.      Pharynx: No oropharyngeal exudate or posterior oropharyngeal erythema.      Tonsils: No tonsillar exudate or tonsillar abscesses. 2+ on the right. 2+ on the left.   Eyes:      General: Lids are normal.      Conjunctiva/sclera: Conjunctivae normal.   Cardiovascular:      Rate and Rhythm: Normal rate and regular rhythm.      Pulses: Normal pulses.      Heart sounds: Normal heart sounds. No murmur heard.  Pulmonary:      Effort: Pulmonary effort is normal. No accessory muscle usage or respiratory distress.      Breath sounds: Examination of the right-middle field reveals wheezing and rhonchi. Examination of the right-lower field reveals wheezing and rhonchi. Examination of the left-lower field reveals wheezing. Wheezing and rhonchi present. No decreased breath sounds or rales.   Lymphadenopathy:      Head:      Right side of head: No submental, submandibular, tonsillar, preauricular, posterior auricular or occipital adenopathy.      Left side of head: No submental, submandibular, tonsillar, preauricular, posterior auricular or occipital adenopathy.      Cervical: No cervical adenopathy.   Skin:     General: Skin is warm and dry.      Findings: Wound (well healing wound R lower abdomen; no drainage, wound is dry other than a small area where the gauze was stuck had broken skin) present.   Neurological:      Mental Status: She is alert and oriented to person, place, and time.      Motor: Tremor present.   Psychiatric:         Attention and Perception: Attention normal.         Mood and Affect: Mood normal.         Speech: Speech normal.         Behavior: Behavior normal. Behavior is cooperative.         Thought Content: Thought content normal.          Cognition and Memory: Cognition normal.         Judgment: Judgment normal.       MIREYA Ho

## 2024-04-30 ENCOUNTER — OFFICE VISIT (OUTPATIENT)
Dept: FAMILY MEDICINE CLINIC | Facility: CLINIC | Age: 49
End: 2024-04-30
Payer: COMMERCIAL

## 2024-04-30 VITALS
WEIGHT: 218.2 LBS | DIASTOLIC BLOOD PRESSURE: 80 MMHG | BODY MASS INDEX: 36.35 KG/M2 | HEIGHT: 65 IN | SYSTOLIC BLOOD PRESSURE: 124 MMHG | OXYGEN SATURATION: 99 % | TEMPERATURE: 97.6 F | RESPIRATION RATE: 16 BRPM | HEART RATE: 92 BPM

## 2024-04-30 DIAGNOSIS — J40 BRONCHITIS: Primary | ICD-10-CM

## 2024-04-30 DIAGNOSIS — R25.1 TREMOR: ICD-10-CM

## 2024-04-30 DIAGNOSIS — L98.499 SKIN ULCER, UNSPECIFIED ULCER STAGE (HCC): ICD-10-CM

## 2024-04-30 PROCEDURE — 99214 OFFICE O/P EST MOD 30 MIN: CPT | Performed by: NURSE PRACTITIONER

## 2024-04-30 PROCEDURE — 3079F DIAST BP 80-89 MM HG: CPT | Performed by: NURSE PRACTITIONER

## 2024-04-30 PROCEDURE — 3074F SYST BP LT 130 MM HG: CPT | Performed by: NURSE PRACTITIONER

## 2024-04-30 RX ORDER — AZITHROMYCIN 250 MG/1
TABLET, FILM COATED ORAL DAILY
Qty: 6 TABLET | Refills: 0 | Status: SHIPPED | OUTPATIENT
Start: 2024-04-30 | End: 2024-05-05

## 2024-04-30 RX ORDER — SACCHAROMYCES BOULARDII 250 MG
250 CAPSULE ORAL DAILY
Qty: 30 CAPSULE | Refills: 0 | Status: SHIPPED | OUTPATIENT
Start: 2024-04-30

## 2024-04-30 RX ORDER — FLUTICASONE PROPIONATE AND SALMETEROL 250; 50 UG/1; UG/1
1 POWDER RESPIRATORY (INHALATION) 2 TIMES DAILY
Qty: 180 BLISTER | Refills: 3 | Status: SHIPPED | OUTPATIENT
Start: 2024-04-30 | End: 2025-04-25

## 2024-04-30 NOTE — ASSESSMENT & PLAN NOTE
Symptoms for about 2 weeks.  Wheezing and rhonchi on exam, subjective tightness as well.  Cough is productive.  Start advair bid, azithromycin, probiotic since she is just completing antibiotic for her wound infection.  Can continue dayquil prn.  Pt instructed to call for reevaluation if sx worsen or persist.

## 2024-04-30 NOTE — ASSESSMENT & PLAN NOTE
Significantly improved, wound is now mostly dried and was well debrided.  Continue collagenase for a few more days, has one more day of antibiotic.  Pt instructed to call for reevaluation if sx worsen or persist.

## 2024-04-30 NOTE — ASSESSMENT & PLAN NOTE
Per pt this is progressively worsening.  Some medications may be contributing but timeline is not clear.  Referral to neuro for evaluation.

## 2024-04-30 NOTE — TELEPHONE ENCOUNTER
PA for Mounjaro Approved     Date(s) approved 4- - 4-        Patient advised by          [x] Delectablehart Message  [] Phone call   []LMOM  []L/M to call office as no active Communication consent on file  []Unable to leave detailed message as VM not approved on Communication consent       Pharmacy advised by    [x]Fax  []Phone call    Approval letter scanned into Media Yes

## 2024-05-14 ENCOUNTER — TELEPHONE (OUTPATIENT)
Dept: FAMILY MEDICINE CLINIC | Facility: CLINIC | Age: 49
End: 2024-05-14

## 2024-05-14 ENCOUNTER — TELEPHONE (OUTPATIENT)
Age: 49
End: 2024-05-14

## 2024-05-14 DIAGNOSIS — E11.69 TYPE 2 DIABETES MELLITUS WITH OBESITY  (HCC): ICD-10-CM

## 2024-05-14 DIAGNOSIS — E66.9 TYPE 2 DIABETES MELLITUS WITH OBESITY  (HCC): ICD-10-CM

## 2024-05-14 NOTE — TELEPHONE ENCOUNTER
Patient needs sent to a different pharmacy not a duplicate    Reason for call:   [x] Refill   [] Prior Auth  [] Other:     Office:   [x] PCP/Provider -   Dena Toledo MD     [] Specialty/Provider -     Medication: tirzepatide (Mounjaro) 12.5 MG/0.5ML     Dose/Frequency: 12.5 mg, Subcutaneous, Every 7 days     Quantity: 6ml    Pharmacy: [id number] [place], [place] - [address] [place] [address] [phone]     Does the patient have enough for 3 days?   [] Yes   [x] No - Send as HP to POD

## 2024-05-14 NOTE — TELEPHONE ENCOUNTER
Patient is calling due to mounjaro - patient has called multiple [facility] and [facility] pharmacies and mounjaro is not in stock and not able to give samples as we have none in the office currently. Do you know if we are able to get any samples in?    Patient is going to try "[facility] and [facility] pharmacies as well

## 2024-05-14 NOTE — TELEPHONE ENCOUNTER
We do not get samples of mounjaro. We can lower her dose to 5mg or 7.5 mg or 10 mg - she can check with the pharmacy and let me know which dose is available    Dr roy

## 2024-05-14 NOTE — TELEPHONE ENCOUNTER
FYI: Pt stated that many of the local pharmacies don't have the   tirzepatide (Mounjaro) 12.5 MG/0.5ML. Pt requested a sample until she able to get the actual med. Pt was transferred to Litzy at the office who was very helpful and  assisted pt.

## 2024-05-23 DIAGNOSIS — F41.9 ANXIETY AND DEPRESSION: ICD-10-CM

## 2024-05-23 DIAGNOSIS — F51.01 PRIMARY INSOMNIA: ICD-10-CM

## 2024-05-23 DIAGNOSIS — F32.A ANXIETY AND DEPRESSION: ICD-10-CM

## 2024-05-24 RX ORDER — ALPRAZOLAM 0.5 MG/1
0.5 TABLET ORAL 2 TIMES DAILY PRN
Qty: 60 TABLET | Refills: 0 | Status: SHIPPED | OUTPATIENT
Start: 2024-05-24

## 2024-05-24 NOTE — TELEPHONE ENCOUNTER
Medication:  PDMP   04/24/2024 04/24/2024 ALPRAZolam (Tablet) 60.0 30 0.5 MG NA KIRBY ZAPATA     Active agreement on file -No

## 2024-06-04 ENCOUNTER — TELEPHONE (OUTPATIENT)
Dept: NEUROLOGY | Facility: CLINIC | Age: 49
End: 2024-06-04

## 2024-06-04 NOTE — TELEPHONE ENCOUNTER
Pt called checking on any sooner appts. Upon review, no open slots were available sooner. Pt already on the wait list. Pt understood.

## 2024-06-21 DIAGNOSIS — F41.9 ANXIETY AND DEPRESSION: ICD-10-CM

## 2024-06-21 DIAGNOSIS — F32.A ANXIETY AND DEPRESSION: ICD-10-CM

## 2024-06-21 DIAGNOSIS — F51.01 PRIMARY INSOMNIA: ICD-10-CM

## 2024-06-24 ENCOUNTER — APPOINTMENT (OUTPATIENT)
Dept: LAB | Facility: MEDICAL CENTER | Age: 49
End: 2024-06-24
Payer: COMMERCIAL

## 2024-06-24 DIAGNOSIS — M79.7 FIBROMYALGIA: ICD-10-CM

## 2024-06-24 DIAGNOSIS — E11.9 TYPE 2 DIABETES MELLITUS WITHOUT COMPLICATION, WITHOUT LONG-TERM CURRENT USE OF INSULIN (HCC): ICD-10-CM

## 2024-06-24 DIAGNOSIS — E03.9 ACQUIRED HYPOTHYROIDISM: ICD-10-CM

## 2024-06-24 DIAGNOSIS — E66.9 TYPE 2 DIABETES MELLITUS WITH OBESITY  (HCC): ICD-10-CM

## 2024-06-24 DIAGNOSIS — Z79.899 ENCOUNTER FOR LONG-TERM (CURRENT) USE OF OTHER MEDICATIONS: ICD-10-CM

## 2024-06-24 DIAGNOSIS — E03.8 OTHER SPECIFIED HYPOTHYROIDISM: ICD-10-CM

## 2024-06-24 DIAGNOSIS — E11.69 TYPE 2 DIABETES MELLITUS WITH OBESITY  (HCC): ICD-10-CM

## 2024-06-24 DIAGNOSIS — E55.9 VITAMIN D DEFICIENCY: ICD-10-CM

## 2024-06-24 LAB
25(OH)D3 SERPL-MCNC: 77.3 NG/ML (ref 30–100)
ALBUMIN SERPL BCG-MCNC: 3.4 G/DL (ref 3.5–5)
ALP SERPL-CCNC: 105 U/L (ref 34–104)
ALT SERPL W P-5'-P-CCNC: 45 U/L (ref 7–52)
ANION GAP SERPL CALCULATED.3IONS-SCNC: 15 MMOL/L (ref 4–13)
AST SERPL W P-5'-P-CCNC: 115 U/L (ref 13–39)
BASOPHILS # BLD AUTO: 0.04 THOUSANDS/ÂΜL (ref 0–0.1)
BASOPHILS NFR BLD AUTO: 1 % (ref 0–1)
BILIRUB SERPL-MCNC: 0.48 MG/DL (ref 0.2–1)
BUN SERPL-MCNC: 3 MG/DL (ref 5–25)
CALCIUM ALBUM COR SERPL-MCNC: 9 MG/DL (ref 8.3–10.1)
CALCIUM SERPL-MCNC: 8.5 MG/DL (ref 8.4–10.2)
CHLORIDE SERPL-SCNC: 101 MMOL/L (ref 96–108)
CHOLEST SERPL-MCNC: 143 MG/DL
CO2 SERPL-SCNC: 22 MMOL/L (ref 21–32)
CREAT SERPL-MCNC: 0.46 MG/DL (ref 0.6–1.3)
CREAT UR-MCNC: 43.8 MG/DL
CRP SERPL QL: 3.3 MG/L
EOSINOPHIL # BLD AUTO: 0.45 THOUSAND/ÂΜL (ref 0–0.61)
EOSINOPHIL NFR BLD AUTO: 7 % (ref 0–6)
ERYTHROCYTE [DISTWIDTH] IN BLOOD BY AUTOMATED COUNT: 14.7 % (ref 11.6–15.1)
ERYTHROCYTE [SEDIMENTATION RATE] IN BLOOD: 55 MM/HOUR (ref 0–19)
EST. AVERAGE GLUCOSE BLD GHB EST-MCNC: 105 MG/DL
GFR SERPL CREATININE-BSD FRML MDRD: 117 ML/MIN/1.73SQ M
GLUCOSE P FAST SERPL-MCNC: 75 MG/DL (ref 65–99)
HBA1C MFR BLD: 5.3 %
HCT VFR BLD AUTO: 40.2 % (ref 34.8–46.1)
HDLC SERPL-MCNC: 89 MG/DL
HGB BLD-MCNC: 13.3 G/DL (ref 11.5–15.4)
IMM GRANULOCYTES # BLD AUTO: 0.03 THOUSAND/UL (ref 0–0.2)
IMM GRANULOCYTES NFR BLD AUTO: 0 % (ref 0–2)
LDLC SERPL CALC-MCNC: 41 MG/DL (ref 0–100)
LYMPHOCYTES # BLD AUTO: 1.72 THOUSANDS/ÂΜL (ref 0.6–4.47)
LYMPHOCYTES NFR BLD AUTO: 25 % (ref 14–44)
MCH RBC QN AUTO: 32.4 PG (ref 26.8–34.3)
MCHC RBC AUTO-ENTMCNC: 33.1 G/DL (ref 31.4–37.4)
MCV RBC AUTO: 98 FL (ref 82–98)
MICROALBUMIN UR-MCNC: <7 MG/L
MONOCYTES # BLD AUTO: 0.73 THOUSAND/ÂΜL (ref 0.17–1.22)
MONOCYTES NFR BLD AUTO: 11 % (ref 4–12)
NEUTROPHILS # BLD AUTO: 3.9 THOUSANDS/ÂΜL (ref 1.85–7.62)
NEUTS SEG NFR BLD AUTO: 56 % (ref 43–75)
NRBC BLD AUTO-RTO: 0 /100 WBCS
PLATELET # BLD AUTO: 168 THOUSANDS/UL (ref 149–390)
PMV BLD AUTO: 10.6 FL (ref 8.9–12.7)
POTASSIUM SERPL-SCNC: 3.9 MMOL/L (ref 3.5–5.3)
PROT SERPL-MCNC: 7 G/DL (ref 6.4–8.4)
RBC # BLD AUTO: 4.1 MILLION/UL (ref 3.81–5.12)
SODIUM SERPL-SCNC: 138 MMOL/L (ref 135–147)
T4 FREE SERPL-MCNC: 0.8 NG/DL (ref 0.61–1.12)
TRIGL SERPL-MCNC: 66 MG/DL
TSH SERPL DL<=0.05 MIU/L-ACNC: 5.22 UIU/ML (ref 0.45–4.5)
WBC # BLD AUTO: 6.87 THOUSAND/UL (ref 4.31–10.16)

## 2024-06-24 PROCEDURE — 85652 RBC SED RATE AUTOMATED: CPT

## 2024-06-24 PROCEDURE — 82043 UR ALBUMIN QUANTITATIVE: CPT

## 2024-06-24 PROCEDURE — 85025 COMPLETE CBC W/AUTO DIFF WBC: CPT

## 2024-06-24 PROCEDURE — 36415 COLL VENOUS BLD VENIPUNCTURE: CPT

## 2024-06-24 PROCEDURE — 84443 ASSAY THYROID STIM HORMONE: CPT

## 2024-06-24 PROCEDURE — 86140 C-REACTIVE PROTEIN: CPT

## 2024-06-24 PROCEDURE — 83036 HEMOGLOBIN GLYCOSYLATED A1C: CPT

## 2024-06-24 PROCEDURE — 82570 ASSAY OF URINE CREATININE: CPT

## 2024-06-24 PROCEDURE — 82306 VITAMIN D 25 HYDROXY: CPT

## 2024-06-24 PROCEDURE — 80061 LIPID PANEL: CPT

## 2024-06-24 PROCEDURE — 80053 COMPREHEN METABOLIC PANEL: CPT

## 2024-06-24 PROCEDURE — 84439 ASSAY OF FREE THYROXINE: CPT

## 2024-06-24 RX ORDER — ALPRAZOLAM 0.5 MG/1
0.5 TABLET ORAL 2 TIMES DAILY PRN
Qty: 60 TABLET | Refills: 0 | Status: SHIPPED | OUTPATIENT
Start: 2024-06-24

## 2024-06-24 NOTE — TELEPHONE ENCOUNTER
Medication:  PDMP   05/24/2024 05/24/2024 ALPRAZolam (Tablet) 60.0 30 0.5 MG NA KIRBY ZAPATA       Active agreement on file -No

## 2024-06-25 DIAGNOSIS — R74.01 ELEVATED AST (SGOT): Primary | ICD-10-CM

## 2024-06-27 ENCOUNTER — OFFICE VISIT (OUTPATIENT)
Dept: FAMILY MEDICINE CLINIC | Facility: CLINIC | Age: 49
End: 2024-06-27
Payer: COMMERCIAL

## 2024-06-27 ENCOUNTER — TELEPHONE (OUTPATIENT)
Age: 49
End: 2024-06-27

## 2024-06-27 VITALS
HEIGHT: 65 IN | DIASTOLIC BLOOD PRESSURE: 80 MMHG | WEIGHT: 218 LBS | TEMPERATURE: 97.3 F | HEART RATE: 77 BPM | RESPIRATION RATE: 17 BRPM | BODY MASS INDEX: 36.32 KG/M2 | SYSTOLIC BLOOD PRESSURE: 120 MMHG | OXYGEN SATURATION: 94 %

## 2024-06-27 DIAGNOSIS — E66.9 TYPE 2 DIABETES MELLITUS WITH OBESITY  (HCC): ICD-10-CM

## 2024-06-27 DIAGNOSIS — R25.1 TREMOR: Primary | ICD-10-CM

## 2024-06-27 DIAGNOSIS — E11.69 TYPE 2 DIABETES MELLITUS WITH OBESITY  (HCC): ICD-10-CM

## 2024-06-27 DIAGNOSIS — I10 BENIGN HYPERTENSION: ICD-10-CM

## 2024-06-27 DIAGNOSIS — F32.A ANXIETY AND DEPRESSION: ICD-10-CM

## 2024-06-27 DIAGNOSIS — E66.01 CLASS 2 SEVERE OBESITY DUE TO EXCESS CALORIES WITH SERIOUS COMORBIDITY AND BODY MASS INDEX (BMI) OF 36.0 TO 36.9 IN ADULT (HCC): ICD-10-CM

## 2024-06-27 DIAGNOSIS — E03.8 OTHER SPECIFIED HYPOTHYROIDISM: ICD-10-CM

## 2024-06-27 DIAGNOSIS — F41.9 ANXIETY AND DEPRESSION: ICD-10-CM

## 2024-06-27 PROBLEM — J40 BRONCHITIS: Status: RESOLVED | Noted: 2024-04-30 | Resolved: 2024-06-27

## 2024-06-27 PROBLEM — L98.499 SKIN ULCER (HCC): Status: RESOLVED | Noted: 2024-04-19 | Resolved: 2024-06-27

## 2024-06-27 PROCEDURE — 99214 OFFICE O/P EST MOD 30 MIN: CPT | Performed by: FAMILY MEDICINE

## 2024-06-27 NOTE — TELEPHONE ENCOUNTER
Smita from [facility] pharmacy called regarding ALPRAZolam (XANAX) 0.5 mg tablet to confirm last OV visit with PCP and when next appointment was. Needed due to medication being a control substance. Last OV was 3/26/24 and next appointment is today 6/27/24. No further concerns at this time.

## 2024-06-27 NOTE — PROGRESS NOTES
Ambulatory Visit  Name: Marti Bacon      : 1975      MRN: [id number]  Encounter Provider: Dena Toledo MD  Encounter Date: 2024   Encounter department[facility]    Assessment & Plan   1. Tremor  Assessment & Plan:  ? Due to gabapentin   To talk to rheumatology regarding meds  Seeing neurology    Orders:  -     MRI brain w wo contrast; Future; Expected date: 2024  2. Type 2 diabetes mellitus with obesity  (HCC)  Assessment & Plan:    Lab Results   Component Value Date    HGBA1C 5.3 2024   Stable on current meds  Orders:  -     Comprehensive metabolic panel; Future; Expected date: 2024  -     Hemoglobin A1C; Future; Expected date: 2024  -     CBC and Platelet; Future; Expected date: 2024  -     Lipid Panel with Direct LDL reflex; Future; Expected date: 2024  3. Benign hypertension  Assessment & Plan:  Stable on lisinopril   4. Class 2 severe obesity due to excess calories with serious comorbidity and body mass index (BMI) of 36.0 to 36.9 in adult (HCC)  Assessment & Plan:  She is doing good on mounjaro   Diet and exercise   5. Anxiety and depression  Assessment & Plan:  On wellbutrin   6. 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    HENT: Negative.     Eyes: Negative.    Respiratory: Negative.     Cardiovascular: Negative.    Genitourinary: Negative.  Negative for pelvic pain.   Neurological:  Positive for tremors.   Hematological: Negative.    Psychiatric/Behavioral: Negative.       Past Medical History:   Diagnosis Date   • Anxiety    • Arthritis    • Bariatric surgery status    • CPAP (continuous positive airway pressure) dependence    • Depression     • Diabetes mellitus (HCC)    • Eczema    • Fibromyalgia, primary    • Foot drop 5/12/2020   • Hyperlipidemia    • Hypertension    • Hypothyroidism    • Left foot drop 5/5/2020   • Lupus anticoagulant disorder (HCC)    • Migraine    • Night sweats    • Obesity    • Ovarian neoplasm 11/20/2017   • Postsurgical malabsorption    • Psoriasis    • Sleep apnea    • Status post laparoscopic hysterectomy 11/20/2017   • Tremors of nervous system 5/12/2020     Past Surgical History:   Procedure Laterality Date   • COLONOSCOPY     • HYSTERECTOMY Bilateral 11/17/2018   • OOPHORECTOMY Bilateral 11/17/2018   • GA COLONOSCOPY FLX DX W/COLLJ SPEC WHEN PFRMD N/A 6/13/2017    Procedure: COLONOSCOPY;  Surgeon: Cherelle Polanco DO;  Location: [facility];  Service: Gastroenterology   • GA EGD TRANSORAL BIOPSY SINGLE/MULTIPLE N/A 1/30/2019    Procedure: ESOPHAGOGASTRODUODENOSCOPY (EGD) with bx;  Surgeon: Dallas Rueda MD;  Location: [facility];  Service: Bariatrics   • GA LAPS GSTR RSTCV PX W/BYP LEESA-EN-Y LIMB <150 CM N/A 3/25/2019    Procedure: LAPAROSCOPIC LEESA-EN-Y GASTRIC BYPASS AND INTRAOPERATIVE EGD;  Surgeon: Dallas Rueda MD;  Location: [facility];  Service: Bariatrics   • GA LAPS TOTAL HYSTERECT 250 GM/< W/RMVL TUBE/OVARY N/A 11/20/2017    Procedure: ROBOTIC TOTAL LAPAROSCOPIC HYSTERECTOMY/ BSO;  Surgeon: Chavo Aviles MD;  Location: [facility];  Service: Gynecology Oncology   • WISDOM TOOTH EXTRACTION       Family History   Problem Relation Age of Onset   • Heart disease Mother         Cardiac disorder   • Diabetes Mother    • Hypertension Mother    • Hyperthyroidism Mother    • Diabetes Father    • Hypertension Father    • Lung cancer Maternal Grandfather    • Colon cancer Cousin 67   • Colon cancer Maternal Aunt 49   • Lung cancer Family    • Diabetes Sister    • No Known Problems Brother    • No Known Problems Maternal Grandmother    • No Known Problems Paternal Grandmother    • No Known Problems Paternal  Grandfather    • No Known Problems Sister      Social History     Tobacco Use   • Smoking status: Former     Current packs/day: 0.00     Average packs/day: 0.8 packs/day for 15.0 years (11.3 ttl pk-yrs)     Types: Cigarettes     Start date: 2003     Quit date: 2018     Years since quittin.5   • Smokeless tobacco: Never   Vaping Use   • Vaping status: Never Used   Substance and Sexual Activity   • Alcohol use: Not Currently   • Drug use: No   • Sexual activity: Yes     Partners: Male     Current Outpatient Medications on File Prior to Visit   Medication Sig   • ALPRAZolam (XANAX) 0.5 mg tablet Take 1 tablet (0.5 mg total) by mouth 2 (two) times a day as needed for anxiety   • atorvastatin (LIPITOR) 10 mg tablet Take 1 tablet (10 mg total) by mouth daily   • BIOTIN PO Take 600 mg by mouth every other day   • buPROPion (WELLBUTRIN SR) 150 mg 12 hr tablet Take 1 tablet (150 mg total) by mouth 2 (two) times a day   • Calcium Citrate-Vitamin D (MINE-CITRATE PLUS VITAMIN D PO) Take by mouth in the morning   • Cholecalciferol (VITAMIN D3) 2000 units capsule Take 2,000 Units by mouth daily    • Cyanocobalamin (B-12) 1000 MCG CAPS Take 1 capsule by mouth daily    • DULoxetine (CYMBALTA) 60 mg delayed release capsule Take 1 capsule by mouth once daily   • Fluticasone-Salmeterol (Advair) 250-50 mcg/dose inhaler Inhale 1 puff 2 (two) times a day Rinse mouth after use.   • gabapentin (NEURONTIN) 300 mg capsule Take 1 capsule (300 mg total) by mouth 3 (three) times a day   • levothyroxine 50 mcg tablet Take 1 tablet (50 mcg total) by mouth daily   • lisinopril (ZESTRIL) 10 mg tablet 5 mg every other day   • Multiple Vitamin (multivitamin) tablet Take 1 tablet by mouth daily   • nystatin (MYCOSTATIN) cream Apply topically 1-2 times a day as needed to abdomen rash   • tirzepatide (Mounjaro) 12.5 MG/0.5ML Inject 0.5 mL (12.5 mg total) under the skin every 7 days   • triamcinolone (KENALOG) 0.1 % ointment APPLY TOPICALLY  "TO AFFECTED AREA 1-2 TIMES A DAY AS NEEDED TO ITCHY RASH   • [DISCONTINUED] Wound Dressings (allantoin) gel Apply topically as needed for wound care   • methocarbamol (ROBAXIN) 500 mg tablet Take 1 tablet (500 mg total) by mouth 4 (four) times a day for 5 days (Patient not taking: Reported on 3/26/2024)   • saccharomyces boulardii (FLORASTOR) 250 mg capsule Take 1 capsule (250 mg total) by mouth daily (Patient not taking: Reported on 6/27/2024)   • [DISCONTINUED] collagenase (Santyl) ointment Apply 1 Application topically daily for 7 days Wound on abdomen measuring 15x18 mm     Allergies   Allergen Reactions   • Metformin Diarrhea     Immunization History   Administered Date(s) Administered   • COVID-19 PFIZER VACCINE 0.3 ML IM 04/29/2021, 05/20/2021   • COVID-19 Pfizer vac (Julian-sucrose, gray cap) 12 yr+ IM 04/09/2022   • Pneumococcal Conjugate 13-Valent 08/03/2020   • Pneumococcal Polysaccharide PPV23 07/15/2021   • Tdap 05/23/2019     Objective     /80 (BP Location: Left arm, Patient Position: Sitting, Cuff Size: Standard)   Pulse 77   Temp (!) 97.3 °F (36.3 °C) (Tympanic)   Resp 17   Ht 5' 5\" (1.651 m)   Wt 98.9 kg (218 lb)   LMP 11/18/2018   SpO2 94%   BMI 36.28 kg/m²     Physical Exam  Constitutional:       Appearance: Normal appearance.   Cardiovascular:      Rate and Rhythm: Normal rate and regular rhythm.      Pulses: Normal pulses.      Heart sounds: Normal heart sounds.   Pulmonary:      Effort: Pulmonary effort is normal.      Breath sounds: Normal breath sounds.   Musculoskeletal:         General: No swelling or tenderness.   Neurological:      General: No focal deficit present.      Mental Status: She is alert and oriented to person, place, and time.      Cranial Nerves: No cranial nerve deficit.      Sensory: No sensory deficit.      Motor: No weakness.      Comments: + resting tremors on both hands   Psychiatric:         Mood and Affect: Mood normal.         Behavior: Behavior normal. "

## 2024-06-28 ENCOUNTER — TELEPHONE (OUTPATIENT)
Dept: ADMINISTRATIVE | Facility: OTHER | Age: 49
End: 2024-06-28

## 2024-06-28 NOTE — TELEPHONE ENCOUNTER
Upon review of the In Basket request and the patient's chart, initial outreach has been made via fax to facility. Please see Contacts section for details.     Thank you  Uyen Foote

## 2024-06-28 NOTE — LETTER
Diabetic Eye Exam Form     Date Requested: 24  Patient: Marti Bacon  Patient : 1975   Referring Provider: Dena Toledo MD      DIABETIC Eye Exam Date _______________________________      Type of Exam MUST be documented for Diabetic Eye Exams. Please CHECK ONE.     Retinal Exam       Dilated Retinal Exam       OCT       Optomap-Iris Exam      Fundus Photography       Left Eye - Please check Retinopathy or No Retinopathy        Exam did show retinopathy    Exam did not show retinopathy       Right Eye - Please check Retinopathy or No Retinopathy       Exam did show retinopathy    Exam did not show retinopathy       Comments __________________________________________________________    Practice Providing Exam ______________________________________________    Exam Performed By (print name) _______________________________________      Provider Signature ___________________________________________________      These reports are needed for  compliance.  Please fax this completed form and a copy of the Diabetic Eye Exam report to our office located at [address] [place] [address] as soon as possible via Fax [phone] attention Uyen: Phone [phone]  We thank you for your assistance in treating our mutual patient.

## 2024-06-28 NOTE — LETTER
Diabetic Eye Exam Form We have received your Consult for this DOS! Below is missing in the note! Please fill out and send back tk u!    Date Requested: 24  Patient: Marti Bacon  Patient : 1975   Referring Provider: Dena Toledo MD      DIABETIC Eye Exam Date ____0-33-61___________________________      Type of Exam MUST be documented for Diabetic Eye Exams. Please CHECK ONE.     Retinal Exam       Dilated Retinal Exam       OCT       Optomap-Iris Exam      Fundus Photography       Left Eye - Please check Retinopathy or No Retinopathy        Exam did show retinopathy    Exam did not show retinopathy       Right Eye - Please check Retinopathy or No Retinopathy       Exam did show retinopathy    Exam did not show retinopathy       Comments __________________________________________________________    Practice Providing Exam ______________________________________________    Exam Performed By (print name) _______________________________________      Provider Signature ___________________________________________________      These reports are needed for  compliance.  Please fax this completed form and a copy of the Diabetic Eye Exam report to our office located at [address] [place] [address] as soon as possible via Fax [phone] jhonatan Qiu: Phone [phone]  We thank you for your assistance in treating our mutual patient.

## 2024-06-28 NOTE — TELEPHONE ENCOUNTER
----- Message from Massiel WEI sent at 6/27/2024  3:58 PM EDT -----  Regarding: Care Gap Request  06/27/24 3:58 PM    Hello, our patient above has had Diabetic Eye Exam completed/performed. Please assist in updating the patient chart by making an External outreach to [facility] facility located in [address] [place] [address] [address] [place], [place] [id number] [phone]. The date of service is 9/2023.    Thank you,  Massiel ANDERSON

## 2024-06-28 NOTE — LETTER
Diabetic Eye Exam Form  2nd Request!    Date Requested: 24  Patient: Marti Bacon  Patient : 1975   Referring Provider: Dena Toledo MD      DIABETIC Eye Exam Date _______________________________      Type of Exam MUST be documented for Diabetic Eye Exams. Please CHECK ONE.     Retinal Exam       Dilated Retinal Exam       OCT       Optomap-Iris Exam      Fundus Photography       Left Eye - Please check Retinopathy or No Retinopathy        Exam did show retinopathy    Exam did not show retinopathy       Right Eye - Please check Retinopathy or No Retinopathy       Exam did show retinopathy    Exam did not show retinopathy       Comments __________________________________________________________    Practice Providing Exam ______________________________________________    Exam Performed By (print name) _______________________________________      Provider Signature ___________________________________________________      These reports are needed for  compliance.  Please fax this completed form and a copy of the Diabetic Eye Exam report to our office located at [address] [place] [address] as soon as possible via Fax [phone] attention Uyen: Phone [phone]  We thank you for your assistance in treating our mutual patient.

## 2024-06-28 NOTE — LETTER
Diabetic Eye Exam Form  3rd and final attempt! Please respond either way tk u!    Date Requested: 07/15/24  Patient: Marti Bacon  Patient : 1975   Referring Provider: Dena Toledo MD      DIABETIC Eye Exam Date _______________________________      Type of Exam MUST be documented for Diabetic Eye Exams. Please CHECK ONE.     Retinal Exam       Dilated Retinal Exam       OCT       Optomap-Iris Exam      Fundus Photography       Left Eye - Please check Retinopathy or No Retinopathy        Exam did show retinopathy    Exam did not show retinopathy       Right Eye - Please check Retinopathy or No Retinopathy       Exam did show retinopathy    Exam did not show retinopathy       Comments __________________________________________________________    Practice Providing Exam ______________________________________________    Exam Performed By (print name) _______________________________________      Provider Signature ___________________________________________________      These reports are needed for  compliance.  Please fax this completed form and a copy of the Diabetic Eye Exam report to our office located at [address] [place] [address] as soon as possible via Fax [phone] jhonatan Qiu: Phone [phone]  We thank you for your assistance in treating our mutual patient.

## 2024-07-02 NOTE — TELEPHONE ENCOUNTER
As a follow-up, a second attempt has been made for outreach via fax to facility. Please see Contacts section for details.    Thank you  Uyen Foote

## 2024-07-19 NOTE — TELEPHONE ENCOUNTER
Upon review of the In Basket request we have found/obtained the documentation. After careful review of the document we are unable to complete this request for Diabetic Eye Exam because the documentation does not have the proper verbiage (wording) needed to close the requested care gap(s) and the documentation does not have the result(s) needed to close the requested care gap(s). Another letter sent to see if they can update the Diabetic Retinopathy.If it comes, we will update HM tk u!    Any additional questions or concerns should be emailed to the Practice Liaisons via the appropriate education email address, please do not reply via In Basket.    Thank you  Uyen Foote   [facility]

## 2024-07-23 DIAGNOSIS — F41.9 ANXIETY AND DEPRESSION: ICD-10-CM

## 2024-07-23 DIAGNOSIS — F32.A ANXIETY AND DEPRESSION: ICD-10-CM

## 2024-07-23 DIAGNOSIS — F51.01 PRIMARY INSOMNIA: ICD-10-CM

## 2024-07-23 RX ORDER — ALPRAZOLAM 0.5 MG/1
0.5 TABLET ORAL 2 TIMES DAILY PRN
Qty: 60 TABLET | Refills: 0 | Status: SHIPPED | OUTPATIENT
Start: 2024-07-23

## 2024-07-23 NOTE — TELEPHONE ENCOUNTER
Medication:  PDMP   06/24/2024 06/24/2024 ALPRAZolam (Tablet) 60.0 30 0.5 MG NA KIRBY ZAPATA     Active agreement on file -No

## 2024-07-26 ENCOUNTER — TELEPHONE (OUTPATIENT)
Age: 49
End: 2024-07-26

## 2024-07-26 NOTE — TELEPHONE ENCOUNTER
Pt called states she has MRI scheduled for tomorrow 7/27 and PA is still pending clinical review.    She was advised to reschedule, but really doesn't want to. Pt was then advised to call pcp office and have Dr Toledo call insurance to have it expedited.    Please advise

## 2024-07-29 ENCOUNTER — TELEPHONE (OUTPATIENT)
Dept: NEUROLOGY | Facility: CLINIC | Age: 49
End: 2024-07-29

## 2024-07-29 NOTE — TELEPHONE ENCOUNTER
Spoke with patient and confirmed appointment with Dr. Banuelos 7/30 at [facility].

## 2024-07-30 ENCOUNTER — CONSULT (OUTPATIENT)
Dept: NEUROLOGY | Facility: CLINIC | Age: 49
End: 2024-07-30
Payer: COMMERCIAL

## 2024-07-30 VITALS
SYSTOLIC BLOOD PRESSURE: 120 MMHG | WEIGHT: 218 LBS | HEIGHT: 65 IN | HEART RATE: 92 BPM | BODY MASS INDEX: 36.32 KG/M2 | DIASTOLIC BLOOD PRESSURE: 70 MMHG

## 2024-07-30 DIAGNOSIS — G25.0 BENIGN ESSENTIAL TREMOR: Primary | ICD-10-CM

## 2024-07-30 PROCEDURE — 3078F DIAST BP <80 MM HG: CPT | Performed by: STUDENT IN AN ORGANIZED HEALTH CARE EDUCATION/TRAINING PROGRAM

## 2024-07-30 PROCEDURE — 3074F SYST BP LT 130 MM HG: CPT | Performed by: STUDENT IN AN ORGANIZED HEALTH CARE EDUCATION/TRAINING PROGRAM

## 2024-07-30 PROCEDURE — 99244 OFF/OP CNSLTJ NEW/EST MOD 40: CPT | Performed by: STUDENT IN AN ORGANIZED HEALTH CARE EDUCATION/TRAINING PROGRAM

## 2024-07-30 RX ORDER — PROPRANOLOL HCL 60 MG
60 CAPSULE, EXTENDED RELEASE 24HR ORAL DAILY
Qty: 90 CAPSULE | Refills: 0 | Status: SHIPPED | OUTPATIENT
Start: 2024-07-30 | End: 2024-10-28

## 2024-07-30 NOTE — PROGRESS NOTES
Patient ID: Marti Bacon is a 49 y.o. female.    Assessment/Plan:    Benign essential tremor  Pt presents for tremor of 3 years duration gradually worsening w/o precipitating or aggravating factor known, affecting hands > feet & R>L at rest & w/ action, head involved as well during severe episodes which occur a few times each week. Parkinson sx such as bradykinesia & rigidity not present on exam today, gait minimally affected & not shuffling. Affects action more than rest, w/ holding cups & handwriting most obviously affected. Pt reports no known fam hx of tremors but uncertain to many members due to living farther away & infrequently visiting. At this time neuro chief concern is of BET, for which pt will be trialed on propanolol to assess for response & will follow up in a couple months to see if side effects or poor tolerance, can consider other agents at that time, no labs or imaging warranted from neuro perspective.       Diagnoses and all orders for this visit:    Benign essential tremor  -     Ambulatory Referral to Neurology  -     propranolol (INDERAL LA) 60 mg 24 hr capsule; Take 1 capsule (60 mg total) by mouth daily           Subjective:    HPI    Tremor    She complains of tremor. Tremor began about 3 years ago. Her tremor primarily involves the bilateral hand, bilateral foot, and head and occurswith action and at rest.  Onset of symptoms was gradual, starting about 3 years ago. Tremor is currently of mild, moderate, diffuse, and intermittent severity, exacerbated by activity (intention) and stress. This tremor affects her handwriting, eating from a spoon, and drinking from a cup. Tremor is alleviated by sleep. Symptoms occur intermittently and last minutes. she also describes symptoms of bilateral hand tremor and change in handwriting. He deniesunilateral hand tremor, voice change, sleep disturbance, drooling during sleep (wet pillows), rigidity, postural changes, difficulty in initiating movement,  "difficulty with walking, difficult with posture, difficulty with swallowing, and balance problems.    Prior evaluation has included MRI brain, unremarkable in 2020.    Previous treatments include none to date, which has not improved symptoms.    The following portions of the patient's history were reviewed and updated as appropriate: allergies, current medications, past family history, past medical history, past social history, past surgical history, and problem list.         Objective:    Blood pressure 120/70, pulse 92, height 5' 5\" (1.651 m), weight 98.9 kg (218 lb), last menstrual period 11/18/2018.    Physical Exam  Vitals and nursing note reviewed.   Constitutional:       General: She is not in acute distress.     Appearance: She is not ill-appearing, toxic-appearing or diaphoretic.   HENT:      Head: Normocephalic and atraumatic.      Nose: Nose normal.      Mouth/Throat:      Pharynx: Oropharynx is clear.   Eyes:      General: Lids are normal. No scleral icterus.        Right eye: No discharge.         Left eye: No discharge.      Extraocular Movements: Extraocular movements intact.      Conjunctiva/sclera: Conjunctivae normal.      Pupils: Pupils are equal, round, and reactive to light.   Cardiovascular:      Rate and Rhythm: Normal rate and regular rhythm.      Pulses: Normal pulses.      Heart sounds: Normal heart sounds.   Pulmonary:      Effort: Pulmonary effort is normal. No respiratory distress.      Breath sounds: Normal breath sounds.   Chest:      Chest wall: No tenderness.   Abdominal:      Tenderness: There is no abdominal tenderness. There is no guarding.   Musculoskeletal:         General: No swelling or tenderness.      Cervical back: No rigidity or tenderness.   Skin:     General: Skin is warm and dry.      Capillary Refill: Capillary refill takes 2 to 3 seconds.   Neurological:      Mental Status: She is alert and oriented to person, place, and time.      Cranial Nerves: No cranial nerve " deficit.      Sensory: No sensory deficit.      Motor: Motor strength is normal.No weakness.      Coordination: Coordination abnormal.      Gait: Gait normal.      Deep Tendon Reflexes: Reflexes are normal and symmetric. Reflexes normal.   Psychiatric:         Mood and Affect: Mood normal.         Speech: Speech normal.         Behavior: Behavior normal.         Neurological Exam  Mental Status  Alert. Oriented to person, place, time and situation. Oriented to person, place, and time. Speech is normal. Language is fluent with no aphasia.    Cranial Nerves  CN I: Sense of smell is normal.  CN II: Visual acuity is normal. Visual fields full to confrontation.  CN III, IV, VI: Extraocular movements intact bilaterally. Normal lids and orbits bilaterally. Pupils equal round and reactive to light bilaterally.  CN V: Facial sensation is normal.  CN VII: Full and symmetric facial movement.  CN VIII: Hearing is normal.  CN IX, X: Palate elevates symmetrically. Normal gag reflex.  CN XI: Shoulder shrug strength is normal.  CN XII: Tongue midline without atrophy or fasciculations.    Motor  Normal muscle bulk throughout. No fasciculations present. Normal muscle tone. The following abnormal movements were seen: Strength is 5/5 throughout all four extremities.    Sensory  Sensation is intact to light touch, pinprick, vibration and proprioception in all four extremities.    Reflexes  Deep tendon reflexes are 2+ and symmetric in all four extremities.    Coordination  Right: Finger-to-nose abnormality: Rapid alternating movement normal. Heel-to-shin normal.Left: Finger-to-nose abnormality: Rapid alternating movement normal. Heel-to-shin normal.    Gait  Normal casual, toe, heel and tandem gait. Normal gait. Able to rise from chair without using arms.        ROS:    Review of Systems   Constitutional:  Negative for activity change, appetite change, fatigue and fever.   HENT:  Negative for postnasal drip, rhinorrhea, sinus pressure,  sinus pain, sneezing and sore throat.    Eyes: Negative.  Negative for photophobia, pain and visual disturbance.   Respiratory:  Negative for cough and shortness of breath.    Cardiovascular: Negative.  Negative for chest pain, palpitations and leg swelling.   Gastrointestinal: Negative.  Negative for nausea and vomiting.   Endocrine: Negative.  Negative for cold intolerance and heat intolerance.   Genitourinary: Negative.  Negative for dysuria, frequency and urgency.   Musculoskeletal:  Negative for arthralgias, back pain, gait problem, joint swelling, myalgias, neck pain and neck stiffness.   Skin: Negative.  Negative for color change and rash.   Allergic/Immunologic: Negative.  Negative for environmental allergies, food allergies and immunocompromised state.   Neurological:  Positive for tremors. Negative for dizziness, seizures, syncope, facial asymmetry, speech difficulty, weakness, light-headedness, numbness and headaches.   Hematological: Negative.  Does not bruise/bleed easily.   Psychiatric/Behavioral:  Negative for confusion, hallucinations and sleep disturbance. The patient is nervous/anxious.

## 2024-07-30 NOTE — ASSESSMENT & PLAN NOTE
Pt presents for tremor of 3 years duration gradually worsening w/o precipitating or aggravating factor known, affecting hands > feet & R>L at rest & w/ action, head involved as well during severe episodes which occur a few times each week. Parkinson sx such as bradykinesia & rigidity not present on exam today, gait minimally affected & not shuffling. Affects action more than rest, w/ holding cups & handwriting most obviously affected. Pt reports no known fam hx of tremors but uncertain to many members due to living farther away & infrequently visiting. At this time neuro chief concern is of BET, for which pt will be trialed on propanolol to assess for response & will follow up in a couple months to see if side effects or poor tolerance, can consider other agents at that time, no labs or imaging warranted from neuro perspective.

## 2024-08-11 DIAGNOSIS — M79.7 FIBROMYALGIA: ICD-10-CM

## 2024-08-12 DIAGNOSIS — E11.69 TYPE 2 DIABETES MELLITUS WITH OBESITY  (HCC): ICD-10-CM

## 2024-08-12 DIAGNOSIS — E66.9 TYPE 2 DIABETES MELLITUS WITH OBESITY  (HCC): ICD-10-CM

## 2024-08-12 RX ORDER — TIRZEPATIDE 12.5 MG/.5ML
INJECTION, SOLUTION SUBCUTANEOUS
Qty: 6 ML | Refills: 0 | Status: SHIPPED | OUTPATIENT
Start: 2024-08-12

## 2024-08-12 RX ORDER — DULOXETIN HYDROCHLORIDE 60 MG/1
60 CAPSULE, DELAYED RELEASE ORAL DAILY
Qty: 90 CAPSULE | Refills: 1 | Status: SHIPPED | OUTPATIENT
Start: 2024-08-12

## 2024-08-25 DIAGNOSIS — F41.9 ANXIETY AND DEPRESSION: ICD-10-CM

## 2024-08-25 DIAGNOSIS — F32.A ANXIETY AND DEPRESSION: ICD-10-CM

## 2024-08-25 DIAGNOSIS — F51.01 PRIMARY INSOMNIA: ICD-10-CM

## 2024-08-26 RX ORDER — ALPRAZOLAM 0.5 MG
0.5 TABLET ORAL 2 TIMES DAILY PRN
Qty: 60 TABLET | Refills: 0 | Status: SHIPPED | OUTPATIENT
Start: 2024-08-26

## 2024-08-26 NOTE — TELEPHONE ENCOUNTER
Medication:  PDMP   07/23/2024 07/23/2024 ALPRAZolam (Tablet) 60.0 30 0.5 MG NA KIRBY ZAPATA     Active agreement on file -No

## 2024-09-10 ENCOUNTER — OFFICE VISIT (OUTPATIENT)
Age: 49
End: 2024-09-10
Payer: COMMERCIAL

## 2024-09-10 VITALS
SYSTOLIC BLOOD PRESSURE: 126 MMHG | HEIGHT: 65 IN | OXYGEN SATURATION: 95 % | WEIGHT: 213 LBS | BODY MASS INDEX: 35.49 KG/M2 | DIASTOLIC BLOOD PRESSURE: 70 MMHG | TEMPERATURE: 97.3 F | HEART RATE: 74 BPM

## 2024-09-10 DIAGNOSIS — Z79.899 ENCOUNTER FOR LONG-TERM (CURRENT) USE OF OTHER MEDICATIONS: ICD-10-CM

## 2024-09-10 DIAGNOSIS — M15.0 PRIMARY GENERALIZED (OSTEO)ARTHRITIS: ICD-10-CM

## 2024-09-10 DIAGNOSIS — M79.7 FIBROMYALGIA: Primary | ICD-10-CM

## 2024-09-10 PROCEDURE — 99214 OFFICE O/P EST MOD 30 MIN: CPT | Performed by: PHYSICIAN ASSISTANT

## 2024-09-10 NOTE — ASSESSMENT & PLAN NOTE
Her myofascial symptoms are generally well-controlled gabapentin.  Encouraged regular home exercise program as well.  Labs as ordered to monitor for medication side effects and toxicity.  Follow-up in 6 months or sooner if needed.    Orders:    CBC and differential; Future    Comprehensive metabolic panel; Future    Sedimentation rate, automated; Future    C-reactive protein; Future

## 2024-09-10 NOTE — PROGRESS NOTES
Ambulatory Visit  Name: Marti Bacon      : 1975      MRN: [id number]  Encounter Provider: Maryjane Gimenez PA-C  Encounter Date: 9/10/2024   Encounter department: [facility] RHEUMATOLOGY [facility]    Assessment & Plan  Fibromyalgia  Her myofascial symptoms are generally well-controlled gabapentin.  Encouraged regular home exercise program as well.  Labs as ordered to monitor for medication side effects and toxicity.  Follow-up in 6 months or sooner if needed.    Orders:    CBC and differential; Future    Comprehensive metabolic panel; Future    Sedimentation rate, automated; Future    C-reactive protein; Future    Primary generalized (osteo)arthritis  Generalized osteoarthritis symptoms are stable.  Encouraged regular home exercise program.         Encounter for long-term (current) use of other medications    Orders:    CBC and differential; Future    Comprehensive metabolic panel; Future    Sedimentation rate, automated; Future    C-reactive protein; Future      History of Present Illness     Marti Bacon is a 49 y.o. female.  She is here for follow-up of her osteoarthritis and fibromyalgia.  For her fibromyalgia she is taking gabapentin and Cymbalta.  Her symptoms are generally stable at this time.  She has minimal stiffness in the morning.  She has no swelling in her joints.  Her osteoarthritis symptoms are stable as well.     She has a history of hypercoagulable state and has been evaluated by Hematology in the past.  They recommended she take a baby aspirin twice weekly.  She has no history of DVT or pulmonary embolism.    She has had a tremor which has worsened more recently.  She recently saw neurology and was diagnosed with a benign essential tremor.  She is now on propranolol and has noticed an improvement in her symptoms.    She had labs done on 2024.  CBC essentially unremarkable.  Creatinine 0.46, .  Alk phos 105.  , ALT 45.  Vitamin D 77.3.      Review  of Systems   Constitutional:  Negative for chills, fatigue and fever.   HENT:  Negative for hearing loss, sore throat and tinnitus.    Eyes:  Negative for pain and visual disturbance.   Respiratory:  Negative for cough and shortness of breath.    Cardiovascular:  Negative for chest pain and palpitations.   Gastrointestinal:  Negative for abdominal pain, nausea and vomiting.   Genitourinary:  Negative for difficulty urinating.   Musculoskeletal:  Positive for arthralgias and myalgias. Negative for back pain, gait problem, joint swelling, neck pain and neck stiffness.   Skin:  Negative for rash.   Neurological:  Negative for dizziness, seizures, weakness, numbness and headaches.   Psychiatric/Behavioral:  Negative for confusion, decreased concentration and sleep disturbance.      Current Outpatient Medications on File Prior to Visit   Medication Sig Dispense Refill    ALPRAZolam (XANAX) 0.5 mg tablet Take 1 tablet (0.5 mg total) by mouth 2 (two) times a day as needed for anxiety 60 tablet 0    atorvastatin (LIPITOR) 10 mg tablet Take 1 tablet (10 mg total) by mouth daily 90 tablet 1    BIOTIN PO Take 600 mg by mouth every other day      buPROPion (WELLBUTRIN SR) 150 mg 12 hr tablet Take 1 tablet (150 mg total) by mouth 2 (two) times a day 180 tablet 1    Calcium Citrate-Vitamin D (MINE-CITRATE PLUS VITAMIN D PO) Take by mouth in the morning      Cholecalciferol (VITAMIN D3) 2000 units capsule Take 2,000 Units by mouth daily       Cyanocobalamin (B-12) 1000 MCG CAPS Take 1 capsule by mouth daily       DULoxetine (CYMBALTA) 60 mg delayed release capsule Take 1 capsule (60 mg total) by mouth daily 90 capsule 1    Fluticasone-Salmeterol (Advair) 250-50 mcg/dose inhaler Inhale 1 puff 2 (two) times a day Rinse mouth after use. 180 blister 3    levothyroxine 50 mcg tablet Take 1 tablet (50 mcg total) by mouth daily 90 tablet 1    lisinopril (ZESTRIL) 10 mg tablet 5 mg every other day 90 tablet 0    Mounjaro 12.5 MG/0.5ML  "INJECT 12.5MG SUBCUTANEOUSLY (UNDER THE SKIN) EVERY WEEK 6 mL 0    Multiple Vitamin (multivitamin) tablet Take 1 tablet by mouth daily      nystatin (MYCOSTATIN) cream Apply topically 1-2 times a day as needed to abdomen rash 60 g 6    propranolol (INDERAL LA) 60 mg 24 hr capsule Take 1 capsule (60 mg total) by mouth daily 90 capsule 0    triamcinolone (KENALOG) 0.1 % ointment APPLY TOPICALLY TO AFFECTED AREA 1-2 TIMES A DAY AS NEEDED TO ITCHY RASH 80 g 0    gabapentin (NEURONTIN) 300 mg capsule Take 1 capsule (300 mg total) by mouth 3 (three) times a day 270 capsule 1    methocarbamol (ROBAXIN) 500 mg tablet Take 1 tablet (500 mg total) by mouth 4 (four) times a day for 5 days (Patient not taking: Reported on 3/26/2024) 20 tablet 0    saccharomyces boulardii (FLORASTOR) 250 mg capsule Take 1 capsule (250 mg total) by mouth daily (Patient not taking: Reported on 6/27/2024) 30 capsule 0     No current facility-administered medications on file prior to visit.          Objective     /70   Pulse 74   Temp (!) 97.3 °F (36.3 °C) (Temporal)   Ht 5' 5\" (1.651 m)   Wt 96.6 kg (213 lb)   LMP 11/18/2018   SpO2 95%   BMI 35.45 kg/m²     Physical Exam  Constitutional:       Appearance: Normal appearance.   Cardiovascular:      Rate and Rhythm: Normal rate and regular rhythm.   Pulmonary:      Breath sounds: Normal breath sounds.   Musculoskeletal:      Comments: Full range of motion neck with triggers posterior cervical and shoulder girdle muscles.  Full range of motion bilateral shoulders, elbows, wrists.  Triggers lateral and medial epicondyles bilaterally.  Mild interphalangeal OA changes, early Heberden's nodes bilateral hands.  No synovitis noted.  Full range of motion bilateral hips with tenderness trochanteric bursa.  Full range of motion bilateral knees with patellofemoral crepitus and tenderness pes anserine bursa bilaterally.  Full range of motion bilateral ankles.  Hyperpronation bilateral feet.  "   Skin:     General: Skin is warm and dry.   Neurological:      General: No focal deficit present.      Mental Status: She is alert.           Dragon Dictation software was used to dictate this note. It may contain errors with dictating incorrect words/spelling. Please contact provider directly for any questions.

## 2024-09-23 DIAGNOSIS — F41.9 ANXIETY AND DEPRESSION: ICD-10-CM

## 2024-09-23 DIAGNOSIS — F32.A ANXIETY AND DEPRESSION: ICD-10-CM

## 2024-09-23 DIAGNOSIS — F51.01 PRIMARY INSOMNIA: ICD-10-CM

## 2024-09-23 DIAGNOSIS — I10 ESSENTIAL HYPERTENSION: ICD-10-CM

## 2024-09-24 RX ORDER — ALPRAZOLAM 0.5 MG
0.5 TABLET ORAL 2 TIMES DAILY PRN
Qty: 60 TABLET | Refills: 0 | Status: SHIPPED | OUTPATIENT
Start: 2024-09-24

## 2024-09-24 RX ORDER — LISINOPRIL 10 MG/1
TABLET ORAL
Qty: 90 TABLET | Refills: 1 | Status: SHIPPED | OUTPATIENT
Start: 2024-09-24

## 2024-09-24 NOTE — TELEPHONE ENCOUNTER
Medication:  PDMP   08/26/2024 08/26/2024 ALPRAZolam (Tablet) 60.0 30 0.5 MG NA KIRBY ZAPATA     Active agreement on file -No

## 2024-10-10 ENCOUNTER — VBI (OUTPATIENT)
Dept: ADMINISTRATIVE | Facility: OTHER | Age: 49
End: 2024-10-10

## 2024-10-10 NOTE — TELEPHONE ENCOUNTER
10/10/24 11:51 AM     Chart reviewed for Diabetic Eye Exam ; nothing is submitted to the patient's insurance at this time.     Lucita Darden MA   [facility]

## 2024-10-17 ENCOUNTER — TELEPHONE (OUTPATIENT)
Age: 49
End: 2024-10-17

## 2024-10-17 DIAGNOSIS — E03.9 HYPOTHYROIDISM, UNSPECIFIED TYPE: ICD-10-CM

## 2024-10-17 DIAGNOSIS — F32.A ANXIETY AND DEPRESSION: ICD-10-CM

## 2024-10-17 DIAGNOSIS — F41.9 ANXIETY AND DEPRESSION: ICD-10-CM

## 2024-10-17 RX ORDER — LEVOTHYROXINE SODIUM 50 UG/1
50 TABLET ORAL DAILY
Qty: 90 TABLET | Refills: 1 | Status: SHIPPED | OUTPATIENT
Start: 2024-10-17

## 2024-10-17 RX ORDER — BUPROPION HYDROCHLORIDE 150 MG/1
150 TABLET, EXTENDED RELEASE ORAL 2 TIMES DAILY
Qty: 180 TABLET | Refills: 1 | Status: SHIPPED | OUTPATIENT
Start: 2024-10-17

## 2024-10-21 ENCOUNTER — RA CDI HCC (OUTPATIENT)
Dept: OTHER | Facility: HOSPITAL | Age: 49
End: 2024-10-21

## 2024-10-23 ENCOUNTER — OFFICE VISIT (OUTPATIENT)
Dept: FAMILY MEDICINE CLINIC | Facility: CLINIC | Age: 49
End: 2024-10-23
Payer: COMMERCIAL

## 2024-10-23 VITALS
DIASTOLIC BLOOD PRESSURE: 70 MMHG | HEIGHT: 65 IN | BODY MASS INDEX: 34.66 KG/M2 | SYSTOLIC BLOOD PRESSURE: 110 MMHG | TEMPERATURE: 97.6 F | HEART RATE: 76 BPM | WEIGHT: 208 LBS | RESPIRATION RATE: 17 BRPM | OXYGEN SATURATION: 98 %

## 2024-10-23 DIAGNOSIS — E78.00 PURE HYPERCHOLESTEROLEMIA: ICD-10-CM

## 2024-10-23 DIAGNOSIS — E66.9 TYPE 2 DIABETES MELLITUS WITH OBESITY  (HCC): ICD-10-CM

## 2024-10-23 DIAGNOSIS — L98.7 EXCESS SKIN OF ABDOMEN: ICD-10-CM

## 2024-10-23 DIAGNOSIS — J01.00 ACUTE MAXILLARY SINUSITIS, RECURRENCE NOT SPECIFIED: ICD-10-CM

## 2024-10-23 DIAGNOSIS — I10 BENIGN HYPERTENSION: ICD-10-CM

## 2024-10-23 DIAGNOSIS — F51.01 PRIMARY INSOMNIA: ICD-10-CM

## 2024-10-23 DIAGNOSIS — E11.69 TYPE 2 DIABETES MELLITUS WITH OBESITY  (HCC): ICD-10-CM

## 2024-10-23 DIAGNOSIS — E66.09 CLASS 1 OBESITY DUE TO EXCESS CALORIES WITH SERIOUS COMORBIDITY AND BODY MASS INDEX (BMI) OF 34.0 TO 34.9 IN ADULT: ICD-10-CM

## 2024-10-23 DIAGNOSIS — L30.4 INTERTRIGO: Primary | ICD-10-CM

## 2024-10-23 DIAGNOSIS — E66.811 CLASS 1 OBESITY DUE TO EXCESS CALORIES WITH SERIOUS COMORBIDITY AND BODY MASS INDEX (BMI) OF 34.0 TO 34.9 IN ADULT: ICD-10-CM

## 2024-10-23 DIAGNOSIS — F41.9 ANXIETY AND DEPRESSION: ICD-10-CM

## 2024-10-23 DIAGNOSIS — F32.A ANXIETY AND DEPRESSION: ICD-10-CM

## 2024-10-23 LAB
LEFT EYE DIABETIC RETINOPATHY: NORMAL
LEFT EYE IMAGE QUALITY: NORMAL
LEFT EYE MACULAR EDEMA: NORMAL
LEFT EYE OTHER RETINOPATHY: NORMAL
RIGHT EYE DIABETIC RETINOPATHY: NORMAL
RIGHT EYE IMAGE QUALITY: NORMAL
RIGHT EYE MACULAR EDEMA: NORMAL
RIGHT EYE OTHER RETINOPATHY: NORMAL
SEVERITY (EYE EXAM): NORMAL

## 2024-10-23 PROCEDURE — 99214 OFFICE O/P EST MOD 30 MIN: CPT | Performed by: FAMILY MEDICINE

## 2024-10-23 PROCEDURE — 92250 FUNDUS PHOTOGRAPHY W/I&R: CPT | Performed by: FAMILY MEDICINE

## 2024-10-23 RX ORDER — ALPRAZOLAM 0.5 MG
0.5 TABLET ORAL 2 TIMES DAILY PRN
Qty: 60 TABLET | Refills: 0 | Status: SHIPPED | OUTPATIENT
Start: 2024-10-23

## 2024-10-23 RX ORDER — NYSTATIN 100000 [USP'U]/G
POWDER TOPICAL 3 TIMES DAILY
Qty: 60 G | Refills: 0 | Status: SHIPPED | OUTPATIENT
Start: 2024-10-23

## 2024-10-23 NOTE — ASSESSMENT & PLAN NOTE
Orders:    ALPRAZolam (XANAX) 0.5 mg tablet; Take 1 tablet (0.5 mg total) by mouth 2 (two) times a day as needed for anxiety

## 2024-10-23 NOTE — PROGRESS NOTES
Ambulatory Visit  Name: Marti Bacon      : 1975      MRN: [id number]  Encounter Provider: Dena Toledo MD  Encounter Date: 10/23/2024   Encounter department: [facility]    Assessment & Plan  Intertrigo    Orders:    nystatin (MYCOSTATIN) powder; Apply topically 3 (three) times a day    Ambulatory Referral to Plastic Surgery; Future    Excess skin of abdomen    Orders:    Ambulatory Referral to Plastic Surgery; Future    Type 2 diabetes mellitus with obesity  (HCC)    Lab Results   Component Value Date    HGBA1C 5.3 2024   Stable on current meds    Orders:    Albumin / creatinine urine ratio; Future    Comprehensive metabolic panel; Future    Hemoglobin A1C; Future    CBC and Platelet; Future    TSH, 3rd generation with Free T4 reflex; Future    Lipid Panel with Direct LDL reflex; Future    Benign hypertension  Stable on current meds         Primary insomnia    Orders:    ALPRAZolam (XANAX) 0.5 mg tablet; Take 1 tablet (0.5 mg total) by mouth 2 (two) times a day as needed for anxiety    Anxiety and depression      Orders:    ALPRAZolam (XANAX) 0.5 mg tablet; Take 1 tablet (0.5 mg total) by mouth 2 (two) times a day as needed for anxiety    Class 1 obesity due to excess calories with serious comorbidity and body mass index (BMI) of 34.0 to 34.9 in adult  Diet, exercise and portion control discussed          Pure hypercholesterolemia  Stable on lipitor         Acute maxillary sinusitis, recurrence not specified    Orders:    amoxicillin-clavulanate (AUGMENTIN) 875-125 mg per tablet; Take 1 tablet by mouth every 12 (twelve) hours for 10 days       History of Present Illness     HPI  Here for follow up  C/o sinus issues and cough x 3 weeks   + productive cough   Not getting better    History obtained from : patient  Review of Systems   Constitutional: Negative.    HENT:  Positive for congestion, postnasal drip and rhinorrhea.    Eyes: Negative.    Respiratory:  Positive for cough.     Cardiovascular: Negative.    Endocrine: Negative.    Skin:  Positive for rash.   Hematological: Negative.    Psychiatric/Behavioral: Negative.       Medical History Reviewed by provider this encounter:  Allergies  Meds  Problems       Past Medical History   Past Medical History:   Diagnosis Date    Anxiety     Arthritis     Bariatric surgery status     CPAP (continuous positive airway pressure) dependence     Depression     Diabetes mellitus (HCC)     Eczema     Fibromyalgia, primary     Foot drop 5/12/2020    Hyperlipidemia     Hypertension     Hypothyroidism     Left foot drop 5/5/2020    Lupus anticoagulant disorder (HCC)     Migraine     Night sweats     Obesity     Ovarian neoplasm 11/20/2017    Postsurgical malabsorption     Psoriasis     Sleep apnea     Status post laparoscopic hysterectomy 11/20/2017    Tremors of nervous system 5/12/2020     Past Surgical History:   Procedure Laterality Date    COLONOSCOPY      HYSTERECTOMY Bilateral 11/17/2018    OOPHORECTOMY Bilateral 11/17/2018    WV COLONOSCOPY FLX DX W/COLLJ SPEC WHEN PFRMD N/A 6/13/2017    Procedure: COLONOSCOPY;  Surgeon: Cherelle Polanco DO;  Location: [facility];  Service: Gastroenterology    WV EGD TRANSORAL BIOPSY SINGLE/MULTIPLE N/A 1/30/2019    Procedure: ESOPHAGOGASTRODUODENOSCOPY (EGD) with bx;  Surgeon: Dallas Rueda MD;  Location: [facility];  Service: Bariatrics    WV LAPS GSTR RSTCV PX W/BYP LEESA-EN-Y LIMB <150 CM N/A 3/25/2019    Procedure: LAPAROSCOPIC LEESA-EN-Y GASTRIC BYPASS AND INTRAOPERATIVE EGD;  Surgeon: Dallas Rueda MD;  Location: [facility];  Service: Bariatrics    WV LAPS TOTAL HYSTERECT 250 GM/< W/RMVL TUBE/OVARY N/A 11/20/2017    Procedure: ROBOTIC TOTAL LAPAROSCOPIC HYSTERECTOMY/ BSO;  Surgeon: Chavo Aviles MD;  Location: [facility];  Service: Gynecology Oncology    WISDOM TOOTH EXTRACTION       Family History   Problem Relation Age of Onset    Heart disease Mother         Cardiac disorder    Diabetes Mother      Hypertension Mother     Hyperthyroidism Mother     Diabetes Father     Hypertension Father     Lung cancer Maternal Grandfather     Colon cancer Cousin 67    Colon cancer Maternal Aunt 49    Lung cancer Family     Diabetes Sister     No Known Problems Brother     No Known Problems Maternal Grandmother     No Known Problems Paternal Grandmother     No Known Problems Paternal Grandfather     No Known Problems Sister      Current Outpatient Medications on File Prior to Visit   Medication Sig Dispense Refill    atorvastatin (LIPITOR) 10 mg tablet Take 1 tablet (10 mg total) by mouth daily 90 tablet 1    BIOTIN PO Take 600 mg by mouth every other day      buPROPion (WELLBUTRIN SR) 150 mg 12 hr tablet Take 1 tablet (150 mg total) by mouth 2 (two) times a day 180 tablet 1    Calcium Citrate-Vitamin D (MINE-CITRATE PLUS VITAMIN D PO) Take by mouth in the morning      Cholecalciferol (VITAMIN D3) 2000 units capsule Take 2,000 Units by mouth daily       Cyanocobalamin (B-12) 1000 MCG CAPS Take 1 capsule by mouth daily       DULoxetine (CYMBALTA) 60 mg delayed release capsule Take 1 capsule (60 mg total) by mouth daily 90 capsule 1    Fluticasone-Salmeterol (Advair) 250-50 mcg/dose inhaler Inhale 1 puff 2 (two) times a day Rinse mouth after use. 180 blister 3    gabapentin (NEURONTIN) 300 mg capsule Take 1 capsule (300 mg total) by mouth 3 (three) times a day 270 capsule 1    levothyroxine 50 mcg tablet Take 1 tablet by mouth once daily 90 tablet 1    lisinopril (ZESTRIL) 10 mg tablet 5 mg every other day 90 tablet 1    Mounjaro 12.5 MG/0.5ML INJECT 12.5MG SUBCUTANEOUSLY (UNDER THE SKIN) EVERY WEEK 6 mL 0    Multiple Vitamin (multivitamin) tablet Take 1 tablet by mouth daily      propranolol (INDERAL LA) 60 mg 24 hr capsule Take 1 capsule (60 mg total) by mouth daily 90 capsule 0    triamcinolone (KENALOG) 0.1 % ointment APPLY TOPICALLY TO AFFECTED AREA 1-2 TIMES A DAY AS NEEDED TO ITCHY RASH 80 g 0    [DISCONTINUED]  ALPRAZolam (XANAX) 0.5 mg tablet Take 1 tablet (0.5 mg total) by mouth 2 (two) times a day as needed for anxiety 60 tablet 0    [DISCONTINUED] nystatin (MYCOSTATIN) cream Apply topically 1-2 times a day as needed to abdomen rash 60 g 6    methocarbamol (ROBAXIN) 500 mg tablet Take 1 tablet (500 mg total) by mouth 4 (four) times a day for 5 days (Patient not taking: Reported on 3/26/2024) 20 tablet 0    saccharomyces boulardii (FLORASTOR) 250 mg capsule Take 1 capsule (250 mg total) by mouth daily (Patient not taking: Reported on 6/27/2024) 30 capsule 0     No current facility-administered medications on file prior to visit.     Allergies   Allergen Reactions    Metformin Diarrhea      Current Outpatient Medications on File Prior to Visit   Medication Sig Dispense Refill    atorvastatin (LIPITOR) 10 mg tablet Take 1 tablet (10 mg total) by mouth daily 90 tablet 1    BIOTIN PO Take 600 mg by mouth every other day      buPROPion (WELLBUTRIN SR) 150 mg 12 hr tablet Take 1 tablet (150 mg total) by mouth 2 (two) times a day 180 tablet 1    Calcium Citrate-Vitamin D (MINE-CITRATE PLUS VITAMIN D PO) Take by mouth in the morning      Cholecalciferol (VITAMIN D3) 2000 units capsule Take 2,000 Units by mouth daily       Cyanocobalamin (B-12) 1000 MCG CAPS Take 1 capsule by mouth daily       DULoxetine (CYMBALTA) 60 mg delayed release capsule Take 1 capsule (60 mg total) by mouth daily 90 capsule 1    Fluticasone-Salmeterol (Advair) 250-50 mcg/dose inhaler Inhale 1 puff 2 (two) times a day Rinse mouth after use. 180 blister 3    gabapentin (NEURONTIN) 300 mg capsule Take 1 capsule (300 mg total) by mouth 3 (three) times a day 270 capsule 1    levothyroxine 50 mcg tablet Take 1 tablet by mouth once daily 90 tablet 1    lisinopril (ZESTRIL) 10 mg tablet 5 mg every other day 90 tablet 1    Mounjaro 12.5 MG/0.5ML INJECT 12.5MG SUBCUTANEOUSLY (UNDER THE SKIN) EVERY WEEK 6 mL 0    Multiple Vitamin (multivitamin) tablet Take 1 tablet  "by mouth daily      propranolol (INDERAL LA) 60 mg 24 hr capsule Take 1 capsule (60 mg total) by mouth daily 90 capsule 0    triamcinolone (KENALOG) 0.1 % ointment APPLY TOPICALLY TO AFFECTED AREA 1-2 TIMES A DAY AS NEEDED TO ITCHY RASH 80 g 0    [DISCONTINUED] ALPRAZolam (XANAX) 0.5 mg tablet Take 1 tablet (0.5 mg total) by mouth 2 (two) times a day as needed for anxiety 60 tablet 0    [DISCONTINUED] nystatin (MYCOSTATIN) cream Apply topically 1-2 times a day as needed to abdomen rash 60 g 6    methocarbamol (ROBAXIN) 500 mg tablet Take 1 tablet (500 mg total) by mouth 4 (four) times a day for 5 days (Patient not taking: Reported on 3/26/2024) 20 tablet 0    saccharomyces boulardii (FLORASTOR) 250 mg capsule Take 1 capsule (250 mg total) by mouth daily (Patient not taking: Reported on 2024) 30 capsule 0     No current facility-administered medications on file prior to visit.      Social History     Tobacco Use    Smoking status: Former     Current packs/day: 0.00     Average packs/day: 0.8 packs/day for 15.0 years (11.3 ttl pk-yrs)     Types: Cigarettes     Start date: 2003     Quit date: 2018     Years since quittin.8     Passive exposure: Never    Smokeless tobacco: Never   Vaping Use    Vaping status: Never Used   Substance and Sexual Activity    Alcohol use: Not Currently    Drug use: No    Sexual activity: Yes     Partners: Male         Objective     /70 (BP Location: Left arm, Patient Position: Sitting, Cuff Size: Standard)   Pulse 76   Temp 97.6 °F (36.4 °C) (Tympanic)   Resp 17   Ht 5' 5\" (1.651 m)   Wt 94.3 kg (208 lb)   LMP 2018   SpO2 98%   BMI 34.61 kg/m²     Physical Exam  Constitutional:       Appearance: Normal appearance.   HENT:      Nose: Congestion and rhinorrhea present.   Cardiovascular:      Rate and Rhythm: Normal rate and regular rhythm.      Pulses: Normal pulses. no weak pulses.           Dorsalis pedis pulses are 2+ on the right side and 2+ on the " left side.        Posterior tibial pulses are 2+ on the right side and 2+ on the left side.      Heart sounds: Normal heart sounds.   Pulmonary:      Effort: Pulmonary effort is normal.      Breath sounds: Normal breath sounds.   Feet:      Right foot:      Skin integrity: No ulcer, skin breakdown, erythema, warmth, callus or dry skin.      Left foot:      Skin integrity: No ulcer, skin breakdown, erythema, warmth, callus or dry skin.   Skin:     Coloration: Skin is not jaundiced.      Findings: Erythema and rash present.      Comments: Under lower abdomen    Neurological:      General: No focal deficit present.      Mental Status: She is alert and oriented to person, place, and time.   Psychiatric:         Mood and Affect: Mood normal.         Behavior: Behavior normal.                               Diabetic Foot Exam    Patient's shoes and socks removed.    Right Foot/Ankle   Right Foot Inspection  Skin Exam: skin normal and skin intact. No dry skin, no warmth, no callus, no erythema, no maceration, no abnormal color, no pre-ulcer, no ulcer and no callus.     Toe Exam: ROM and strength within normal limits.     Sensory   Vibration: intact  Proprioception: intact  Monofilament testing: intact    Vascular  Capillary refills: < 3 seconds  The right DP pulse is 2+. The right PT pulse is 2+.     Left Foot/Ankle  Left Foot Inspection  Skin Exam: skin normal and skin intact. No dry skin, no warmth, no erythema, no maceration, normal color, no pre-ulcer, no ulcer and no callus.     Toe Exam: ROM and strength within normal limits.     Sensory   Vibration: intact  Proprioception: intact  Monofilament testing: intact    Vascular  Capillary refills: < 3 seconds  The left DP pulse is 2+. The left PT pulse is 2+.     Assign Risk Category  No deformity present  No loss of protective sensation  No weak pulses  Risk: 0

## 2024-10-23 NOTE — ASSESSMENT & PLAN NOTE
Lab Results   Component Value Date    HGBA1C 5.3 06/24/2024   Stable on current meds    Orders:    Albumin / creatinine urine ratio; Future    Comprehensive metabolic panel; Future    Hemoglobin A1C; Future    CBC and Platelet; Future    TSH, 3rd generation with Free T4 reflex; Future    Lipid Panel with Direct LDL reflex; Future

## 2024-10-25 DIAGNOSIS — G25.0 BENIGN ESSENTIAL TREMOR: ICD-10-CM

## 2024-10-25 NOTE — TELEPHONE ENCOUNTER
Medication: Propranolol    Dose/Frequency: Take 1 capsule (60 mg total) by mouth daily, 60 mg    Quantity: 90 capsule    Pharmacy: [facility], [place]    Office:   [] PCP/Provider -   [x] Speciality/Provider -     Does the patient have enough for 3 days?   [x] Yes   [] No - Send as HP to POD    Pt has a week left

## 2024-10-25 NOTE — TELEPHONE ENCOUNTER
Propranolol 60 mg    Next OV 2/18/2025 with Dr. Banuelos.    Last script sent 7/30/24. Quantity: 90. Refills: 0.    Dr. Banuelos - Rx entered. Please review and sign if in agreement.

## 2024-10-28 RX ORDER — PROPRANOLOL HYDROCHLORIDE 60 MG/1
60 CAPSULE, EXTENDED RELEASE ORAL DAILY
Qty: 90 CAPSULE | Refills: 1 | Status: SHIPPED | OUTPATIENT
Start: 2024-10-28

## 2024-11-03 DIAGNOSIS — E11.69 DIABETES MELLITUS TYPE 2 IN OBESE: ICD-10-CM

## 2024-11-03 DIAGNOSIS — M79.7 FIBROMYALGIA: ICD-10-CM

## 2024-11-03 DIAGNOSIS — E66.9 DIABETES MELLITUS TYPE 2 IN OBESE: ICD-10-CM

## 2024-11-03 RX ORDER — ATORVASTATIN CALCIUM 10 MG/1
10 TABLET, FILM COATED ORAL DAILY
Qty: 90 TABLET | Refills: 0 | Status: SHIPPED | OUTPATIENT
Start: 2024-11-03

## 2024-11-04 RX ORDER — DULOXETIN HYDROCHLORIDE 60 MG/1
60 CAPSULE, DELAYED RELEASE ORAL DAILY
Qty: 90 CAPSULE | Refills: 1 | Status: SHIPPED | OUTPATIENT
Start: 2024-11-04

## 2024-11-06 ENCOUNTER — OFFICE VISIT (OUTPATIENT)
Dept: BARIATRICS | Facility: CLINIC | Age: 49
End: 2024-11-06
Payer: COMMERCIAL

## 2024-11-06 VITALS
SYSTOLIC BLOOD PRESSURE: 108 MMHG | TEMPERATURE: 97.6 F | DIASTOLIC BLOOD PRESSURE: 70 MMHG | WEIGHT: 208 LBS | HEART RATE: 71 BPM | BODY MASS INDEX: 34.66 KG/M2 | HEIGHT: 65 IN

## 2024-11-06 DIAGNOSIS — Z98.84 BARIATRIC SURGERY STATUS: ICD-10-CM

## 2024-11-06 DIAGNOSIS — E66.811 OBESITY, CLASS I, BMI 30-34.9: ICD-10-CM

## 2024-11-06 DIAGNOSIS — K91.2 POSTSURGICAL MALABSORPTION: ICD-10-CM

## 2024-11-06 DIAGNOSIS — B37.2 CUTANEOUS CANDIDIASIS: ICD-10-CM

## 2024-11-06 DIAGNOSIS — E11.69 TYPE 2 DIABETES MELLITUS WITH OBESITY  (HCC): ICD-10-CM

## 2024-11-06 DIAGNOSIS — Z48.815 ENCOUNTER FOR SURGICAL AFTERCARE FOLLOWING SURGERY OF DIGESTIVE SYSTEM: Primary | ICD-10-CM

## 2024-11-06 DIAGNOSIS — E66.9 TYPE 2 DIABETES MELLITUS WITH OBESITY  (HCC): ICD-10-CM

## 2024-11-06 PROCEDURE — 99203 OFFICE O/P NEW LOW 30 MIN: CPT | Performed by: NURSE PRACTITIONER

## 2024-11-06 NOTE — PROGRESS NOTES
Assessment/Plan:     Patient ID: Marti Bacon is a 49 y.o. female.     Bariatric Surgery Status/BMI 34/DMII    -s/p Jorge-En-Y Gastric Bypass with Dr. Rueda on 03/25/2019. Presents to the office today for OD annual visit. Overall doing well - tolerating a regular diet. Denies having any abdominal pain, N/V/D/C, regurgitation, reflux or dysphagia. Of note, on mounjaro with PCP for help with weight loss and type II DM. Started this in 04/2023 at weight of 249 lbs. Currently down 41 lbs since the use of mounjaro. Tolerating mounjaro well without adverse effects      PLAN:     - Routine follow up in 1 year for annual visit  - Continue with healthy lifestyle, adequate protein intake of 60 gm, fluid intake of at least 64 oz.   - Continue with MVI daily.   - Activity as tolerated.   - Labs ordered and will adjust accordingly if any deficiency.   - Follow up with RD and SW as needed.     Cutaneous candidiasis - having rashes in between abdominal folds. Using nystatin powder to help with this. Her excess skin overhangs pass her pubis region. She is continuing to work on losing further weight to achieve goal weight of 180 before deciding to pursue surgical interventions.   - supportive care for now.   - keep clean and dry.   - continue with nystatin.   - referral to plastics once goal weight met.   .    Continued/Maintain healthy weight loss with good nutrition intakes.  Adequate hydration with at least 64oz. fluid intake.  Follow diet as discussed.  Follow vitamin and mineral recommendations as reviewed with you.  Exercise as tolerated.    Colonoscopy referral made: due - last completed in 2017. Has referral   Mammogram - scheduled.     Follow-up in 1 year for annual visit. We kindly ask that your arrive 15 minutes before your scheduled appointment time with your provider to allow our staff to room you, get your vital signs and update your chart.    Get lab work done prior to annual visit. Please call the office if you need  a script.  It is recommended to check with your insurance BEFORE getting labs done to make sure they are covered by your policy.      Call our office if you have any problems with abdominal pain especially associated with fever, chills, nausea, vomiting or any other concerns.    All  Post-bariatric surgery patients should be aware that very small quantities of any alcohol can cause impairment and it is very possible not to feel the effect. The effect can be in the system for several hours.  It is also a stomach irritant.     It is advised to AVOID alcohol, Nonsteroidal antiinflammatory drugs (NSAIDS) and nicotine of all forms . Any of these can cause stomach irritation/pain.    Discussed the effects of alcohol on a bariatric patient and the increased impairment risk.     Keep up the good work!     Postsurgical Malabsorption   -At risk for malabsorption of vitamins/minerals secondary to malabsorption and restriction of intake from bariatric surgery  -Currently taking adequate postop bariatric surgery vitamin supplementation  -Next set of bariatric labs ordered for approximately 2 weeks  -Patient received education about the importance of adhering to a lifelong supplementation regimen to avoid vitamin/mineral deficiencies      Diagnoses and all orders for this visit:    Encounter for surgical aftercare following surgery of digestive system  -     Folate; Future  -     Iron Panel (Includes Ferritin, Iron Sat%, Iron, and TIBC); Future  -     PTH, intact; Future  -     Vitamin A; Future  -     Vitamin B1, whole blood; Future  -     Vitamin B12; Future  -     Zinc; Future    Bariatric surgery status  -     Folate; Future  -     Iron Panel (Includes Ferritin, Iron Sat%, Iron, and TIBC); Future  -     PTH, intact; Future  -     Vitamin A; Future  -     Vitamin B1, whole blood; Future  -     Vitamin B12; Future  -     Zinc; Future    Postsurgical malabsorption  -     Folate; Future  -     Iron Panel (Includes Ferritin, Iron  Sat%, Iron, and TIBC); Future  -     PTH, intact; Future  -     Vitamin A; Future  -     Vitamin B1, whole blood; Future  -     Vitamin B12; Future  -     Zinc; Future    Obesity, Class I, BMI 30-34.9  -     Folate; Future  -     Iron Panel (Includes Ferritin, Iron Sat%, Iron, and TIBC); Future  -     PTH, intact; Future  -     Vitamin A; Future  -     Vitamin B1, whole blood; Future  -     Vitamin B12; Future  -     Zinc; Future    BMI 34.0-34.9,adult  -     Folate; Future  -     Iron Panel (Includes Ferritin, Iron Sat%, Iron, and TIBC); Future  -     PTH, intact; Future  -     Vitamin A; Future  -     Vitamin B1, whole blood; Future  -     Vitamin B12; Future  -     Zinc; Future    Cutaneous candidiasis    Type 2 diabetes mellitus with obesity  (HCC)         Subjective:      Patient ID: Marti Bacon is a 49 y.o. female.    -s/p Jorge-En-Y Gastric Bypass with Dr. Rueda on 03/25/2019. Presents to the office today for OD annual visit. Overall doing well - tolerating a regular diet. Denies having any abdominal pain, N/V/D/C, regurgitation, reflux or dysphagia. Of note, on mounjaro with PCP for help with weight loss and type II DM. Started this in 04/2023 at weight of 249 lbs. Currently down 41 lbs since the use of mounjaro. Tolerating mounjaro well without adverse effects    Initial: 345 lbs  Current: 208 lbs  EWL: (Weight loss is ahead of schedule at this post surgical period.)  Yonathan 179 lbs  Goal - 180 lbs  Current BMI is Body mass index is 34.61 kg/m².    Tolerating a regular diet-yes  Eating at least 60 grams of protein per day-yes  Following 30/60 minute rule with liquids-yes  Drinking at least 64 ounces of fluid per day-yes  Drinking carbonated beverages-occasionally  Sufficient exercise-yes  Using NSAIDs regularly-no  Using nicotine-no  Using alcohol-no  Supplements: regular Multivitamins, B-12, Calcium , and vitamin D biotin    EWL is 70%, which places the patient ahead of schedule for expected post surgical  "weight loss at this time.     The following portions of the patient's history were reviewed and updated as appropriate: allergies, current medications, past family history, past medical history, past social history, past surgical history and problem list.    Review of Systems   Constitutional: Negative.    Respiratory: Negative.     Cardiovascular: Negative.    Gastrointestinal: Negative.    Musculoskeletal: Negative.    Neurological: Negative.    Psychiatric/Behavioral: Negative.           Objective:    /70   Pulse 71   Temp 97.6 °F (36.4 °C) (Tympanic)   Ht 5' 5\" (1.651 m)   Wt 94.3 kg (208 lb)   LMP 11/18/2018   BMI 34.61 kg/m²      Physical Exam  Vitals and nursing note reviewed.   Constitutional:       Appearance: Normal appearance. She is obese.   Cardiovascular:      Rate and Rhythm: Normal rate and regular rhythm.      Pulses: Normal pulses.      Heart sounds: Normal heart sounds.   Pulmonary:      Effort: Pulmonary effort is normal.      Breath sounds: Normal breath sounds.   Abdominal:      General: Bowel sounds are normal.      Palpations: Abdomen is soft.      Tenderness: There is no abdominal tenderness.   Musculoskeletal:         General: Normal range of motion.   Skin:     General: Skin is warm and dry.      Findings: Rash (in between abdominal folds) present.   Neurological:      General: No focal deficit present.      Mental Status: She is alert and oriented to person, place, and time.   Psychiatric:         Mood and Affect: Mood normal.         Behavior: Behavior normal.         Thought Content: Thought content normal.         Judgment: Judgment normal.             "

## 2024-11-06 NOTE — PROGRESS NOTES
Date of surgery: 3/25/2019  Procedure:RNY  Performing surgeon:     Initial Weight - 345lb  Current Weight -208 lb  Yonathan Weight - 179 lb  Total Body Weight Loss (EWL)- 137%  EWL% - 70%  TWB % -40%

## 2024-11-25 ENCOUNTER — TELEPHONE (OUTPATIENT)
Dept: PLASTIC SURGERY | Facility: CLINIC | Age: 49
End: 2024-11-25

## 2024-11-26 DIAGNOSIS — F32.A ANXIETY AND DEPRESSION: ICD-10-CM

## 2024-11-26 DIAGNOSIS — F51.01 PRIMARY INSOMNIA: ICD-10-CM

## 2024-11-26 DIAGNOSIS — F41.9 ANXIETY AND DEPRESSION: ICD-10-CM

## 2024-11-26 RX ORDER — ALPRAZOLAM 0.5 MG
0.5 TABLET ORAL 2 TIMES DAILY PRN
Qty: 60 TABLET | Refills: 0 | Status: SHIPPED | OUTPATIENT
Start: 2024-11-26

## 2024-11-26 NOTE — TELEPHONE ENCOUNTER
Medication:  PDMP   10/24/2024 10/23/2024 ALPRAZolam (Tablet) 60.0 30 0.5 MG NA KIRBY ZAPATA     Active agreement on file -No

## 2024-12-01 DIAGNOSIS — E11.69 TYPE 2 DIABETES MELLITUS WITH OBESITY  (HCC): ICD-10-CM

## 2024-12-01 DIAGNOSIS — E66.9 TYPE 2 DIABETES MELLITUS WITH OBESITY  (HCC): ICD-10-CM

## 2024-12-03 RX ORDER — TIRZEPATIDE 12.5 MG/.5ML
INJECTION, SOLUTION SUBCUTANEOUS
Qty: 6 ML | Refills: 0 | Status: SHIPPED | OUTPATIENT
Start: 2024-12-03

## 2024-12-03 NOTE — TELEPHONE ENCOUNTER
Recent PHQ 2/9 Score    PHQ 2:  Date Adult PHQ 2 Score Adult PHQ 2 Interpretation   3/11/2022 0 No further screening needed       PHQ 9:  Date Adult PHQ 9 Score Adult PHQ 9 Interpretation   1/25/2022 13 Moderate Depression      Requested medication(s) are due for refill today: Yes  Patient has already received a courtesy refill: No  Other reason request has been forwarded to provider: per protocol

## 2024-12-16 ENCOUNTER — TELEPHONE (OUTPATIENT)
Dept: PLASTIC SURGERY | Facility: CLINIC | Age: 49
End: 2024-12-16

## 2024-12-16 NOTE — TELEPHONE ENCOUNTER
Emailed criteria for panniculectomy consultation.  Started Nystatin 10/23/2024.  Need all to be met.

## 2024-12-21 DIAGNOSIS — F41.9 ANXIETY AND DEPRESSION: ICD-10-CM

## 2024-12-21 DIAGNOSIS — F32.A ANXIETY AND DEPRESSION: ICD-10-CM

## 2024-12-21 DIAGNOSIS — F51.01 PRIMARY INSOMNIA: ICD-10-CM

## 2024-12-23 RX ORDER — ALPRAZOLAM 0.5 MG
0.5 TABLET ORAL 2 TIMES DAILY PRN
Qty: 60 TABLET | Refills: 0 | Status: SHIPPED | OUTPATIENT
Start: 2024-12-23

## 2024-12-23 NOTE — TELEPHONE ENCOUNTER
Medication:  PDMP   11/26/2024 11/26/2024 ALPRAZolam (Tablet) 60.0 30 0.5 MG NA KIRBY ZAPATA     Active agreement on file -No

## 2025-01-28 DIAGNOSIS — E11.69 DIABETES MELLITUS TYPE 2 IN OBESE: ICD-10-CM

## 2025-01-28 DIAGNOSIS — F32.A ANXIETY AND DEPRESSION: ICD-10-CM

## 2025-01-28 DIAGNOSIS — F51.01 PRIMARY INSOMNIA: ICD-10-CM

## 2025-01-28 DIAGNOSIS — F41.9 ANXIETY AND DEPRESSION: ICD-10-CM

## 2025-01-28 DIAGNOSIS — M79.7 FIBROMYALGIA: ICD-10-CM

## 2025-01-28 DIAGNOSIS — E66.9 DIABETES MELLITUS TYPE 2 IN OBESE: ICD-10-CM

## 2025-01-28 RX ORDER — ALPRAZOLAM 0.5 MG
0.5 TABLET ORAL 2 TIMES DAILY PRN
Qty: 60 TABLET | Refills: 0 | Status: SHIPPED | OUTPATIENT
Start: 2025-01-28

## 2025-01-28 RX ORDER — GABAPENTIN 300 MG/1
300 CAPSULE ORAL 3 TIMES DAILY
Qty: 270 CAPSULE | Refills: 0 | Status: SHIPPED | OUTPATIENT
Start: 2025-01-28 | End: 2025-07-27

## 2025-01-28 RX ORDER — ATORVASTATIN CALCIUM 10 MG/1
10 TABLET, FILM COATED ORAL DAILY
Qty: 90 TABLET | Refills: 1 | Status: SHIPPED | OUTPATIENT
Start: 2025-01-28

## 2025-01-28 NOTE — TELEPHONE ENCOUNTER
Medication:  PDMP     12/23/2024 12/23/2024 ALPRAZolam (Tablet) 60.0 30 0.5 MG DESMOND WALLS     Active agreement on file -No

## 2025-02-18 ENCOUNTER — OFFICE VISIT (OUTPATIENT)
Dept: NEUROLOGY | Facility: CLINIC | Age: 50
End: 2025-02-18
Payer: COMMERCIAL

## 2025-02-18 VITALS — HEART RATE: 72 BPM | SYSTOLIC BLOOD PRESSURE: 150 MMHG | DIASTOLIC BLOOD PRESSURE: 82 MMHG

## 2025-02-18 DIAGNOSIS — G25.0 BENIGN ESSENTIAL TREMOR: Primary | ICD-10-CM

## 2025-02-18 PROCEDURE — 99215 OFFICE O/P EST HI 40 MIN: CPT | Performed by: PSYCHIATRY & NEUROLOGY

## 2025-02-18 RX ORDER — PROPRANOLOL HYDROCHLORIDE 40 MG/1
40 TABLET ORAL 2 TIMES DAILY
Qty: 180 TABLET | Refills: 1 | Status: SHIPPED | OUTPATIENT
Start: 2025-02-18 | End: 2025-08-17

## 2025-02-18 NOTE — ASSESSMENT & PLAN NOTE
Patient returns approximately 6 months following last neuro visit for the topic of concern have been tremors throughout body including all 4 extremities and even to head/neck gradually worsening over a few years.  Patient returns after beginning trial of propranolol at 60 mg daily in long-acting form, reports substantial improvement to tremors including none noted in bilateral lower extremities nor in head/neck anymore, and only really noted in the upper extremities with very specific actions such as when outstretched, quality life in this regard much improved.  Patient inquires as to possibility of increasing dose for further improvement, which following discussion of risks and benefits neurology agreed with such a plan.  New prescription placed for propranolol, to be at 40 mg tablets twice daily as opposed to 60 mg once daily, leading to new total of 80 mg daily.  If patient were to find intolerable side effects or if no noticeable improvement over the next few weeks to months, patient could safely be returned back to prior dose without issue.  Response to trial of propranolol further evidence of BET diagnosis, with further evidence of absence of Parkinson features on exam today.  Patient aware to reach out in the interim between appointments for any further questions or concerns related to today's appointment.  Will follow-up with patient in 4 to 6 months for follow-up and to address medications.    Orders:    propranolol (INDERAL) 40 mg tablet; Take 1 tablet (40 mg total) by mouth 2 (two) times a day

## 2025-02-18 NOTE — PROGRESS NOTES
"Name: Marti Bacon      : 1975      MRN: [id number]  Encounter Provider: Glenn Banuelos DO  Encounter Date: 2025   Encounter department: [facility]    Assessment & Plan  Benign essential tremor  Patient returns approximately 6 months following last neuro visit for the topic of concern have been tremors throughout body including all 4 extremities and even to head/neck gradually worsening over a few years.  Patient returns after beginning trial of propranolol at 60 mg daily in long-acting form, reports substantial improvement to tremors including none noted in bilateral lower extremities nor in head/neck anymore, and only really noted in the upper extremities with very specific actions such as when outstretched, quality life in this regard much improved.  Patient inquires as to possibility of increasing dose for further improvement, which following discussion of risks and benefits neurology agreed with such a plan.  New prescription placed for propranolol, to be at 40 mg tablets twice daily as opposed to 60 mg once daily, leading to new total of 80 mg daily.  If patient were to find intolerable side effects or if no noticeable improvement over the next few weeks to months, patient could safely be returned back to prior dose without issue.  Response to trial of propranolol further evidence of BET diagnosis, with further evidence of absence of Parkinson features on exam today.  Patient aware to reach out in the interim between appointments for any further questions or concerns related to today's appointment.  Will follow-up with patient in 4 to 6 months for follow-up and to address medications.    Orders:    propranolol (INDERAL) 40 mg tablet; Take 1 tablet (40 mg total) by mouth 2 (two) times a day        History of Present Illness   Patient presents for approximately 6-month follow-up to neuro clinic for tremor to all 4 extremities.  Per prior neuro note, \"Pt presents for " "tremor of 3 years duration gradually worsening w/o precipitating or aggravating factor known, affecting hands > feet & R>L at rest & w/ action, head involved as well during severe episodes which occur a few times each week. Parkinson sx such as bradykinesia & rigidity not present on exam today, gait minimally affected & not shuffling. Affects action more than rest, w/ holding cups & handwriting most obviously affected. Pt reports no known fam hx of tremors but uncertain to many members due to living farther away & infrequently visiting. At this time neuro chief concern is of BET, for which pt will be trialed on propanolol to assess for response & will follow up in a couple months to see if side effects or poor tolerance, can consider other agents at that time, no labs or imaging warranted from neuro perspective.\"  Today, patient reports significant reduction in symptoms since last visit, approximately 80% improved in severity, since approximately 1 week after beginning propranolol 60 mg daily 24-hour form.  Patient denies any side effects, particularly no episodes of fatigue or presyncope.  Tremors to lower extremities and to head have completely resolved, the only remaining findings are enhanced with specific positions or movements such as when outstretched for example.  Patient reports some interest in trialing an increase in dose, with the stipulation that if patient against and noticed intolerable side effects or if no noted improvement after a couple weeks, patient could return to prior dosing/frequency.  No contraindication from neuro perspective for trialing increased daily total of propranolol.  Other than the tremor, patient continues to have no obvious findings for Parkinson including no bradykinesia or rigidity seen, and patient's tremors with response to propranolol much more indicative of BET.       Review of Systems   Constitutional:  Negative for activity change, appetite change, fatigue and fever. "   HENT:  Negative for postnasal drip, rhinorrhea, sinus pressure, sinus pain, sneezing and sore throat.    Eyes: Negative.  Negative for photophobia, pain and visual disturbance.   Respiratory:  Negative for cough and shortness of breath.    Cardiovascular: Negative.  Negative for chest pain, palpitations and leg swelling.   Gastrointestinal: Negative.  Negative for nausea and vomiting.   Endocrine: Negative.  Negative for cold intolerance and heat intolerance.   Genitourinary: Negative.  Negative for dysuria, frequency and urgency.   Musculoskeletal:  Negative for arthralgias, back pain, gait problem, joint swelling, myalgias, neck pain and neck stiffness.   Skin: Negative.  Negative for color change and rash.   Allergic/Immunologic: Negative.  Negative for environmental allergies, food allergies and immunocompromised state.   Neurological:  Positive for tremors. Negative for dizziness, seizures, syncope, facial asymmetry, speech difficulty, weakness, light-headedness, numbness and headaches.   Hematological: Negative.  Does not bruise/bleed easily.   Psychiatric/Behavioral:  Negative for confusion, hallucinations and sleep disturbance. The patient is not nervous/anxious.     I have personally reviewed the MA's review of systems and made changes as necessary.    Current Outpatient Medications on File Prior to Visit   Medication Sig Dispense Refill    ALPRAZolam (XANAX) 0.5 mg tablet Take 1 tablet (0.5 mg total) by mouth 2 (two) times a day as needed for anxiety 60 tablet 0    atorvastatin (LIPITOR) 10 mg tablet Take 1 tablet (10 mg total) by mouth daily 90 tablet 1    BIOTIN PO Take 600 mg by mouth every other day      buPROPion (WELLBUTRIN SR) 150 mg 12 hr tablet Take 1 tablet (150 mg total) by mouth 2 (two) times a day 180 tablet 1    Calcium Citrate-Vitamin D (MINE-CITRATE PLUS VITAMIN D PO) Take by mouth in the morning      Cholecalciferol (VITAMIN D3) 2000 units capsule Take 2,000 Units by mouth daily        Cyanocobalamin (B-12) 1000 MCG CAPS Take 1 capsule by mouth daily       DULoxetine (CYMBALTA) 60 mg delayed release capsule Take 1 capsule (60 mg total) by mouth daily 90 capsule 1    Fluticasone-Salmeterol (Advair) 250-50 mcg/dose inhaler Inhale 1 puff 2 (two) times a day Rinse mouth after use. 180 blister 3    gabapentin (NEURONTIN) 300 mg capsule Take 1 capsule (300 mg total) by mouth 3 (three) times a day 270 capsule 0    levothyroxine 50 mcg tablet Take 1 tablet by mouth once daily 90 tablet 1    lisinopril (ZESTRIL) 10 mg tablet 5 mg every other day 90 tablet 1    Mounjaro 12.5 MG/0.5ML SOAJ INJECT 12.5MG SUBCUTANEOUSLY (UNDER THE SKIN) EVERY WEEK 6 mL 0    Multiple Vitamin (multivitamin) tablet Take 1 tablet by mouth daily      nystatin (MYCOSTATIN) powder Apply topically 3 (three) times a day 60 g 0    saccharomyces boulardii (FLORASTOR) 250 mg capsule Take 1 capsule (250 mg total) by mouth daily 30 capsule 0    triamcinolone (KENALOG) 0.1 % ointment APPLY TOPICALLY TO AFFECTED AREA 1-2 TIMES A DAY AS NEEDED TO ITCHY RASH 80 g 0    [DISCONTINUED] propranolol (INDERAL LA) 60 mg 24 hr capsule Take 1 capsule (60 mg total) by mouth daily 90 capsule 1    methocarbamol (ROBAXIN) 500 mg tablet Take 1 tablet (500 mg total) by mouth 4 (four) times a day for 5 days (Patient not taking: Reported on 3/26/2024) 20 tablet 0     No current facility-administered medications on file prior to visit.      Social History     Tobacco Use    Smoking status: Former     Current packs/day: 0.00     Average packs/day: 0.8 packs/day for 15.0 years (11.3 ttl pk-yrs)     Types: Cigarettes     Start date: 2003     Quit date: 2018     Years since quittin.2     Passive exposure: Never    Smokeless tobacco: Never   Vaping Use    Vaping status: Never Used   Substance and Sexual Activity    Alcohol use: Not Currently    Drug use: No    Sexual activity: Yes     Partners: Male        Objective   /82 (BP Location: Right arm,  Patient Position: Sitting, Cuff Size: Large)   Pulse 72   LMP 11/18/2018     Physical Exam  Vitals and nursing note reviewed.   Constitutional:       General: She is not in acute distress.     Appearance: She is not ill-appearing, toxic-appearing or diaphoretic.   HENT:      Head: Normocephalic and atraumatic.      Nose: Nose normal.      Mouth/Throat:      Pharynx: Oropharynx is clear.   Eyes:      General: Lids are normal. No scleral icterus.        Right eye: No discharge.         Left eye: No discharge.      Extraocular Movements: Extraocular movements intact.      Conjunctiva/sclera: Conjunctivae normal.      Pupils: Pupils are equal, round, and reactive to light.   Neurological:      Mental Status: She is alert and oriented to person, place, and time.      Cranial Nerves: No cranial nerve deficit.      Sensory: No sensory deficit.      Motor: No weakness.      Coordination: Coordination abnormal.      Gait: Gait normal.      Deep Tendon Reflexes: Reflexes normal.   Psychiatric:         Mood and Affect: Mood normal.         Speech: Speech normal.         Behavior: Behavior normal.       Neurological Exam  Mental Status  Alert. Oriented to person, place, time and situation. Oriented to person, place, and time. Speech is normal. Language is fluent with no aphasia.    Cranial Nerves  CN I: Sense of smell is normal.  CN II: Visual acuity is normal. Visual fields full to confrontation.  CN III, IV, VI: Extraocular movements intact bilaterally. Normal lids and orbits bilaterally. Pupils equal round and reactive to light bilaterally.  CN V: Facial sensation is normal.  CN VII: Full and symmetric facial movement.  CN VIII: Hearing is normal.  CN IX, X: Palate elevates symmetrically. Normal gag reflex.  CN XI: Shoulder shrug strength is normal.  CN XII: Tongue midline without atrophy or fasciculations.    Motor  Normal muscle bulk throughout. No fasciculations present. Normal muscle tone. The following abnormal  movements were seen: Strength is 5/5 in all four extremities except as noted.  Very mild tremor seen bilaterally when holding outstretched.    Sensory  Light touch is normal in upper and lower extremities.     Reflexes  Deep tendon reflexes are 2+ and symmetric except as noted.    Coordination  Right: Finger-to-nose abnormality: Rapid alternating movement normal. Heel-to-shin normal.Left: Finger-to-nose abnormality: Rapid alternating movement normal. Heel-to-shin normal.  Mild shaking.    Gait   Normal gait.Casual gait is normal including stance, stride, and arm swing. Able to rise from chair without using arms.        Administrative Statements   I have spent a total time of 30 minutes in caring for this patient on the day of the visit/encounter including Risks and benefits of tx options, Patient and family education, Counseling / Coordination of care, Documenting in the medical record, Reviewing/placing orders in the medical record (including tests, medications, and/or procedures), Obtaining or reviewing history  , and Communicating with other healthcare professionals .

## 2025-02-20 ENCOUNTER — RA CDI HCC (OUTPATIENT)
Dept: OTHER | Facility: HOSPITAL | Age: 50
End: 2025-02-20

## 2025-02-20 NOTE — PROGRESS NOTES
HCC coding opportunities       Chart reviewed, no opportunity found: CHART REVIEWED, NO OPPORTUNITY FOUND        Patients Insurance        Commercial Insurance: [employer]       This is a reminder to assess ((resolve/update/assess)  all HCC (risk adjustment) codes for the year 2025 as patients MANUEL scores reset to zero with the New year.    Also, just a reminder to please review and assess all other chronic conditions for 2025.

## 2025-02-25 ENCOUNTER — OFFICE VISIT (OUTPATIENT)
Dept: FAMILY MEDICINE CLINIC | Facility: CLINIC | Age: 50
End: 2025-02-25
Payer: COMMERCIAL

## 2025-02-25 VITALS
SYSTOLIC BLOOD PRESSURE: 110 MMHG | HEART RATE: 78 BPM | RESPIRATION RATE: 17 BRPM | DIASTOLIC BLOOD PRESSURE: 70 MMHG | BODY MASS INDEX: 33.66 KG/M2 | TEMPERATURE: 97.8 F | HEIGHT: 65 IN | WEIGHT: 202 LBS | OXYGEN SATURATION: 98 %

## 2025-02-25 DIAGNOSIS — G25.0 BENIGN ESSENTIAL TREMOR: ICD-10-CM

## 2025-02-25 DIAGNOSIS — F41.9 ANXIETY AND DEPRESSION: ICD-10-CM

## 2025-02-25 DIAGNOSIS — F51.01 PRIMARY INSOMNIA: ICD-10-CM

## 2025-02-25 DIAGNOSIS — Z12.31 ENCOUNTER FOR SCREENING MAMMOGRAM FOR BREAST CANCER: ICD-10-CM

## 2025-02-25 DIAGNOSIS — E11.69 TYPE 2 DIABETES MELLITUS WITH OBESITY  (HCC): Primary | ICD-10-CM

## 2025-02-25 DIAGNOSIS — I10 BENIGN HYPERTENSION: ICD-10-CM

## 2025-02-25 DIAGNOSIS — D68.62 LUPUS ANTICOAGULANT DISORDER (HCC): ICD-10-CM

## 2025-02-25 DIAGNOSIS — F32.A ANXIETY AND DEPRESSION: ICD-10-CM

## 2025-02-25 DIAGNOSIS — E66.09 CLASS 1 OBESITY DUE TO EXCESS CALORIES WITH SERIOUS COMORBIDITY AND BODY MASS INDEX (BMI) OF 34.0 TO 34.9 IN ADULT: ICD-10-CM

## 2025-02-25 DIAGNOSIS — E78.2 MIXED HYPERLIPIDEMIA: ICD-10-CM

## 2025-02-25 DIAGNOSIS — E66.9 TYPE 2 DIABETES MELLITUS WITH OBESITY  (HCC): Primary | ICD-10-CM

## 2025-02-25 DIAGNOSIS — E03.9 ACQUIRED HYPOTHYROIDISM: ICD-10-CM

## 2025-02-25 DIAGNOSIS — E66.811 CLASS 1 OBESITY DUE TO EXCESS CALORIES WITH SERIOUS COMORBIDITY AND BODY MASS INDEX (BMI) OF 34.0 TO 34.9 IN ADULT: ICD-10-CM

## 2025-02-25 DIAGNOSIS — K91.2 POSTSURGICAL MALABSORPTION: Chronic | ICD-10-CM

## 2025-02-25 LAB — SL AMB POCT HEMOGLOBIN AIC: 5 (ref ?–6.5)

## 2025-02-25 PROCEDURE — 83036 HEMOGLOBIN GLYCOSYLATED A1C: CPT | Performed by: FAMILY MEDICINE

## 2025-02-25 PROCEDURE — 99214 OFFICE O/P EST MOD 30 MIN: CPT | Performed by: FAMILY MEDICINE

## 2025-02-25 RX ORDER — ALPRAZOLAM 0.5 MG
0.5 TABLET ORAL 2 TIMES DAILY PRN
Qty: 60 TABLET | Refills: 0 | Status: SHIPPED | OUTPATIENT
Start: 2025-02-25

## 2025-02-25 NOTE — PROGRESS NOTES
Name: Marti Bacon      : 1975      MRN: [id number]  Encounter Provider: Dena Toledo MD  Encounter Date: 2025   Encounter department: [facility]  :  Assessment & Plan  Type 2 diabetes mellitus with obesity  (HCC)    Lab Results   Component Value Date    HGBA1C 5.0 2025   Stable   Continue current meds  Orders:  •  POCT hemoglobin A1c  •  Albumin / creatinine urine ratio; Future  •  Comprehensive metabolic panel; Future  •  Hemoglobin A1C; Future  •  CBC and Platelet; Future  •  Lipid Panel with Direct LDL reflex; Future  •  TSH, 3rd generation with Free T4 reflex; Future    Encounter for screening mammogram for breast cancer    Orders:  •  Mammo screening bilateral w 3d and cad; Future    Benign hypertension  Doing on current meds        Class 1 obesity due to excess calories with serious comorbidity and body mass index (BMI) of 34.0 to 34.9 in adult  Diet, exercise and portion control        Lupus anticoagulant disorder (HCC)  Stable   Sees dr. Eden        Primary insomnia    Orders:  •  ALPRAZolam (XANAX) 0.5 mg tablet; Take 1 tablet (0.5 mg total) by mouth 2 (two) times a day as needed for anxiety    Anxiety and depression    Orders:  •  ALPRAZolam (XANAX) 0.5 mg tablet; Take 1 tablet (0.5 mg total) by mouth 2 (two) times a day as needed for anxiety    Acquired hypothyroidism  Stable on current meds  Due for labs        Mixed hyperlipidemia  Lipitor as directed       Postsurgical malabsorption  Continue multi-vitamin as directed        Benign essential tremor  Started on propranolol 40 mg BID  Sees neurology              History of Present Illness   HPI  Here for follow up  More tired lately   Didn't do labs for this visit    Review of Systems   Constitutional: Negative.    HENT: Negative.     Eyes: Negative.    Respiratory: Negative.     Cardiovascular: Negative.    Gastrointestinal: Negative.    Endocrine: Negative.    Musculoskeletal: Negative.    Neurological:  "Negative.    Hematological: Negative.    Psychiatric/Behavioral: Negative.         Objective   /70 (BP Location: Left arm, Patient Position: Sitting, Cuff Size: Standard)   Pulse 78   Temp 97.8 °F (36.6 °C) (Tympanic)   Resp 17   Ht 5' 5\" (1.651 m)   Wt 91.6 kg (202 lb)   LMP 11/18/2018   SpO2 98%   BMI 33.61 kg/m²      Physical Exam  Vitals and nursing note reviewed.   Constitutional:       Appearance: Normal appearance.   HENT:      Head: Normocephalic and atraumatic.      Nose: Nose normal.      Mouth/Throat:      Mouth: Mucous membranes are moist.   Cardiovascular:      Rate and Rhythm: Normal rate and regular rhythm.      Pulses: Normal pulses.      Heart sounds: Normal heart sounds.   Pulmonary:      Effort: Pulmonary effort is normal.      Breath sounds: Normal breath sounds.   Neurological:      General: No focal deficit present.      Mental Status: She is alert and oriented to person, place, and time.   Psychiatric:         Mood and Affect: Mood normal.         Behavior: Behavior normal.         "

## 2025-02-25 NOTE — ASSESSMENT & PLAN NOTE
Lab Results   Component Value Date    HGBA1C 5.0 02/25/2025   Stable   Continue current meds  Orders:  •  POCT hemoglobin A1c  •  Albumin / creatinine urine ratio; Future  •  Comprehensive metabolic panel; Future  •  Hemoglobin A1C; Future  •  CBC and Platelet; Future  •  Lipid Panel with Direct LDL reflex; Future  •  TSH, 3rd generation with Free T4 reflex; Future

## 2025-03-11 ENCOUNTER — OFFICE VISIT (OUTPATIENT)
Age: 50
End: 2025-03-11
Payer: COMMERCIAL

## 2025-03-11 VITALS
HEIGHT: 64 IN | BODY MASS INDEX: 34.49 KG/M2 | HEART RATE: 80 BPM | OXYGEN SATURATION: 97 % | SYSTOLIC BLOOD PRESSURE: 118 MMHG | DIASTOLIC BLOOD PRESSURE: 90 MMHG | WEIGHT: 202 LBS

## 2025-03-11 DIAGNOSIS — M79.7 FIBROMYALGIA: Primary | ICD-10-CM

## 2025-03-11 DIAGNOSIS — Z79.899 ENCOUNTER FOR LONG-TERM (CURRENT) USE OF MEDICATIONS: ICD-10-CM

## 2025-03-11 PROCEDURE — 99214 OFFICE O/P EST MOD 30 MIN: CPT | Performed by: PHYSICIAN ASSISTANT

## 2025-03-11 NOTE — ASSESSMENT & PLAN NOTE
Her myofascial symptoms are stable with Cymbalta and gabapentin.  Encouraged regular home exercise program.  This is ordered to monitor for medication side effects and toxicity.  Follow-up in 6 months or sooner if needed.    Orders:    CBC and differential; Future    Comprehensive metabolic panel; Future    Sedimentation rate, automated; Future    C-reactive protein; Future

## 2025-03-11 NOTE — PROGRESS NOTES
Name: Marti Bacon      : 1975      MRN: [id number]  Encounter Provider: Maryjane Gimenez PA-C  Encounter Date: 3/11/2025   [facility]  :  Assessment & Plan  Fibromyalgia  Her myofascial symptoms are stable with Cymbalta and gabapentin.  Encouraged regular home exercise program.  This is ordered to monitor for medication side effects and toxicity.  Follow-up in 6 months or sooner if needed.    Orders:    CBC and differential; Future    Comprehensive metabolic panel; Future    Sedimentation rate, automated; Future    C-reactive protein; Future    Encounter for long-term (current) use of medications    Orders:    CBC and differential; Future    Comprehensive metabolic panel; Future    Sedimentation rate, automated; Future    C-reactive protein; Future        History of Present Illness   Marti Bacon is a 50 y.o. female.  She is here for follow-up of her osteoarthritis and fibromyalgia.  For her fibromyalgia she is taking gabapentin and Cymbalta.  Her symptoms are generally stable at this time.  She has minimal stiffness in the morning.  She has no swelling in her joints.  Her osteoarthritis symptoms are stable as well.     She has a history of hypercoagulable state and has been evaluated by Hematology in the past.  They recommended she take a baby aspirin twice weekly.  She has no history of DVT or pulmonary embolism.     She is following with neurology for a benign essential tremor.  She is now on propranolol and has noticed an improvement in her symptoms.              Review of Systems   Constitutional:  Negative for chills, fatigue and fever.   HENT:  Negative for hearing loss, sore throat and tinnitus.    Eyes:  Negative for pain and visual disturbance.   Respiratory:  Negative for cough and shortness of breath.    Cardiovascular:  Negative for chest pain and palpitations.   Gastrointestinal:  Negative for abdominal pain, nausea and  vomiting.   Genitourinary:  Negative for difficulty urinating.   Musculoskeletal:  Positive for arthralgias and myalgias. Negative for back pain, gait problem, joint swelling, neck pain and neck stiffness.   Skin:  Negative for rash.   Neurological:  Negative for dizziness, seizures, weakness, numbness and headaches.   Psychiatric/Behavioral:  Negative for confusion, decreased concentration and sleep disturbance.      Current Outpatient Medications on File Prior to Visit   Medication Sig Dispense Refill    ALPRAZolam (XANAX) 0.5 mg tablet Take 1 tablet (0.5 mg total) by mouth 2 (two) times a day as needed for anxiety 60 tablet 0    atorvastatin (LIPITOR) 10 mg tablet Take 1 tablet (10 mg total) by mouth daily 90 tablet 1    BIOTIN PO Take 600 mg by mouth every other day      buPROPion (WELLBUTRIN SR) 150 mg 12 hr tablet Take 1 tablet (150 mg total) by mouth 2 (two) times a day 180 tablet 1    Calcium Citrate-Vitamin D (MINE-CITRATE PLUS VITAMIN D PO) Take by mouth in the morning      Cholecalciferol (VITAMIN D3) 2000 units capsule Take 2,000 Units by mouth daily       Cyanocobalamin (B-12) 1000 MCG CAPS Take 1 capsule by mouth daily       DULoxetine (CYMBALTA) 60 mg delayed release capsule Take 1 capsule (60 mg total) by mouth daily 90 capsule 1    gabapentin (NEURONTIN) 300 mg capsule Take 1 capsule (300 mg total) by mouth 3 (three) times a day 270 capsule 0    levothyroxine 50 mcg tablet Take 1 tablet by mouth once daily 90 tablet 1    lisinopril (ZESTRIL) 10 mg tablet 5 mg every other day 90 tablet 1    Mounjaro 12.5 MG/0.5ML SOAJ INJECT 12.5MG SUBCUTANEOUSLY (UNDER THE SKIN) EVERY WEEK 6 mL 0    Multiple Vitamin (multivitamin) tablet Take 1 tablet by mouth daily      nystatin (MYCOSTATIN) powder Apply topically 3 (three) times a day 60 g 0    propranolol (INDERAL) 40 mg tablet Take 1 tablet (40 mg total) by mouth 2 (two) times a day 180 tablet 1    triamcinolone (KENALOG) 0.1 % ointment APPLY TOPICALLY TO  "AFFECTED AREA 1-2 TIMES A DAY AS NEEDED TO ITCHY RASH (Patient not taking: No sig reported) 80 g 0     No current facility-administered medications on file prior to visit.         Objective   /90   Pulse 80   Ht 5' 3.75\" (1.619 m)   Wt 91.6 kg (202 lb)   LMP 11/18/2018   SpO2 97%   BMI 34.95 kg/m²      Physical Exam  Constitutional:       Appearance: Normal appearance.   Cardiovascular:      Rate and Rhythm: Normal rate and regular rhythm.   Pulmonary:      Breath sounds: Normal breath sounds.   Musculoskeletal:      Comments: Full range of motion neck with triggers posterior cervical and shoulder girdle muscles.  Full range of motion bilateral shoulders, elbows, wrists.  Triggers lateral and medial epicondyles bilaterally.  Mild interphalangeal OA changes, early Heberden's nodes bilateral hands.  No synovitis noted.  Full range of motion bilateral hips with tenderness trochanteric bursa.  Full range of motion bilateral knees with patellofemoral crepitus and tenderness pes anserine bursa bilaterally.  Full range of motion bilateral ankles.  Hyperpronation bilateral feet.    Skin:     General: Skin is warm and dry.   Neurological:      General: No focal deficit present.      Mental Status: She is alert.             Dragon Dictation software was used to dictate this note. It may contain errors with dictating incorrect words/spelling. Please contact provider directly for any questions.    "

## 2025-03-28 DIAGNOSIS — F32.A ANXIETY AND DEPRESSION: ICD-10-CM

## 2025-03-28 DIAGNOSIS — F51.01 PRIMARY INSOMNIA: ICD-10-CM

## 2025-03-28 DIAGNOSIS — F41.9 ANXIETY AND DEPRESSION: ICD-10-CM

## 2025-03-28 RX ORDER — ALPRAZOLAM 0.5 MG
0.5 TABLET ORAL 2 TIMES DAILY PRN
Qty: 60 TABLET | Refills: 0 | Status: SHIPPED | OUTPATIENT
Start: 2025-03-28

## 2025-04-09 DIAGNOSIS — E03.9 HYPOTHYROIDISM, UNSPECIFIED TYPE: ICD-10-CM

## 2025-04-09 RX ORDER — LEVOTHYROXINE SODIUM 50 UG/1
50 TABLET ORAL DAILY
Qty: 90 TABLET | Refills: 1 | Status: SHIPPED | OUTPATIENT
Start: 2025-04-09

## 2025-04-12 DIAGNOSIS — E11.69 TYPE 2 DIABETES MELLITUS WITH OBESITY  (HCC): ICD-10-CM

## 2025-04-12 DIAGNOSIS — E66.9 TYPE 2 DIABETES MELLITUS WITH OBESITY  (HCC): ICD-10-CM

## 2025-04-14 RX ORDER — TIRZEPATIDE 12.5 MG/.5ML
INJECTION, SOLUTION SUBCUTANEOUS
Qty: 6 ML | Refills: 0 | Status: SHIPPED | OUTPATIENT
Start: 2025-04-14

## 2025-04-23 DIAGNOSIS — F32.A ANXIETY AND DEPRESSION: ICD-10-CM

## 2025-04-23 DIAGNOSIS — F51.01 PRIMARY INSOMNIA: ICD-10-CM

## 2025-04-23 DIAGNOSIS — F41.9 ANXIETY AND DEPRESSION: ICD-10-CM

## 2025-04-24 RX ORDER — ALPRAZOLAM 0.5 MG
0.5 TABLET ORAL 2 TIMES DAILY PRN
Qty: 60 TABLET | Refills: 0 | Status: SHIPPED | OUTPATIENT
Start: 2025-04-24

## 2025-04-24 NOTE — TELEPHONE ENCOUNTER
Medication:  PDMP   03/28/2025 03/28/2025 ALPRAZolam (Tablet) 60.0 30 0.5 MG NA KIRBY ZAPATA     Active agreement on file -No

## 2025-05-16 DIAGNOSIS — M79.7 FIBROMYALGIA: ICD-10-CM

## 2025-05-16 RX ORDER — DULOXETIN HYDROCHLORIDE 60 MG/1
60 CAPSULE, DELAYED RELEASE ORAL DAILY
Qty: 90 CAPSULE | Refills: 1 | Status: SHIPPED | OUTPATIENT
Start: 2025-05-16

## 2025-05-28 DIAGNOSIS — F51.01 PRIMARY INSOMNIA: ICD-10-CM

## 2025-05-28 DIAGNOSIS — F41.9 ANXIETY AND DEPRESSION: ICD-10-CM

## 2025-05-28 DIAGNOSIS — F32.A ANXIETY AND DEPRESSION: ICD-10-CM

## 2025-05-29 RX ORDER — ALPRAZOLAM 0.5 MG
0.5 TABLET ORAL 2 TIMES DAILY PRN
Qty: 60 TABLET | Refills: 0 | Status: SHIPPED | OUTPATIENT
Start: 2025-05-29

## 2025-05-29 NOTE — TELEPHONE ENCOUNTER
Medication:  PDMP   04/28/2025 04/24/2025 04/24/2025 ALPRAZolam (Tablet) 60.0 30 0.5 MG DESMOND ZAPATA     Active agreement on file -No

## 2025-06-01 DIAGNOSIS — M79.7 FIBROMYALGIA: ICD-10-CM

## 2025-06-01 DIAGNOSIS — F32.A ANXIETY AND DEPRESSION: ICD-10-CM

## 2025-06-01 DIAGNOSIS — F41.9 ANXIETY AND DEPRESSION: ICD-10-CM

## 2025-06-01 RX ORDER — BUPROPION HYDROCHLORIDE 150 MG/1
150 TABLET, EXTENDED RELEASE ORAL 2 TIMES DAILY
Qty: 180 TABLET | Refills: 1 | Status: SHIPPED | OUTPATIENT
Start: 2025-06-01

## 2025-06-02 RX ORDER — GABAPENTIN 300 MG/1
300 CAPSULE ORAL 3 TIMES DAILY
Qty: 270 CAPSULE | Refills: 1 | Status: SHIPPED | OUTPATIENT
Start: 2025-06-02 | End: 2025-11-29

## 2025-06-26 ENCOUNTER — RA CDI HCC (OUTPATIENT)
Dept: OTHER | Facility: HOSPITAL | Age: 50
End: 2025-06-26

## 2025-06-29 DIAGNOSIS — F32.A ANXIETY AND DEPRESSION: ICD-10-CM

## 2025-06-29 DIAGNOSIS — F41.9 ANXIETY AND DEPRESSION: ICD-10-CM

## 2025-06-29 DIAGNOSIS — F51.01 PRIMARY INSOMNIA: ICD-10-CM

## 2025-06-30 RX ORDER — ALPRAZOLAM 0.5 MG
0.5 TABLET ORAL 2 TIMES DAILY PRN
Qty: 60 TABLET | Refills: 0 | Status: SHIPPED | OUTPATIENT
Start: 2025-06-30

## 2025-06-30 NOTE — TELEPHONE ENCOUNTER
Medication:  PDMP   05/30/2025 05/29/2025 05/29/2025 ALPRAZolam (Tablet) 60.0 30 0.5 MG DESMOND ZAPATA     Active agreement on file -No

## 2025-07-07 ENCOUNTER — OFFICE VISIT (OUTPATIENT)
Dept: FAMILY MEDICINE CLINIC | Facility: CLINIC | Age: 50
End: 2025-07-07
Payer: COMMERCIAL

## 2025-07-07 VITALS
WEIGHT: 195.6 LBS | RESPIRATION RATE: 17 BRPM | HEART RATE: 87 BPM | SYSTOLIC BLOOD PRESSURE: 100 MMHG | BODY MASS INDEX: 33.39 KG/M2 | OXYGEN SATURATION: 98 % | HEIGHT: 64 IN | DIASTOLIC BLOOD PRESSURE: 70 MMHG | TEMPERATURE: 97.9 F

## 2025-07-07 DIAGNOSIS — E66.09 CLASS 1 OBESITY DUE TO EXCESS CALORIES WITH SERIOUS COMORBIDITY AND BODY MASS INDEX (BMI) OF 34.0 TO 34.9 IN ADULT: ICD-10-CM

## 2025-07-07 DIAGNOSIS — I10 BENIGN HYPERTENSION: ICD-10-CM

## 2025-07-07 DIAGNOSIS — E66.811 CLASS 1 OBESITY DUE TO EXCESS CALORIES WITH SERIOUS COMORBIDITY AND BODY MASS INDEX (BMI) OF 34.0 TO 34.9 IN ADULT: ICD-10-CM

## 2025-07-07 DIAGNOSIS — D68.62 LUPUS ANTICOAGULANT DISORDER (HCC): ICD-10-CM

## 2025-07-07 DIAGNOSIS — E03.9 ACQUIRED HYPOTHYROIDISM: ICD-10-CM

## 2025-07-07 DIAGNOSIS — E66.9 TYPE 2 DIABETES MELLITUS WITH OBESITY  (HCC): ICD-10-CM

## 2025-07-07 DIAGNOSIS — G25.0 BENIGN ESSENTIAL TREMOR: ICD-10-CM

## 2025-07-07 DIAGNOSIS — M79.89 RIGHT LEG SWELLING: ICD-10-CM

## 2025-07-07 DIAGNOSIS — J01.00 ACUTE MAXILLARY SINUSITIS, RECURRENCE NOT SPECIFIED: ICD-10-CM

## 2025-07-07 DIAGNOSIS — F32.A ANXIETY AND DEPRESSION: ICD-10-CM

## 2025-07-07 DIAGNOSIS — F41.9 ANXIETY AND DEPRESSION: ICD-10-CM

## 2025-07-07 DIAGNOSIS — Z00.00 ANNUAL PHYSICAL EXAM: Primary | ICD-10-CM

## 2025-07-07 DIAGNOSIS — E11.69 TYPE 2 DIABETES MELLITUS WITH OBESITY  (HCC): ICD-10-CM

## 2025-07-07 LAB — SL AMB POCT HEMOGLOBIN AIC: 4.8 (ref ?–6.5)

## 2025-07-07 PROCEDURE — 99214 OFFICE O/P EST MOD 30 MIN: CPT | Performed by: FAMILY MEDICINE

## 2025-07-07 PROCEDURE — 99396 PREV VISIT EST AGE 40-64: CPT | Performed by: FAMILY MEDICINE

## 2025-07-07 PROCEDURE — 83036 HEMOGLOBIN GLYCOSYLATED A1C: CPT | Performed by: FAMILY MEDICINE

## 2025-07-07 NOTE — PROGRESS NOTES
Adult Annual Physical  Name: Marti Bacon      : 1975      MRN: [id number]  Encounter Provider: Dena Toledo MD  Encounter Date: 2025   Encounter department: [facility]    :  Assessment & Plan  Annual physical exam         Right leg swelling    Orders:    VAS VENOUS DUPLEX -LOWER LIMB UNILATERAL; Future    Type 2 diabetes mellitus with obesity  (HCC)    Lab Results   Component Value Date    HGBA1C 4.8 2025     Stable on current meds  Orders:    POCT hemoglobin A1c    Albumin / creatinine urine ratio; Future    Comprehensive metabolic panel; Future    Hemoglobin A1C; Future    CBC and differential; Future    TSH, 3rd generation with Free T4 reflex; Future    Class 1 obesity due to excess calories with serious comorbidity and body mass index (BMI) of 34.0 to 34.9 in adult  Diet, exercise and portion control discussed        Anxiety and depression  Stable on current meds       Benign hypertension  Stable on 5mg every other day        Benign essential tremor  Doing well on propranolol       Acquired hypothyroidism  Stable on current meds       Lupus anticoagulant disorder (HCC)  Stable   Continue to monitor            Preventive Screenings:  - Diabetes Screening: screening not indicated and has diabetes  - Cholesterol Screening: screening not indicated and has hyperlipidemia   - Hepatitis C screening: screening up-to-date   - HIV screening: screening up-to-date   - Cervical cancer screening: screening not indicated   - Breast cancer screening: orders placed   - Colon cancer screening: screening up-to-date   - Lung cancer screening: screening not indicated     Immunizations:  - Immunizations due: Zoster (Shingrix)    Counseling/Anticipatory Guidance:    - Drug use: discussed harms of illicit drug use and how it can negatively impact mental/physical health.   - Dental health: discussed importance of regular tooth brushing, flossing, and dental visits.   - Sexual health: discussed  sexually transmitted diseases, partner selection, use of condoms, avoidance of unintended pregnancy, and contraceptive alternatives.   - Diet: discussed recommendations for a healthy/well-balanced diet.   - Exercise: the importance of regular exercise/physical activity was discussed. Recommend exercise 3-5 times per week for at least 30 minutes.   - Injury prevention: discussed safety/seat belts, safety helmets, smoke detectors, carbon monoxide detectors, and smoking near bedding or upholstery.          History of Present Illness     C/o right leg swelling for the last 1 month after she fell on it  C/o sinus pressure and drainage for 2 months  Adult Annual Physical:  Patient presents for annual physical.     Diet and Physical Activity:  - Diet/Nutrition: no special diet, low carb diet, consuming 3-5 servings of fruits/vegetables daily and well balanced diet.  - Exercise: walking, 3-4 times a week on average and less than 30 minutes on average.    Depression Screening:    - PHQ-9 Score: 0    General Health:  - Sleep: sleeps poorly and 4-6 hours of sleep on average. snores  - Hearing: normal hearing bilateral ears.  - Vision: no vision problems and wears glasses.  - Dental: brushes teeth once daily, regular dental visits and floss regularly.    /GYN Health:  - Follows with GYN: yes.   - Menopause: postmenopausal.   - History of STDs: no  - Contraception: hysterectomy.      Advanced Care Planning:  - Has an advanced directive?: yes    - Has a durable medical POA?: yes    - ACP document given to patient?: no      Review of Systems   Constitutional:  Negative for chills and fever.   HENT:  Negative for ear pain and sore throat.    Eyes: Negative.  Negative for pain and visual disturbance.   Respiratory:  Negative for cough and shortness of breath.    Cardiovascular:  Negative for chest pain and palpitations.   Gastrointestinal:  Negative for abdominal pain and vomiting.   Endocrine: Negative.    Genitourinary:   Negative for dysuria and hematuria.   Musculoskeletal:  Negative for arthralgias and back pain.   Skin:  Negative for color change and rash.   Allergic/Immunologic: Negative.    Neurological:  Negative for seizures and syncope.   Hematological: Negative.    Psychiatric/Behavioral: Negative.     All other systems reviewed and are negative.    Medical History Reviewed by provider this encounter:  Tobacco  Allergies  Meds  Problems  Med Hx  Surg Hx  Fam Hx     .  Past Medical History   Past Medical History[1]  Past Surgical History[2]  Family History[3]   reports that she quit smoking about 6 years ago. Her smoking use included cigarettes. She started smoking about 21 years ago. She has a 11.3 pack-year smoking history. She has never been exposed to tobacco smoke. She has never used smokeless tobacco. She reports that she does not currently use alcohol. She reports that she does not use drugs.  Current Outpatient Medications   Medication Instructions    ALPRAZolam (XANAX) 0.5 mg, Oral, 2 times daily PRN    atorvastatin (LIPITOR) 10 mg, Oral, Daily    BIOTIN  mg, Every other day    buPROPion (WELLBUTRIN SR) 150 mg, Oral, 2 times daily    Calcium Citrate-Vitamin D (MINE-CITRATE PLUS VITAMIN D PO) Daily    Cyanocobalamin (B-12) 1000 MCG CAPS 1 capsule, Daily    DULoxetine (CYMBALTA) 60 mg, Oral, Daily    gabapentin (NEURONTIN) 300 mg, Oral, 3 times daily    levothyroxine 50 mcg, Oral, Daily    lisinopril (ZESTRIL) 10 mg tablet 5 mg every other day    Mounjaro 12.5 MG/0.5ML SOAJ INJECT 12.5MG SUBCUTANEOUSLY (UNDER THE SKIN) EVERY WEEK    Multiple Vitamin (multivitamin) tablet 1 tablet, Oral, Daily    nystatin (MYCOSTATIN) powder Topical, 3 times daily    propranolol (INDERAL) 40 mg, Oral, 2 times daily    Vitamin D3 2,000 Units, Daily   Allergies[4]   Medications Ordered Prior to Encounter[5]   Social History[6]    Objective   /70 (BP Location: Left arm, Patient Position: Sitting, Cuff Size: Standard)    "Pulse 87   Temp 97.9 °F (36.6 °C) (Tympanic)   Resp 17   Ht 5' 4\" (1.626 m)   Wt 88.7 kg (195 lb 9.6 oz)   LMP 11/18/2018   SpO2 98%   BMI 33.57 kg/m²     Physical Exam  Vitals and nursing note reviewed.   Constitutional:       Appearance: She is well-developed.   HENT:      Head: Normocephalic and atraumatic.      Right Ear: Tympanic membrane normal.      Left Ear: Tympanic membrane normal.      Nose: Nose normal.      Mouth/Throat:      Mouth: Mucous membranes are moist.     Eyes:      Pupils: Pupils are equal, round, and reactive to light.     Neck:      Thyroid: No thyromegaly.     Cardiovascular:      Rate and Rhythm: Normal rate and regular rhythm.      Pulses: Normal pulses.      Heart sounds: Normal heart sounds. No murmur heard.  Pulmonary:      Effort: Pulmonary effort is normal.      Breath sounds: Normal breath sounds.   Abdominal:      General: Bowel sounds are normal.      Palpations: Abdomen is soft.     Musculoskeletal:         General: Swelling present. No deformity. Normal range of motion.      Cervical back: Normal range of motion and neck supple.      Comments: Right lower leg     Skin:     General: Skin is warm.      Findings: No erythema or rash.     Neurological:      Mental Status: She is alert and oriented to person, place, and time.      Deep Tendon Reflexes: Reflexes are normal and symmetric.     Psychiatric:         Mood and Affect: Mood normal.         Behavior: Behavior normal.              [1]   Past Medical History:  Diagnosis Date    Anxiety     Arthritis     Bariatric surgery status     CPAP (continuous positive airway pressure) dependence     Depression     Diabetes mellitus (HCC)     Eczema     Fibromyalgia, primary     Foot drop 5/12/2020    Hyperlipidemia     Hypertension     Hypothyroidism     Left foot drop 5/5/2020    Lupus anticoagulant disorder (HCC)     Migraine     Night sweats     Obesity     Ovarian neoplasm 11/20/2017    Postsurgical malabsorption     Psoriasis  "    Sleep apnea     Status post laparoscopic hysterectomy 11/20/2017    Tremors of nervous system 5/12/2020   [2]   Past Surgical History:  Procedure Laterality Date    COLONOSCOPY      HYSTERECTOMY Bilateral 11/17/2018    OOPHORECTOMY Bilateral 11/17/2018    NM COLONOSCOPY FLX DX W/COLLJ SPEC WHEN PFRMD N/A 6/13/2017    Procedure: COLONOSCOPY;  Surgeon: Cherelle Polanco DO;  Location: [facility];  Service: Gastroenterology    NM EGD TRANSORAL BIOPSY SINGLE/MULTIPLE N/A 1/30/2019    Procedure: ESOPHAGOGASTRODUODENOSCOPY (EGD) with bx;  Surgeon: Dallas Rueda MD;  Location: [facility];  Service: Bariatrics    NM LAPS GSTR RSTCV PX W/BYP LEESA-EN-Y LIMB <150 CM N/A 3/25/2019    Procedure: LAPAROSCOPIC LEESA-EN-Y GASTRIC BYPASS AND INTRAOPERATIVE EGD;  Surgeon: Dallas Rueda MD;  Location: [facility];  Service: Bariatrics    NM LAPS TOTAL HYSTERECT 250 GM/< W/RMVL TUBE/OVARY N/A 11/20/2017    Procedure: ROBOTIC TOTAL LAPAROSCOPIC HYSTERECTOMY/ BSO;  Surgeon: Chavo Aviles MD;  Location: [facility];  Service: Gynecology Oncology    WISDOM TOOTH EXTRACTION     [3]   Family History  Problem Relation Name Age of Onset    Heart disease Mother S Bitt         Cardiac disorder    Diabetes Mother S Bitt     Hypertension Mother S Bitt     Hyperthyroidism Mother S Bitt     Diabetes Father F Bitt     Hypertension Father F Bitt     Lung cancer Maternal Grandfather      Colon cancer Cousin Maternal 67    Colon cancer Maternal Aunt Chelo 49    Lung cancer Family      Diabetes Sister S Prom     No Known Problems Brother      No Known Problems Maternal Grandmother      No Known Problems Paternal Grandmother      No Known Problems Paternal Grandfather      No Known Problems Sister     [4]   Allergies  Allergen Reactions    Metformin Diarrhea   [5]   Current Outpatient Medications on File Prior to Visit   Medication Sig Dispense Refill    ALPRAZolam (XANAX) 0.5 mg tablet Take 1 tablet (0.5 mg total) by mouth 2 (two) times a day as  needed for anxiety 60 tablet 0    atorvastatin (LIPITOR) 10 mg tablet Take 1 tablet (10 mg total) by mouth daily 90 tablet 1    BIOTIN PO Take 600 mg by mouth every other day      buPROPion (WELLBUTRIN SR) 150 mg 12 hr tablet Take 1 tablet (150 mg total) by mouth 2 (two) times a day 180 tablet 1    Calcium Citrate-Vitamin D (MINE-CITRATE PLUS VITAMIN D PO) Take by mouth in the morning      Cholecalciferol (VITAMIN D3) 2000 units capsule Take 2,000 Units by mouth in the morning.      Cyanocobalamin (B-12) 1000 MCG CAPS Take 1 capsule by mouth in the morning.      DULoxetine (CYMBALTA) 60 mg delayed release capsule Take 1 capsule by mouth once daily 90 capsule 1    gabapentin (NEURONTIN) 300 mg capsule Take 1 capsule (300 mg total) by mouth 3 (three) times a day 270 capsule 1    levothyroxine 50 mcg tablet Take 1 tablet by mouth once daily 90 tablet 1    lisinopril (ZESTRIL) 10 mg tablet 5 mg every other day 90 tablet 1    Mounjaro 12.5 MG/0.5ML SOAJ INJECT 12.5MG SUBCUTANEOUSLY (UNDER THE SKIN) EVERY WEEK 6 mL 0    Multiple Vitamin (multivitamin) tablet Take 1 tablet by mouth daily      nystatin (MYCOSTATIN) powder Apply topically 3 (three) times a day 60 g 0    propranolol (INDERAL) 40 mg tablet Take 1 tablet (40 mg total) by mouth 2 (two) times a day 180 tablet 1    [DISCONTINUED] triamcinolone (KENALOG) 0.1 % ointment APPLY TOPICALLY TO AFFECTED AREA 1-2 TIMES A DAY AS NEEDED TO ITCHY RASH (Patient not taking: No sig reported) 80 g 0     No current facility-administered medications on file prior to visit.   [6]   Social History  Tobacco Use    Smoking status: Former     Current packs/day: 0.00     Average packs/day: 0.8 packs/day for 15.0 years (11.3 ttl pk-yrs)     Types: Cigarettes     Start date: 2003     Quit date: 2018     Years since quittin.5     Passive exposure: Never    Smokeless tobacco: Never   Vaping Use    Vaping status: Never Used   Substance and Sexual Activity    Alcohol use: Not  Currently    Drug use: No    Sexual activity: Yes     Partners: Male

## 2025-07-07 NOTE — ASSESSMENT & PLAN NOTE
Lab Results   Component Value Date    HGBA1C 4.8 07/07/2025     Stable on current meds  Orders:    POCT hemoglobin A1c    Albumin / creatinine urine ratio; Future    Comprehensive metabolic panel; Future    Hemoglobin A1C; Future    CBC and differential; Future    TSH, 3rd generation with Free T4 reflex; Future

## 2025-07-07 NOTE — PATIENT INSTRUCTIONS
"Patient Education     Routine physical for adults   The Basics   Written by the doctors and editors at [facility]   What is a physical? -- A physical is a routine visit, or \"check-up,\" with your doctor. You might also hear it called a \"wellness visit\" or \"preventive visit.\"  During each visit, the doctor will:   Ask about your physical and mental health   Ask about your habits, behaviors, and lifestyle   Do an exam   Give you vaccines if needed   Talk to you about any medicines you take   Give advice about your health   Answer your questions  Getting regular check-ups is an important part of taking care of your health. It can help your doctor find and treat any problems you have. But it's also important for preventing health problems.  A routine physical is different from a \"sick visit.\" A sick visit is when you see a doctor because of a health concern or problem. Since physicals are scheduled ahead of time, you can think about what you want to ask the doctor.  How often should I get a physical? -- It depends on your age and health. In general, for people age 21 years and older:   If you are younger than 50 years, you might be able to get a physical every 3 years.   If you are 50 years or older, your doctor might recommend a physical every year.  If you have an ongoing health condition, like diabetes or high blood pressure, your doctor will probably want to see you more often.  What happens during a physical? -- In general, each visit will include:   Physical exam - The doctor or nurse will check your height, weight, heart rate, and blood pressure. They will also look at your eyes and ears. They will ask about how you are feeling and whether you have any symptoms that bother you.   Medicines - It's a good idea to bring a list of all the medicines you take to each doctor visit. Your doctor will talk to you about your medicines and answer any questions. Tell them if you are having any side effects that bother you. You " "should also tell them if you are having trouble paying for any of your medicines.   Habits and behaviors - This includes:   Your diet   Your exercise habits   Whether you smoke, drink alcohol, or use drugs   Whether you are sexually active   Whether you feel safe at home  Your doctor will talk to you about things you can do to improve your health and lower your risk of health problems. They will also offer help and support. For example, if you want to quit smoking, they can give you advice and might prescribe medicines. If you want to improve your diet or get more physical activity, they can help you with this, too.   Lab tests, if needed - The tests you get will depend on your age and situation. For example, your doctor might want to check your:   Cholesterol   Blood sugar   Iron level   Vaccines - The recommended vaccines will depend on your age, health, and what vaccines you already had. Vaccines are very important because they can prevent certain serious or deadly infections.   Discussion of screening - \"Screening\" means checking for diseases or other health problems before they cause symptoms. Your doctor can recommend screening based on your age, risk, and preferences. This might include tests to check for:   Cancer, such as breast, prostate, cervical, ovarian, colorectal, prostate, lung, or skin cancer   Sexually transmitted infections, such as chlamydia and gonorrhea   Mental health conditions like depression and anxiety  Your doctor will talk to you about the different types of screening tests. They can help you decide which screenings to have. They can also explain what the results might mean.   Answering questions - The physical is a good time to ask the doctor or nurse questions about your health. If needed, they can refer you to other doctors or specialists, too.  Adults older than 65 years often need other care, too. As you get older, your doctor will talk to you about:   How to prevent falling at " home   Hearing or vision tests   Memory testing   How to take your medicines safely   Making sure that you have the help and support you need at home  All topics are updated as new evidence becomes available and our peer review process is complete.  This topic retrieved from [employer] on: May 02, 2024.  Topic 031752 Version 1.0  Release: 32.4.3 - C32.122  © 2024 UpToDate, Inc. and/or its affiliates. All rights reserved.  Consumer Information Use and Disclaimer   Disclaimer: This generalized information is a limited summary of diagnosis, treatment, and/or medication information. It is not meant to be comprehensive and should be used as a tool to help the user understand and/or assess potential diagnostic and treatment options. It does NOT include all information about conditions, treatments, medications, side effects, or risks that may apply to a specific patient. It is not intended to be medical advice or a substitute for the medical advice, diagnosis, or treatment of a health care provider based on the health care provider's examination and assessment of a patient's specific and unique circumstances. Patients must speak with a health care provider for complete information about their health, medical questions, and treatment options, including any risks or benefits regarding use of medications. This information does not endorse any treatments or medications as safe, effective, or approved for treating a specific patient. UpToDate, Inc. and its affiliates disclaim any warranty or liability relating to this information or the use thereof.The use of this information is governed by the Terms of Use, available at https://www.woltersEmotifyuwer.com/en/know/clinical-effectiveness-terms. 2024© UpToDate, Inc. and its affiliates and/or licensors. All rights reserved.  Copyright   © 2024 Up[employer]Date, Inc. and/or its affiliates. All rights reserved.

## 2025-07-11 ENCOUNTER — HOSPITAL ENCOUNTER (OUTPATIENT)
Dept: NON INVASIVE DIAGNOSTICS | Facility: CLINIC | Age: 50
Discharge: HOME | End: 2025-07-11
Attending: FAMILY MEDICINE
Payer: COMMERCIAL

## 2025-07-11 DIAGNOSIS — M79.89 RIGHT LEG SWELLING: ICD-10-CM

## 2025-07-11 PROCEDURE — 93971 EXTREMITY STUDY: CPT | Performed by: SURGERY

## 2025-07-11 PROCEDURE — 93971 EXTREMITY STUDY: CPT

## 2025-07-21 DIAGNOSIS — E66.9 TYPE 2 DIABETES MELLITUS WITH OBESITY  (HCC): ICD-10-CM

## 2025-07-21 DIAGNOSIS — E11.69 TYPE 2 DIABETES MELLITUS WITH OBESITY  (HCC): ICD-10-CM

## 2025-07-22 RX ORDER — ATORVASTATIN CALCIUM 10 MG/1
10 TABLET, FILM COATED ORAL DAILY
Qty: 30 TABLET | Refills: 0 | Status: SHIPPED | OUTPATIENT
Start: 2025-07-22

## 2025-07-29 DIAGNOSIS — E11.69 TYPE 2 DIABETES MELLITUS WITH OBESITY  (HCC): ICD-10-CM

## 2025-07-29 DIAGNOSIS — E66.9 TYPE 2 DIABETES MELLITUS WITH OBESITY  (HCC): ICD-10-CM

## 2025-07-30 RX ORDER — TIRZEPATIDE 12.5 MG/.5ML
INJECTION, SOLUTION SUBCUTANEOUS
Qty: 6 ML | Refills: 0 | Status: SHIPPED | OUTPATIENT
Start: 2025-07-30

## 2025-08-06 DIAGNOSIS — F41.9 ANXIETY AND DEPRESSION: ICD-10-CM

## 2025-08-06 DIAGNOSIS — F32.A ANXIETY AND DEPRESSION: ICD-10-CM

## 2025-08-06 DIAGNOSIS — F51.01 PRIMARY INSOMNIA: ICD-10-CM

## 2025-08-06 RX ORDER — ALPRAZOLAM 0.5 MG
0.5 TABLET ORAL 2 TIMES DAILY PRN
Qty: 60 TABLET | Refills: 0 | Status: SHIPPED | OUTPATIENT
Start: 2025-08-06

## (undated) DEVICE — DRAPE FLUID WARMER (BIRD BATH)

## (undated) DEVICE — SUT VICRYL 0 UR-6 27 IN J603H

## (undated) DEVICE — Device

## (undated) DEVICE — NEEDLE SPINAL18G X 3.5 IN QUINCKE

## (undated) DEVICE — ADHESIVE SKIN CLSR DERMABOND NX

## (undated) DEVICE — LAPAROSCOPIC TROCAR SLEEVE/SINGLE USE: Brand: KII® SLEEVE

## (undated) DEVICE — HARMONIC 1100 SHEARS, 36CM SHAFT LENGTH: Brand: HARMONIC

## (undated) DEVICE — ENDO STITCH 2-0 SURGIDAC 48 IN

## (undated) DEVICE — 3000CC GUARDIAN II: Brand: GUARDIAN

## (undated) DEVICE — SPONGE 4 X 4 XRAY 16 PLY STRL LF RFD

## (undated) DEVICE — DRAPE EQUIPMENT RF WAND

## (undated) DEVICE — ALL PURPOSE SPONGES,NONWOVEN, 4 PLY: Brand: CURITY

## (undated) DEVICE — TROCARS: Brand: KII® BALLOON BLUNT TIP SYSTEM

## (undated) DEVICE — ENDOPOUCH RETRIEVER SPECIMEN RETRIEVAL BAGS: Brand: ENDOPOUCH RETRIEVER

## (undated) DEVICE — OCCLUDER COLPO-PNEUMO

## (undated) DEVICE — CHLORAPREP HI-LITE 26ML ORANGE

## (undated) DEVICE — ALLENTOWN LAP CHOLE APP PACK: Brand: CARDINAL HEALTH

## (undated) DEVICE — INTENDED FOR TISSUE SEPARATION, AND OTHER PROCEDURES THAT REQUIRE A SHARP SURGICAL BLADE TO PUNCTURE OR CUT.: Brand: BARD-PARKER SAFETY BLADES SIZE 11, STERILE

## (undated) DEVICE — 1820 FOAM BLOCK NEEDLE COUNTER: Brand: DEVON

## (undated) DEVICE — MONOPOLAR CURVED SCISSORS: Brand: ENDOWRIST

## (undated) DEVICE — ENDOPATH XCEL BLADELESS TROCARS WITH STABILITY SLEEVES: Brand: ENDOPATH XCEL

## (undated) DEVICE — SUT MONOCRYL 4-0 PS-2 27 IN Y426H

## (undated) DEVICE — GLOVE INDICATOR PI UNDERGLOVE SZ 7 BLUE

## (undated) DEVICE — INTENDED FOR TISSUE SEPARATION, AND OTHER PROCEDURES THAT REQUIRE A SHARP SURGICAL BLADE TO PUNCTURE OR CUT.: Brand: BARD-PARKER SAFETY BLADES SIZE 15, STERILE

## (undated) DEVICE — TROCAR PORT ACCESS 12 X120MM W/BLDLS OPTICAL TIP AIRSEAL

## (undated) DEVICE — TROCAR VISIPORT

## (undated) DEVICE — CLAMP TOWEL TUBING DISPOSABLE

## (undated) DEVICE — SPONGE LAP 18 X 18 IN STRL RFD

## (undated) DEVICE — VIOLET BRAIDED (POLYGLACTIN 910), SYNTHETIC ABSORBABLE SUTURE: Brand: COATED VICRYL

## (undated) DEVICE — SPECIMEN TRAP: Brand: ARGYLE

## (undated) DEVICE — GLOVE PI ULTRA TOUCH SZ.6.5

## (undated) DEVICE — AIRSEAL TUBE SMOKE EVAC LUMENX3 FILTERED

## (undated) DEVICE — TRAY FOLEY 16FR URIMETER SILICONE SURESTEP

## (undated) DEVICE — STRL COTTON TIP APPLCTR 6IN PK: Brand: CARDINAL HEALTH

## (undated) DEVICE — FENESTRATED BIPOLAR FORCEPS: Brand: ENDOWRIST

## (undated) DEVICE — GLOVE INDICATOR PI UNDERGLOVE SZ 8 BLUE

## (undated) DEVICE — POWER SHELL SIGNIA

## (undated) DEVICE — TIBURON LAPAROSCOPIC ABDOMINAL DRAPE: Brand: CONVERTORS

## (undated) DEVICE — PMI DISPOSABLE PUNCTURE CLOSURE DEVICE / SUTURE GRASPER: Brand: PMI

## (undated) DEVICE — 10 MM BABCOCKS WITH RATCHET HANDLES: Brand: ENDOPATH

## (undated) DEVICE — COVIDIEN ENDO GIA PURPLE (MED) RELOAD 60MM

## (undated) DEVICE — PROGRASP FORCEPS: Brand: ENDOWRIST

## (undated) DEVICE — TRAVELKIT CONTAINS FIRST STEP KIT (200ML EP-4 KIT) AND SOILED SCOPE BAG - 1 KIT: Brand: TRAVELKIT CONTAINS FIRST STEP KIT AND SOILED SCOPE BAG

## (undated) DEVICE — 2000CC GUARDIAN II: Brand: GUARDIAN

## (undated) DEVICE — SKIN MARKER DUAL TIP WITH RULER CAP, FLEXIBLE RULER AND LABELS: Brand: DEVON

## (undated) DEVICE — COLUMN DRAPE

## (undated) DEVICE — STAPLER EEA 25 MM COVIDIEN

## (undated) DEVICE — TIP COVER ACCESSORY

## (undated) DEVICE — ADHESIVE SKN CLSR HISTOACRYL FLEX 0.5ML LF

## (undated) DEVICE — UTERINE MANIPULATOR RUMI 6.7 X 8 CM

## (undated) DEVICE — LUBRICANT INST ELECTROLUBE ANTISTK WO PAD

## (undated) DEVICE — ENDO STITCH DEVICE 10 MM

## (undated) DEVICE — TROCAR: Brand: KII FIOS FIRST ENTRY

## (undated) DEVICE — SYRINGE 30ML LL

## (undated) DEVICE — TUBING SUCTION 5MM X 12 FT

## (undated) DEVICE — [HIGH FLOW INSUFFLATOR,  DO NOT USE IF PACKAGE IS DAMAGED,  KEEP DRY,  KEEP AWAY FROM SUNLIGHT,  PROTECT FROM HEAT AND RADIOACTIVE SOURCES.]: Brand: PNEUMOSURE

## (undated) DEVICE — IRRIG ENDO FLO TUBING

## (undated) DEVICE — ARM DRAPE

## (undated) DEVICE — ASTOUND STANDARD SURGICAL GOWN, XL: Brand: CONVERTORS

## (undated) DEVICE — NEEDLE 25G X 1 1/2

## (undated) DEVICE — ENDOPATH 5MM CURVED SCISSORS WITH MONOPOLAR CAUTERY: Brand: ENDOPATH

## (undated) DEVICE — SINGLE-USE BIOPSY FORCEPS: Brand: RADIAL JAW 4

## (undated) DEVICE — GLOVE SRG BIOGEL 7

## (undated) DEVICE — SYRINGE 20ML LL

## (undated) DEVICE — PACK ROBOTIC PROSTATE PBDS DA VINCI SI/XI

## (undated) DEVICE — WEBRIL 6 IN UNSTERILE

## (undated) DEVICE — SCD SEQUENTIAL COMPRESSION COMFORT SLEEVE MEDIUM KNEE LENGTH: Brand: KENDALL SCD

## (undated) DEVICE — VISUALIZATION SYSTEM: Brand: CLEARIFY

## (undated) DEVICE — URETERAL CATHETER ADAPTOR TIP

## (undated) DEVICE — SYRINGE 50ML LL

## (undated) DEVICE — CANNULA SEAL

## (undated) DEVICE — GLOVE SRG LF STRL BGL SKNSNS 7.5 PF

## (undated) DEVICE — CHLORHEXIDINE 4PCT 4 OZ

## (undated) DEVICE — SUT STRATAFIX SPIRAL 2-0 CT-1 20 CM SXPD1B400

## (undated) DEVICE — TUBING SMOKE EVAC W/FILTRATION DEVICE PLUMEPORT ACTIV

## (undated) DEVICE — MAYO STAND COVER: Brand: CONVERTORS

## (undated) DEVICE — GLOVE SRG BIOGEL 8

## (undated) DEVICE — ANTIBACTERIAL VIOLET BRAIDED (POLYGLACTIN 910), SYNTHETIC ABSORBABLE SUTURE: Brand: COATED VICRYL

## (undated) DEVICE — ELECTRODE LAP SPATULA STR E-Z CLEAN 33CM -0018